# Patient Record
Sex: MALE | Race: WHITE | NOT HISPANIC OR LATINO | Employment: FULL TIME | URBAN - METROPOLITAN AREA
[De-identification: names, ages, dates, MRNs, and addresses within clinical notes are randomized per-mention and may not be internally consistent; named-entity substitution may affect disease eponyms.]

---

## 2017-01-12 ENCOUNTER — OFFICE VISIT (OUTPATIENT)
Dept: FAMILY MEDICINE | Facility: CLINIC | Age: 37
End: 2017-01-12
Payer: COMMERCIAL

## 2017-01-12 VITALS
SYSTOLIC BLOOD PRESSURE: 134 MMHG | HEART RATE: 79 BPM | TEMPERATURE: 97.8 F | BODY MASS INDEX: 42.66 KG/M2 | HEIGHT: 72 IN | WEIGHT: 315 LBS | DIASTOLIC BLOOD PRESSURE: 80 MMHG

## 2017-01-12 DIAGNOSIS — J02.9 SORE THROAT: Primary | ICD-10-CM

## 2017-01-12 DIAGNOSIS — I10 BENIGN ESSENTIAL HYPERTENSION: ICD-10-CM

## 2017-01-12 LAB
DEPRECATED S PYO AG THROAT QL EIA: NORMAL
MICRO REPORT STATUS: NORMAL
SPECIMEN SOURCE: NORMAL

## 2017-01-12 PROCEDURE — 87081 CULTURE SCREEN ONLY: CPT | Performed by: FAMILY MEDICINE

## 2017-01-12 PROCEDURE — 87880 STREP A ASSAY W/OPTIC: CPT | Performed by: FAMILY MEDICINE

## 2017-01-12 PROCEDURE — 99214 OFFICE O/P EST MOD 30 MIN: CPT | Performed by: FAMILY MEDICINE

## 2017-01-12 RX ORDER — LISINOPRIL 10 MG/1
10 TABLET ORAL DAILY
Qty: 30 TABLET | Refills: 1 | Status: SHIPPED | OUTPATIENT
Start: 2017-01-12 | End: 2017-02-01

## 2017-01-12 NOTE — NURSING NOTE
Chief Complaint   Patient presents with     Hypertension     Here to discuss about elevated blood pressure readings.     Pharyngitis     Sore throat symptoms.       Initial /72 mmHg  Pulse 79  Temp(Src) 97.8  F (36.6  C) (Tympanic)  Ht 6' (1.829 m)  Wt 315 lb (142.883 kg)  BMI 42.71 kg/m2 Estimated body mass index is 42.71 kg/(m^2) as calculated from the following:    Height as of this encounter: 6' (1.829 m).    Weight as of this encounter: 315 lb (142.883 kg).  BP completed using cuff size: X-large

## 2017-01-12 NOTE — PROGRESS NOTES
SUBJECTIVE:                                                    Edwin Nuno is a 36 year old male who presents to clinic today for the following health issues:      Hypertension Follow-up    He was seen for a dental visit and the blood pressure was 184/60.  Was seen in clinic and was told to monitor his readings.  His BP machine at home has the average BP at rest in the 160 systolic range.   Was seen in the ER for conjunctivitis on 12-30-16 and blood pressure was still elevated.   He was on Lisinopril in the past.     Family Hx: none for hypertension.     Clinic BP readings are copied below:  Vital Signs 11/10/2016 12/19/2016 12/19/2016 12/30/2016 12/30/2016   Systolic 135 161 159 193 169   Diastolic 81 95 92 121 103   Pulse 79 80 77 85      Vital Signs 1/12/2017   Systolic 141   Diastolic 72   Pulse 79         Outpatient blood pressures are being checked at home.  Results are 160/116.    Low Salt Diet: no added salt     ENT Symptoms             Symptoms: cc Present Absent Comment   Fever/Chills   x    Fatigue   x    Muscle Aches   x    Eye Irritation   x    Sneezing   x    Nasal Titus/Drg  x  Minor.   Sinus Pressure/Pain   x    Loss of smell   x    Dental pain   x    Sore Throat  x     Swollen Glands   x    Ear Pain/Fullness   x    Cough   x    Wheeze   x    Chest Pain   x    Shortness of breath   x    Rash   x    Other         Symptom duration:  One week.   Symptom severity:  Symptoms are the same.   Treatments tried:  Ibuprofen as needed. Cool liquids   Contacts:  none known for strep     He has had strep throat in the past.       Amount of exercise or physical activity: 3 days per week.    Problems taking medications regularly: No    Medication side effects: none  Diet: No added salt.        Current outpatient prescriptions:      lisinopril (PRINIVIL/ZESTRIL) 10 MG tablet, Take 1 tablet (10 mg) by mouth daily, Disp: 30 tablet, Rfl: 1     testosterone cypionate (DEPO-TESTOSTERONE) 200 MG/ML injection, 400 mg  every 25 days., Disp: 2 mL, Rfl: 5     order for DME, Equipment being ordered: gel cups, large, Disp: 1 Device, Rfl: 0     order for DME, Equipment being ordered: knee sleeve, XL, Disp: 1 Device, Rfl: 0    Patient Active Problem List   Diagnosis     Family history of colon cancer     Hyperlipidemia with target LDL less than 130     Hypotestosteronism     Esophageal reflux     Benign essential hypertension       Blood pressure 134/80, pulse 79, temperature 97.8  F (36.6  C), temperature source Tympanic, height 6' (1.829 m), weight 315 lb (142.883 kg).  Exam:  GENERAL APPEARANCE: healthy, alert and no distress  EYES: EOMI,  PERRL  HENT: ear canals and TM's normal and tonsillar erythema  NECK: no adenopathy, no asymmetry, masses, or scars and thyroid normal to palpation  RESP: lungs clear to auscultation - no rales, rhonchi or wheezes  CV: regular rates and rhythm, normal S1 S2, no S3 or S4 and no murmur, click or rub -  SKIN: no suspicious lesions or rashes  PSYCH: mentation appears normal and affect normal/bright    RST: negative    (J02.9) Sore throat  (primary encounter diagnosis)  Comment:   Plan: Strep, Rapid Screen, Beta strep group A culture        For the throat we did the rapid strep test, and this is negative. If the 24 hour test is positive then Pen V at 500 mg twice daily would be ordered. Use the symptomatic therapies as discussed. Avoid contagious exposures.     (I10) Benign essential hypertension  Comment:   Plan: lisinopril (PRINIVIL/ZESTRIL) 10 MG tablet        For the BP, since the average is high then we will start Lisinopril 10 mg daily. Monitor and record the readings at rest with a large cuff, with a calibrated machine. The goal for the average is under 130/80.   Use the non drug therapies as well and if the average is still high in 3-4 weeks then increase the dose to 20 mg daily. Call if any side effects. If doing well then recheck in the clinic in 6 weeks. Bring in the readings.       Cecilio  Davis Hughes

## 2017-01-12 NOTE — PATIENT INSTRUCTIONS
Thank you for choosing [facility].  You may be receiving a survey in the mail from Lukas [employer] regarding your visit today.  Please take a few minutes to complete and return the survey to let us know how we are doing.      If you have questions or concerns, please contact us via RiverWired or you can contact your care team at [phone].    Our Clinic hours are:  Monday 6:40 am  to 7:00 pm  Tuesday -Friday 6:40 am to 5:00 pm    The [place] outpatient lab hours are:  Monday - Friday 6:10 am to 4:45 pm  Saturdays 7:00 am to 11:00 am  Appointments are required, call [phone]    If you have clinical questions after hours or would like to schedule an appointment,  call the clinic at [phone].      (J02.9) Sore throat  (primary encounter diagnosis)  Comment:   Plan: Strep, Rapid Screen, Beta strep group A culture        For the throat we did the rapid strep test, and this is negative. If the 24 hour test is positive then Pen V at 500 mg twice daily would be ordered. Use the symptomatic therapies as discussed. Avoid contagious exposures.     (I10) Benign essential hypertension  Comment:   Plan: lisinopril (PRINIVIL/ZESTRIL) 10 MG tablet        For the BP, since the average is high then we will start Lisinopril 10 mg daily. Monitor and record the readings at rest with a large cuff, with a calibrated machine. The goal for the average is under 130/80.   Use the non drug therapies as well and if the average is still high in 3-4 weeks then increase the dose to 20 mg daily. Call if any side effects. If doing well then recheck in the clinic in 6 weeks. Bring in the readings.

## 2017-01-12 NOTE — MR AVS SNAPSHOT
After Visit Summary   1/12/2017    Edwin Nuno    MRN: [id number]           Patient Information     Date Of Birth          1980        Visit Information        Provider Department      1/12/2017 7:20 AM Cecilio Hughes MD [facility]        Today's Diagnoses     Sore throat    -  1     Benign essential hypertension           Care Instructions          Thank you for choosing [facility].  You may be receiving a survey in the mail from [facility] regarding your visit today.  Please take a few minutes to complete and return the survey to let us know how we are doing.      If you have questions or concerns, please contact us via [employer] or you can contact your care team at [phone].    Our Clinic hours are:  Monday 6:40 am  to 7:00 pm  Tuesday -Friday 6:40 am to 5:00 pm    The [place] outpatient lab hours are:  Monday - Friday 6:10 am to 4:45 pm  Saturdays 7:00 am to 11:00 am  Appointments are required, call [phone]    If you have clinical questions after hours or would like to schedule an appointment,  call the clinic at [phone].      (J02.9) Sore throat  (primary encounter diagnosis)  Comment:   Plan: Strep, Rapid Screen, Beta strep group A culture        For the throat we did the rapid strep test, and this is negative. If the 24 hour test is positive then Pen V at 500 mg twice daily would be ordered. Use the symptomatic therapies as discussed. Avoid contagious exposures.     (I10) Benign essential hypertension  Comment:   Plan: lisinopril (PRINIVIL/ZESTRIL) 10 MG tablet        For the BP, since the average is high then we will start Lisinopril 10 mg daily. Monitor and record the readings at rest with a large cuff, with a calibrated machine. The goal for the average is under 130/80.   Use the non drug therapies as well and if the average is still high in 3-4 weeks then increase the dose to 20 mg daily. Call if any side effects. If doing well then recheck in  the clinic in 6 weeks. Bring in the readings.         Follow-ups after your visit        Who to contact     If you have questions or need follow up information about today's clinic visit or your schedule please contact [facility] directly at [phone].  Normal or non-critical lab and imaging results will be communicated to you by MyChart, letter or phone within 4 business days after the clinic has received the results. If you do not hear from us within 7 days, please contact the clinic through Tradesparqhart or phone. If you have a critical or abnormal lab result, we will notify you by phone as soon as possible.  Submit refill requests through SCREEMO or call your pharmacy and they will forward the refill request to us. Please allow 3 business days for your refill to be completed.          Additional Information About Your Visit        TradesparqharSkytap Information     SCREEMO gives you secure access to your electronic health record. If you see a primary care provider, you can also send messages to your care team and make appointments. If you have questions, please call your primary care clinic.  If you do not have a primary care provider, please call [phone] and they will assist you.        Care EveryWhere ID     This is your Care EveryWhere ID. This could be used by other organizations to access your [place] medical records  [id number]        Your Vitals Were     Pulse Temperature Height BMI (Body Mass Index)          79 97.8  F (36.6  C) (Tympanic) 6' (1.829 m) 42.71 kg/m2         Blood Pressure from Last 3 Encounters:   01/12/17 141/72   12/30/16 169/103   12/19/16 159/92    Weight from Last 3 Encounters:   01/12/17 315 lb (142.883 kg)   12/30/16 313 lb (141.976 kg)   12/19/16 321 lb 6.4 oz (145.786 kg)              We Performed the Following     Beta strep group A culture     Strep, Rapid Screen          Today's Medication Changes          These changes are accurate as of: 1/12/17  8:02 AM.  If you have  any questions, ask your nurse or doctor.               Start taking these medicines.        Dose/Directions    lisinopril 10 MG tablet   Commonly known as:  PRINIVIL/ZESTRIL   Used for:  Benign essential hypertension   Started by:  Cecilio Hughes MD        Dose:  10 mg   Take 1 tablet (10 mg) by mouth daily   Quantity:  30 tablet   Refills:  1            Where to get your medicines      These medications were sent to [id number] [address] AT [facility] OF [address] [place] [address]  [address] [facility] [address]     Phone:  [phone]    - lisinopril 10 MG tablet             Primary Care Provider Office Phone # Fax #    Cecilio Hughes [phone]       [facility] [address] [facility] [address]        Thank you!     Thank you for choosing [facility]  for your care. Our goal is always to provide you with excellent care. Hearing back from our patients is one way we can continue to improve our services. Please take a few minutes to complete the written survey that you may receive in the mail after your visit with us. Thank you!             Your Updated Medication List - Protect others around you: Learn how to safely use, store and throw away your medicines at www.disposemymeds.org.          This list is accurate as of: 1/12/17  8:02 AM.  Always use your most recent med list.                   Brand Name Dispense Instructions for use    DEPO-TESTOSTERONE 200 MG/ML injection   Generic drug:  testosterone cypionate     2 mL    400 mg every 25 days.       lisinopril 10 MG tablet    PRINIVIL/ZESTRIL    30 tablet    Take 1 tablet (10 mg) by mouth daily       * order for DME     1 Device    Equipment being ordered: knee sleeve, XL       * order for DME     1 Device    Equipment being ordered: gel cups, large       * Notice:  This list has 2 medication(s) that are the same as other medications prescribed for you. Read  the directions carefully, and ask your doctor or other care provider to review them with you.

## 2017-01-12 NOTE — Clinical Note
[facility]  [address] [facility] [address]  Phone: [phone]    January 18, 2017    Edwin Nuno  [address]  [facility] [address]              Dear Mr. Nuno,     The results of your recent throat culture were negative.  If you have any further questions or concerns please contact the clinic.                Sincerely,      Cecilio Hughes MD / CATHERINE

## 2017-01-14 LAB
BACTERIA SPEC CULT: NORMAL
MICRO REPORT STATUS: NORMAL
SPECIMEN SOURCE: NORMAL

## 2017-02-01 ENCOUNTER — OFFICE VISIT (OUTPATIENT)
Dept: FAMILY MEDICINE | Facility: CLINIC | Age: 37
End: 2017-02-01
Payer: COMMERCIAL

## 2017-02-01 VITALS
TEMPERATURE: 98.3 F | BODY MASS INDEX: 41.99 KG/M2 | WEIGHT: 310 LBS | HEIGHT: 72 IN | HEART RATE: 70 BPM | SYSTOLIC BLOOD PRESSURE: 135 MMHG | DIASTOLIC BLOOD PRESSURE: 74 MMHG

## 2017-02-01 DIAGNOSIS — I10 BENIGN ESSENTIAL HYPERTENSION: Primary | ICD-10-CM

## 2017-02-01 DIAGNOSIS — E34.9 HYPOTESTOSTERONISM: ICD-10-CM

## 2017-02-01 LAB
CREAT SERPL-MCNC: 1.27 MG/DL (ref 0.66–1.25)
GFR SERPL CREATININE-BSD FRML MDRD: 64 ML/MIN/1.7M2
POTASSIUM SERPL-SCNC: 3.9 MMOL/L (ref 3.4–5.3)

## 2017-02-01 PROCEDURE — 36415 COLL VENOUS BLD VENIPUNCTURE: CPT | Performed by: FAMILY MEDICINE

## 2017-02-01 PROCEDURE — 82565 ASSAY OF CREATININE: CPT | Performed by: FAMILY MEDICINE

## 2017-02-01 PROCEDURE — 99213 OFFICE O/P EST LOW 20 MIN: CPT | Mod: 25 | Performed by: FAMILY MEDICINE

## 2017-02-01 PROCEDURE — 96372 THER/PROPH/DIAG INJ SC/IM: CPT | Performed by: FAMILY MEDICINE

## 2017-02-01 PROCEDURE — 84132 ASSAY OF SERUM POTASSIUM: CPT | Performed by: FAMILY MEDICINE

## 2017-02-01 RX ORDER — LISINOPRIL 10 MG/1
10 TABLET ORAL DAILY
Qty: 90 TABLET | Refills: 3 | Status: SHIPPED | OUTPATIENT
Start: 2017-02-01 | End: 2017-08-03

## 2017-02-01 RX ORDER — TESTOSTERONE CYPIONATE 200 MG/ML
INJECTION, SOLUTION INTRAMUSCULAR
Qty: 2 ML | Refills: 5 | COMMUNITY
Start: 2017-02-01 | End: 2017-04-10

## 2017-02-01 NOTE — PROGRESS NOTES
SUBJECTIVE:                                                    Edwin Nuno is a 36 year old male who presents to clinic today for the following health issues:      Hypertension Follow-up      Outpatient blood pressures are being checked at home.  Results are 126/98 for an average.    He was at the Dentist on 1-24-17 and the blood pressure was 158/88.  They sent a form with him to have filled out.  He forgot this form at home today.    Low Salt Diet: no added salt       Amount of exercise or physical activity: 3 days per week.    Problems taking medications regularly: No    Medication side effects: none    Diet: No added salt.    TESTOSTERONE INJECTION:  Due for his Testosterone injection today.    Testosterone Cypionate, 400 mg every 25 days.      Current outpatient prescriptions:      lisinopril (PRINIVIL/ZESTRIL) 10 MG tablet, Take 1 tablet (10 mg) by mouth daily, Disp: 90 tablet, Rfl: 3     testosterone cypionate (DEPO-TESTOSTERONE) 200 MG/ML injection, 400 mg every 25 days., Disp: 2 mL, Rfl: 5     [DISCONTINUED] lisinopril (PRINIVIL/ZESTRIL) 10 MG tablet, Take 1 tablet (10 mg) by mouth daily, Disp: 30 tablet, Rfl: 1     [DISCONTINUED] testosterone cypionate (DEPO-TESTOSTERONE) 200 MG/ML injection, 400 mg every 25 days., Disp: 2 mL, Rfl: 5     order for DME, Equipment being ordered: gel cups, large, Disp: 1 Device, Rfl: 0     order for DME, Equipment being ordered: knee sleeve, XL, Disp: 1 Device, Rfl: 0    Patient Active Problem List   Diagnosis     Family history of colon cancer     Hyperlipidemia with target LDL less than 130     Hypotestosteronism     Esophageal reflux     Benign essential hypertension       Blood pressure 135/74, pulse 70, temperature 98.3  F (36.8  C), temperature source Tympanic, height 6' (1.829 m), weight 310 lb (140.615 kg).  Exam:  GENERAL APPEARANCE: healthy, alert and no distress  CV: regular rates and rhythm  SKIN: no suspicious lesions or rashes  PSYCH: mentation appears  normal and affect normal/bright      (I10) Benign essential hypertension  (primary encounter diagnosis)  Comment:   Plan: lisinopril (PRINIVIL/ZESTRIL) 10 MG tablet,         Creatinine, Potassium        Monitor and record the BP readings at rest with a calibrated machine with a large cuff. The goal for the average is under 130/80. Use the med and the non drug therapies.  If doing well then refill and recheck annually. If the BP is higher, then call the clinic RN at [phone] and the dose of the Lisinopril will be increased to 20 mg daily.     (E29.1) Hypotestosteronism  Comment:   Plan: testosterone cypionate (DEPO-TESTOSTERONE) 200         MG/ML injection        The shot is done today. The interval is 25 days. The interval may be adjusted pending the effects. The lab is done annually.     Cecilio Hughes

## 2017-02-01 NOTE — MR AVS SNAPSHOT
After Visit Summary   2/1/2017    Edwin Nuno    MRN: [id number]           Patient Information     Date Of Birth          1980        Visit Information        Provider Department      2/1/2017 3:00 PM Cecilio Hughes MD [facility]        Today's Diagnoses     Benign essential hypertension    -  1     Hypotestosteronism           Care Instructions          Thank you for choosing [facility].  You may be receiving a survey in the mail from [facility] regarding your visit today.  Please take a few minutes to complete and return the survey to let us know how we are doing.      If you have questions or concerns, please contact us via [email or web address] or you can contact your care team at [phone].    Our Clinic hours are:  Monday 6:40 am  to 7:00 pm  Tuesday -Friday 6:40 am to 5:00 pm    The [place] outpatient lab hours are:  Monday - Friday 6:10 am to 4:45 pm  Saturdays 7:00 am to 11:00 am  Appointments are required, call [phone]    If you have clinical questions after hours or would like to schedule an appointment,  call the clinic at [phone].    (I10) Benign essential hypertension  (primary encounter diagnosis)  Comment:   Plan: lisinopril (PRINIVIL/ZESTRIL) 10 MG tablet,         Creatinine, Potassium        Monitor and record the BP readings at rest with a calibrated machine with a large cuff. The goal for the average is under 130/80. Use the med and the non drug therapies.  If doing well then refill and recheck annually. If the BP is higher, then call the clinic RN at [phone] and the dose of the Lisinopril will be increased to 20 mg daily.     (E29.1) Hypotestosteronism  Comment:   Plan: testosterone cypionate (DEPO-TESTOSTERONE) 200         MG/ML injection        The shot is done today. The interval is 25 days. The interval may be adjusted pending the effects. The lab is done annually.           Follow-ups after your visit        Who to contact     If you have  questions or need follow up information about today's clinic visit or your schedule please contact [facility] directly at [phone].  Normal or non-critical lab and imaging results will be communicated to you by MyChart, letter or phone within 4 business days after the clinic has received the results. If you do not hear from us within 7 days, please contact the clinic through [email or web address] or phone. If you have a critical or abnormal lab result, we will notify you by phone as soon as possible.  Submit refill requests through Eyepic or call your pharmacy and they will forward the refill request to us. Please allow 3 business days for your refill to be completed.          Additional Information About Your Visit        Ask.comharSanook Information     Eyepic gives you secure access to your electronic health record. If you see a primary care provider, you can also send messages to your care team and make appointments. If you have questions, please call your primary care clinic.  If you do not have a primary care provider, please call [phone] and they will assist you.        Care EveryWhere ID     This is your Care EveryWhere ID. This could be used by other organizations to access your [place] medical records  [id number]        Your Vitals Were     Pulse Temperature Height BMI (Body Mass Index)          70 98.3  F (36.8  C) (Tympanic) 6' (1.829 m) 42.03 kg/m2         Blood Pressure from Last 3 Encounters:   02/01/17 135/74   01/12/17 134/80   12/30/16 169/103    Weight from Last 3 Encounters:   02/01/17 310 lb (140.615 kg)   01/12/17 315 lb (142.883 kg)   12/30/16 313 lb (141.976 kg)              We Performed the Following     Creatinine     Potassium          Where to get your medicines      These medications were sent to [id number] - [place] [address] AT [address]  [address] [facility] [address]     Phone:  [phone]    - lisinopril 10 MG  tablet       Primary Care Provider Office Phone # Fax #    Cecilio Hughes [phone]       [facility] [address] [facility] [address]        Thank you!     Thank you for choosing [facility]  for your care. Our goal is always to provide you with excellent care. Hearing back from our patients is one way we can continue to improve our services. Please take a few minutes to complete the written survey that you may receive in the mail after your visit with us. Thank you!             Your Updated Medication List - Protect others around you: Learn how to safely use, store and throw away your medicines at www.disposemymeds.org.          This list is accurate as of: 2/1/17  3:42 PM.  Always use your most recent med list.                   Brand Name Dispense Instructions for use    DEPO-TESTOSTERONE 200 MG/ML injection   Generic drug:  testosterone cypionate     2 mL    400 mg every 25 days.       lisinopril 10 MG tablet    PRINIVIL/ZESTRIL    90 tablet    Take 1 tablet (10 mg) by mouth daily       * order for DME     1 Device    Equipment being ordered: knee sleeve, XL       * order for DME     1 Device    Equipment being ordered: gel cups, large       * Notice:  This list has 2 medication(s) that are the same as other medications prescribed for you. Read the directions carefully, and ask your doctor or other care provider to review them with you.

## 2017-02-01 NOTE — PATIENT INSTRUCTIONS
Thank you for choosing [facility].  You may be receiving a survey in the mail from Lukas Kent regarding your visit today.  Please take a few minutes to complete and return the survey to let us know how we are doing.      If you have questions or concerns, please contact us via [employer] or you can contact your care team at [phone].    Our Clinic hours are:  Monday 6:40 am  to 7:00 pm  Tuesday -Friday 6:40 am to 5:00 pm    The [place] outpatient lab hours are:  Monday - Friday 6:10 am to 4:45 pm  Saturdays 7:00 am to 11:00 am  Appointments are required, call [phone]    If you have clinical questions after hours or would like to schedule an appointment,  call the clinic at [phone].    (I10) Benign essential hypertension  (primary encounter diagnosis)  Comment:   Plan: lisinopril (PRINIVIL/ZESTRIL) 10 MG tablet,         Creatinine, Potassium        Monitor and record the BP readings at rest with a calibrated machine with a large cuff. The goal for the average is under 130/80. Use the med and the non drug therapies.  If doing well then refill and recheck annually. If the BP is higher, then call the clinic RN at [phone] and the dose of the Lisinopril will be increased to 20 mg daily.     (E29.1) Hypotestosteronism  Comment:   Plan: testosterone cypionate (DEPO-TESTOSTERONE) 200         MG/ML injection        The shot is done today. The interval is 25 days. The interval may be adjusted pending the effects. The lab is done annually.

## 2017-02-01 NOTE — Clinical Note
[facility]  [address] [facility] [place] [address]  Phone: [phone]    February 1, 2017      Edwin Nuno  [address]  [place] [address]    Dear Petar Nuno    Your blood pressure today in clinic was 135/74.   You should be authorized for dental work.       Sincerely,    / Cecilio Hughes MD

## 2017-02-01 NOTE — NURSING NOTE
Chief Complaint   Patient presents with     Hypertension     Recheck on blood pressure.     Imm/Inj     Due for a Testosterone injection.       Initial /74 mmHg  Pulse 70  Temp(Src) 98.3  F (36.8  C) (Tympanic)  Ht 6' (1.829 m)  Wt 310 lb (140.615 kg)  BMI 42.03 kg/m2 Estimated body mass index is 42.03 kg/(m^2) as calculated from the following:    Height as of this encounter: 6' (1.829 m).    Weight as of this encounter: 310 lb (140.615 kg).  BP completed using cuff size: X-large

## 2017-02-01 NOTE — Clinical Note
[facility]  [address] [place] [address]  Phone: [phone]    February 2, 2017    Edwin Nuno  [address]  [place] [address]          Dear Mr. Nuno,    The results of your recent lab tests were:  The creatinine is higher, and this is expected on Lisinopril.   He should have one more creatinine in about one month to ensure it is stable.   If there is no change, then the lab is done annually.  Component      Latest Ref Rng 2/1/2017   Creatinine      0.66 - 1.25 mg/dL 1.27 (H)   GFR Estimate      >60 mL/min/1.7m2 64   GFR Estimate If Black      >60 mL/min/1.7m2 78   Potassium      3.4 - 5.3 mmol/L 3.9     If you have any further questions or problems, please contact our office.    Sincerely,      Cecilio Hughes MD / dennise

## 2017-03-02 ENCOUNTER — ALLIED HEALTH/NURSE VISIT (OUTPATIENT)
Dept: FAMILY MEDICINE | Facility: CLINIC | Age: 37
End: 2017-03-02
Payer: COMMERCIAL

## 2017-03-02 DIAGNOSIS — E34.9 HYPOTESTOSTERONISM: Primary | ICD-10-CM

## 2017-03-02 PROCEDURE — 99207 ZZC NO CHARGE NURSE ONLY: CPT

## 2017-04-10 ENCOUNTER — TELEPHONE (OUTPATIENT)
Dept: FAMILY MEDICINE | Facility: CLINIC | Age: 37
End: 2017-04-10

## 2017-04-10 DIAGNOSIS — E34.9 HYPOTESTOSTERONISM: ICD-10-CM

## 2017-04-10 RX ORDER — TESTOSTERONE CYPIONATE 200 MG/ML
INJECTION, SOLUTION INTRAMUSCULAR
Qty: 2 ML | Refills: 5 | Status: SHIPPED | OUTPATIENT
Start: 2017-04-10 | End: 2017-08-03

## 2017-04-10 NOTE — TELEPHONE ENCOUNTER
Please notify prescription ready to  at pharmacy.   Please explain the protocol to him. Thanks.   Cecilio Hughes

## 2017-04-10 NOTE — TELEPHONE ENCOUNTER
Called and spoke with patient . He did receive his letter and would like to continue getting testosterone injections here. He would like rx sent to [facility]. He will call pharmacy one day prior to coming for injection. Clement RANDOLPH CMA

## 2017-04-10 NOTE — TELEPHONE ENCOUNTER
Paper rx walked by hand to [facility] . Clement RANDOLPH CMA  Patient previously explained protocol.

## 2017-06-01 ENCOUNTER — ALLIED HEALTH/NURSE VISIT (OUTPATIENT)
Dept: FAMILY MEDICINE | Facility: CLINIC | Age: 37
End: 2017-06-01
Payer: COMMERCIAL

## 2017-06-01 DIAGNOSIS — E34.9 HYPOTESTOSTERONISM: Primary | ICD-10-CM

## 2017-06-01 PROCEDURE — 96372 THER/PROPH/DIAG INJ SC/IM: CPT

## 2017-06-01 PROCEDURE — 99207 ZZC NO CHARGE NURSE ONLY: CPT

## 2017-06-21 ENCOUNTER — HOSPITAL ENCOUNTER (EMERGENCY)
Facility: CLINIC | Age: 37
Discharge: HOME | End: 2017-06-21
Attending: NURSE PRACTITIONER | Admitting: NURSE PRACTITIONER
Payer: COMMERCIAL

## 2017-06-21 VITALS
WEIGHT: 290 LBS | TEMPERATURE: 100.3 F | BODY MASS INDEX: 39.28 KG/M2 | DIASTOLIC BLOOD PRESSURE: 75 MMHG | OXYGEN SATURATION: 95 % | HEIGHT: 72 IN | SYSTOLIC BLOOD PRESSURE: 182 MMHG | RESPIRATION RATE: 16 BRPM

## 2017-06-21 DIAGNOSIS — J02.0 ACUTE STREPTOCOCCAL PHARYNGITIS: ICD-10-CM

## 2017-06-21 LAB
INTERNAL QC OK POCT: YES
S PYO AG THROAT QL IA.RAPID: POSITIVE

## 2017-06-21 PROCEDURE — 99213 OFFICE O/P EST LOW 20 MIN: CPT

## 2017-06-21 PROCEDURE — 99213 OFFICE O/P EST LOW 20 MIN: CPT | Performed by: NURSE PRACTITIONER

## 2017-06-21 PROCEDURE — 87880 STREP A ASSAY W/OPTIC: CPT | Performed by: NURSE PRACTITIONER

## 2017-06-21 RX ORDER — PENICILLIN V POTASSIUM 500 MG/1
500 TABLET, FILM COATED ORAL 2 TIMES DAILY
Qty: 20 TABLET | Refills: 0 | Status: SHIPPED | OUTPATIENT
Start: 2017-06-21 | End: 2017-07-01

## 2017-06-21 NOTE — LETTER
[facility] EMERGENCY DEPARTMENT  [address] [place] [address]  [phone]          June 21, 2017    RE:  Edwin Nuno                                                                                                                                                       [address]  [place] [address]            To whom it may concern:    Edwin KENNEDY Nuno was under my professional care today in Urgent Care. He is unable to work tomorrow.     Sincerely,         CHAIM Johnson CNP

## 2017-06-21 NOTE — ED AVS SNAPSHOT
[facility] Emergency Department    [address] [address]    Phone:  [phone]    Fax:  [phone]                                       Edwin Nuno   MRN: [id number]    Department:  [facility] Emergency Department   Date of Visit:  6/21/2017           Patient Information     Date Of Birth          1980        Your diagnoses for this visit were:     Acute streptococcal pharyngitis        You were seen by Irma Cabrera APRN CNP.      Follow-up Information     Follow up with Cecilio Hughes MD.    Specialty:  Family Practice    Why:  As needed    Contact information:    [facility]  [address] [address]  [phone]          Discharge Instructions         Pharyngitis: Strep (Confirmed)    You have had a positive test for strep throat. Strep throat is a contagious illness. It is spread by coughing, kissing or by touching others after touching your mouth or nose. Symptoms include throat pain that is worse with swallowing, aching all over, headache, and fever. It is treated with antibiotic medicine. This should help you start to feel better in 1 to 2 days.  Home care    Rest at home. Drink plenty of fluids to you won't get dehydrated.    No work or school for the first 2 days of taking the antibiotics. After this time, you will not be contagious. You can then return to school or work if you are feeling better.     Take antibiotic medicine for the full 10 days, even if you feel better. This is very important to ensure the infection is treated. It is also important to prevent medicine-resistant germs from developing. If you were given an antibiotic shot, you don't need any more antibiotics.    You may use acetaminophen or ibuprofen to control pain or fever, unless another medicine was prescribed for this. Talk with your doctor before taking these medicines if you have chronic liver or kidney disease. Also talk with your doctor if you  have had a stomach ulcer or GI bleeding.    Throat lozenges or sprays help reduce pain. Gargling with warm saltwater will also reduce throat pain. Dissolve 1/2 teaspoon of salt in 1 glass of warm water. This may be useful just before meals.     Soft foods are OK. Avoid salty or spicy foods.  Follow-up care  Follow up with your healthcare provider or our staff if you don't get better over the next week.  When to seek medical advice  Call your healthcare provider right away if any of these occur:    Fever of 100.4 F (38 C) or higher, or as directed by your healthcare provider    New or worsening ear pain, sinus pain, or headache    Painful lumps in the back of neck    Stiff neck    Lymph nodes getting larger or becoming soft in the middle    You can't swallow liquids or you can't open your mouth wide because of throat pain    Signs of dehydration. These include very dark urine or no urine, sunken eyes, and dizziness.    Trouble breathing or noisy breathing    Muffled voice    Rash  Date Last Reviewed: 4/13/2015 2000-2017 [employer]. [address] [place] [address]. All rights reserved. This information is not intended as a substitute for professional medical care. Always follow your healthcare professional's instructions.          24 Hour Appointment Hotline       To make an appointment at any [facility] clinic, call [phone] ([phone]). If you don't have a family doctor or clinic, we will help you find one. [facility] clinics are conveniently located to serve the needs of you and your family.             Review of your medicines      START taking        Dose / Directions Last dose taken    penicillin V potassium 500 MG tablet   Commonly known as:  VEETID   Dose:  500 mg   Quantity:  20 tablet        Take 1 tablet (500 mg) by mouth 2 times daily for 10 days   Refills:  0          Our records show that you are taking the medicines listed below. If these are incorrect, please call  your family doctor or clinic.        Dose / Directions Last dose taken    lisinopril 10 MG tablet   Commonly known as:  PRINIVIL/ZESTRIL   Dose:  10 mg   Quantity:  90 tablet        Take 1 tablet (10 mg) by mouth daily   Refills:  3        * order for DME   Quantity:  1 Device        Equipment being ordered: knee sleeve, XL   Refills:  0        * order for DME   Quantity:  1 Device        Equipment being ordered: gel cups, large   Refills:  0        testosterone cypionate 200 MG/ML injection   Commonly known as:  DEPO-TESTOSTERONE   Quantity:  2 mL        400 mg every 25 days.   Refills:  5        * Notice:  This list has 2 medication(s) that are the same as other medications prescribed for you. Read the directions carefully, and ask your doctor or other care provider to review them with you.            Prescriptions were sent or printed at these locations (1 Prescription)                   [facility] [place], [place] - [address]   [address] [address]    Telephone:  [phone]   Fax:  [phone]   Hours:                  E-Prescribed (1 of 1)         penicillin V potassium (VEETID) 500 MG tablet                Procedures and tests performed during your visit     Rapid strep group A screen POCT      Orders Needing Specimen Collection     None      Pending Results     No orders found from 6/19/2017 to 6/22/2017.            Pending Culture Results     No orders found from 6/19/2017 to 6/22/2017.            Pending Results Instructions     If you had any lab results that were not finalized at the time of your Discharge, you can call the ED Lab Result RN at [phone]. You will be contacted by this team for any positive Lab results or changes in treatment. The nurses are available 7 days a week from 10A to 6:30P.  You can leave a message 24 hours per day and they will return your call.        Test Results From Your Hospital Stay        6/21/2017  8:05 PM      Component Results      Component Value Ref Range & Units Status    Rapid Strep A Screen POSITIVE neg Final    Internal QC OK Yes  Final                Thank you for choosing [facility]       Thank you for choosing [facility] for your care. Our goal is always to provide you with excellent care. Hearing back from our patients is one way we can continue to improve our services. Please take a few minutes to complete the written survey that you may receive in the mail after you visit with us. Thank you!        [facility] Information     [facility] gives you secure access to your electronic health record. If you see a primary care provider, you can also send messages to your care team and make appointments. If you have questions, please call your primary care clinic.  If you do not have a primary care provider, please call [phone] and they will assist you.        Care EveryWhere ID     This is your Care EveryWhere ID. This could be used by other organizations to access your [facility] medical records  [id number]        Equal Access to Services     BERENICE BENJAMIN : Anusha Cornejo, john worley, austen stanley . So [facility] [phone].    ATENCIÓN: Si habla español, tiene a bhandari disposición servicios gratuitos de asistencia lingüística. Llame al [phone].    We comply with applicable federal civil rights laws and [place] laws. We do not discriminate on the basis of race, color, national origin, age, disability sex, sexual orientation or gender identity.            After Visit Summary       This is your record. Keep this with you and show to your community pharmacist(s) and doctor(s) at your next visit.

## 2017-06-21 NOTE — ED AVS SNAPSHOT
[facility] Emergency Department    [address] [address]    Phone:  [phone]    Fax:  [phone]                                       Edwin Nuno   MRN: [id number]    Department:  [facility] Emergency Department   Date of Visit:  6/21/2017           After Visit Summary Signature Page     I have received my discharge instructions, and my questions have been answered. I have discussed any challenges I see with this plan with the nurse or doctor.    ..........................................................................................................................................  Patient/Patient Representative Signature      ..........................................................................................................................................  Patient Representative Print Name and Relationship to Patient    ..................................................               ................................................  Date                                            Time    ..........................................................................................................................................  Reviewed by Signature/Title    ...................................................              ..............................................  Date                                                            Time

## 2017-06-22 ENCOUNTER — HOSPITAL ENCOUNTER (EMERGENCY)
Facility: CLINIC | Age: 37
Discharge: HOME | End: 2017-06-22
Attending: EMERGENCY MEDICINE | Admitting: EMERGENCY MEDICINE
Payer: COMMERCIAL

## 2017-06-22 ENCOUNTER — NURSE TRIAGE (OUTPATIENT)
Dept: NURSING | Facility: CLINIC | Age: 37
End: 2017-06-22

## 2017-06-22 VITALS
BODY MASS INDEX: 39.28 KG/M2 | TEMPERATURE: 99.2 F | DIASTOLIC BLOOD PRESSURE: 93 MMHG | WEIGHT: 290 LBS | HEART RATE: 85 BPM | OXYGEN SATURATION: 96 % | RESPIRATION RATE: 16 BRPM | HEIGHT: 72 IN | SYSTOLIC BLOOD PRESSURE: 154 MMHG

## 2017-06-22 DIAGNOSIS — R51.9 HEADACHE, UNSPECIFIED HEADACHE TYPE: ICD-10-CM

## 2017-06-22 DIAGNOSIS — J02.0 STREPTOCOCCAL SORE THROAT: ICD-10-CM

## 2017-06-22 PROCEDURE — 99284 EMERGENCY DEPT VISIT MOD MDM: CPT | Performed by: EMERGENCY MEDICINE

## 2017-06-22 PROCEDURE — 99285 EMERGENCY DEPT VISIT HI MDM: CPT

## 2017-06-22 PROCEDURE — 96372 THER/PROPH/DIAG INJ SC/IM: CPT

## 2017-06-22 PROCEDURE — 25000128 H RX IP 250 OP 636: Performed by: EMERGENCY MEDICINE

## 2017-06-22 PROCEDURE — 25000125 ZZHC RX 250: Performed by: EMERGENCY MEDICINE

## 2017-06-22 RX ORDER — DEXAMETHASONE SODIUM PHOSPHATE 4 MG/ML
10 VIAL (ML) INJECTION ONCE
Status: COMPLETED | OUTPATIENT
Start: 2017-06-22 | End: 2017-06-22

## 2017-06-22 RX ORDER — ONDANSETRON 4 MG/1
4 TABLET, ORALLY DISINTEGRATING ORAL ONCE
Status: COMPLETED | OUTPATIENT
Start: 2017-06-22 | End: 2017-06-22

## 2017-06-22 RX ORDER — HYDROCODONE BITARTRATE AND ACETAMINOPHEN 5; 325 MG/1; MG/1
TABLET ORAL
Qty: 15 TABLET | Refills: 0 | Status: SHIPPED | OUTPATIENT
Start: 2017-06-22 | End: 2017-08-03

## 2017-06-22 RX ADMIN — DEXAMETHASONE SODIUM PHOSPHATE 10 MG: 4 INJECTION, SOLUTION INTRAMUSCULAR; INTRAVENOUS at 22:00

## 2017-06-22 RX ADMIN — HYDROMORPHONE HYDROCHLORIDE 1 MG: 1 INJECTION, SOLUTION INTRAMUSCULAR; INTRAVENOUS; SUBCUTANEOUS at 22:48

## 2017-06-22 RX ADMIN — ONDANSETRON 4 MG: 4 TABLET, ORALLY DISINTEGRATING ORAL at 22:47

## 2017-06-22 ASSESSMENT — ENCOUNTER SYMPTOMS
NECK STIFFNESS: 0
MYALGIAS: 1
GASTROINTESTINAL NEGATIVE: 1
SORE THROAT: 1
FEVER: 1
VOICE CHANGE: 0
RHINORRHEA: 1
RESPIRATORY NEGATIVE: 1
CHILLS: 1
NEUROLOGICAL NEGATIVE: 1
FATIGUE: 1
APPETITE CHANGE: 1

## 2017-06-22 NOTE — LETTER
[facility] EMERGENCY DEPARTMENT  [address] [address]  [phone]  Dept: [phone]      6/22/2017    Re: Edwin KENNEDY Nurys      TO WHOM IT MAY CONCERN:    Edwin Nuno  was seen on Thursday 6/22/17.  Please excuse him from work today and tomorrow due to illness.    Cordially,            [facility] EMERGENCY DEPARTMENT

## 2017-06-22 NOTE — ED PROVIDER NOTES
History     Chief Complaint   Patient presents with     Fatigue     body aches, L neck tenderness last 2 days, sore throat     HPI  Edwin Nuno is a 36 year old male who presents to urgent care for evaluation of sore throat, body aches, fever, pain with swallowing, and left neck tenderness.  Symptoms started 2 days ago.  Today he felt feverish, chills and weak.  No nausea or vomiting.  Tolerating fluids.    I have reviewed the Medications, Allergies, Past Medical and Surgical History, and Social History in the Epic system.    Allergies: No Known Allergies    No current facility-administered medications on file prior to encounter.   Current Outpatient Prescriptions on File Prior to Encounter:  testosterone cypionate (DEPO-TESTOSTERONE) 200 MG/ML injection 400 mg every 25 days.   lisinopril (PRINIVIL/ZESTRIL) 10 MG tablet Take 1 tablet (10 mg) by mouth daily   order for DME Equipment being ordered: gel cups, large   order for DME Equipment being ordered: knee sleeve, XL     Patient Active Problem List   Diagnosis     Family history of colon cancer     Hyperlipidemia with target LDL less than 130     Hypotestosteronism     Esophageal reflux     Benign essential hypertension       Review of Systems  As mentioned above in the history present illness. All other systems were reviewed and are negative.    Physical Exam   BP: 182/75  Heart Rate: 99  Temp: 100.3  F (37.9  C)  Resp: 16  Height: 182.9 cm (6')  Weight: 131.5 kg (290 lb)  SpO2: 95 %  Physical Exam  GENERAL APPEARANCE: healthy, alert and no distress  EYES: EOMI,  PERRL, conjunctiva clear  HENT: ear canals and TM's normal.  Nose normal.  Posterior oropharynx erythema without exudate.  Uvula is midline.  No unilateral peritonsillar swelling.  NECK: supple, nontender, no lymphadenopathy  RESP: lungs clear to auscultation - no rales, rhonchi or wheezes  CV: regular rates and rhythm, normal S1 S2, no murmur noted    ED Course     ED Course     Procedures          Results for orders placed or performed during the hospital encounter of 06/21/17 (from the past 24 hour(s))   Rapid strep group A screen POCT   Result Value Ref Range    Rapid Strep A Screen POSITIVE neg    Internal QC OK Yes          Assessments & Plan (with Medical Decision Making)     RST positive.  Patient will be initiated on Penicillin.  Patient and family notified contagious for 24 hours after initiating antibiotics. Recommend replacement of toothbrush at that time.  Follow up with PCP if no improvement in three days.  Worrisome reasons to seek care sooner discussed.      I have reviewed the nursing notes.    I have reviewed the findings, diagnosis, plan and need for follow up with the patient.      New Prescriptions    PENICILLIN V POTASSIUM (VEETID) 500 MG TABLET    Take 1 tablet (500 mg) by mouth 2 times daily for 10 days       Final diagnoses:   Acute streptococcal pharyngitis       6/21/2017   [facility] EMERGENCY DEPARTMENT     Irma Cabrera APRN CNP  06/21/17 2007

## 2017-06-22 NOTE — ED AVS SNAPSHOT
[facility] Emergency Department    [address] [address]    Phone:  [phone]    Fax:  [phone]                                       Edwin Nuno   MRN: [id number]    Department:  [facility] Emergency Department   Date of Visit:  6/22/2017           After Visit Summary Signature Page     I have received my discharge instructions, and my questions have been answered. I have discussed any challenges I see with this plan with the nurse or doctor.    ..........................................................................................................................................  Patient/Patient Representative Signature      ..........................................................................................................................................  Patient Representative Print Name and Relationship to Patient    ..................................................               ................................................  Date                                            Time    ..........................................................................................................................................  Reviewed by Signature/Title    ...................................................              ..............................................  Date                                                            Time

## 2017-06-22 NOTE — DISCHARGE INSTRUCTIONS
Pharyngitis: Strep (Confirmed)    You have had a positive test for strep throat. Strep throat is a contagious illness. It is spread by coughing, kissing or by touching others after touching your mouth or nose. Symptoms include throat pain that is worse with swallowing, aching all over, headache, and fever. It is treated with antibiotic medicine. This should help you start to feel better in 1 to 2 days.  Home care    Rest at home. Drink plenty of fluids to you won't get dehydrated.    No work or school for the first 2 days of taking the antibiotics. After this time, you will not be contagious. You can then return to school or work if you are feeling better.     Take antibiotic medicine for the full 10 days, even if you feel better. This is very important to ensure the infection is treated. It is also important to prevent medicine-resistant germs from developing. If you were given an antibiotic shot, you don't need any more antibiotics.    You may use acetaminophen or ibuprofen to control pain or fever, unless another medicine was prescribed for this. Talk with your doctor before taking these medicines if you have chronic liver or kidney disease. Also talk with your doctor if you have had a stomach ulcer or GI bleeding.    Throat lozenges or sprays help reduce pain. Gargling with warm saltwater will also reduce throat pain. Dissolve 1/2 teaspoon of salt in 1 glass of warm water. This may be useful just before meals.     Soft foods are OK. Avoid salty or spicy foods.  Follow-up care  Follow up with your healthcare provider or our staff if you don't get better over the next week.  When to seek medical advice  Call your healthcare provider right away if any of these occur:    Fever of 100.4 F (38 C) or higher, or as directed by your healthcare provider    New or worsening ear pain, sinus pain, or headache    Painful lumps in the back of neck    Stiff neck    Lymph nodes getting larger or becoming soft in the  middle    You can't swallow liquids or you can't open your mouth wide because of throat pain    Signs of dehydration. These include very dark urine or no urine, sunken eyes, and dizziness.    Trouble breathing or noisy breathing    Muffled voice    Rash  Date Last Reviewed: 4/13/2015 2000-2017 [employer]. [address] [place] [address], [place] [address]. All rights reserved. This information is not intended as a substitute for professional medical care. Always follow your healthcare professional's instructions.

## 2017-06-22 NOTE — ED AVS SNAPSHOT
[facility]    [address] [facility] [address]    Phone:  [phone]    Fax:  [phone]                                       Edwin Nuno   MRN: [id number]    Department:  [facility]   Date of Visit:  6/22/2017           Patient Information     Date Of Birth          1980        Your diagnoses for this visit were:     Streptococcal sore throat     Headache, unspecified headache type        You were seen by Joe Walden MD.      Follow-up Information     Follow up with Cecilio Hughes MD In 4 days.    Specialty:  Family Practice    Why:  For re-evaluation if symptoms not resolving    Contact information:    [address] [id number] [phone]          Follow up with [facility].    Specialty:  EMERGENCY MEDICINE    Why:  If symptoms worsen, or new problems or concerns.    Contact information:    [address] [place] [id number] [phone]    Additional information:    The medical center is located at   [address] [place] [address] (between [address] and   [facility] in [place], four miles north   of [place]).        Discharge Instructions         Pharyngitis: Strep (Confirmed)    You have had a positive test for strep throat. Strep throat is a contagious illness. It is spread by coughing, kissing or by touching others after touching your mouth or nose. Symptoms include throat pain that is worse with swallowing, aching all over, headache, and fever. It is treated with antibiotic medicine. This should help you start to feel better in 1 to 2 days.  Home care    Rest at home. Drink plenty of fluids to you won't get dehydrated.    No work or school for the first 2 days of taking the antibiotics. After this time, you will not be contagious. You can then return to school or work if you are feeling better.     Take antibiotic medicine for the full 10 days, even if  you feel better. This is very important to ensure the infection is treated. It is also important to prevent medicine-resistant germs from developing. If you were given an antibiotic shot, you don't need any more antibiotics.    You may use acetaminophen or ibuprofen to control pain or fever, unless another medicine was prescribed for this. Talk with your doctor before taking these medicines if you have chronic liver or kidney disease. Also talk with your doctor if you have had a stomach ulcer or GI bleeding.    Throat lozenges or sprays help reduce pain. Gargling with warm saltwater will also reduce throat pain. Dissolve 1/2 teaspoon of salt in 1 glass of warm water. This may be useful just before meals.     Soft foods are OK. Avoid salty or spicy foods.  Follow-up care  Follow up with your healthcare provider or our staff if you don't get better over the next week.  When to seek medical advice  Call your healthcare provider right away if any of these occur:    Fever of 100.4 F (38 C) or higher, or as directed by your healthcare provider    New or worsening ear pain, sinus pain, or headache    Painful lumps in the back of neck    Stiff neck    Lymph nodes getting larger or becoming soft in the middle    You can't swallow liquids or you can't open your mouth wide because of throat pain    Signs of dehydration. These include very dark urine or no urine, sunken eyes, and dizziness.    Trouble breathing or noisy breathing    Muffled voice    Rash  Date Last Reviewed: 4/13/2015 2000-2017 [employer]. [address] [place] [address], [place] [address]. All rights reserved. This information is not intended as a substitute for professional medical care. Always follow your healthcare professional's instructions.          When You Have a Sore Throat    A sore throat can be painful. There are many reasons why you may have a sore throat. Your healthcare provider will work with you to find the cause of your sore throat.  He or she will also find the best treatment for you.  What causes a sore throat?  Sore throats can be caused or worsened by:    Cold or flu viruses    Bacteria    Irritants such as tobacco smoke or air pollution    Acid reflux  A healthy throat  The tonsils are on the sides of the throat near the base of the tongue. They collect viruses and bacteria and help fight infection. The throat (pharynx) is the passage for air. Mucus from the nasal cavity also moves down the passage.  An inflamed throat  The tonsils and pharynx can become inflamed due to a cold or flu virus. Postnasal drip (excess mucus draining from the nasal cavity) can irritate the throat. It can also make the throat or tonsils more likely to be infected by bacteria. Severe, untreated tonsillitis in children or adults can cause a pocket of pus (abscess) to form near the tonsil.  Your evaluation  A medical evaluation can help find the cause of your sore throat. It can also help your healthcare provider choose the best treatment for you. The evaluation may include a health history, physical exam, and diagnostic tests.  Health history  Your healthcare provider may ask you the following:    How long has the sore throat lasted and how have you been treating it?    Do you have any other symptoms, such as body aches, fever, or cough?    Does your sore throat recur? If so, how often? How many days of school or work have you missed because of a sore throat?    Do you have trouble eating or swallowing?    Have you been told that you snore or have other sleep problems?    Do you have bad breath?    Do you cough up bad-tasting mucus?  Physical exam  During the exam, your healthcare provider checks your ears, nose, and throat for problems. He or she also checks for swelling in the neck, and may listen to your chest.  Possible tests  Other tests your healthcare provider may perform include:    A throat swab to check for bacteria such as streptococcus (the bacteria that  "causes strep throat)    A blood test to check for mononucleosis (a viral infection)    A chest X-ray to rule out pneumonia, especially if you have a cough  Treating a sore throat  Treatment depends on many factors. What is the likely cause? Is the problem recent? Does it keep coming back? In many cases, the best thing to do is to treat the symptoms, rest, and let the problem heal itself. Antibiotics may help clear up some bacterial infections. For cases of severe or recurring tonsillitis, the tonsils may need to be removed.  Relieving your symptoms    Don t smoke, and avoid secondhand smoke.    For children, try throat sprays or Popsicles. Adults and older children may try lozenges.    Drink warm liquids to soothe the throat and help thin mucus. Avoid alcohol, spicy foods, and acidic drinks such as orange juice. These can irritate the throat.    Gargle with warm saltwater (1 teaspoon of salt to 8 ounces of warm water).    Use a humidifier to keep air moist and relieve throat dryness.    Try over-the-counter pain relievers such as acetaminophen or ibuprofen. Use as directed, and don t exceed the recommended dose. Don t give aspirin to children.   Are antibiotics needed?  If your sore throat is due to a bacterial infection, antibiotics may speed healing and prevent complications. Although group A streptococcus (\"strep throat\" or GAS) is the major treatable infection for a sore throat, GAS causes only 5% to 15% of sore throats in adults who seek medical care. Most sore throats are caused by cold or flu viruses. And antibiotics don t treat viral illness. In fact, using antibiotics when they re not needed may produce bacteria that are harder to kill. Your healthcare provider will prescribe antibiotics only if he or she thinks they are likely to help.  If antibiotics are prescribed  Take the medicine exactly as directed. Be sure to finish your prescription even if you re feeling better. And be sure to ask your healthcare " provider or pharmacist what side effects are common and what to do about them.  Is surgery needed?  In some cases, tonsils need to be removed. This is often done as outpatient (same-day) surgery. Your healthcare provider may advise removing the tonsils in cases of:    Several severe bouts of tonsillitis in a year.  Severe  episodes include those that lead to missed days of school or work, or that need to be treated with antibiotics.    Tonsillitis that causes breathing problems during sleep    Tonsillitis caused by food particles collecting in pouches in the tonsils (cryptic tonsillitis)  Call your healthcare provider if any of the following occur:    Symptoms worsen, or new symptoms develop.    Swollen tonsils make breathing difficult.    The pain is severe enough to keep you from drinking liquids.    A skin rash, hives, or wheezing develops. Any of these could signal an allergic reaction to antibiotics.    Symptoms don t improve within a week.    Symptoms don t improve within 2 to 3 days of starting antibiotics.   Date Last Reviewed: 10/1/2016    6328-2952 [employer]. [address] [place] [address]. All rights reserved. This information is not intended as a substitute for professional medical care. Always follow your healthcare professional's instructions.          Self-Care for Sore Throats    Sore throats happen for many reasons, such as colds, allergies, and infections caused by viruses or bacteria. In any case, your throat becomes red and sore. Your goal for self-care is to reduce your discomfort while giving your throat a chance to heal.  Moisten and soothe your throat  Tips include the following:    Try a sip of water first thing after waking up.    Keep your throat moist by drinking 6 or more glasses of clear liquids every day.    Run a cool-air humidifier in your room overnight.    Avoid cigarette smoke.     Suck on throat lozenges, cough drops, hard candy, ice chips, or frozen  fruit-juice bars. Use the sugar-free versions if your diet or medical condition requires them.  Gargle to ease irritation  Gargling every hour or 2 can ease irritation. Try gargling with 1 of these solutions:    1/4 teaspoon of salt in 1/2 cup of warm water    An over-the-counter anesthetic gargle  Use medicine for more relief  Over-the-counter medicine can reduce sore throat symptoms. Ask your pharmacist if you have questions about which medicine to use:    Ease pain with anesthetic sprays. Aspirin or an aspirin substitute also helps. Remember, never give aspirin to anyone 18 or younger, or if you are already taking blood thinners.     For sore throats caused by allergies, try antihistamines to block the allergic reaction.    Remember: unless a sore throat is caused by a bacterial infection, antibiotics won t help you.  Prevent future sore throats  Prevention tips include the following:    Stop smoking or reduce contact with secondhand smoke. Smoke irritates the tender throat lining.    Limit contact with pets and with allergy-causing substances, such as pollen and mold.    When you re around someone with a sore throat or cold, wash your hands often to keep viruses or bacteria from spreading.    Don t strain your vocal cords.  Call your healthcare provider  Contact your healthcare provider if you have:    A temperature over 101 F (38.3 C)    White spots on the throat    Great difficulty swallowing    Trouble breathing    A skin rash    Recent exposure to someone else with strep bacteria    Severe hoarseness and swollen glands in the neck or jaw   Date Last Reviewed: 8/1/2016 2000-2017 [employer]. [address] [place] [address], [place] [address]. All rights reserved. This information is not intended as a substitute for professional medical care. Always follow your healthcare professional's instructions.          Discharge References/Attachments     HEADACHES, SELF-CARE FOR (ENGLISH)      24 Hour  Appointment Hotline       To make an appointment at any [facility], call [phone] ([phone]). If you don't have a family doctor or clinic, we will help you find one. [facility] clinics are conveniently located to serve the needs of you and your family.             Review of your medicines      START taking        Dose / Directions Last dose taken    amoxicillin-clavulanate 875-125 MG per tablet   Commonly known as:  AUGMENTIN   Dose:  1 tablet   Quantity:  20 tablet        Take 1 tablet by mouth 2 times daily for 10 days   Refills:  0        HYDROcodone-acetaminophen 5-325 MG per tablet   Commonly known as:  NORCO   Quantity:  15 tablet        1-2 tabs po q 4-6 hrs. prn pain   Refills:  0          Our records show that you are taking the medicines listed below. If these are incorrect, please call your family doctor or clinic.        Dose / Directions Last dose taken    lisinopril 10 MG tablet   Commonly known as:  PRINIVIL/ZESTRIL   Dose:  10 mg   Quantity:  90 tablet        Take 1 tablet (10 mg) by mouth daily   Refills:  3        * order for DME   Quantity:  1 Device        Equipment being ordered: knee sleeve, XL   Refills:  0        * order for DME   Quantity:  1 Device        Equipment being ordered: gel cups, large   Refills:  0        penicillin V potassium 500 MG tablet   Commonly known as:  VEETID   Dose:  500 mg   Quantity:  20 tablet        Take 1 tablet (500 mg) by mouth 2 times daily for 10 days   Refills:  0        testosterone cypionate 200 MG/ML injection   Commonly known as:  DEPO-TESTOSTERONE   Quantity:  2 mL        400 mg every 25 days.   Refills:  5        * Notice:  This list has 2 medication(s) that are the same as other medications prescribed for you. Read the directions carefully, and ask your doctor or other care provider to review them with you.            Prescriptions were sent or printed at these locations (2 Prescriptions)                   Other Prescriptions                 Printed at Department/Unit printer (2 of 2)         amoxicillin-clavulanate (AUGMENTIN) 875-125 MG per tablet               HYDROcodone-acetaminophen (NORCO) 5-325 MG per tablet                Orders Needing Specimen Collection     None      Pending Results     No orders found from 6/20/2017 to 6/23/2017.            Pending Culture Results     No orders found from 6/20/2017 to 6/23/2017.            Pending Results Instructions     If you had any lab results that were not finalized at the time of your Discharge, you can call the ED Lab Result RN at [phone]. You will be contacted by this team for any positive Lab results or changes in treatment. The nurses are available 7 days a week from 10A to 6:30P.  You can leave a message 24 hours per day and they will return your call.        Test Results From Your Hospital Stay               Thank you for choosing [facility]       Thank you for choosing [facility] for your care. Our goal is always to provide you with excellent care. Hearing back from our patients is one way we can continue to improve our services. Please take a few minutes to complete the written survey that you may receive in the mail after you visit with us. Thank you!        [employer] Information     [employer] gives you secure access to your electronic health record. If you see a primary care provider, you can also send messages to your care team and make appointments. If you have questions, please call your primary care clinic.  If you do not have a primary care provider, please call [phone] and they will assist you.        Care EveryWhere ID     This is your Care EveryWhere ID. This could be used by other organizations to access your [facility] medical records  [id number]        Equal Access to Services     [facility] SOURAV : Anusha Cornejo, waaxda luqadaha, qaybta kaalmada kayla, austen hui. So [facility] [phone].    ATENCIÓN: Si kenny enrique  disposición servicios gratuitos de asistencia lingüística. Llfatemeh al [phone].    We comply with applicable federal civil rights laws and [place] laws. We do not discriminate on the basis of race, color, national origin, age, disability sex, sexual orientation or gender identity.            After Visit Summary       This is your record. Keep this with you and show to your community pharmacist(s) and doctor(s) at your next visit.

## 2017-06-23 NOTE — DISCHARGE INSTRUCTIONS
Pharyngitis: Strep (Confirmed)    You have had a positive test for strep throat. Strep throat is a contagious illness. It is spread by coughing, kissing or by touching others after touching your mouth or nose. Symptoms include throat pain that is worse with swallowing, aching all over, headache, and fever. It is treated with antibiotic medicine. This should help you start to feel better in 1 to 2 days.  Home care    Rest at home. Drink plenty of fluids to you won't get dehydrated.    No work or school for the first 2 days of taking the antibiotics. After this time, you will not be contagious. You can then return to school or work if you are feeling better.     Take antibiotic medicine for the full 10 days, even if you feel better. This is very important to ensure the infection is treated. It is also important to prevent medicine-resistant germs from developing. If you were given an antibiotic shot, you don't need any more antibiotics.    You may use acetaminophen or ibuprofen to control pain or fever, unless another medicine was prescribed for this. Talk with your doctor before taking these medicines if you have chronic liver or kidney disease. Also talk with your doctor if you have had a stomach ulcer or GI bleeding.    Throat lozenges or sprays help reduce pain. Gargling with warm saltwater will also reduce throat pain. Dissolve 1/2 teaspoon of salt in 1 glass of warm water. This may be useful just before meals.     Soft foods are OK. Avoid salty or spicy foods.  Follow-up care  Follow up with your healthcare provider or our staff if you don't get better over the next week.  When to seek medical advice  Call your healthcare provider right away if any of these occur:    Fever of 100.4 F (38 C) or higher, or as directed by your healthcare provider    New or worsening ear pain, sinus pain, or headache    Painful lumps in the back of neck    Stiff neck    Lymph nodes getting larger or becoming soft in the  middle    You can't swallow liquids or you can't open your mouth wide because of throat pain    Signs of dehydration. These include very dark urine or no urine, sunken eyes, and dizziness.    Trouble breathing or noisy breathing    Muffled voice    Rash  Date Last Reviewed: 4/13/2015 2000-2017 [employer]. [address] [place] [address], [place] [address]. All rights reserved. This information is not intended as a substitute for professional medical care. Always follow your healthcare professional's instructions.          When You Have a Sore Throat    A sore throat can be painful. There are many reasons why you may have a sore throat. Your healthcare provider will work with you to find the cause of your sore throat. He or she will also find the best treatment for you.  What causes a sore throat?  Sore throats can be caused or worsened by:    Cold or flu viruses    Bacteria    Irritants such as tobacco smoke or air pollution    Acid reflux  A healthy throat  The tonsils are on the sides of the throat near the base of the tongue. They collect viruses and bacteria and help fight infection. The throat (pharynx) is the passage for air. Mucus from the nasal cavity also moves down the passage.  An inflamed throat  The tonsils and pharynx can become inflamed due to a cold or flu virus. Postnasal drip (excess mucus draining from the nasal cavity) can irritate the throat. It can also make the throat or tonsils more likely to be infected by bacteria. Severe, untreated tonsillitis in children or adults can cause a pocket of pus (abscess) to form near the tonsil.  Your evaluation  A medical evaluation can help find the cause of your sore throat. It can also help your healthcare provider choose the best treatment for you. The evaluation may include a health history, physical exam, and diagnostic tests.  Health history  Your healthcare provider may ask you the following:    How long has the sore throat lasted and how  have you been treating it?    Do you have any other symptoms, such as body aches, fever, or cough?    Does your sore throat recur? If so, how often? How many days of school or work have you missed because of a sore throat?    Do you have trouble eating or swallowing?    Have you been told that you snore or have other sleep problems?    Do you have bad breath?    Do you cough up bad-tasting mucus?  Physical exam  During the exam, your healthcare provider checks your ears, nose, and throat for problems. He or she also checks for swelling in the neck, and may listen to your chest.  Possible tests  Other tests your healthcare provider may perform include:    A throat swab to check for bacteria such as streptococcus (the bacteria that causes strep throat)    A blood test to check for mononucleosis (a viral infection)    A chest X-ray to rule out pneumonia, especially if you have a cough  Treating a sore throat  Treatment depends on many factors. What is the likely cause? Is the problem recent? Does it keep coming back? In many cases, the best thing to do is to treat the symptoms, rest, and let the problem heal itself. Antibiotics may help clear up some bacterial infections. For cases of severe or recurring tonsillitis, the tonsils may need to be removed.  Relieving your symptoms    Don t smoke, and avoid secondhand smoke.    For children, try throat sprays or Popsicles. Adults and older children may try lozenges.    Drink warm liquids to soothe the throat and help thin mucus. Avoid alcohol, spicy foods, and acidic drinks such as orange juice. These can irritate the throat.    Gargle with warm saltwater (1 teaspoon of salt to 8 ounces of warm water).    Use a humidifier to keep air moist and relieve throat dryness.    Try over-the-counter pain relievers such as acetaminophen or ibuprofen. Use as directed, and don t exceed the recommended dose. Don t give aspirin to children.   Are antibiotics needed?  If your sore throat  "is due to a bacterial infection, antibiotics may speed healing and prevent complications. Although group A streptococcus (\"strep throat\" or GAS) is the major treatable infection for a sore throat, GAS causes only 5% to 15% of sore throats in adults who seek medical care. Most sore throats are caused by cold or flu viruses. And antibiotics don t treat viral illness. In fact, using antibiotics when they re not needed may produce bacteria that are harder to kill. Your healthcare provider will prescribe antibiotics only if he or she thinks they are likely to help.  If antibiotics are prescribed  Take the medicine exactly as directed. Be sure to finish your prescription even if you re feeling better. And be sure to ask your healthcare provider or pharmacist what side effects are common and what to do about them.  Is surgery needed?  In some cases, tonsils need to be removed. This is often done as outpatient (same-day) surgery. Your healthcare provider may advise removing the tonsils in cases of:    Several severe bouts of tonsillitis in a year.  Severe  episodes include those that lead to missed days of school or work, or that need to be treated with antibiotics.    Tonsillitis that causes breathing problems during sleep    Tonsillitis caused by food particles collecting in pouches in the tonsils (cryptic tonsillitis)  Call your healthcare provider if any of the following occur:    Symptoms worsen, or new symptoms develop.    Swollen tonsils make breathing difficult.    The pain is severe enough to keep you from drinking liquids.    A skin rash, hives, or wheezing develops. Any of these could signal an allergic reaction to antibiotics.    Symptoms don t improve within a week.    Symptoms don t improve within 2 to 3 days of starting antibiotics.   Date Last Reviewed: 10/1/2016    6383-5609 [employer]. [address] [place] [address], [place] [address]. All rights reserved. This information is not intended as a " substitute for professional medical care. Always follow your healthcare professional's instructions.          Self-Care for Sore Throats    Sore throats happen for many reasons, such as colds, allergies, and infections caused by viruses or bacteria. In any case, your throat becomes red and sore. Your goal for self-care is to reduce your discomfort while giving your throat a chance to heal.  Moisten and soothe your throat  Tips include the following:    Try a sip of water first thing after waking up.    Keep your throat moist by drinking 6 or more glasses of clear liquids every day.    Run a cool-air humidifier in your room overnight.    Avoid cigarette smoke.     Suck on throat lozenges, cough drops, hard candy, ice chips, or frozen fruit-juice bars. Use the sugar-free versions if your diet or medical condition requires them.  Gargle to ease irritation  Gargling every hour or 2 can ease irritation. Try gargling with 1 of these solutions:    1/4 teaspoon of salt in 1/2 cup of warm water    An over-the-counter anesthetic gargle  Use medicine for more relief  Over-the-counter medicine can reduce sore throat symptoms. Ask your pharmacist if you have questions about which medicine to use:    Ease pain with anesthetic sprays. Aspirin or an aspirin substitute also helps. Remember, never give aspirin to anyone 18 or younger, or if you are already taking blood thinners.     For sore throats caused by allergies, try antihistamines to block the allergic reaction.    Remember: unless a sore throat is caused by a bacterial infection, antibiotics won t help you.  Prevent future sore throats  Prevention tips include the following:    Stop smoking or reduce contact with secondhand smoke. Smoke irritates the tender throat lining.    Limit contact with pets and with allergy-causing substances, such as pollen and mold.    When you re around someone with a sore throat or cold, wash your hands often to keep viruses or bacteria from  spreading.    Don t strain your vocal cords.  Call your healthcare provider  Contact your healthcare provider if you have:    A temperature over 101 F (38.3 C)    White spots on the throat    Great difficulty swallowing    Trouble breathing    A skin rash    Recent exposure to someone else with strep bacteria    Severe hoarseness and swollen glands in the neck or jaw   Date Last Reviewed: 8/1/2016 2000-2017 [employer]. [address] [place] [address]. All rights reserved. This information is not intended as a substitute for professional medical care. Always follow your healthcare professional's instructions.

## 2017-06-23 NOTE — ED PROVIDER NOTES
History     Chief Complaint   Patient presents with     Pharyngitis     Pt states dx with strep yesterday and today with continued symptoms - tired, sorethroat, etc.     HPI  Edwin Nuno is a 36 year old male who was seen in Urgent Care yesterday with symptoms of strep throat and had a positive rapid strep test.  He was prescribed penicillin vk 500 mg twice a day and is had 3 doses, but returns today because he is not feeling any better.  He has persistent sore throat and states he can't eat or drink anything. He has no voice change, drooling or respiratory distress.  He has a diffuse poorly described headache, myalgias, malaise and generalized weakness.  He feels feverish and chilled.  No neck stiffness.  No vomiting or diarrhea.  Sore throat began 4 days ago, was insidious in onset and described as severe, exacerbated swallowing, nonradiating and associated with tender swollen anterior lymph nodes.        I have reviewed the Medications, Allergies, Past Medical and Surgical History, and Social History in the Epic system.  Patient Active Problem List   Diagnosis     Family history of colon cancer     Hyperlipidemia with target LDL less than 130     Hypotestosteronism     Esophageal reflux     Benign essential hypertension     Past Medical History:   Diagnosis Date     Hyperlipidemia LDL goal <160 6/7/2013     Hypertension, goal below 140/90 6/7/2013     History reviewed. No pertinent surgical history.  No current facility-administered medications for this encounter.      Current Outpatient Prescriptions   Medication     amoxicillin-clavulanate (AUGMENTIN) 875-125 MG per tablet     HYDROcodone-acetaminophen (NORCO) 5-325 MG per tablet     penicillin V potassium (VEETID) 500 MG tablet     testosterone cypionate (DEPO-TESTOSTERONE) 200 MG/ML injection     lisinopril (PRINIVIL/ZESTRIL) 10 MG tablet     order for DME     order for DME     No Known Allergies  Social History   Substance Use Topics     Smoking status:  Never Smoker     Smokeless tobacco: Former User     Alcohol use Yes     Family History   Problem Relation Age of Onset     Cancer - colorectal Mother      Breast Cancer Mother      Cancer - colorectal Maternal Grandmother      C.A.D. No family hx of      DIABETES No family hx of      Hypertension No family hx of      CEREBROVASCULAR DISEASE No family hx of      Prostate Cancer No family hx of      Melanoma No family hx of        Review of Systems   Constitutional: Positive for appetite change, chills, fatigue and fever.   HENT: Positive for rhinorrhea and sore throat. Negative for drooling and voice change.    Respiratory: Negative.    Gastrointestinal: Negative.    Musculoskeletal: Positive for myalgias. Negative for neck stiffness.   Skin: Negative.    Neurological: Negative.        Physical Exam   BP: 157/90  Pulse: 85  Temp: 99.2  F (37.3  C)  Resp: 16  Height: 182.9 cm (6')  Weight: 131.5 kg (290 lb)  SpO2: 99 %    Physical Exam   Constitutional: He is oriented to person, place, and time. He appears well-developed and well-nourished. No distress.   HENT:   Head: Normocephalic and atraumatic.   Mouth/Throat: Oropharynx is clear and moist. No oropharyngeal exudate.   Mild posterior pharyngeal erythema without soft tissue swelling, PTA or trismus.  Tongue and sublingual space is normal.  Voice is normal.   Eyes: Conjunctivae and EOM are normal. No scleral icterus.   Neck: Normal range of motion. Neck supple. No tracheal deviation present. No thyromegaly present.   Cardiovascular: Normal rate, regular rhythm and normal heart sounds.  Exam reveals no gallop and no friction rub.    No murmur heard.  Pulmonary/Chest: Effort normal and breath sounds normal. No stridor. No respiratory distress. He has no wheezes. He has no rales.   Musculoskeletal: Normal range of motion. He exhibits no edema.   Lymphadenopathy:     He has cervical adenopathy (anterior, benign).   Neurological: He is alert and oriented to person, place,  and time.   Skin: Skin is warm and dry. No rash noted. He is not diaphoretic. No erythema. No pallor.   Psychiatric: His behavior is normal.   Flat affect.   Nursing note and vitals reviewed.      ED Course     ED Course     Procedures             Labs Ordered and Resulted from Time of ED Arrival Up to the Time of Departure from the ED - No data to display    Medications   dexamethasone (DECADRON) oral solution (inj used orally) 10 mg (10 mg Oral Given 6/22/17 2200)   HYDROmorphone (DILAUDID) injection 1 mg (1 mg Intramuscular Given 6/22/17 2248)   ondansetron (ZOFRAN-ODT) ODT tab 4 mg (4 mg Oral Given 6/22/17 2247)     Patient declined IV fluids.    Assessments & Plan (with Medical Decision Making)   36-year-old male with confirmed strep throat with no improvement after 3 doses of penicillin prescribed yesterday in urgent care.  Returns today because he is not feeling any better and because of concern about headache, generalized malaise, decreased ability to take oral intake and fluids.  He is nontoxic appearing and had a benign oropharyngeal examination.  No evidence of peritonsillar abscess or retropharyngeal abscess and airway appears patent.  We discussed treatment plan and he declined IV fluids.  He was given Dilaudid 1 mg IM and Zofran ODT and felt much improved.  He will continue ibuprofen.  He was given Decadron 10 mg po.  Will switch him to Augmentin in case his penicillin resistant, although is probably more likely that he has not had a long enough course of therapy to develop significant improvement yet.  He and his wife were provided instructions for supportive care and will return as needed for worsened condition or worsening symptoms, or new problems or concerns.    I have reviewed the nursing notes.    I have reviewed the findings, diagnosis, plan and need for follow up with the patient.    New Prescriptions    AMOXICILLIN-CLAVULANATE (AUGMENTIN) 875-125 MG PER TABLET    Take 1 tablet by mouth 2  times daily for 10 days    HYDROCODONE-ACETAMINOPHEN (NORCO) 5-325 MG PER TABLET    1-2 tabs po q 4-6 hrs. prn pain       Final diagnoses:   Streptococcal sore throat   Headache, unspecified headache type       6/22/2017   [facility] EMERGENCY DEPARTMENT     Joe Walden MD  06/22/17 [id number]

## 2017-06-23 NOTE — TELEPHONE ENCOUNTER
"  Reason for Disposition    [1] Taking antibiotics > 24 hours AND [2] symptoms WORSE    Additional Information    Negative: Severe difficulty breathing (e.g., struggling for each breath, speaks in single words)    Negative: Sounds like a life-threatening emergency to the triager    Negative: [1] Recent hospitalization for pneumonia AND [2] taking an antibiotic    Negative: [1] Animal bite infection AND [2] taking an antibiotic    Negative: [1] Ear  infection (Otitis Media) AND [2] taking an antibiotic    Negative: [1] Ear  infection (Swimmer's Ear)) AND [2] taking an antibiotic    Negative: [1] Sinus infection AND [2] taking an antibiotic    Negative: [1] Strep throat AND [2] taking an antibiotic    Negative: [1] Urinary tract  infection (e.g., cystitis, pyelonephritis, urethritis, epididymitis) AND [2] male AND [3] taking an antibiotic    Negative: [1] Urinary tract  infection (e.g., cystitis, pyelonephritis, urethritis) AND [2] female AND [3] taking an antibiotic    Negative: [1] Wound infection AND [2] taking an antibiotic    Negative: MODERATE difficulty breathing (e.g., speaks in phrases, SOB even at rest, pulse 100-120)    Negative: Fever > 103 F (39.4 C)    Negative: Patient sounds very sick or weak to the triager    Answer Assessment - Initial Assessment Questions  1. INFECTION: \"What infection is the antibiotic being given for?\"      Strep-rapid was positive  2. ANTIBIOTIC: \"What antibiotic are you taking\" \"How many times per day?\"      Penecillian  3. DURATION: \"When was the antibiotic started?\"      06/21/17  4. MAIN CONCERN OR SYMPTOM:  \"What is your main concern right now?\"      Worsening fatigue, worsening sore throat with difficulty swallowing, mores severe body aches,  5. BETTER-SAME-WORSE: \"Are you getting better, staying the same, or getting worse compared to when you first started the antibiotics?\" If getting worse, ask: \"In what way?\"       Worse in all symptoms  6. FEVER: \"Do you have a fever?\" " "If so, ask: \"What is your temperature, how was it measured, and when did it start?\"      99.8 oral on abx and tylenol  7. SYMPTOMS: \"Are there any other symptoms you're concerned about?\" If so, ask: \"When did it start?\"      Seen in urgent care last night and RST positive started antibiotics, and no results back on the flu swab, nothing in epic.  8. FOLLOW-UP APPOINTMENT: \"Do you have a follow-up appointment with your doctor?\"      None scheduled.    Protocols used: INFECTION ON ANTIBIOTIC FOLLOW-UP CALL-ADULT-    Kristine Dacosta, RN, BSN  [facility]    "

## 2017-06-28 ENCOUNTER — ALLIED HEALTH/NURSE VISIT (OUTPATIENT)
Dept: FAMILY MEDICINE | Facility: CLINIC | Age: 37
End: 2017-06-28
Payer: COMMERCIAL

## 2017-06-28 DIAGNOSIS — E34.9 HYPOTESTOSTERONISM: Primary | ICD-10-CM

## 2017-06-28 PROCEDURE — 99207 ZZC NO CHARGE NURSE ONLY: CPT

## 2017-06-28 NOTE — MR AVS SNAPSHOT
After Visit Summary   6/28/2017    Edwin Nuno    MRN: [id number]           Patient Information     Date Of Birth          1980        Visit Information        Provider Department      6/28/2017 3:00 PM FABIANO BAZAN/IM RN [facility]        Today's Diagnoses     Hypotestosteronism    -  1       Follow-ups after your visit        Who to contact     If you have questions or need follow up information about today's clinic visit or your schedule please contact [facility] directly at [phone].  Normal or non-critical lab and imaging results will be communicated to you by [employer], letter or phone within 4 business days after the clinic has received the results. If you do not hear from us within 7 days, please contact the clinic through [employer] or phone. If you have a critical or abnormal lab result, we will notify you by phone as soon as possible.  Submit refill requests through [facility] or call your pharmacy and they will forward the refill request to us. Please allow 3 business days for your refill to be completed.          Additional Information About Your Visit        MyChart Information     [facility] gives you secure access to your electronic health record. If you see a primary care provider, you can also send messages to your care team and make appointments. If you have questions, please call your primary care clinic.  If you do not have a primary care provider, please call [phone] and they will assist you.        Care EveryWhere ID     This is your Care EveryWhere ID. This could be used by other organizations to access your [facility] medical records  [id number]         Blood Pressure from Last 3 Encounters:   06/22/17 (!) 154/93   06/21/17 182/75   02/01/17 135/74    Weight from Last 3 Encounters:   06/22/17 290 lb (131.5 kg)   06/21/17 290 lb (131.5 kg)   02/01/17 (!) 310 lb (140.6 kg)              Today, you had the following     No orders found for display     "     Today's Medication Changes          These changes are accurate as of: 6/28/17  5:32 PM.  If you have any questions, ask your nurse or doctor.               Start taking these medicines.        Dose/Directions    Needle (Disp) 18G X 1-1/2\" Misc   Commonly known as:  B-D BLUNT FILL NEEDLE   Used for:  Hypotestosteronism        For drawing up medication.   Quantity:  10 each   Refills:  0       syringe/needle (disp) 22G X 1-1/2\" 3 ML Misc   Commonly known as:  B-D SYRINGE/NEEDLE   Used for:  Hypotestosteronism        For Testosterone injections.   Quantity:  10 each   Refills:  0            Where to get your medicines      These medications were sent to [facility] [address]  [address] [facility] [address]     Phone:  [phone]     Needle (Disp) 18G X 1-1/2\" Misc    syringe/needle (disp) 22G X 1-1/2\" 3 ML Misc                Primary Care Provider Office Phone # Fax #    Cecilio Hughes [phone]       [facility] [address] [facility] [address]        Equal Access to Services     BERENICE BENJAMIN AH: Hadii melinda ku hadasho Soparadiseali, waaxda luqadaha, qaybta kaalmada adeegyada, austen millern ildefonso hui. So [facility] [phone].    ATENCIÓN: Si habla español, tiene a bhandari disposición servicios gratuitos de asistencia lingüística. Rochelle al [phone].    We comply with applicable federal civil rights laws and [place] laws. We do not discriminate on the basis of race, color, national origin, age, disability sex, sexual orientation or gender identity.            Thank you!     Thank you for choosing [facility]  for your care. Our goal is always to provide you with excellent care. Hearing back from our patients is one way we can continue to improve our services. Please take a few minutes to complete the written survey that you may receive in the mail after your visit with us. Thank you!             Your " "Updated Medication List - Protect others around you: Learn how to safely use, store and throw away your medicines at www.disposemymeds.org.          This list is accurate as of: 6/28/17  5:32 PM.  Always use your most recent med list.                   Brand Name Dispense Instructions for use Diagnosis    amoxicillin-clavulanate 875-125 MG per tablet    AUGMENTIN    20 tablet    Take 1 tablet by mouth 2 times daily for 10 days        HYDROcodone-acetaminophen 5-325 MG per tablet    NORCO    15 tablet    1-2 tabs po q 4-6 hrs. prn pain        lisinopril 10 MG tablet    PRINIVIL/ZESTRIL    90 tablet    Take 1 tablet (10 mg) by mouth daily    Benign essential hypertension       Needle (Disp) 18G X 1-1/2\" Misc    B-D BLUNT FILL NEEDLE    10 each    For drawing up medication.    Hypotestosteronism       * order for DME     1 Device    Equipment being ordered: knee sleeve, XL    Right knee injury, initial encounter       * order for DME     1 Device    Equipment being ordered: gel cups, large    Pain of right heel, Right Achilles tendinitis       penicillin V potassium 500 MG tablet    VEETID    20 tablet    Take 1 tablet (500 mg) by mouth 2 times daily for 10 days        syringe/needle (disp) 22G X 1-1/2\" 3 ML Misc    B-D SYRINGE/NEEDLE    10 each    For Testosterone injections.    Hypotestosteronism       testosterone cypionate 200 MG/ML injection    DEPO-TESTOSTERONE    2 mL    400 mg every 25 days.    Hypotestosteronism       * Notice:  This list has 2 medication(s) that are the same as other medications prescribed for you. Read the directions carefully, and ask your doctor or other care provider to review them with you.      "

## 2017-06-28 NOTE — NURSING NOTE
Teaching for home injections done. Pt was able to give teach back and administer his own injection into the left thigh muscle w/o difficulty. Óscar teaching documents provided for IM injections in the thigh muscle and IM injection instructions. Pt was able to review the information and verbalize understanding.     Lily Bean RN

## 2017-08-03 ENCOUNTER — OFFICE VISIT (OUTPATIENT)
Dept: FAMILY MEDICINE | Facility: CLINIC | Age: 37
End: 2017-08-03
Payer: COMMERCIAL

## 2017-08-03 ENCOUNTER — MEDICAL CORRESPONDENCE (OUTPATIENT)
Dept: HEALTH INFORMATION MANAGEMENT | Facility: CLINIC | Age: 37
End: 2017-08-03

## 2017-08-03 VITALS
BODY MASS INDEX: 41.17 KG/M2 | WEIGHT: 304 LBS | HEIGHT: 72 IN | SYSTOLIC BLOOD PRESSURE: 122 MMHG | TEMPERATURE: 96 F | HEART RATE: 68 BPM | DIASTOLIC BLOOD PRESSURE: 70 MMHG

## 2017-08-03 DIAGNOSIS — E34.9 HYPOTESTOSTERONISM: ICD-10-CM

## 2017-08-03 DIAGNOSIS — I10 BENIGN ESSENTIAL HYPERTENSION: ICD-10-CM

## 2017-08-03 LAB
CREAT SERPL-MCNC: 1.01 MG/DL (ref 0.66–1.25)
GFR SERPL CREATININE-BSD FRML MDRD: 83 ML/MIN/1.7M2

## 2017-08-03 PROCEDURE — 99214 OFFICE O/P EST MOD 30 MIN: CPT | Performed by: FAMILY MEDICINE

## 2017-08-03 PROCEDURE — 82565 ASSAY OF CREATININE: CPT | Performed by: FAMILY MEDICINE

## 2017-08-03 PROCEDURE — 36415 COLL VENOUS BLD VENIPUNCTURE: CPT | Performed by: FAMILY MEDICINE

## 2017-08-03 RX ORDER — TESTOSTERONE CYPIONATE 200 MG/ML
INJECTION, SOLUTION INTRAMUSCULAR
Qty: 2 ML | Refills: 5 | Status: SHIPPED | OUTPATIENT
Start: 2017-08-03 | End: 2018-03-30

## 2017-08-03 RX ORDER — LISINOPRIL 20 MG/1
20 TABLET ORAL DAILY
Qty: 90 TABLET | Refills: 3 | Status: SHIPPED | OUTPATIENT
Start: 2017-08-03 | End: 2018-07-17

## 2017-08-03 NOTE — MR AVS SNAPSHOT
After Visit Summary   8/3/2017    Edwin Nuno    MRN: [id number]           Patient Information     Date Of Birth          1980        Visit Information        Provider Department      8/3/2017 7:20 AM Cecilio Hughes MD [facility]        Today's Diagnoses     Benign essential hypertension        Hypotestosteronism          Care Instructions          Thank you for choosing [facility]3].  You may be receiving a survey in the mail from [facility] regarding your visit today.  Please take a few minutes to complete and return the survey to let us know how we are doing.      If you have questions or concerns, please contact us via [employer] or you can contact your care team at [phone].    Our Clinic hours are:  Monday 6:40 am  to 7:00 pm  Tuesday -Friday 6:40 am to 5:00 pm    The [place] outpatient lab hours are:  Monday - Friday 6:10 am to 4:45 pm  Saturdays 7:00 am to 11:00 am  Appointments are required, call [phone]    If you have clinical questions after hours or would like to schedule an appointment,  call the clinic at [phone].    (I10) Benign essential hypertension  Comment:   Plan: lisinopril (PRINIVIL/ZESTRIL) 20 MG tablet,         Creatinine        The goal for the average is under 130/80. Use the med at 20 mg daily of the Lisinopril and the non drug therapies. Calibrated the home BP machine annually and it should be within 10 points.   On the arm, use the large BP cuff. The form is made out for the dentist and faxed to [phone]. Do the creatinine today.     (E29.1) Hypotestosteronism  Comment:   Plan: testosterone cypionate (DEPO-TESTOSTERONE) 200         MG/ML injection        The Rx is done for six months. He does home injections. Call for the lab appt in November for the Testosterone.           Follow-ups after your visit        Future tests that were ordered for you today     Open Future Orders        Priority Expected Expires Ordered     Testosterone, total Routine 11/3/2017 1/3/2018 8/3/2017            Who to contact     If you have questions or need follow up information about today's clinic visit or your schedule please contact [facility] directly at [phone].  Normal or non-critical lab and imaging results will be communicated to you by MyChart, letter or phone within 4 business days after the clinic has received the results. If you do not hear from us within 7 days, please contact the clinic through [employer] or phone. If you have a critical or abnormal lab result, we will notify you by phone as soon as possible.  Submit refill requests through [employer] or call your pharmacy and they will forward the refill request to us. Please allow 3 business days for your refill to be completed.          Additional Information About Your Visit        [employer] Information     [employer] gives you secure access to your electronic health record. If you see a primary care provider, you can also send messages to your care team and make appointments. If you have questions, please call your primary care clinic.  If you do not have a primary care provider, please call [phone] and they will assist you.        Care EveryWhere ID     This is your Care EveryWhere ID. This could be used by other organizations to access your [place] medical records  [id number]        Your Vitals Were     Pulse Temperature Height BMI (Body Mass Index)          68 96  F (35.6  C) (Tympanic) 6' (1.829 m) 41.23 kg/m2         Blood Pressure from Last 3 Encounters:   08/03/17 122/70   06/22/17 (!) 154/93   06/21/17 182/75    Weight from Last 3 Encounters:   08/03/17 (!) 304 lb (137.9 kg)   06/22/17 290 lb (131.5 kg)   06/21/17 290 lb (131.5 kg)              We Performed the Following     Creatinine          Today's Medication Changes          These changes are accurate as of: 8/3/17  7:35 AM.  If you have any questions, ask your nurse or doctor.               These medicines  have changed or have updated prescriptions.        Dose/Directions    lisinopril 20 MG tablet   Commonly known as:  PRINIVIL/ZESTRIL   This may have changed:    - medication strength  - how much to take   Used for:  Benign essential hypertension   Changed by:  Cecilio Hughes MD        Dose:  20 mg   Take 1 tablet (20 mg) by mouth daily   Quantity:  90 tablet   Refills:  3            Where to get your medicines      These medications were sent to [facility] [address] [address] AT [address]  [address] [facility] [address]     Phone:  [phone]     lisinopril 20 MG tablet         Some of these will need a paper prescription and others can be bought over the counter.  Ask your nurse if you have questions.     Bring a paper prescription for each of these medications     testosterone cypionate 200 MG/ML injection                Primary Care Provider Office Phone # Fax #    Cecilio Hughes [phone]       [facility] [address] [facility] [address]        Equal Access to Services     RHONDA [facility] Center[facility]ARCIA : Hadii melinda leyva hadasho Soomaali, waaxda luqadaha, qaybta kaalmada adeegyadestiny, waxay mira thomson . So [facility] [phone].    ATENCIÓN: Si habla español, tiene a bhandari disposición servicios gratuitos de asistencia lingüística. Jayceeame al [phone].    We comply with applicable federal civil rights laws and [place] laws. We do not discriminate on the basis of race, color, national origin, age, disability sex, sexual orientation or gender identity.            Thank you!     Thank you for choosing [facility]  for your care. Our goal is always to provide you with excellent care. Hearing back from our patients is one way we can continue to improve our services. Please take a few minutes to complete the written survey that you may receive in the mail after your visit with us. Thank you!    "          Your Updated Medication List - Protect others around you: Learn how to safely use, store and throw away your medicines at www.disposemymeds.org.          This list is accurate as of: 8/3/17  7:35 AM.  Always use your most recent med list.                   Brand Name Dispense Instructions for use Diagnosis    lisinopril 20 MG tablet    PRINIVIL/ZESTRIL    90 tablet    Take 1 tablet (20 mg) by mouth daily    Benign essential hypertension       Needle (Disp) 18G X 1-1/2\" Misc    B-D BLUNT FILL NEEDLE    10 each    For drawing up medication.    Hypotestosteronism       * order for DME     1 Device    Equipment being ordered: knee sleeve, XL    Right knee injury, initial encounter       * order for DME     1 Device    Equipment being ordered: gel cups, large    Pain of right heel, Right Achilles tendinitis       syringe/needle (disp) 22G X 1-1/2\" 3 ML Misc    B-D SYRINGE/NEEDLE    10 each    For Testosterone injections.    Hypotestosteronism       testosterone cypionate 200 MG/ML injection    DEPO-TESTOSTERONE    2 mL    400 mg every 25 days.    Hypotestosteronism       * Notice:  This list has 2 medication(s) that are the same as other medications prescribed for you. Read the directions carefully, and ask your doctor or other care provider to review them with you.      "

## 2017-08-03 NOTE — PATIENT INSTRUCTIONS
Thank you for choosing [facility].  You may be receiving a survey in the mail from [employer] regarding your visit today.  Please take a few minutes to complete and return the survey to let us know how we are doing.      If you have questions or concerns, please contact us via [phone] or you can contact your care team at [phone].    Our Clinic hours are:  Monday 6:40 am  to 7:00 pm  Tuesday -Friday 6:40 am to 5:00 pm    The [place] outpatient lab hours are:  Monday - Friday 6:10 am to 4:45 pm  Saturdays 7:00 am to 11:00 am  Appointments are required, call [phone]    If you have clinical questions after hours or would like to schedule an appointment,  call the clinic at [phone].    (I10) Benign essential hypertension  Comment:   Plan: lisinopril (PRINIVIL/ZESTRIL) 20 MG tablet,         Creatinine        The goal for the average is under 130/80. Use the med at 20 mg daily of the Lisinopril and the non drug therapies. Calibrated the home BP machine annually and it should be within 10 points.   On the arm, use the large BP cuff. The form is made out for the dentist and faxed to [phone]. Do the creatinine today.     (E29.1) Hypotestosteronism  Comment:   Plan: testosterone cypionate (DEPO-TESTOSTERONE) 200         MG/ML injection        The Rx is done for six months. He does home injections. Call for the lab appt in November for the Testosterone.

## 2017-08-03 NOTE — NURSING NOTE
Chief Complaint   Patient presents with     Hypertension     Recheck on blood pressure.  Has been elevated.  Needs clearance for dentist.     Testosterone     Recheck on testosterone injections and medication.       Initial /70  Pulse 68  Temp 96  F (35.6  C) (Tympanic)  Ht 6' (1.829 m)  Wt (!) 304 lb (137.9 kg)  BMI 41.23 kg/m2 Estimated body mass index is 41.23 kg/(m^2) as calculated from the following:    Height as of this encounter: 6' (1.829 m).    Weight as of this encounter: 304 lb (137.9 kg).  Medication Reconciliation: complete

## 2017-08-03 NOTE — LETTER
dEwin Nuno  [address]   [facility] [address]        August 3, 2017          Dear ,    We are writing to inform you of your test results.    Your test results fall within the expected range(s) or remain unchanged from previous results.  Please continue with current treatment plan.    Resulted Orders   Creatinine   Result Value Ref Range    Creatinine 1.01 0.66 - 1.25 mg/dL    GFR Estimate 83 >60 mL/min/1.7m2      Comment:      Non  GFR Calc    GFR Estimate If Black >90   GFR Calc   >60 mL/min/1.7m2       If you have any questions or concerns, please call the clinic at the number listed above.       Sincerely,        Cecilio Hughes MD

## 2017-08-03 NOTE — PROGRESS NOTES
"  SUBJECTIVE:                                                    Edwin Nuno is a 36 year old male who presents to clinic today for the following health issues:      Hypertension Follow-up      Outpatient blood pressures are being checked at home.  Results are Upper 140's/lower 90's.    He increased the dose of the Lisinopril to 20 mg daily.     At his Dental Visit the readings were 172/90 and 164/90.  This was on 8-1-2017.    Low Salt Diet: no added salt        Amount of exercise or physical activity: None    Problems taking medications regularly: A little he states, on average he is missing 3 days per week.    Medication side effects: none  Diet: No added salt.      TESTOSTERONE:  Recheck on testosterone injections and medication.      Current Outpatient Prescriptions:      syringe/needle, disp, (B-D SYRINGE/NEEDLE) 22G X 1-1/2\" 3 ML MISC, For Testosterone injections., Disp: 10 each, Rfl: 0     Needle, Disp, (B-D BLUNT FILL NEEDLE) 18G X 1-1/2\" MISC, For drawing up medication., Disp: 10 each, Rfl: 0     testosterone cypionate (DEPO-TESTOSTERONE) 200 MG/ML injection, 400 mg every 25 days., Disp: 2 mL, Rfl: 5     lisinopril (PRINIVIL/ZESTRIL) 10 MG tablet, Take 1 tablet (10 mg) by mouth daily, Disp: 90 tablet, Rfl: 3     order for DME, Equipment being ordered: gel cups, large, Disp: 1 Device, Rfl: 0     order for DME, Equipment being ordered: knee sleeve, XL, Disp: 1 Device, Rfl: 0    Patient Active Problem List   Diagnosis     Family history of colon cancer     Hyperlipidemia with target LDL less than 130     Hypotestosteronism     Esophageal reflux     Benign essential hypertension       Blood pressure 122/70, pulse 68, temperature 96  F (35.6  C), temperature source Tympanic, height 6' (1.829 m), weight (!) 304 lb (137.9 kg).    Exam:  GENERAL APPEARANCE: healthy, alert and no distress  EYES: EOMI,  PERRL  CV: regular rates and rhythm  PSYCH: mentation appears normal and affect normal/bright    (I10) Benign " essential hypertension  Comment:   Plan: lisinopril (PRINIVIL/ZESTRIL) 20 MG tablet,         Creatinine        The goal for the average is under 130/80. Use the med at 20 mg daily of the Lisinopril and the non drug therapies. Calibrated the home BP machine annually and it should be within 10 points.   On the arm, use the large BP cuff. The form is made out for the dentist and faxed to [phone]. Do the creatinine today.     (E29.1) Hypotestosteronism  Comment:   Plan: testosterone cypionate (DEPO-TESTOSTERONE) 200         MG/ML injection        The Rx is done for six months. He does home injections. Call for the lab appt in November for the Testosterone.       Cecilio Hughes

## 2017-10-16 ENCOUNTER — OFFICE VISIT (OUTPATIENT)
Dept: FAMILY MEDICINE | Facility: CLINIC | Age: 37
End: 2017-10-16
Payer: COMMERCIAL

## 2017-10-16 VITALS
TEMPERATURE: 97.6 F | BODY MASS INDEX: 42.19 KG/M2 | DIASTOLIC BLOOD PRESSURE: 89 MMHG | SYSTOLIC BLOOD PRESSURE: 146 MMHG | HEART RATE: 83 BPM | HEIGHT: 72 IN | WEIGHT: 311.5 LBS | OXYGEN SATURATION: 94 %

## 2017-10-16 DIAGNOSIS — R09.81 CONGESTION OF PARANASAL SINUS: Primary | ICD-10-CM

## 2017-10-16 PROCEDURE — 99213 OFFICE O/P EST LOW 20 MIN: CPT | Performed by: FAMILY MEDICINE

## 2017-10-16 NOTE — PROGRESS NOTES
SUBJECTIVE:   Edwin Nuno is a 36 year old male who presents to clinic today for the following health issues:      ENT Symptoms             Symptoms: cc Present Absent Comment   Fever/Chills   x    Fatigue  x  Little bit   Muscle Aches   x    Eye Irritation   x    Sneezing   x    Nasal Titus/Drg  x     Sinus Pressure/Pain  x     Loss of smell  x     Dental pain   x    Sore Throat   x    Swollen Glands   x    Ear Pain/Fullness   x    Cough   x    Wheeze   x    Chest Pain   x    Shortness of breath   x    Rash   x    Other  x  headache     Symptom duration:  2 days   Symptom severity:  mild   Treatments tried:  decongestants, sinus medication   Contacts:  family; child dx with strep 2 weeks ago               Problem list and histories reviewed & adjusted, as indicated.  Additional history:         Reviewed and updated as needed this visit by clinical staffTobacco  Allergies  Med Hx  Surg Hx  Fam Hx  Soc Hx      Reviewed and updated as needed this visit by Provider      OBJECTIVE:  /89  Pulse 83  Temp 97.6  F (36.4  C) (Tympanic)  Ht 6' (1.829 m)  Wt (!) 311 lb 8 oz (141.3 kg)  SpO2 94%  BMI 42.25 kg/m2  HEAD: AT/NC  EYES: PERRLA, EOMI, Sclerae clear, Fundi normal with sharp discs  EARS: TMs clear, canals normal  NOSE & THROAT: clear  LUNGS: clear to auscultation, normal breath sounds  CV: RRR without murmur  ABD: BS+, soft, nontender, no masses, no hepatosplenomegaly     ASSESSMENT:  Congestion of paranasal sinus    PLAN:  Symptomatic management  Call if symptoms persist into next week which would be beyond 10 days and then would consider antibiotic treatment.

## 2017-10-16 NOTE — NURSING NOTE
Chief Complaint   Patient presents with     Sinus Problem     x 2 days       Initial /89  Pulse 83  Temp 97.6  F (36.4  C) (Tympanic)  Ht 6' (1.829 m)  Wt (!) 311 lb 8 oz (141.3 kg)  SpO2 94%  BMI 42.25 kg/m2 Estimated body mass index is 42.25 kg/(m^2) as calculated from the following:    Height as of this encounter: 6' (1.829 m).    Weight as of this encounter: 311 lb 8 oz (141.3 kg).  Medication Reconciliation: complete

## 2017-10-16 NOTE — MR AVS SNAPSHOT
After Visit Summary   10/16/2017    Edwin Nuno    MRN: [id number]           Patient Information     Date Of Birth          1980        Visit Information        Provider Department      10/16/2017 4:00 PM Ricardo Stoddard MD [facility]        Today's Diagnoses     Congestion of paranasal sinus    -  1       Follow-ups after your visit        Who to contact     If you have questions or need follow up information about today's clinic visit or your schedule please contact [facility] directly at [phone].  Normal or non-critical lab and imaging results will be communicated to you by Adknowledgehart, letter or phone within 4 business days after the clinic has received the results. If you do not hear from us within 7 days, please contact the clinic through Gigalot or phone. If you have a critical or abnormal lab result, we will notify you by phone as soon as possible.  Submit refill requests through FunCaptcha or call your pharmacy and they will forward the refill request to us. Please allow 3 business days for your refill to be completed.          Additional Information About Your Visit        MyChart Information     FunCaptcha gives you secure access to your electronic health record. If you see a primary care provider, you can also send messages to your care team and make appointments. If you have questions, please call your primary care clinic.  If you do not have a primary care provider, please call [phone] and they will assist you.        Care EveryWhere ID     This is your Care EveryWhere ID. This could be used by other organizations to access your [facility] medical records  [id number]        Your Vitals Were     Pulse Temperature Height Pulse Oximetry BMI (Body Mass Index)       83 97.6  F (36.4  C) (Tympanic) 6' (1.829 m) 94% 42.25 kg/m2        Blood Pressure from Last 3 Encounters:   10/16/17 146/89   08/03/17 122/70   06/22/17 (!) 154/93    Weight from Last  "3 Encounters:   10/16/17 (!) 311 lb 8 oz (141.3 kg)   08/03/17 (!) 304 lb (137.9 kg)   06/22/17 290 lb (131.5 kg)              Today, you had the following     No orders found for display       Primary Care Provider Office Phone # Fax #    Cecilio Hughes [phone] [address] [address]        Equal Access to Services     BERENICE BENJAMIN AH: Hadii aad ku hadasho Soomaali, waaxda luqadaha, qaybta kaalmada adeegyada, waxay idiin hayaan adeeg kharash la'aan ah. So [facility] [place] [phone].    ATENCIÓN: Si habla español, tiene a bhandari disposición servicios gratuitos de asistencia lingüística. [facility] [phone].    We comply with applicable federal civil rights laws and [place] laws. We do not discriminate on the basis of race, color, national origin, age, disability, sex, sexual orientation, or gender identity.            Thank you!     Thank you for choosing [facility][note 3]  for your care. Our goal is always to provide you with excellent care. Hearing back from our patients is one way we can continue to improve our services. Please take a few minutes to complete the written survey that you may receive in the mail after your visit with us. Thank you!             Your Updated Medication List - Protect others around you: Learn how to safely use, store and throw away your medicines at www.disposemymeds.org.          This list is accurate as of: 10/16/17  4:26 PM.  Always use your most recent med list.                   Brand Name Dispense Instructions for use Diagnosis    lisinopril 20 MG tablet    PRINIVIL/ZESTRIL    90 tablet    Take 1 tablet (20 mg) by mouth daily    Benign essential hypertension       Needle (Disp) 18G X 1-1/2\" Misc    B-D BLUNT FILL NEEDLE    10 each    For drawing up medication.    Hypotestosteronism       * order for DME     1 Device    Equipment being ordered: knee sleeve, XL    Right knee injury, initial encounter       * order for DME     1 Device " "   Equipment being ordered: gel cups, large    Pain of right heel, Right Achilles tendinitis       syringe/needle (disp) 22G X 1-1/2\" 3 ML Misc    B-D SYRINGE/NEEDLE    10 each    For Testosterone injections.    Hypotestosteronism       testosterone cypionate 200 MG/ML injection    DEPO-TESTOSTERONE    2 mL    400 mg every 25 days.    Hypotestosteronism       * Notice:  This list has 2 medication(s) that are the same as other medications prescribed for you. Read the directions carefully, and ask your doctor or other care provider to review them with you.      "

## 2017-11-20 DIAGNOSIS — E34.9 HYPOTESTOSTERONISM: ICD-10-CM

## 2017-11-20 PROCEDURE — 36415 COLL VENOUS BLD VENIPUNCTURE: CPT | Performed by: FAMILY MEDICINE

## 2017-11-20 PROCEDURE — 84403 ASSAY OF TOTAL TESTOSTERONE: CPT | Performed by: FAMILY MEDICINE

## 2017-11-23 LAB — TESTOST SERPL-MCNC: 256 NG/DL (ref 240–950)

## 2017-11-25 ENCOUNTER — TRANSFERRED RECORDS (OUTPATIENT)
Dept: HEALTH INFORMATION MANAGEMENT | Facility: CLINIC | Age: 37
End: 2017-11-25

## 2017-12-05 ENCOUNTER — TRANSFERRED RECORDS (OUTPATIENT)
Dept: HEALTH INFORMATION MANAGEMENT | Facility: CLINIC | Age: 37
End: 2017-12-05

## 2017-12-13 ENCOUNTER — OFFICE VISIT (OUTPATIENT)
Dept: FAMILY MEDICINE | Facility: CLINIC | Age: 37
End: 2017-12-13
Payer: COMMERCIAL

## 2017-12-13 VITALS
BODY MASS INDEX: 42.26 KG/M2 | TEMPERATURE: 97.7 F | SYSTOLIC BLOOD PRESSURE: 139 MMHG | WEIGHT: 312 LBS | HEART RATE: 76 BPM | DIASTOLIC BLOOD PRESSURE: 78 MMHG | HEIGHT: 72 IN

## 2017-12-13 DIAGNOSIS — Z01.818 PREOP GENERAL PHYSICAL EXAM: Primary | ICD-10-CM

## 2017-12-13 LAB
BASOPHILS # BLD AUTO: 0 10E9/L (ref 0–0.2)
BASOPHILS NFR BLD AUTO: 0.1 %
CREAT SERPL-MCNC: 1.1 MG/DL (ref 0.66–1.25)
DIFFERENTIAL METHOD BLD: NORMAL
EOSINOPHIL # BLD AUTO: 0.2 10E9/L (ref 0–0.7)
EOSINOPHIL NFR BLD AUTO: 2.7 %
ERYTHROCYTE [DISTWIDTH] IN BLOOD BY AUTOMATED COUNT: 13.2 % (ref 10–15)
GFR SERPL CREATININE-BSD FRML MDRD: 76 ML/MIN/1.7M2
HCT VFR BLD AUTO: 41 % (ref 40–53)
HGB BLD-MCNC: 14.5 G/DL (ref 13.3–17.7)
LYMPHOCYTES # BLD AUTO: 2.8 10E9/L (ref 0.8–5.3)
LYMPHOCYTES NFR BLD AUTO: 39.3 %
MCH RBC QN AUTO: 31.6 PG (ref 26.5–33)
MCHC RBC AUTO-ENTMCNC: 35.4 G/DL (ref 31.5–36.5)
MCV RBC AUTO: 89 FL (ref 78–100)
MONOCYTES # BLD AUTO: 0.6 10E9/L (ref 0–1.3)
MONOCYTES NFR BLD AUTO: 8.5 %
NEUTROPHILS # BLD AUTO: 3.5 10E9/L (ref 1.6–8.3)
NEUTROPHILS NFR BLD AUTO: 49.4 %
PLATELET # BLD AUTO: 249 10E9/L (ref 150–450)
RBC # BLD AUTO: 4.59 10E12/L (ref 4.4–5.9)
WBC # BLD AUTO: 7 10E9/L (ref 4–11)

## 2017-12-13 PROCEDURE — 82565 ASSAY OF CREATININE: CPT | Performed by: FAMILY MEDICINE

## 2017-12-13 PROCEDURE — 36415 COLL VENOUS BLD VENIPUNCTURE: CPT | Performed by: FAMILY MEDICINE

## 2017-12-13 PROCEDURE — 85025 COMPLETE CBC W/AUTO DIFF WBC: CPT | Performed by: FAMILY MEDICINE

## 2017-12-13 PROCEDURE — 93000 ELECTROCARDIOGRAM COMPLETE: CPT | Performed by: FAMILY MEDICINE

## 2017-12-13 PROCEDURE — 99214 OFFICE O/P EST MOD 30 MIN: CPT | Performed by: FAMILY MEDICINE

## 2017-12-13 RX ORDER — CETIRIZINE HYDROCHLORIDE 10 MG/1
10 TABLET ORAL EVERY EVENING
Qty: 30 TABLET | Refills: 1 | COMMUNITY
Start: 2017-12-13 | End: 2021-04-15

## 2017-12-13 RX ORDER — MULTIPLE VITAMINS W/ MINERALS TAB 9MG-400MCG
1 TAB ORAL DAILY
Qty: 100 TABLET | Refills: 3 | COMMUNITY
Start: 2017-12-13 | End: 2018-01-17

## 2017-12-13 NOTE — PROGRESS NOTES
[facility]  [address] [address]  [phone]  Dept: [phone]    PRE-OP EVALUATION:  Today's date: 2017    Edwin Nuno (: 1980) presents for pre-operative evaluation assessment as requested by Dr. José Preston.  He requires evaluation and anesthesia risk assessment prior to undergoing surgery/procedure for treatment of right knee meniscus tears .  Proposed procedure: Right knee arthroscopy and meniscus repair.     Date of Surgery/ Procedure: 18  Time of Surgery/ Procedure: [facility]  Hospital/Surgical Facility: [facility]  Fax number for surgical facility: [phone].  Primary Physician: Cecilio Hughes  Type of Anesthesia Anticipated: General    Patient has a Health Care Directive or Living Will:  NO    1. NO - Do you have a history of heart attack, stroke, stent, bypass or surgery on an artery in the head, neck, heart or legs?  2. NO - Do you ever have any pain or discomfort in your chest?  3. NO - Do you have a history of  Heart Failure?  4. NO - Are you troubled by shortness of breath when: walking on the level, up a slight hill or at night?  5. NO - Do you currently have a cold, bronchitis or other respiratory infection?  6. NO - Do you have a cough, shortness of breath or wheezing?  7. NO - Do you sometimes get pains in the calves of your legs when you walk?  8. YES - Do you or anyone in your family have previous history of blood clots?  Patient-following a surgery on the left knee.  9. NO - Do you or does anyone in your family have a serious bleeding problem such as prolonged bleeding following surgeries or cuts?  10. NO - Have you ever had problems with anemia or been told to take iron pills?  11. NO - Have you had any abnormal blood loss such as black, tarry or bloody stools, or abnormal vaginal bleeding?  12. NO - Have you ever had a blood transfusion?  13. NO - Have you or any of your relatives ever had problems  with anesthesia?  14. NO - Do you have sleep apnea, excessive snoring or daytime drowsiness?  15. NO - Do you have any prosthetic heart valves?  16. NO - Do you have prosthetic joints?      HPI:                                                      Brief HPI related to upcoming procedure: see above.       See problem list for active medical problems.  Problems all longstanding and stable, except as noted/documented.  See ROS for pertinent symptoms related to these conditions.                                                                                                  .    MEDICAL HISTORY:                                                    Patient Active Problem List    Diagnosis Date Noted     Benign essential hypertension 01/12/2017     Priority: Medium     Hypotestosteronism 10/29/2015     Priority: Medium     He has been on replacement therapies in the past.        Esophageal reflux 10/29/2015     Priority: Medium     Family history of colon cancer 11/07/2014     Priority: Medium     His mother and MGM had colon cancer. Recommend starting colonoscopies at age 40.        Hyperlipidemia with target LDL less than 130 11/07/2014     Priority: Medium     Diagnosis updated by automated process. Provider to review and confirm.        Past Medical History:   Diagnosis Date     Hyperlipidemia LDL goal <160 6/7/2013     Hypertension, goal below 140/90 6/7/2013     History reviewed. No pertinent surgical history.  Current Outpatient Prescriptions   Medication Sig Dispense Refill     magnesium oxide (MAG-OX) 400 (241.3 MG) MG tablet Take 1 tablet (400 mg) by mouth daily 60 tablet 3     cetirizine (ZYRTEC) 10 MG tablet Take 1 tablet (10 mg) by mouth every evening 30 tablet 1     multivitamin, therapeutic with minerals (MULTI-VITAMIN) TABS tablet Take 1 tablet by mouth daily 100 tablet 3     lisinopril (PRINIVIL/ZESTRIL) 20 MG tablet Take 1 tablet (20 mg) by mouth daily 90 tablet 3     testosterone cypionate  "(DEPO-TESTOSTERONE) 200 MG/ML injection 400 mg every 25 days. 2 mL 5     syringe/needle, disp, (B-D SYRINGE/NEEDLE) 22G X 1-1/2\" 3 ML MISC For Testosterone injections. 10 each 0     Needle, Disp, (B-D BLUNT FILL NEEDLE) 18G X 1-1/2\" MISC For drawing up medication. 10 each 0     order for DME Equipment being ordered: gel cups, large 1 Device 0     order for DME Equipment being ordered: knee sleeve, XL 1 Device 0     OTC products: None, except as noted above    No Known Allergies   Latex Allergy: NO    Social History   Substance Use Topics     Smoking status: Never Smoker     Smokeless tobacco: Former User     Alcohol use Yes     History   Drug Use No       REVIEW OF SYSTEMS:                                                    C: NEGATIVE for fever, chills, change in weight  E/M: NEGATIVE for ear, mouth and throat problems  R: NEGATIVE for significant cough or SOB  CV: NEGATIVE for chest pain, palpitations or peripheral edema    EXAM:                                                    /78  Pulse 76  Temp 97.7  F (36.5  C) (Tympanic)  Ht 6' (1.829 m)  Wt (!) 312 lb (141.5 kg)  BMI 42.31 kg/m2  Exam:  GENERAL APPEARANCE: healthy, alert and no distress; over weight  EYES: EOMI,  PERRL  HENT: ear canals and TM's normal and nose and mouth without ulcers or lesions  NECK: no adenopathy, no asymmetry, masses, or scars and thyroid normal to palpation  RESP: lungs clear to auscultation - no rales, rhonchi or wheezes  CV: regular rates and rhythm, normal S1 S2, no S3 or S4 and no murmur, click or rub -  ABDOMEN:  soft, nontender, no HSM or masses and bowel sounds normal  MS: extremities normal- no gross deformities noted, no evidence of inflammation in joints, FROM in all extremities.  MS: decreased range of motion of the knee  SKIN: no suspicious lesions or rashes  NEURO: Normal strength and tone, sensory exam grossly normal, mentation intact and speech normal  PSYCH: mentation appears normal and affect " normal/bright  LYMPHATICS: No axillary, cervical, inguinal, or supraclavicular nodes      DIAGNOSTICS:                                                      EKG: appears normal, NSR, normal axis, normal intervals, no acute ST/T changes c/w ischemia, no LVH by voltage criteria, unchanged from previous tracings  Labs Drawn and in Process:   Unresulted Labs Ordered in the Past 30 Days of this Admission     No orders found from 10/14/2017 to 2017.          Recent Labs   Lab Test  17   0742  17   1552  13   1616 13   HGB   --    --   13.4   --    PLT   --    --   251   --    NA   --    --   143  140   POTASSIUM   --   3.9  4.1  4.6   CR  1.01  1.27*  1.07  1.07   A1C   --    --    --   5.4        IMPRESSION:                                                    Reason for surgery/procedure: Edwin Nuno (: 1980) presents for pre-operative evaluation assessment as requested by Dr. José Preston.  He requires evaluation and anesthesia risk assessment prior to undergoing surgery/procedure for treatment of right knee meniscus tears .  Proposed procedure: Right knee arthroscopy and meniscus repair.     The proposed surgical procedure is considered LOW risk.    REVISED CARDIAC RISK INDEX  The patient has the following serious cardiovascular risks for perioperative complications such as (MI, PE, VFib and 3  AV Block):  No serious cardiac risks  INTERPRETATION: 0 risks: Class I (very low risk - 0.4% complication rate)    The patient has the following additional risks for perioperative complications:  No identified additional risks      ICD-10-CM    1. Preop general physical exam Z01.818        RECOMMENDATIONS:                                                        --Patient is to take all scheduled medications on the day before surgery EXCEPT for modifications listed below.  Take no aspirin or advil or aleve for one week before surgery. Tylenol is OK.   Your stomach should be empty for 8  hours before surgery.     APPROVAL GIVEN to proceed with proposed procedure, without further diagnostic evaluation       Signed Electronically by: Cecilio Hughes MD    Copy of this evaluation report is provided to requesting physician.    [facility] Preop Guidelines

## 2017-12-13 NOTE — PATIENT INSTRUCTIONS
Before Your Surgery      Call your surgeon if there is any change in your health. This includes signs of a cold or flu (such as a sore throat, runny nose, cough, rash or fever).    Do not smoke, drink alcohol or take over the counter medicine (unless your surgeon or primary care doctor tells you to) for the 24 hours before and after surgery.    If you take prescribed drugs: Follow your doctor s orders about which medicines to take and which to stop until after surgery.    Eating and drinking prior to surgery: follow the instructions from your surgeon    Take a shower or bath the night before surgery. Use the soap your surgeon gave you to gently clean your skin. If you do not have soap from your surgeon, use your regular soap. Do not shave or scrub the surgery site.  Wear clean pajamas and have clean sheets on your bed.             Thank you for choosing [facility].  You may be receiving a survey in the mail from [facility] regarding your visit today.  Please take a few minutes to complete and return the survey to let us know how we are doing.      If you have questions or concerns, please contact us via Slicethepie or you can contact your care team at [phone].    Our Clinic hours are:  Monday 6:40 am  to 7:00 pm  Tuesday -Friday 6:40 am to 5:00 pm    The [place] outpatient lab hours are:  Monday - Friday 6:10 am to 4:45 pm  Saturdays 7:00 am to 11:00 am  Appointments are required, call [phone]    If you have clinical questions after hours or would like to schedule an appointment,  call the clinic at [phone].    DIAGNOSTICS:                                                      EKG: appears normal, NSR, normal axis, normal intervals, no acute ST/T changes c/w ischemia, no LVH by voltage criteria, unchanged from previous tracings  Labs Drawn and in Process:   Unresulted Labs Ordered in the Past 30 Days of this Admission     No orders found from 10/14/2017 to 12/14/2017.          Recent Labs   Lab  Test  17   0742  17   1552  13   1616 13   HGB   --    --   13.4   --    PLT   --    --   251   --    NA   --    --   143  140   POTASSIUM   --   3.9  4.1  4.6   CR  1.01  1.27*  1.07  1.07   A1C   --    --    --   5.4        IMPRESSION:                                                    Reason for surgery/procedure: Edwin Nuno (: 1980) presents for pre-operative evaluation assessment as requested by Dr. José Preston.  He requires evaluation and anesthesia risk assessment prior to undergoing surgery/procedure for treatment of right knee meniscus tears .  Proposed procedure: Right knee arthroscopy and meniscus repair.     The proposed surgical procedure is considered LOW risk.    REVISED CARDIAC RISK INDEX  The patient has the following serious cardiovascular risks for perioperative complications such as (MI, PE, VFib and 3  AV Block):  No serious cardiac risks  INTERPRETATION: 0 risks: Class I (very low risk - 0.4% complication rate)    The patient has the following additional risks for perioperative complications:  No identified additional risks      ICD-10-CM    1. Preop general physical exam Z01.818        RECOMMENDATIONS:                                                        --Patient is to take all scheduled medications on the day before surgery EXCEPT for modifications listed below.  Take no aspirin or advil or aleve for one week before surgery. Tylenol is OK.   Your stomach should be empty for 8 hours before surgery.     APPROVAL GIVEN to proceed with proposed procedure, without further diagnostic evaluation

## 2017-12-13 NOTE — MR AVS SNAPSHOT
After Visit Summary   12/13/2017    Edwin Nuno    MRN: [id number]           Patient Information     Date Of Birth          1980        Visit Information        Provider Department      12/13/2017 7:00 AM Cecilio Hughes MD [facility]        Today's Diagnoses     Preop general physical exam    -  1      Care Instructions      Before Your Surgery      Call your surgeon if there is any change in your health. This includes signs of a cold or flu (such as a sore throat, runny nose, cough, rash or fever).    Do not smoke, drink alcohol or take over the counter medicine (unless your surgeon or primary care doctor tells you to) for the 24 hours before and after surgery.    If you take prescribed drugs: Follow your doctor s orders about which medicines to take and which to stop until after surgery.    Eating and drinking prior to surgery: follow the instructions from your surgeon    Take a shower or bath the night before surgery. Use the soap your surgeon gave you to gently clean your skin. If you do not have soap from your surgeon, use your regular soap. Do not shave or scrub the surgery site.  Wear clean pajamas and have clean sheets on your bed.             Thank you for choosing [facility].  You may be receiving a survey in the mail from [employer] regarding your visit today.  Please take a few minutes to complete and return the survey to let us know how we are doing.      If you have questions or concerns, please contact us via [employer] or you can contact your care team at [phone].    Our Clinic hours are:  Monday 6:40 am  to 7:00 pm  Tuesday -Friday 6:40 am to 5:00 pm    The [place] outpatient lab hours are:  Monday - Friday 6:10 am to 4:45 pm  Saturdays 7:00 am to 11:00 am  Appointments are required, call [phone]    If you have clinical questions after hours or would like to schedule an appointment,  call the clinic at [phone].    DIAGNOSTICS:                                                       EKG: appears normal, NSR, normal axis, normal intervals, no acute ST/T changes c/w ischemia, no LVH by voltage criteria, unchanged from previous tracings  Labs Drawn and in Process:   Unresulted Labs Ordered in the Past 30 Days of this Admission     No orders found from 10/14/2017 to 2017.          Recent Labs   Lab Test  17   0742  17   1552  13   1616 13   HGB   --    --   13.4   --    PLT   --    --   251   --    NA   --    --   143  140   POTASSIUM   --   3.9  4.1  4.6   CR  1.01  1.27*  1.07  1.07   A1C   --    --    --   5.4        IMPRESSION:                                                    Reason for surgery/procedure: Edwin Nuno (: 1980) presents for pre-operative evaluation assessment as requested by Dr. José Preston.  He requires evaluation and anesthesia risk assessment prior to undergoing surgery/procedure for treatment of right knee meniscus tears .  Proposed procedure: Right knee arthroscopy and meniscus repair.     The proposed surgical procedure is considered LOW risk.    REVISED CARDIAC RISK INDEX  The patient has the following serious cardiovascular risks for perioperative complications such as (MI, PE, VFib and 3  AV Block):  No serious cardiac risks  INTERPRETATION: 0 risks: Class I (very low risk - 0.4% complication rate)    The patient has the following additional risks for perioperative complications:  No identified additional risks      ICD-10-CM    1. Preop general physical exam Z01.818        RECOMMENDATIONS:                                                        --Patient is to take all scheduled medications on the day before surgery EXCEPT for modifications listed below.  Take no aspirin or advil or aleve for one week before surgery. Tylenol is OK.   Your stomach should be empty for 8 hours before surgery.     APPROVAL GIVEN to proceed with proposed procedure, without further diagnostic evaluation                Follow-ups after your visit        Who to contact     If you have questions or need follow up information about today's clinic visit or your schedule please contact [facility] directly at [phone].  Normal or non-critical lab and imaging results will be communicated to you by MyChart, letter or phone within 4 business days after the clinic has received the results. If you do not hear from us within 7 days, please contact the clinic through MyChart or phone. If you have a critical or abnormal lab result, we will notify you by phone as soon as possible.  Submit refill requests through [employer] or call your pharmacy and they will forward the refill request to us. Please allow 3 business days for your refill to be completed.          Additional Information About Your Visit        [employer] Information     [employer] gives you secure access to your electronic health record. If you see a primary care provider, you can also send messages to your care team and make appointments. If you have questions, please call your primary care clinic.  If you do not have a primary care provider, please call [phone] and they will assist you.        Care EveryWhere ID     This is your Care EveryWhere ID. This could be used by other organizations to access your [facility] medical records  [id number]        Your Vitals Were     Pulse Temperature Height BMI (Body Mass Index)          76 97.7  F (36.5  C) (Tympanic) 6' (1.829 m) 42.31 kg/m2         Blood Pressure from Last 3 Encounters:   12/13/17 139/78   10/16/17 146/89   08/03/17 122/70    Weight from Last 3 Encounters:   12/13/17 (!) 312 lb (141.5 kg)   10/16/17 (!) 311 lb 8 oz (141.3 kg)   08/03/17 (!) 304 lb (137.9 kg)              We Performed the Following     CBC with platelets differential     Creatinine     EKG 12-lead complete w/read - Clinics        Primary Care Provider Office Phone # Fax #    Cecilio Hughes [phone]        "[address] [facility] [address]        Equal Access to Services     BERENICE BENJAMIN : Hadii melinda ku hadmargaritao Soparadiseali, waaxda luqadaha, qaybta kaalmada nickienaomydestiny, waxay idiin hayshlomomady matthewsamandaleeann hui. So [facility] [phone].    ATENCIÓN: Si habla español, tiene a bhandari disposición servicios gratuitos de asistencia lingüística. Llame al [phone].    We comply with applicable federal civil rights laws and [place] laws. We do not discriminate on the basis of race, color, national origin, age, disability, sex, sexual orientation, or gender identity.            Thank you!     Thank you for choosing [facility]  for your care. Our goal is always to provide you with excellent care. Hearing back from our patients is one way we can continue to improve our services. Please take a few minutes to complete the written survey that you may receive in the mail after your visit with us. Thank you!             Your Updated Medication List - Protect others around you: Learn how to safely use, store and throw away your medicines at www.disposemymeds.org.          This list is accurate as of: 12/13/17  8:04 AM.  Always use your most recent med list.                   Brand Name Dispense Instructions for use Diagnosis    cetirizine 10 MG tablet    zyrTEC    30 tablet    Take 1 tablet (10 mg) by mouth every evening    Preop general physical exam       lisinopril 20 MG tablet    PRINIVIL/ZESTRIL    90 tablet    Take 1 tablet (20 mg) by mouth daily    Benign essential hypertension       magnesium oxide 400 (241.3 MG) MG tablet    MAG-OX    60 tablet    Take 1 tablet (400 mg) by mouth daily    Preop general physical exam       Multi-vitamin Tabs tablet     100 tablet    Take 1 tablet by mouth daily    Preop general physical exam       Needle (Disp) 18G X 1-1/2\" Misc    B-D BLUNT FILL NEEDLE    10 each    For drawing up medication.    Hypotestosteronism       * order for DME     1 Device    Equipment being ordered: knee " "sleeve, XL    Right knee injury, initial encounter       * order for DME     1 Device    Equipment being ordered: gel cups, large    Pain of right heel, Right Achilles tendinitis       syringe/needle (disp) 22G X 1-1/2\" 3 ML Misc    B-D SYRINGE/NEEDLE    10 each    For Testosterone injections.    Hypotestosteronism       testosterone cypionate 200 MG/ML injection    DEPO-TESTOSTERONE    2 mL    400 mg every 25 days.    Hypotestosteronism       * Notice:  This list has 2 medication(s) that are the same as other medications prescribed for you. Read the directions carefully, and ask your doctor or other care provider to review them with you.      "

## 2017-12-20 ENCOUNTER — OFFICE VISIT (OUTPATIENT)
Dept: URGENT CARE | Facility: URGENT CARE | Age: 37
End: 2017-12-20
Payer: COMMERCIAL

## 2017-12-20 VITALS
HEART RATE: 76 BPM | DIASTOLIC BLOOD PRESSURE: 92 MMHG | TEMPERATURE: 97.9 F | OXYGEN SATURATION: 97 % | SYSTOLIC BLOOD PRESSURE: 152 MMHG | BODY MASS INDEX: 42.45 KG/M2 | WEIGHT: 313 LBS

## 2017-12-20 DIAGNOSIS — J01.90 ACUTE SINUSITIS WITH SYMPTOMS > 10 DAYS: Primary | ICD-10-CM

## 2017-12-20 PROCEDURE — 99213 OFFICE O/P EST LOW 20 MIN: CPT | Performed by: NURSE PRACTITIONER

## 2017-12-20 NOTE — PROGRESS NOTES
"  SUBJECTIVE:   Edwin Nuno is a 37 year old male who presents to clinic today for the following health issues:  Chief Complaint   Patient presents with     Sinus Problem     since september, post nasal drip, congestion, sinus pressure, clogged ears                 Problem list and histories reviewed & adjusted, as indicated.  Additional history: as documented    Patient Active Problem List   Diagnosis     Family history of colon cancer     Hyperlipidemia with target LDL less than 130     Hypotestosteronism     Esophageal reflux     Benign essential hypertension     No past surgical history on file.    Social History   Substance Use Topics     Smoking status: Never Smoker     Smokeless tobacco: Former User     Alcohol use Yes     Family History   Problem Relation Age of Onset     Cancer - colorectal Mother      Breast Cancer Mother      Cancer - colorectal Maternal Grandmother      Prostate Cancer Father      C.A.D. No family hx of      DIABETES No family hx of      Hypertension No family hx of      CEREBROVASCULAR DISEASE No family hx of      Melanoma No family hx of          Current Outpatient Prescriptions   Medication Sig Dispense Refill     amoxicillin-clavulanate (AUGMENTIN) 875-125 MG per tablet Take 1 tablet by mouth 2 times daily for 10 days 20 tablet 0     magnesium oxide (MAG-OX) 400 (241.3 MG) MG tablet Take 1 tablet (400 mg) by mouth daily 60 tablet 3     cetirizine (ZYRTEC) 10 MG tablet Take 1 tablet (10 mg) by mouth every evening 30 tablet 1     multivitamin, therapeutic with minerals (MULTI-VITAMIN) TABS tablet Take 1 tablet by mouth daily 100 tablet 3     lisinopril (PRINIVIL/ZESTRIL) 20 MG tablet Take 1 tablet (20 mg) by mouth daily 90 tablet 3     testosterone cypionate (DEPO-TESTOSTERONE) 200 MG/ML injection 400 mg every 25 days. 2 mL 5     syringe/needle, disp, (B-D SYRINGE/NEEDLE) 22G X 1-1/2\" 3 ML MISC For Testosterone injections. 10 each 0     Needle, Disp, (B-D BLUNT FILL NEEDLE) 18G X " "1-1/2\" MISC For drawing up medication. 10 each 0     order for DME Equipment being ordered: gel cups, large 1 Device 0     order for DME Equipment being ordered: knee sleeve, XL 1 Device 0     No Known Allergies  Labs reviewed in EPIC      Reviewed and updated as needed this visit by clinical staffAllergies  Meds  Problems       Reviewed and updated as needed this visit by Provider  Allergies  Meds  Problems         ROS:  Constitutional, HEENT, cardiovascular, pulmonary, GI, , musculoskeletal, neuro, skin, endocrine and psych systems are negative, except as otherwise noted.      OBJECTIVE:   BP (!) 152/92  Pulse 76  Temp 97.9  F (36.6  C) (Tympanic)  Wt (!) 313 lb (142 kg)  SpO2 97%  BMI 42.45 kg/m2  Body mass index is 42.45 kg/(m^2).   GENERAL: healthy, alert and no distress, nontoxic in appearance  EYES: Eyes grossly normal to inspection, PERRL and conjunctivae and sclerae normal  HENT: ear canals and TM's normal, nose and mouth without ulcers or lesions  NECK: no adenopathy, supple with full ROM  RESP: lungs clear to auscultation - no rales, rhonchi or wheezes  CV: regular rate and rhythm, normal S1 S2, no S3 or S4, no murmur, click or rub, no peripheral edema  ABDOMEN: soft, nontender, no hepatosplenomegaly, no masses   MS: no gross musculoskeletal defects noted, no edema  No rash    Diagnostic Test Results:  No results found for this or any previous visit (from the past 24 hour(s)).    ASSESSMENT/PLAN:     Problem List Items Addressed This Visit     None      Visit Diagnoses     Acute sinusitis with symptoms > 10 days    -  Primary    Relevant Medications    amoxicillin-clavulanate (AUGMENTIN) 875-125 MG per tablet               Patient Instructions   Increase rest and fluids. Tylenol and/or Ibuprofen for comfort. Cool mist vaporizer. If your symptoms worsen or do not resolve follow up with your primary care provider in 2 weeks and sooner if needed.        Indications for emergent return to " emergency department discussed with patient, who verbalized good understanding and agreement.  Patient understands the limitations of today's evaluation.           Sinusitis (Antibiotic Treatment)    The sinuses are air-filled spaces within the bones of the face. They connect to the inside of the nose. Sinusitis is an inflammation of the tissue lining the sinus cavity. Sinus inflammation can occur during a cold. It can also be due to allergies to pollens and other particles in the air. Sinusitis can cause symptoms of sinus congestion and fullness. A sinus infection causes fever, headache and facial pain. There is often green or yellow drainage from the nose or into the back of the throat (post-nasal drip). You have been given antibiotics to treat this condition.  Home care:    Take the full course of antibiotics as instructed. Do not stop taking them, even if you feel better.    Drink plenty of water, hot tea, and other liquids. This may help thin mucus. It also may promote sinus drainage.    Heat may help soothe painful areas of the face. Use a towel soaked in hot water. Or,  the shower and direct the hot spray onto your face. Using a vaporizer along with a menthol rub at night may also help.     An expectorant containing guaifenesin may help thin the mucus and promote drainage from the sinuses.    Over-the-counter decongestants may be used unless a similar medicine was prescribed. Nasal sprays work the fastest. Use one that contains phenylephrine or oxymetazoline. First blow the nose gently. Then use the spray. Do not use these medicines more often than directed on the label or symptoms may get worse. You may also use tablets containing pseudoephedrine. Avoid products that combine ingredients, because side effects may be increased. Read labels. You can also ask the pharmacist for help. (NOTE: Persons with high blood pressure should not use decongestants. They can raise blood  pressure.)    Over-the-counter antihistamines may help if allergies contributed to your sinusitis.      Do not use nasal rinses or irrigation during an acute sinus infection, unless told to by your health care provider. Rinsing may spread the infection to other sinuses.    Use acetaminophen or ibuprofen to control pain, unless another pain medicine was prescribed. (If you have chronic liver or kidney disease or ever had a stomach ulcer, talk with your doctor before using these medicines. Aspirin should never be used in anyone under 18 years of age who is ill with a fever. It may cause severe liver damage.)    Don't smoke. This can worsen symptoms.  Follow-up care  Follow up with your healthcare provider or our staff if you are not improving within the next week.  When to seek medical advice  Call your healthcare provider if any of these occur:    Facial pain or headache becoming more severe    Stiff neck    Unusual drowsiness or confusion    Swelling of the forehead or eyelids    Vision problems, including blurred or double vision    Fever of 100.4 F (38 C) or higher, or as directed by your healthcare provider    Seizure    Breathing problems    Symptoms not resolving within 10 days  Date Last Reviewed: 4/13/2015 2000-2017 [employer]. [address] [place] [address], [place] [address]. All rights reserved. This information is not intended as a substitute for professional medical care. Always follow your healthcare professional's instructions.            CHAIM Yuen [facility] URGENT [facility]

## 2017-12-20 NOTE — MR AVS SNAPSHOT
After Visit Summary   12/20/2017    Edwin Nuno    MRN: [id number]           Patient Information     Date Of Birth          1980        Visit Information        Provider Department      12/20/2017 5:15 PM Yvonne Camp APRN [facility]        Today's Diagnoses     Acute sinusitis with symptoms > 10 days    -  1      Care Instructions    Increase rest and fluids. Tylenol and/or Ibuprofen for comfort. Cool mist vaporizer. If your symptoms worsen or do not resolve follow up with your primary care provider in 2 weeks and sooner if needed.        Indications for emergent return to emergency department discussed with patient, who verbalized good understanding and agreement.  Patient understands the limitations of today's evaluation.           Sinusitis (Antibiotic Treatment)    The sinuses are air-filled spaces within the bones of the face. They connect to the inside of the nose. Sinusitis is an inflammation of the tissue lining the sinus cavity. Sinus inflammation can occur during a cold. It can also be due to allergies to pollens and other particles in the air. Sinusitis can cause symptoms of sinus congestion and fullness. A sinus infection causes fever, headache and facial pain. There is often green or yellow drainage from the nose or into the back of the throat (post-nasal drip). You have been given antibiotics to treat this condition.  Home care:    Take the full course of antibiotics as instructed. Do not stop taking them, even if you feel better.    Drink plenty of water, hot tea, and other liquids. This may help thin mucus. It also may promote sinus drainage.    Heat may help soothe painful areas of the face. Use a towel soaked in hot water. Or,  the shower and direct the hot spray onto your face. Using a vaporizer along with a menthol rub at night may also help.     An expectorant containing guaifenesin may help thin the mucus and promote  drainage from the sinuses.    Over-the-counter decongestants may be used unless a similar medicine was prescribed. Nasal sprays work the fastest. Use one that contains phenylephrine or oxymetazoline. First blow the nose gently. Then use the spray. Do not use these medicines more often than directed on the label or symptoms may get worse. You may also use tablets containing pseudoephedrine. Avoid products that combine ingredients, because side effects may be increased. Read labels. You can also ask the pharmacist for help. (NOTE: Persons with high blood pressure should not use decongestants. They can raise blood pressure.)    Over-the-counter antihistamines may help if allergies contributed to your sinusitis.      Do not use nasal rinses or irrigation during an acute sinus infection, unless told to by your health care provider. Rinsing may spread the infection to other sinuses.    Use acetaminophen or ibuprofen to control pain, unless another pain medicine was prescribed. (If you have chronic liver or kidney disease or ever had a stomach ulcer, talk with your doctor before using these medicines. Aspirin should never be used in anyone under 18 years of age who is ill with a fever. It may cause severe liver damage.)    Don't smoke. This can worsen symptoms.  Follow-up care  Follow up with your healthcare provider or our staff if you are not improving within the next week.  When to seek medical advice  Call your healthcare provider if any of these occur:    Facial pain or headache becoming more severe    Stiff neck    Unusual drowsiness or confusion    Swelling of the forehead or eyelids    Vision problems, including blurred or double vision    Fever of 100.4 F (38 C) or higher, or as directed by your healthcare provider    Seizure    Breathing problems    Symptoms not resolving within 10 days  Date Last Reviewed: 4/13/2015 2000-2017 [employer]. [address] [place] [address], [place] [address]. All rights  reserved. This information is not intended as a substitute for professional medical care. Always follow your healthcare professional's instructions.                Follow-ups after your visit        Follow-up notes from your care team     See patient instructions section of the AVS Return if symptoms worsen or fail to improve, for Follow up with your primary care provider.      Who to contact     If you have questions or need follow up information about today's clinic visit or your schedule please contact [facility] directly at [phone].  Normal or non-critical lab and imaging results will be communicated to you by Omnistreamhart, letter or phone within 4 business days after the clinic has received the results. If you do not hear from us within 7 days, please contact the clinic through [employer] or phone. If you have a critical or abnormal lab result, we will notify you by phone as soon as possible.  Submit refill requests through MD Insider or call your pharmacy and they will forward the refill request to us. Please allow 3 business days for your refill to be completed.          Additional Information About Your Visit        Omnistreamhart Information     [employer]ider gives you secure access to your electronic health record. If you see a primary care provider, you can also send messages to your care team and make appointments. If you have questions, please call your primary care clinic.  If you do not have a primary care provider, please call [phone] and they will assist you.        Care EveryWhere ID     This is your Care EveryWhere ID. This could be used by other organizations to access your [facility] medical records  [id number]        Your Vitals Were     Pulse Temperature Pulse Oximetry BMI (Body Mass Index)          76 97.9  F (36.6  C) (Tympanic) 97% 42.45 kg/m2         Blood Pressure from Last 3 Encounters:   12/20/17 (!) 152/92   12/13/17 139/78   10/16/17 146/89    Weight from Last 3  Encounters:   12/20/17 (!) 313 lb (142 kg)   12/13/17 (!) 312 lb (141.5 kg)   10/16/17 (!) 311 lb 8 oz (141.3 kg)              Today, you had the following     No orders found for display         Today's Medication Changes          These changes are accurate as of: 12/20/17  6:06 PM.  If you have any questions, ask your nurse or doctor.               Start taking these medicines.        Dose/Directions    amoxicillin-clavulanate 875-125 MG per tablet   Commonly known as:  AUGMENTIN   Used for:  Acute sinusitis with symptoms > 10 days        Dose:  1 tablet   Take 1 tablet by mouth 2 times daily for 10 days   Quantity:  20 tablet   Refills:  0            Where to get your medicines      These medications were sent to [facility] [address] [place] [address] AT [address] [address]     Phone:  [phone]     amoxicillin-clavulanate 875-125 MG per tablet                Primary Care Provider Office Phone # Fax #    Cecilio Hughes [phone] [address] [facility] [address]        Equal Access to Services     BERENICE BENJAMIN AH: Hadii melinda wilsono Soomaali, waaxda luqadaha, qaybta kaalmada adeegyada, austen hui. So [facility] [phone].    ATENCIÓN: Si habla español, tiene a bhandari disposición servicios gratuitos de asistencia lingüística. Rochelle al [phone].    We comply with applicable federal civil rights laws and [place] laws. We do not discriminate on the basis of race, color, national origin, age, disability, sex, sexual orientation, or gender identity.            Thank you!     Thank you for choosing [facility]  for your care. Our goal is always to provide you with excellent care. Hearing back from our patients is one way we can continue to improve our services. Please take a few minutes to complete the written survey that you may receive in the  "mail after your visit with us. Thank you!             Your Updated Medication List - Protect others around you: Learn how to safely use, store and throw away your medicines at www.disposemymeds.org.          This list is accurate as of: 12/20/17  6:06 PM.  Always use your most recent med list.                   Brand Name Dispense Instructions for use Diagnosis    amoxicillin-clavulanate 875-125 MG per tablet    AUGMENTIN    20 tablet    Take 1 tablet by mouth 2 times daily for 10 days    Acute sinusitis with symptoms > 10 days       cetirizine 10 MG tablet    zyrTEC    30 tablet    Take 1 tablet (10 mg) by mouth every evening    Preop general physical exam       lisinopril 20 MG tablet    PRINIVIL/ZESTRIL    90 tablet    Take 1 tablet (20 mg) by mouth daily    Benign essential hypertension       magnesium oxide 400 (241.3 MG) MG tablet    MAG-OX    60 tablet    Take 1 tablet (400 mg) by mouth daily    Preop general physical exam       Multi-vitamin Tabs tablet     100 tablet    Take 1 tablet by mouth daily    Preop general physical exam       Needle (Disp) 18G X 1-1/2\" Misc    B-D BLUNT FILL NEEDLE    10 each    For drawing up medication.    Hypotestosteronism       * order for DME     1 Device    Equipment being ordered: knee sleeve, XL    Right knee injury, initial encounter       * order for DME     1 Device    Equipment being ordered: gel cups, large    Pain of right heel, Right Achilles tendinitis       syringe/needle (disp) 22G X 1-1/2\" 3 ML Misc    B-D SYRINGE/NEEDLE    10 each    For Testosterone injections.    Hypotestosteronism       testosterone cypionate 200 MG/ML injection    DEPO-TESTOSTERONE    2 mL    400 mg every 25 days.    Hypotestosteronism       * Notice:  This list has 2 medication(s) that are the same as other medications prescribed for you. Read the directions carefully, and ask your doctor or other care provider to review them with you.      "

## 2017-12-21 NOTE — PATIENT INSTRUCTIONS
Increase rest and fluids. Tylenol and/or Ibuprofen for comfort. Cool mist vaporizer. If your symptoms worsen or do not resolve follow up with your primary care provider in 2 weeks and sooner if needed.        Indications for emergent return to emergency department discussed with patient, who verbalized good understanding and agreement.  Patient understands the limitations of today's evaluation.           Sinusitis (Antibiotic Treatment)    The sinuses are air-filled spaces within the bones of the face. They connect to the inside of the nose. Sinusitis is an inflammation of the tissue lining the sinus cavity. Sinus inflammation can occur during a cold. It can also be due to allergies to pollens and other particles in the air. Sinusitis can cause symptoms of sinus congestion and fullness. A sinus infection causes fever, headache and facial pain. There is often green or yellow drainage from the nose or into the back of the throat (post-nasal drip). You have been given antibiotics to treat this condition.  Home care:    Take the full course of antibiotics as instructed. Do not stop taking them, even if you feel better.    Drink plenty of water, hot tea, and other liquids. This may help thin mucus. It also may promote sinus drainage.    Heat may help soothe painful areas of the face. Use a towel soaked in hot water. Or,  the shower and direct the hot spray onto your face. Using a vaporizer along with a menthol rub at night may also help.     An expectorant containing guaifenesin may help thin the mucus and promote drainage from the sinuses.    Over-the-counter decongestants may be used unless a similar medicine was prescribed. Nasal sprays work the fastest. Use one that contains phenylephrine or oxymetazoline. First blow the nose gently. Then use the spray. Do not use these medicines more often than directed on the label or symptoms may get worse. You may also use tablets containing pseudoephedrine. Avoid  products that combine ingredients, because side effects may be increased. Read labels. You can also ask the pharmacist for help. (NOTE: Persons with high blood pressure should not use decongestants. They can raise blood pressure.)    Over-the-counter antihistamines may help if allergies contributed to your sinusitis.      Do not use nasal rinses or irrigation during an acute sinus infection, unless told to by your health care provider. Rinsing may spread the infection to other sinuses.    Use acetaminophen or ibuprofen to control pain, unless another pain medicine was prescribed. (If you have chronic liver or kidney disease or ever had a stomach ulcer, talk with your doctor before using these medicines. Aspirin should never be used in anyone under 18 years of age who is ill with a fever. It may cause severe liver damage.)    Don't smoke. This can worsen symptoms.  Follow-up care  Follow up with your healthcare provider or our staff if you are not improving within the next week.  When to seek medical advice  Call your healthcare provider if any of these occur:    Facial pain or headache becoming more severe    Stiff neck    Unusual drowsiness or confusion    Swelling of the forehead or eyelids    Vision problems, including blurred or double vision    Fever of 100.4 F (38 C) or higher, or as directed by your healthcare provider    Seizure    Breathing problems    Symptoms not resolving within 10 days  Date Last Reviewed: 4/13/2015 2000-2017 [employer]. [address] [place] [address], [place] [address]. All rights reserved. This information is not intended as a substitute for professional medical care. Always follow your healthcare professional's instructions.

## 2018-01-08 ENCOUNTER — TRANSFERRED RECORDS (OUTPATIENT)
Dept: HEALTH INFORMATION MANAGEMENT | Facility: CLINIC | Age: 38
End: 2018-01-08

## 2018-01-09 ENCOUNTER — TELEPHONE (OUTPATIENT)
Dept: FAMILY MEDICINE | Facility: CLINIC | Age: 38
End: 2018-01-09

## 2018-01-09 DIAGNOSIS — Z20.828 EXPOSURE TO THE FLU: Primary | ICD-10-CM

## 2018-01-10 RX ORDER — OSELTAMIVIR PHOSPHATE 75 MG/1
75 CAPSULE ORAL DAILY
Qty: 10 CAPSULE | Refills: 0 | Status: SHIPPED | OUTPATIENT
Start: 2018-01-10 | End: 2018-02-02

## 2018-01-10 NOTE — TELEPHONE ENCOUNTER
Order for Tamiflu faxed to pharmacy  left message for  Ashley Nuno to return call.  Arlene Carranza RN

## 2018-01-10 NOTE — TELEPHONE ENCOUNTER
Ashley is calling again this AM hoping this order could be sent in this am for medication to prevent the Flu Generic Tamaflu.  Daughter was in the UC with Twyla Galvan Positive Influenza.  Shantel Orn Station Sec

## 2018-01-10 NOTE — TELEPHONE ENCOUNTER
Patient's wife Ashley called stating that they were just seen at urgent care and was sent to pharmacy to  medication. Patient Edwin GRAMAJO was suppose to get medication for preventive measures, medication was sent under daughter's name in stead, unable to  medication. Please call back ASAP, contact Ashley Nuno (patients spouse) @ [phone]

## 2018-01-13 ENCOUNTER — HOSPITAL ENCOUNTER (EMERGENCY)
Facility: CLINIC | Age: 38
Discharge: HOME | End: 2018-01-13
Attending: EMERGENCY MEDICINE | Admitting: EMERGENCY MEDICINE
Payer: COMMERCIAL

## 2018-01-13 ENCOUNTER — APPOINTMENT (OUTPATIENT)
Dept: ULTRASOUND IMAGING | Facility: CLINIC | Age: 38
End: 2018-01-13
Attending: EMERGENCY MEDICINE
Payer: COMMERCIAL

## 2018-01-13 VITALS
DIASTOLIC BLOOD PRESSURE: 74 MMHG | SYSTOLIC BLOOD PRESSURE: 153 MMHG | HEART RATE: 112 BPM | HEIGHT: 72 IN | OXYGEN SATURATION: 96 % | BODY MASS INDEX: 40.63 KG/M2 | WEIGHT: 300 LBS | TEMPERATURE: 98.2 F | RESPIRATION RATE: 18 BRPM

## 2018-01-13 DIAGNOSIS — I82.401 ACUTE DEEP VEIN THROMBOSIS (DVT) OF RIGHT LOWER EXTREMITY, UNSPECIFIED VEIN (H): ICD-10-CM

## 2018-01-13 LAB
APTT PPP: 26 SEC (ref 22–37)
BASOPHILS # BLD AUTO: 0 10E9/L (ref 0–0.2)
BASOPHILS NFR BLD AUTO: 0.1 %
DIFFERENTIAL METHOD BLD: NORMAL
EOSINOPHIL # BLD AUTO: 0.2 10E9/L (ref 0–0.7)
EOSINOPHIL NFR BLD AUTO: 1.8 %
ERYTHROCYTE [DISTWIDTH] IN BLOOD BY AUTOMATED COUNT: 12.3 % (ref 10–15)
HCT VFR BLD AUTO: 41.9 % (ref 40–53)
HGB BLD-MCNC: 14.6 G/DL (ref 13.3–17.7)
IMM GRANULOCYTES # BLD: 0 10E9/L (ref 0–0.4)
IMM GRANULOCYTES NFR BLD: 0.2 %
INR PPP: 1.02 (ref 0.86–1.14)
LYMPHOCYTES # BLD AUTO: 2.5 10E9/L (ref 0.8–5.3)
LYMPHOCYTES NFR BLD AUTO: 28.1 %
MCH RBC QN AUTO: 30.2 PG (ref 26.5–33)
MCHC RBC AUTO-ENTMCNC: 34.8 G/DL (ref 31.5–36.5)
MCV RBC AUTO: 87 FL (ref 78–100)
MONOCYTES # BLD AUTO: 0.8 10E9/L (ref 0–1.3)
MONOCYTES NFR BLD AUTO: 8.9 %
NEUTROPHILS # BLD AUTO: 5.4 10E9/L (ref 1.6–8.3)
NEUTROPHILS NFR BLD AUTO: 60.9 %
PLATELET # BLD AUTO: 208 10E9/L (ref 150–450)
RBC # BLD AUTO: 4.84 10E12/L (ref 4.4–5.9)
WBC # BLD AUTO: 8.9 10E9/L (ref 4–11)

## 2018-01-13 PROCEDURE — 93971 EXTREMITY STUDY: CPT | Mod: RT

## 2018-01-13 PROCEDURE — 99284 EMERGENCY DEPT VISIT MOD MDM: CPT | Mod: Z6 | Performed by: EMERGENCY MEDICINE

## 2018-01-13 PROCEDURE — 96372 THER/PROPH/DIAG INJ SC/IM: CPT | Performed by: EMERGENCY MEDICINE

## 2018-01-13 PROCEDURE — 25000132 ZZH RX MED GY IP 250 OP 250 PS 637: Performed by: EMERGENCY MEDICINE

## 2018-01-13 PROCEDURE — 85025 COMPLETE CBC W/AUTO DIFF WBC: CPT | Performed by: EMERGENCY MEDICINE

## 2018-01-13 PROCEDURE — 25000128 H RX IP 250 OP 636: Performed by: EMERGENCY MEDICINE

## 2018-01-13 PROCEDURE — 99284 EMERGENCY DEPT VISIT MOD MDM: CPT | Mod: 25 | Performed by: EMERGENCY MEDICINE

## 2018-01-13 PROCEDURE — 85730 THROMBOPLASTIN TIME PARTIAL: CPT | Performed by: EMERGENCY MEDICINE

## 2018-01-13 PROCEDURE — 85610 PROTHROMBIN TIME: CPT | Performed by: EMERGENCY MEDICINE

## 2018-01-13 RX ORDER — WARFARIN SODIUM 5 MG/1
7.5 TABLET ORAL DAILY
Qty: 45 TABLET | Refills: 0 | Status: SHIPPED | OUTPATIENT
Start: 2018-01-13 | End: 2018-01-19

## 2018-01-13 RX ORDER — ENOXAPARIN SODIUM 100 MG/ML
140 INJECTION SUBCUTANEOUS ONCE
Status: DISCONTINUED | OUTPATIENT
Start: 2018-01-13 | End: 2018-01-13 | Stop reason: CLARIF

## 2018-01-13 RX ORDER — WARFARIN SODIUM 7.5 MG/1
7.5 TABLET ORAL ONCE
Status: COMPLETED | OUTPATIENT
Start: 2018-01-13 | End: 2018-01-13

## 2018-01-13 RX ADMIN — WARFARIN SODIUM 7.5 MG: 7.5 TABLET ORAL at 15:48

## 2018-01-13 RX ADMIN — ENOXAPARIN SODIUM 140 MG: 150 INJECTION SUBCUTANEOUS at 15:49

## 2018-01-13 NOTE — ED PROVIDER NOTES
History     Chief Complaint   Patient presents with     Leg Pain     HPI  Edwin Nuno is a 37 year old male who had right knee arthroscopic surgery 5 days ago who developed right calf pain today.  He is on full strength aspirin therapy for DVT prophylaxis.  This morning after awakening he noted posterior right calf pain of dull aching, cramping nature which is constant, nonradiating and of moderate severity.  He has tried nothing for pain relief.  He has no shortness of breath, chest pain, cough, hemoptysis or fever or chills.  No history of DVT/PE.  Denies other risk factors for DVT/PE.  No other acute complaints or concerns per    Problem List:    Patient Active Problem List    Diagnosis Date Noted     Benign essential hypertension 01/12/2017     Priority: Medium     Hypotestosteronism 10/29/2015     Priority: Medium     He has been on replacement therapies in the past.        Esophageal reflux 10/29/2015     Priority: Medium     Family history of colon cancer 11/07/2014     Priority: Medium     His mother and MGM had colon cancer. Recommend starting colonoscopies at age 40.        Hyperlipidemia with target LDL less than 130 11/07/2014     Priority: Medium     Diagnosis updated by automated process. Provider to review and confirm.          Past Medical History:    Past Medical History:   Diagnosis Date     Hyperlipidemia LDL goal <160 6/7/2013     Hypertension, goal below 140/90 6/7/2013       Past Surgical History:    History reviewed. No pertinent surgical history.    Family History:    Family History   Problem Relation Age of Onset     Cancer - colorectal Mother      Breast Cancer Mother      Cancer - colorectal Maternal Grandmother      Prostate Cancer Father      C.A.D. No family hx of      DIABETES No family hx of      Hypertension No family hx of      CEREBROVASCULAR DISEASE No family hx of      Melanoma No family hx of        Social History:  Marital Status:   [2]  Social History   Substance  "Use Topics     Smoking status: Never Smoker     Smokeless tobacco: Former User     Alcohol use Yes        Medications:      Coenzyme Q10 (COQ10 PO)   enoxaparin (LOVENOX) 150 MG/ML injection   warfarin (COUMADIN) 5 MG tablet   oseltamivir (TAMIFLU) 75 MG capsule   magnesium oxide (MAG-OX) 400 (241.3 MG) MG tablet   multivitamin, therapeutic with minerals (MULTI-VITAMIN) TABS tablet   lisinopril (PRINIVIL/ZESTRIL) 20 MG tablet   testosterone cypionate (DEPO-TESTOSTERONE) 200 MG/ML injection   order for DME   cetirizine (ZYRTEC) 10 MG tablet   syringe/needle, disp, (B-D SYRINGE/NEEDLE) 22G X 1-1/2\" 3 ML MISC   Needle, Disp, (B-D BLUNT FILL NEEDLE) 18G X 1-1/2\" MISC   order for DME         Review of Systems  As mentioned above in the history present illness.  All other systems were reviewed and are negative.    Physical Exam   BP: 125/77  Pulse: 112  Temp: 98.2  F (36.8  C)  Resp: 18  Height: 182.9 cm (6')  Weight: 136.1 kg (300 lb)  SpO2: 97 %      Physical Exam   Constitutional: He is oriented to person, place, and time. He appears well-developed and well-nourished. No distress.   HENT:   Head: Normocephalic and atraumatic.   Mouth/Throat: Oropharynx is clear and moist.   Eyes: Conjunctivae and EOM are normal.   Neck: Normal range of motion. Neck supple. No JVD present. No tracheal deviation present.   Cardiovascular: Normal rate, regular rhythm, normal heart sounds and intact distal pulses.  Exam reveals no gallop and no friction rub.    No murmur heard.  Pulmonary/Chest: Effort normal and breath sounds normal. No respiratory distress. He has no wheezes. He has no rales.   Abdominal: Soft.   Musculoskeletal: Normal range of motion. He exhibits tenderness. He exhibits no edema.        Right lower leg: He exhibits tenderness. He exhibits no bony tenderness, no edema and no deformity.        Legs:  Neurological: He is alert and oriented to person, place, and time. He has normal strength. No sensory deficit.   Skin: " Skin is warm and dry. No rash noted. He is not diaphoretic. No erythema. No pallor.   Left knee surgical incisions appear clean, dry and without evidence of cellulitis or infection.   Psychiatric: He has a normal mood and affect. His behavior is normal.   Nursing note and vitals reviewed.      ED Course     ED Course     Procedures          Results for orders placed or performed during the hospital encounter of 01/13/18   US Lower Extremity Venous Duplex Right    Narrative    US LOWER EXTREMITY VENOUS DUPLEX RIGHT   1/13/2018 3:03 PM     HISTORY: Postoperative pain, evaluate for DVT.     COMPARISON: None.    FINDINGS: Gray-scale, color and Doppler spectral analysis ultrasound  was performed of the right leg. Compression and augmentation imaging  was performed.    Positive occlusive deep venous thrombosis identified involving the  right popliteal vein extending throughout the posterior tibialis  veins.      Impression    IMPRESSION: Positive occlusive deep venous thrombosis involving the  right popliteal vein through the posterior tibialis veins.    OLGA CARRASCO MD          Labs Ordered and Resulted from Time of ED Arrival Up to the Time of Departure from the ED - No data to display      Medications   warfarin (COUMADIN) tablet 7.5 mg (7.5 mg Oral Given 1/13/18 6628)   enoxaparin (LOVENOX) injection 140 mg (140 mg Subcutaneous Given 1/13/18 3548)       Assessments & Plan (with Medical Decision Making)   5 days status post right knee arthroscopic surgery with development of right lower extremity DVT involving the right popliteal vein through the posterior tibial veins.  No signs or symptoms of PE.  No other known risk factors for DVT/PE.  He has failed DVT prophylaxis with full-strength aspirin since his surgery. We discussed risks and benefits of the different forms of anticoagulation therapy and he denied contraindication to anticoagulation therapy and chose Lovenox and Coumadin anticoagulation therapy.  He was given  Coumadin 7.5 mg po and Lovenox 140 mg SQ. He was discharged with prescriptions for Coumadin and Lovenox, and I made a referral for anticoagulation clinic.  He will follow-up with his Orthopedist next week as scheduled, and follow-up with his primary care provider next week.  He was counseled on signs and symptoms of PE and signs and symptoms indicate need for emergent re-evaluation.  Patient was provided instructions for supportive care and will return as needed for worsened condition or worsening symptoms, or new problems or concerns.    I have reviewed the nursing notes.    I have reviewed the findings, diagnosis, plan and need for follow up with the patient.    New Prescriptions    ENOXAPARIN (LOVENOX) 150 MG/ML INJECTION    Inject 0.9 mLs (135 mg) Subcutaneous every 12 hours for 6 days    WARFARIN (COUMADIN) 5 MG TABLET    Take 1.5 tablets (7.5 mg) by mouth daily       Final diagnoses:   Acute deep vein thrombosis (DVT) of right lower extremity, unspecified vein (H)       1/13/2018   [facility] EMERGENCY DEPARTMENT     Joe Walden MD  01/13/18 3532

## 2018-01-13 NOTE — DISCHARGE INSTRUCTIONS
Stop aspirin therapy.    Begin Coumadin.  Have INR checked in anticoagulation clinic in 3 days (referral was made for you)    Follow-up in primary care clinic next week.    Follow-up in orthopedics clinic next week as scheduled.    Return to the emergency department for new or worsening symptoms.

## 2018-01-13 NOTE — ED AVS SNAPSHOT
[facility] Emergency Department    [address] [facility] [address]    Phone:  [phone]    Fax:  [phone]                                       Edwin Nuno   MRN: [id number]    Department:  [facility] Emergency Department   Date of Visit:  1/13/2018           After Visit Summary Signature Page     I have received my discharge instructions, and my questions have been answered. I have discussed any challenges I see with this plan with the nurse or doctor.    ..........................................................................................................................................  Patient/Patient Representative Signature      ..........................................................................................................................................  Patient Representative Print Name and Relationship to Patient    ..................................................               ................................................  Date                                            Time    ..........................................................................................................................................  Reviewed by Signature/Title    ...................................................              ..............................................  Date                                                            Time

## 2018-01-13 NOTE — ED AVS SNAPSHOT
[facility] Emergency Department    [address] [place] [address]    Phone:  [phone]    Fax:  [phone]                                       Edwin Nuno   MRN: [id number]    Department:  [facility] Emergency Department   Date of Visit:  1/13/2018           Patient Information     Date Of Birth          1980        Your diagnoses for this visit were:     Acute deep vein thrombosis (DVT) of right lower extremity, unspecified vein (H)        You were seen by Joe Walden MD.      Follow-up Information     Follow up with Your Orthopedist.    Why:  Next week as scheduled.        Schedule an appointment as soon as possible for a visit with Cecilio Hughes MD.    Specialty:  Family Practice    Why:  Recheck in primary care clinic next week    Contact information:    [address] [place] [id number] [phone]          Follow up with Anticoagulation clinic In 3 days.        Follow up with [facility] Emergency Department.    Specialty:  EMERGENCY MEDICINE    Why:  If symptoms worsen, signs or symptoms of blood clot in the lungs, new problems or concerns.    Contact information:    [address] [place] [address] [phone]    Additional information:    The medical center is located at   [address] [place] [address] (between [address]   [address] in [place], [place] miles north   of [place]).        Discharge Instructions           Stop aspirin therapy.    Begin Coumadin.  Have INR checked in anticoagulation clinic in 3 days (referral was made for you)    Follow-up in primary care clinic next week.    Follow-up in orthopedics clinic next week as scheduled.    Return to the emergency department for new or worsening symptoms.    Discharge References/Attachments     DEEP VEIN THROMBOSIS, UNDERSTANDING  (ENGLISH)    DVT (ENGLISH)    DISCHARGE INSTRUCTIONS FOR DEEP VEIN THROMBOSIS (ENGLISH)    DEEP VEIN THROMBOSIS, COMPLICATIONS OF (ENGLISH)    BLOOD  THINNERS (ANTICOAGULANTS), USING (ENGLISH)    (S) DIET GUIDELINES FOR PATIENTS TAKING WARFARIN (COUMADIN) (ENGLISH)      24 Hour Appointment Hotline       To make an appointment at any [facility] clinic, call [phone] ([phone]). If you don't have a family doctor or clinic, we will help you find one. [facility] clinics are conveniently located to serve the needs of you and your family.          ED Discharge Orders     INR CLINIC REFERRAL       Your provider has referred you to INR Services.    Please be aware that coverage of these services is subject to the terms and limitations of your health insurance plan.  Call member services at your health plan with any benefit or coverage questions.    Indication for Anticoagulation: DVT (first time)  If nonstandard INR is desired, indicate goal range and explanation: N/A  Expected Duration of Therapy: 3 Months                     Review of your medicines      START taking        Dose / Directions Last dose taken    enoxaparin 150 MG/ML injection   Commonly known as:  LOVENOX   Dose:  140 mg   Quantity:  12 Syringe        Inject 0.9 mLs (135 mg) Subcutaneous every 12 hours for 6 days   Refills:  0        warfarin 5 MG tablet   Commonly known as:  COUMADIN   Dose:  7.5 mg   Quantity:  45 tablet        Take 1.5 tablets (7.5 mg) by mouth daily   Refills:  0          Our records show that you are taking the medicines listed below. If these are incorrect, please call your family doctor or clinic.        Dose / Directions Last dose taken    cetirizine 10 MG tablet   Commonly known as:  zyrTEC   Dose:  10 mg   Quantity:  30 tablet        Take 1 tablet (10 mg) by mouth every evening   Refills:  1        COQ10 PO   Dose:  400 mg        Take 400 mg by mouth daily   Refills:  0        lisinopril 20 MG tablet   Commonly known as:  PRINIVIL/ZESTRIL   Dose:  20 mg   Quantity:  90 tablet        Take 1 tablet (20 mg) by mouth daily   Refills:  3        magnesium oxide 400 (241.3 MG)  "MG tablet   Commonly known as:  MAG-OX   Dose:  400 mg   Quantity:  60 tablet        Take 1 tablet (400 mg) by mouth daily   Refills:  3        Multi-vitamin Tabs tablet   Dose:  1 tablet   Quantity:  100 tablet        Take 1 tablet by mouth daily   Refills:  3        Needle (Disp) 18G X 1-1/2\" Misc   Commonly known as:  B-D BLUNT FILL NEEDLE   Quantity:  10 each        For drawing up medication.   Refills:  0        * order for DME   Quantity:  1 Device        Equipment being ordered: knee sleeve, XL   Refills:  0        * order for DME   Quantity:  1 Device        Equipment being ordered: gel cups, large   Refills:  0        oseltamivir 75 MG capsule   Commonly known as:  TAMIFLU   Dose:  75 mg   Quantity:  10 capsule        Take 1 capsule (75 mg) by mouth daily   Refills:  0        syringe/needle (disp) 22G X 1-1/2\" 3 ML Misc   Commonly known as:  B-D SYRINGE/NEEDLE   Quantity:  10 each        For Testosterone injections.   Refills:  0        testosterone cypionate 200 MG/ML injection   Commonly known as:  DEPO-TESTOSTERONE   Quantity:  2 mL        400 mg every 25 days.   Refills:  5        * Notice:  This list has 2 medication(s) that are the same as other medications prescribed for you. Read the directions carefully, and ask your doctor or other care provider to review them with you.            Prescriptions were sent or printed at these locations (2 Prescriptions)                   [facility] [address] [address]    Telephone:  [phone]   Fax:  [phone]   Hours:                  E-Prescribed (2 of 2)         enoxaparin (LOVENOX) 150 MG/ML injection               warfarin (COUMADIN) 5 MG tablet                Procedures and tests performed during your visit     US Lower Extremity Venous Duplex Right      Orders Needing Specimen Collection     Ordered          01/13/18 1557  CBC with platelets, differential - STAT, Prio: STAT, Needs to " be Collected     Scheduled Task Status   01/13/18 1558 Collect CBC with platelets, differential Open   Order Class:  PCU Collect                01/13/18 1557  INR - STAT, Prio: STAT, Needs to be Collected     Scheduled Task Status   01/13/18 1558 Collect INR Open   Order Class:  PCU Collect                01/13/18 1557  PTT - STAT, Prio: STAT, Needs to be Collected     Scheduled Task Status   01/13/18 1558 Collect PTT Open   Order Class:  PCU Collect                  Pending Results     No orders found from 1/11/2018 to 1/14/2018.            Pending Culture Results     No orders found from 1/11/2018 to 1/14/2018.            Pending Results Instructions     If you had any lab results that were not finalized at the time of your Discharge, you can call the ED Lab Result RN at [phone]. You will be contacted by this team for any positive Lab results or changes in treatment. The nurses are available 7 days a week from 10A to 6:30P.  You can leave a message 24 hours per day and they will return your call.        Test Results From Your Hospital Stay        1/13/2018  3:54 PM      Narrative     US LOWER EXTREMITY VENOUS DUPLEX RIGHT   1/13/2018 3:03 PM     HISTORY: Postoperative pain, evaluate for DVT.     COMPARISON: None.    FINDINGS: Gray-scale, color and Doppler spectral analysis ultrasound  was performed of the right leg. Compression and augmentation imaging  was performed.    Positive occlusive deep venous thrombosis identified involving the  right popliteal vein extending throughout the posterior tibialis  veins.        Impression     IMPRESSION: Positive occlusive deep venous thrombosis involving the  right popliteal vein through the posterior tibialis veins.    OLGA CARRASCO MD                Thank you for choosing [facility]       Thank you for choosing [facility] for your care. Our goal is always to provide you with excellent care. Hearing back from our patients is one way we can continue to improve our services.  Please take a few minutes to complete the written survey that you may receive in the mail after you visit with us. Thank you!        [facility] Information     [facility] gives you secure access to your electronic health record. If you see a primary care provider, you can also send messages to your care team and make appointments. If you have questions, please call your primary care clinic.  If you do not have a primary care provider, please call [phone] and they will assist you.        Care EveryWhere ID     This is your Care EveryWhere ID. This could be used by other organizations to access your [facility] medical records  [id number]        Equal Access to Services     BERENICE [facility] SystemMARCIA : Anusha Cornejo, john worley, lea garza, austen thomson . So [facility] [place] [facility] [phone].    ATENCIÓN: Si habla español, tiene a bhandari disposición servicios gratuitos de asistencia lingüística. Llame al [phone].    We comply with applicable federal civil rights laws and [place] laws. We do not discriminate on the basis of race, color, national origin, age, disability, sex, sexual orientation, or gender identity.            After Visit Summary       This is your record. Keep this with you and show to your community pharmacist(s) and doctor(s) at your next visit.

## 2018-01-14 ENCOUNTER — APPOINTMENT (OUTPATIENT)
Dept: CT IMAGING | Facility: CLINIC | Age: 38
End: 2018-01-14
Attending: EMERGENCY MEDICINE
Payer: COMMERCIAL

## 2018-01-14 ENCOUNTER — HOSPITAL ENCOUNTER (OUTPATIENT)
Facility: CLINIC | Age: 38
Setting detail: OBSERVATION
Discharge: HOME | End: 2018-01-15
Attending: EMERGENCY MEDICINE | Admitting: INTERNAL MEDICINE
Payer: COMMERCIAL

## 2018-01-14 DIAGNOSIS — I26.99 PULMONARY EMBOLISM ON RIGHT (H): ICD-10-CM

## 2018-01-14 LAB
ANION GAP SERPL CALCULATED.3IONS-SCNC: 7 MMOL/L (ref 3–14)
BASOPHILS # BLD AUTO: 0 10E9/L (ref 0–0.2)
BASOPHILS NFR BLD AUTO: 0.2 %
BUN SERPL-MCNC: 21 MG/DL (ref 7–30)
CALCIUM SERPL-MCNC: 9.1 MG/DL (ref 8.5–10.1)
CHLORIDE SERPL-SCNC: 102 MMOL/L (ref 94–109)
CO2 SERPL-SCNC: 27 MMOL/L (ref 20–32)
CREAT SERPL-MCNC: 1.41 MG/DL (ref 0.66–1.25)
DIFFERENTIAL METHOD BLD: NORMAL
EOSINOPHIL # BLD AUTO: 0.2 10E9/L (ref 0–0.7)
EOSINOPHIL NFR BLD AUTO: 1.5 %
ERYTHROCYTE [DISTWIDTH] IN BLOOD BY AUTOMATED COUNT: 12.4 % (ref 10–15)
GFR SERPL CREATININE-BSD FRML MDRD: 57 ML/MIN/1.7M2
GLUCOSE SERPL-MCNC: 107 MG/DL (ref 70–99)
HCT VFR BLD AUTO: 41.2 % (ref 40–53)
HGB BLD-MCNC: 14.5 G/DL (ref 13.3–17.7)
IMM GRANULOCYTES # BLD: 0 10E9/L (ref 0–0.4)
IMM GRANULOCYTES NFR BLD: 0.3 %
INR PPP: 1.04 (ref 0.86–1.14)
LYMPHOCYTES # BLD AUTO: 2.7 10E9/L (ref 0.8–5.3)
LYMPHOCYTES NFR BLD AUTO: 25.2 %
MCH RBC QN AUTO: 30.3 PG (ref 26.5–33)
MCHC RBC AUTO-ENTMCNC: 35.2 G/DL (ref 31.5–36.5)
MCV RBC AUTO: 86 FL (ref 78–100)
MONOCYTES # BLD AUTO: 0.8 10E9/L (ref 0–1.3)
MONOCYTES NFR BLD AUTO: 7.7 %
NEUTROPHILS # BLD AUTO: 6.9 10E9/L (ref 1.6–8.3)
NEUTROPHILS NFR BLD AUTO: 65.1 %
NT-PROBNP SERPL-MCNC: 10 PG/ML (ref 0–450)
PLATELET # BLD AUTO: 203 10E9/L (ref 150–450)
POTASSIUM SERPL-SCNC: 4.2 MMOL/L (ref 3.4–5.3)
RBC # BLD AUTO: 4.78 10E12/L (ref 4.4–5.9)
SODIUM SERPL-SCNC: 136 MMOL/L (ref 133–144)
TROPONIN I SERPL-MCNC: <0.015 UG/L (ref 0–0.04)
WBC # BLD AUTO: 10.6 10E9/L (ref 4–11)

## 2018-01-14 PROCEDURE — 80048 BASIC METABOLIC PNL TOTAL CA: CPT | Performed by: EMERGENCY MEDICINE

## 2018-01-14 PROCEDURE — G0378 HOSPITAL OBSERVATION PER HR: HCPCS

## 2018-01-14 PROCEDURE — 85025 COMPLETE CBC W/AUTO DIFF WBC: CPT | Performed by: EMERGENCY MEDICINE

## 2018-01-14 PROCEDURE — 93010 ELECTROCARDIOGRAM REPORT: CPT | Mod: Z6 | Performed by: EMERGENCY MEDICINE

## 2018-01-14 PROCEDURE — 93005 ELECTROCARDIOGRAM TRACING: CPT

## 2018-01-14 PROCEDURE — 25000128 H RX IP 250 OP 636: Performed by: EMERGENCY MEDICINE

## 2018-01-14 PROCEDURE — 99285 EMERGENCY DEPT VISIT HI MDM: CPT | Mod: 25 | Performed by: EMERGENCY MEDICINE

## 2018-01-14 PROCEDURE — 99285 EMERGENCY DEPT VISIT HI MDM: CPT | Mod: 25

## 2018-01-14 PROCEDURE — 85610 PROTHROMBIN TIME: CPT | Performed by: EMERGENCY MEDICINE

## 2018-01-14 PROCEDURE — 99220 ZZC INITIAL OBSERVATION CARE,LEVL III: CPT | Performed by: INTERNAL MEDICINE

## 2018-01-14 PROCEDURE — 71260 CT THORAX DX C+: CPT

## 2018-01-14 PROCEDURE — 83880 ASSAY OF NATRIURETIC PEPTIDE: CPT | Performed by: INTERNAL MEDICINE

## 2018-01-14 PROCEDURE — 84484 ASSAY OF TROPONIN QUANT: CPT | Performed by: EMERGENCY MEDICINE

## 2018-01-14 PROCEDURE — 25000125 ZZHC RX 250: Performed by: EMERGENCY MEDICINE

## 2018-01-14 RX ORDER — HYDROCODONE BITARTRATE AND ACETAMINOPHEN 5; 325 MG/1; MG/1
1-2 TABLET ORAL EVERY 4 HOURS PRN
Status: DISCONTINUED | OUTPATIENT
Start: 2018-01-14 | End: 2018-01-15 | Stop reason: HOSPADM

## 2018-01-14 RX ORDER — IOPAMIDOL 755 MG/ML
96 INJECTION, SOLUTION INTRAVASCULAR ONCE
Status: COMPLETED | OUTPATIENT
Start: 2018-01-14 | End: 2018-01-14

## 2018-01-14 RX ORDER — ACETAMINOPHEN 325 MG/1
650 TABLET ORAL EVERY 4 HOURS PRN
Status: DISCONTINUED | OUTPATIENT
Start: 2018-01-14 | End: 2018-01-15 | Stop reason: HOSPADM

## 2018-01-14 RX ORDER — NALOXONE HYDROCHLORIDE 0.4 MG/ML
.1-.4 INJECTION, SOLUTION INTRAMUSCULAR; INTRAVENOUS; SUBCUTANEOUS
Status: DISCONTINUED | OUTPATIENT
Start: 2018-01-14 | End: 2018-01-15 | Stop reason: HOSPADM

## 2018-01-14 RX ORDER — ONDANSETRON 2 MG/ML
4 INJECTION INTRAMUSCULAR; INTRAVENOUS EVERY 6 HOURS PRN
Status: DISCONTINUED | OUTPATIENT
Start: 2018-01-14 | End: 2018-01-15 | Stop reason: HOSPADM

## 2018-01-14 RX ORDER — ENOXAPARIN SODIUM 100 MG/ML
140 INJECTION SUBCUTANEOUS EVERY 12 HOURS
Status: DISCONTINUED | OUTPATIENT
Start: 2018-01-15 | End: 2018-01-14

## 2018-01-14 RX ORDER — OSELTAMIVIR PHOSPHATE 75 MG/1
75 CAPSULE ORAL DAILY
Status: DISCONTINUED | OUTPATIENT
Start: 2018-01-15 | End: 2018-01-15 | Stop reason: HOSPADM

## 2018-01-14 RX ORDER — HYDROMORPHONE HYDROCHLORIDE 1 MG/ML
.3-.5 INJECTION, SOLUTION INTRAMUSCULAR; INTRAVENOUS; SUBCUTANEOUS EVERY 4 HOURS PRN
Status: CANCELLED | OUTPATIENT
Start: 2018-01-14

## 2018-01-14 RX ORDER — ONDANSETRON 4 MG/1
4 TABLET, ORALLY DISINTEGRATING ORAL EVERY 6 HOURS PRN
Status: DISCONTINUED | OUTPATIENT
Start: 2018-01-14 | End: 2018-01-15 | Stop reason: HOSPADM

## 2018-01-14 RX ADMIN — SODIUM CHLORIDE 110 ML: 9 INJECTION, SOLUTION INTRAVENOUS at 18:04

## 2018-01-14 RX ADMIN — IOPAMIDOL 96 ML: 755 INJECTION, SOLUTION INTRAVENOUS at 18:04

## 2018-01-14 RX ADMIN — SODIUM CHLORIDE 1000 ML: 9 INJECTION, SOLUTION INTRAVENOUS at 20:09

## 2018-01-14 ASSESSMENT — ENCOUNTER SYMPTOMS
BACK PAIN: 0
WHEEZING: 0
NUMBNESS: 0
DIZZINESS: 0
ACTIVITY CHANGE: 0
APPETITE CHANGE: 0
DYSURIA: 0
NAUSEA: 0
CHEST TIGHTNESS: 0
HEADACHES: 0
ABDOMINAL PAIN: 0
CONFUSION: 0
VOMITING: 0
NECK PAIN: 0
TROUBLE SWALLOWING: 0
LIGHT-HEADEDNESS: 0
STRIDOR: 0
BRUISES/BLEEDS EASILY: 0

## 2018-01-14 NOTE — IP AVS SNAPSHOT
[facility]    [address] [address]    Phone:  [phone]    Fax:  [phone]                                       After Visit Summary   1/14/2018    Edwin Nuno    MRN: [id number]           After Visit Summary Signature Page     I have received my discharge instructions, and my questions have been answered. I have discussed any challenges I see with this plan with the nurse or doctor.    ..........................................................................................................................................  Patient/Patient Representative Signature      ..........................................................................................................................................  Patient Representative Print Name and Relationship to Patient    ..................................................               ................................................  Date                                            Time    ..........................................................................................................................................  Reviewed by Signature/Title    ...................................................              ..............................................  Date                                                            Time

## 2018-01-14 NOTE — ED AVS SNAPSHOT
[facility] Emergency Department    [address] [address]    Phone:  [phone]    Fax:  [phone]                                       Edwin Nuno   MRN: [id number]    Department:  [facility] Emergency Department   Date of Visit:  1/14/2018           After Visit Summary Signature Page     I have received my discharge instructions, and my questions have been answered. I have discussed any challenges I see with this plan with the nurse or doctor.    ..........................................................................................................................................  Patient/Patient Representative Signature      ..........................................................................................................................................  Patient Representative Print Name and Relationship to Patient    ..................................................               ................................................  Date                                            Time    ..........................................................................................................................................  Reviewed by Signature/Title    ...................................................              ..............................................  Date                                                            Time

## 2018-01-14 NOTE — IP AVS SNAPSHOT
MRN:[id number]                      After Visit Summary   1/14/2018    Edwin Nuno    MRN: [id number]           Thank you!     Thank you for choosing [facility] for your care. Our goal is always to provide you with excellent care. Hearing back from our patients is one way we can continue to improve our services. Please take a few minutes to complete the written survey that you may receive in the mail after you visit with us. Thank you!        Patient Information     Date Of Birth          1980        Designated Caregiver       Most Recent Value    Caregiver    Will someone help with your care after discharge? no      About your hospital stay     You were admitted on:  January 14, 2018 You last received care in the:  [facility]    You were discharged on:  January 15, 2018        Reason for your hospital stay       You were seen for a blood clot in your lung, a pulmonary embolism.                  Who to Call     For medical emergencies, please call 911.  For non-urgent questions about your medical care, please call your primary care provider or clinic, [phone]          Attending Provider     Provider Specialty    Cecilio Nash MD Emergency Medicine    Park Chavez MD Internal Medicine    [facility]       Primary Care Provider Office Phone # Fax #    Cecilio Hughes [phone]      After Care Instructions     Activity       Your activity upon discharge: activity as tolerated, no vigorous or strenuous exercise            Diet       Follow this diet upon discharge: Regular Diet                  Follow-up Appointments     Follow-up and recommended labs and tests        Follow up with primary care provider, Cecilio Hughes, as scheduled this Friday to evaluate medication change and for hospital follow- up.  No follow up labs or test are needed.                  Your next 10 appointments already scheduled      Jan 17, 2018  3:00 PM CST   Anticoagulation Visit with [place] [facility]   [facility]    [address] [address] [phone]            Jan 19, 2018  7:20 AM CST   MyChart Injury Follow Up with Cecilio Hughes MD   [facility]    [address] [address]   [phone]              Further instructions from your care team       - Please follow up with your primary and orthopedic surgeon as scheduled on Friday.  - Follow up with the anticoagulation clinic as needed per pharmacy.  - Call your primary or present to the emergency room if you have worsening chest pain, shortness of breath, feel like your heart is racing or beating differently, or any other concerning symptoms.    Warfarin Instructions  You were given 7.5 mg of warfarin today prior to your discharge.  You can take this in the morning daily as this works best with your schedule.  Continue the Lovenox injections twice daily until you are seen by the Coumadin Clinic on Wednesday in [place] at 3 pm.  They will evaluate your INR and further warfarin and Lovenox dosing at that time.    Pending Results     Date and Time Order Name Status Description    1/14/2018 2220 EKG 12-LEAD, TRACING ONLY In process             Statement of Approval     Ordered          01/15/18 1110  I have reviewed and agree with all the recommendations and orders detailed in this document.  EFFECTIVE NOW     Approved and electronically signed by:  Margo Santana PA-C             Admission Information     Date & Time Provider Department Dept. Phone    1/14/2018 Shad Wynne MD [facility] [phone]      Your Vitals Were     Blood Pressure Pulse Temperature Respirations Height Weight    139/70 (BP Location: Right arm) 77 99.2  F (37.3  C) (Oral) 18 1.829 m (6') 135.5 kg (298 lb 11.6 oz)    Pulse Oximetry BMI (Body Mass Index)                 97% 40.51 kg/m2          [facility] Information     [facility] gives you secure access to your electronic health record. If you see a primary care provider, you can also send messages to your care team and make appointments. If you have questions, please call your primary care clinic.  If you do not have a primary care provider, please call [phone] and they will assist you.        Care EveryWhere ID     This is your Care EveryWhere ID. This could be used by other organizations to access your [facility] medical records  [id number]        Equal Access to Services     BERENICE BENJAMIN : Hadraina martinez Sotyrone, waaxda luqadaha, qaybta kaalmadestiny garza, austen thomson . So [facility] [place] [phone].    ATENCIÓN: Si habla español, tiene a bhandari disposición servicios gratuitos de asistencia lingüística. Llame al [phone].    We comply with applicable federal civil rights laws and [place] laws. We do not discriminate on the basis of race, color, national origin, age, disability, sex, sexual orientation, or gender identity.               Review of your medicines      CONTINUE these medicines which have NOT CHANGED        Dose / Directions    cetirizine 10 MG tablet   Commonly known as:  zyrTEC   Used for:  Preop general physical exam        Dose:  10 mg   Take 1 tablet (10 mg) by mouth every evening   Quantity:  30 tablet   Refills:  1       COQ10 PO        Dose:  400 mg   Take 400 mg by mouth daily   Refills:  0       enoxaparin 150 MG/ML injection   Commonly known as:  LOVENOX        Dose:  140 mg   Inject 0.9 mLs (135 mg) Subcutaneous every 12 hours for 6 days   Quantity:  12 Syringe   Refills:  0       lisinopril 20 MG tablet   Commonly known as:  PRINIVIL/ZESTRIL   Used for:  Benign essential hypertension        Dose:  20 mg   Take 1 tablet (20 mg) by mouth daily   Quantity:  90 tablet   Refills:  3       magnesium oxide 400 (241.3 MG) MG tablet   Commonly known as:  MAG-OX   Used  "for:  Preop general physical exam        Dose:  400 mg   Take 1 tablet (400 mg) by mouth daily   Quantity:  60 tablet   Refills:  3       Multi-vitamin Tabs tablet   Used for:  Preop general physical exam        Dose:  1 tablet   Take 1 tablet by mouth daily   Quantity:  100 tablet   Refills:  3       Needle (Disp) 18G X 1-1/2\" Misc   Commonly known as:  B-D BLUNT FILL NEEDLE   Used for:  Hypotestosteronism        For drawing up medication.   Quantity:  10 each   Refills:  0       * order for DME   Used for:  Right knee injury, initial encounter        Equipment being ordered: knee sleeve, XL   Quantity:  1 Device   Refills:  0       * order for DME   Used for:  Pain of right heel, Right Achilles tendinitis        Equipment being ordered: gel cups, large   Quantity:  1 Device   Refills:  0       oseltamivir 75 MG capsule   Commonly known as:  TAMIFLU   Used for:  Exposure to the flu        Dose:  75 mg   Take 1 capsule (75 mg) by mouth daily   Quantity:  10 capsule   Refills:  0       syringe/needle (disp) 22G X 1-1/2\" 3 ML Misc   Commonly known as:  B-D SYRINGE/NEEDLE   Used for:  Hypotestosteronism        For Testosterone injections.   Quantity:  10 each   Refills:  0       testosterone cypionate 200 MG/ML injection   Commonly known as:  DEPO-TESTOSTERONE   Used for:  Hypotestosteronism        400 mg every 25 days.   Quantity:  2 mL   Refills:  5       warfarin 5 MG tablet   Commonly known as:  COUMADIN        Dose:  7.5 mg   Take 1.5 tablets (7.5 mg) by mouth daily   Quantity:  45 tablet   Refills:  0       * Notice:  This list has 2 medication(s) that are the same as other medications prescribed for you. Read the directions carefully, and ask your doctor or other care provider to review them with you.             Protect others around you: Learn how to safely use, store and throw away your medicines at www.disposemymeds.org.             Medication List: This is a list of all your medications and when to take " "them. Check marks below indicate your daily home schedule. Keep this list as a reference.      Medications           Morning Afternoon Evening Bedtime As Needed    cetirizine 10 MG tablet   Commonly known as:  zyrTEC   Take 1 tablet (10 mg) by mouth every evening                                COQ10 PO   Take 400 mg by mouth daily                                enoxaparin 150 MG/ML injection   Commonly known as:  LOVENOX   Inject 0.9 mLs (135 mg) Subcutaneous every 12 hours for 6 days   Last time this was given:  135 mg on 1/15/2018  6:15 AM                                lisinopril 20 MG tablet   Commonly known as:  PRINIVIL/ZESTRIL   Take 1 tablet (20 mg) by mouth daily                                magnesium oxide 400 (241.3 MG) MG tablet   Commonly known as:  MAG-OX   Take 1 tablet (400 mg) by mouth daily                                Multi-vitamin Tabs tablet   Take 1 tablet by mouth daily                                Needle (Disp) 18G X 1-1/2\" Misc   Commonly known as:  B-D BLUNT FILL NEEDLE   For drawing up medication.                                * order for DME   Equipment being ordered: knee sleeve, XL                                * order for DME   Equipment being ordered: gel cups, large                                oseltamivir 75 MG capsule   Commonly known as:  TAMIFLU   Take 1 capsule (75 mg) by mouth daily   Last time this was given:  75 mg on 1/15/2018  9:00 AM                                syringe/needle (disp) 22G X 1-1/2\" 3 ML Misc   Commonly known as:  B-D SYRINGE/NEEDLE   For Testosterone injections.                                testosterone cypionate 200 MG/ML injection   Commonly known as:  DEPO-TESTOSTERONE   400 mg every 25 days.                                warfarin 5 MG tablet   Commonly known as:  COUMADIN   Take 1.5 tablets (7.5 mg) by mouth daily                                * Notice:  This list has 2 medication(s) that are the same as other medications prescribed for " you. Read the directions carefully, and ask your doctor or other care provider to review them with you.

## 2018-01-14 NOTE — ED PROVIDER NOTES
History     Chief Complaint   Patient presents with     Chest Pain     chest pain , dx with dvt yesterday     HPI  Edwin Nuno is a 37 year old male with a history of recent knee arthroscopy on 1/8/2018 and diagnosis of right popliteal DVT yesterday who presents emergency department complaining of shortness of breath.  Patient states this morning he awoke with this pain in his right chest with deep breathing.  Pain is persisted intermittently since that time does not radiate.  He rates it a 3 out of 10.  He does not feel significantly short of breath but does have chest pain.  He had some diaphoresis earlier in the day but does not think he has had a fever.  He denies any headache or visual changes.  He has not had any abdominal pain or back pain.  He is still having some pain in his right calf from the DVT.  He denies any numbness or weakness in any extremity.  She began Lovenox and Coumadin yesterday and has taken several doses of both.      Problem List:    Patient Active Problem List    Diagnosis Date Noted     Benign essential hypertension 01/12/2017     Priority: Medium     Hypotestosteronism 10/29/2015     Priority: Medium     He has been on replacement therapies in the past.        Esophageal reflux 10/29/2015     Priority: Medium     Family history of colon cancer 11/07/2014     Priority: Medium     His mother and MGM had colon cancer. Recommend starting colonoscopies at age 40.        Hyperlipidemia with target LDL less than 130 11/07/2014     Priority: Medium     Diagnosis updated by automated process. Provider to review and confirm.          Past Medical History:    Past Medical History:   Diagnosis Date     Hyperlipidemia LDL goal <160 6/7/2013     Hypertension, goal below 140/90 6/7/2013       Past Surgical History:    No past surgical history on file.    Family History:    Family History   Problem Relation Age of Onset     Cancer - colorectal Mother      Breast Cancer Mother      Cancer -  "colorectal Maternal Grandmother      Prostate Cancer Father      C.A.D. No family hx of      DIABETES No family hx of      Hypertension No family hx of      CEREBROVASCULAR DISEASE No family hx of      Melanoma No family hx of        Social History:  Marital Status:   [2]  Social History   Substance Use Topics     Smoking status: Never Smoker     Smokeless tobacco: Former User     Alcohol use Yes        Medications:      Coenzyme Q10 (COQ10 PO)   enoxaparin (LOVENOX) 150 MG/ML injection   warfarin (COUMADIN) 5 MG tablet   oseltamivir (TAMIFLU) 75 MG capsule   magnesium oxide (MAG-OX) 400 (241.3 MG) MG tablet   cetirizine (ZYRTEC) 10 MG tablet   multivitamin, therapeutic with minerals (MULTI-VITAMIN) TABS tablet   lisinopril (PRINIVIL/ZESTRIL) 20 MG tablet   testosterone cypionate (DEPO-TESTOSTERONE) 200 MG/ML injection   syringe/needle, disp, (B-D SYRINGE/NEEDLE) 22G X 1-1/2\" 3 ML MISC   Needle, Disp, (B-D BLUNT FILL NEEDLE) 18G X 1-1/2\" MISC   order for DME   order for DME         Review of Systems   Constitutional: Negative for activity change and appetite change.   HENT: Negative for congestion and trouble swallowing.    Eyes: Negative for visual disturbance.   Respiratory: Negative for chest tightness, wheezing and stridor.    Cardiovascular: Positive for chest pain.   Gastrointestinal: Negative for abdominal pain, nausea and vomiting.   Genitourinary: Negative for decreased urine volume and dysuria.   Musculoskeletal: Negative for back pain and neck pain.   Skin: Negative for rash.   Neurological: Negative for dizziness, light-headedness, numbness and headaches.   Hematological: Does not bruise/bleed easily.   Psychiatric/Behavioral: Negative for confusion.       Physical Exam          Physical Exam   Constitutional: He is oriented to person, place, and time. He appears well-developed and well-nourished. No distress.   HENT:   Head: Normocephalic.   Eyes: Conjunctivae are normal.   Neck: Normal range of " motion. Neck supple.   Cardiovascular: Normal rate, regular rhythm, normal heart sounds and intact distal pulses.    No murmur heard.  Pulmonary/Chest: Effort normal and breath sounds normal. He has no wheezes. He has no rales.   Abdominal: Soft. Bowel sounds are normal. There is no tenderness.   Musculoskeletal: Normal range of motion.   Tenderness to palpation of the right posterior calf region   Neurological: He is alert and oriented to person, place, and time. He exhibits normal muscle tone.   Skin: Skin is warm and dry. No rash noted.   Psychiatric: He has a normal mood and affect.   Nursing note and vitals reviewed.      ED Course     ED Course     Procedures       EKG Interpretation:      Interpreted by Cecilio Nash  Rhythm: normal sinus   Rate: normal  Axis: normal  Ectopy: none  Conduction: normal  ST Segments/ T Waves: No ST-T wave changes  Q Waves: none  Comparison to prior: Unchanged from 12/13/17    Clinical Impression: normal EKG                    Critical Care time:  none               Labs Ordered and Resulted from Time of ED Arrival Up to the Time of Departure from the ED   BASIC METABOLIC PANEL - Abnormal; Notable for the following:        Result Value    Glucose 107 (*)     Creatinine 1.41 (*)     GFR Estimate 57 (*)     All other components within normal limits   CBC WITH PLATELETS DIFFERENTIAL   TROPONIN I   INR       Assessments & Plan (with Medical Decision Making)records were reviewed . Labs were obtained. Patient had and ekg done.  Basic labs were checked.  White count is not elevated hemoglobin 14.5 platelet count 203.  Basic metabolic panel significant for creatinine elevated 1.41.  I discussed this with CT technician and this is in range for doing a PE protocol study I did give the patient a liter of fluids before completing the study which was ordered.  The PE protocol did reveal a single right lower lobe segmental pulmonary embolism no other abnormalities noted.  Patient has  been on Coumadin and Lovenox for day and a half.  He has had symptoms today and due to this concern I felt patient should be admitted for heparin protocol I did discuss this with Dr. Chavez with hospitalist service.  Discussed with pharmacy and they said hospital policy is not start heparin until after the Lovenox dose was 12 hours old and therefore I discussed case with Dr. Pat with [facility] hematology.  He did not feel patient need to be heparinized.  He thought it was reasonable to admit the patient overnight to observe and make sure the embolism was not evolving but would not switch to heparin unless there is a high risk of bleeding or patient had high risk factors.     I have reviewed the nursing notes.    I have reviewed the findings, diagnosis, plan and need for follow up with the patient.       New Prescriptions    No medications on file       Final diagnoses:   Pulmonary embolism on right (H)       1/14/2018   [facility] EMERGENCY DEPARTMENT     Cecilio Nash MD  01/16/18 [id number]

## 2018-01-14 NOTE — ED AVS SNAPSHOT
[facility] Emergency Department    [address] [address]    Phone:  [phone]    Fax:  [phone]                                       Edwin Nuno   MRN: [id number]    Department:  [facility] Emergency Department   Date of Visit:  1/14/2018           Patient Information     Date Of Birth          1980        Your diagnoses for this visit were:     Pulmonary embolism on right (H)        You were seen by Cecilio Nash MD and Park Chavez MD.      Future Appointments        Provider Department Dept Phone Center    1/15/2018 3:30 PM [place] Anticoagulation provider, [facility] [phone] [facility]    1/19/2018 7:20 AM Cecilio Hughes MD [facility] [phone] [facility]      24 Hour Appointment Hotline       To make an appointment at any [facility], call [phone] ([phone]). If you don't have a family doctor or clinic, we will help you find one. [facility] are conveniently located to serve the needs of you and your family.             Review of your medicines      Our records show that you are taking the medicines listed below. If these are incorrect, please call your family doctor or clinic.        Dose / Directions Last dose taken    cetirizine 10 MG tablet   Commonly known as:  zyrTEC   Dose:  10 mg   Quantity:  30 tablet        Take 1 tablet (10 mg) by mouth every evening   Refills:  1        COQ10 PO   Dose:  400 mg        Take 400 mg by mouth daily   Refills:  0        enoxaparin 150 MG/ML injection   Commonly known as:  LOVENOX   Dose:  140 mg   Quantity:  12 Syringe        Inject 0.9 mLs (135 mg) Subcutaneous every 12 hours for 6 days   Refills:  0        lisinopril 20 MG tablet   Commonly known as:  PRINIVIL/ZESTRIL   Dose:  20 mg   Quantity:  90 tablet        Take 1 tablet (20 mg) by mouth daily   Refills:  3        magnesium oxide 400 (241.3 MG) MG tablet   Commonly known as:  MAG-OX   Dose:  400 mg  "  Quantity:  60 tablet        Take 1 tablet (400 mg) by mouth daily   Refills:  3        Multi-vitamin Tabs tablet   Dose:  1 tablet   Quantity:  100 tablet        Take 1 tablet by mouth daily   Refills:  3        Needle (Disp) 18G X 1-1/2\" Misc   Commonly known as:  B-D BLUNT FILL NEEDLE   Quantity:  10 each        For drawing up medication.   Refills:  0        * order for DME   Quantity:  1 Device        Equipment being ordered: knee sleeve, XL   Refills:  0        * order for DME   Quantity:  1 Device        Equipment being ordered: gel cups, large   Refills:  0        oseltamivir 75 MG capsule   Commonly known as:  TAMIFLU   Dose:  75 mg   Quantity:  10 capsule        Take 1 capsule (75 mg) by mouth daily   Refills:  0        syringe/needle (disp) 22G X 1-1/2\" 3 ML Misc   Commonly known as:  B-D SYRINGE/NEEDLE   Quantity:  10 each        For Testosterone injections.   Refills:  0        testosterone cypionate 200 MG/ML injection   Commonly known as:  DEPO-TESTOSTERONE   Quantity:  2 mL        400 mg every 25 days.   Refills:  5        warfarin 5 MG tablet   Commonly known as:  COUMADIN   Dose:  7.5 mg   Quantity:  45 tablet        Take 1.5 tablets (7.5 mg) by mouth daily   Refills:  0        * Notice:  This list has 2 medication(s) that are the same as other medications prescribed for you. Read the directions carefully, and ask your doctor or other care provider to review them with you.            Procedures and tests performed during your visit     Basic metabolic panel    CBC with platelets, differential    Chest CT - IV contrast only - PE protocol    ED Bed Request    INR    Troponin I      Orders Needing Specimen Collection     None      Pending Results     Date and Time Order Name Status Description    1/14/2018 1642 Chest CT - IV contrast only - PE protocol Preliminary             Pending Culture Results     No orders found from 1/12/2018 to 1/15/2018.            Pending Results Instructions     If you " had any lab results that were not finalized at the time of your Discharge, you can call the ED Lab Result RN at [phone]. You will be contacted by this team for any positive Lab results or changes in treatment. The nurses are available 7 days a week from 10A to 6:30P.  You can leave a message 24 hours per day and they will return your call.        Test Results From Your Hospital Stay        1/14/2018  3:46 PM      Component Results     Component Value Ref Range & Units Status    WBC 10.6 4.0 - 11.0 10e9/L Final    RBC Count 4.78 4.4 - 5.9 10e12/L Final    Hemoglobin 14.5 13.3 - 17.7 g/dL Final    Hematocrit 41.2 40.0 - 53.0 % Final    MCV 86 78 - 100 fl Final    MCH 30.3 26.5 - 33.0 pg Final    MCHC 35.2 31.5 - 36.5 g/dL Final    RDW 12.4 10.0 - 15.0 % Final    Platelet Count 203 150 - 450 10e9/L Final    Diff Method Automated Method  Final    % Neutrophils 65.1 % Final    % Lymphocytes 25.2 % Final    % Monocytes 7.7 % Final    % Eosinophils 1.5 % Final    % Basophils 0.2 % Final    % Immature Granulocytes 0.3 % Final    Absolute Neutrophil 6.9 1.6 - 8.3 10e9/L Final    Absolute Lymphocytes 2.7 0.8 - 5.3 10e9/L Final    Absolute Monocytes 0.8 0.0 - 1.3 10e9/L Final    Absolute Eosinophils 0.2 0.0 - 0.7 10e9/L Final    Absolute Basophils 0.0 0.0 - 0.2 10e9/L Final    Abs Immature Granulocytes 0.0 0 - 0.4 10e9/L Final         1/14/2018  4:05 PM      Component Results     Component Value Ref Range & Units Status    Sodium 136 133 - 144 mmol/L Final    Potassium 4.2 3.4 - 5.3 mmol/L Final    Chloride 102 94 - 109 mmol/L Final    Carbon Dioxide 27 20 - 32 mmol/L Final    Anion Gap 7 3 - 14 mmol/L Final    Glucose 107 (H) 70 - 99 mg/dL Final    Urea Nitrogen 21 7 - 30 mg/dL Final    Creatinine 1.41 (H) 0.66 - 1.25 mg/dL Final    GFR Estimate 57 (L) >60 mL/min/1.7m2 Final    Non  GFR Calc    GFR Estimate If Black 68 >60 mL/min/1.7m2 Final    African American GFR Calc    Calcium 9.1 8.5 - 10.1 mg/dL  Final         1/14/2018  4:05 PM      Component Results     Component Value Ref Range & Units Status    Troponin I ES <0.015 0.000 - 0.045 ug/L Final    The 99th percentile for upper reference range is 0.045 ug/L.  Troponin values   in the range of 0.045 - 0.120 ug/L may be associated with risks of adverse   clinical events.           1/14/2018  3:54 PM      Component Results     Component Value Ref Range & Units Status    INR 1.04 0.86 - 1.14 Final         1/14/2018  6:40 PM      Narrative     CT CHEST PULMONARY EMBOLISM W CONTRAST  1/14/2018 6:15 PM     HISTORY: Deep venous thrombosis diagnosed yesterday by ultrasound. Now  with shortness of breath.    TECHNIQUE: Helical axial scans from lung apices through lung bases  with 96 mL Isovue 370 IV contrast. Radiation dose for this scan was  reduced using automated exposure control, adjustment of the mA and/or  kV according to patient size, or iterative reconstruction technique.    COMPARISON: None.    FINDINGS: There is a single pulmonary embolus in a right lower lobe  segmental artery (images 85 through 100 of series 4). No other  pulmonary emboli are identified. The remainder of the mediastinal and  hilar structures are unremarkable. The lungs are clear bilaterally.  Visualized upper abdomen shows no abnormality.        Impression     IMPRESSION:  1. Single right lower lobe segmental pulmonary embolus.  2. No other abnormalities.                  Thank you for choosing [facility]       Thank you for choosing [facility] for your care. Our goal is always to provide you with excellent care. Hearing back from our patients is one way we can continue to improve our services. Please take a few minutes to complete the written survey that you may receive in the mail after you visit with us. Thank you!        [facility] Information     [facility] gives you secure access to your electronic health record. If you see a primary care provider, you can also send messages to your care team  and make appointments. If you have questions, please call your primary care clinic.  If you do not have a primary care provider, please call [phone] and they will assist you.        Care EveryWhere ID     This is your Care EveryWhere ID. This could be used by other organizations to access your [facility] medical records  [id number]        Equal Access to Services     BERENICE BENJAMIN : Anusha Cornejo, john worley, austen stanley. So [facility] [phone].    ATENCIÓN: Si habla español, tiene a bhandari disposición servicios gratuitos de asistencia lingüística. Llame al [phone].    We comply with applicable federal civil rights laws and [place] laws. We do not discriminate on the basis of race, color, national origin, age, disability, sex, sexual orientation, or gender identity.            After Visit Summary       This is your record. Keep this with you and show to your community pharmacist(s) and doctor(s) at your next visit.

## 2018-01-15 ENCOUNTER — APPOINTMENT (OUTPATIENT)
Dept: ULTRASOUND IMAGING | Facility: CLINIC | Age: 38
End: 2018-01-15
Attending: INTERNAL MEDICINE
Payer: COMMERCIAL

## 2018-01-15 ENCOUNTER — ANTICOAGULATION THERAPY VISIT (OUTPATIENT)
Dept: ANTICOAGULATION | Facility: CLINIC | Age: 38
End: 2018-01-15

## 2018-01-15 VITALS
HEIGHT: 72 IN | RESPIRATION RATE: 18 BRPM | WEIGHT: 298.72 LBS | BODY MASS INDEX: 40.46 KG/M2 | SYSTOLIC BLOOD PRESSURE: 138 MMHG | OXYGEN SATURATION: 97 % | DIASTOLIC BLOOD PRESSURE: 79 MMHG | TEMPERATURE: 98.5 F | HEART RATE: 77 BPM

## 2018-01-15 DIAGNOSIS — Z79.01 LONG-TERM (CURRENT) USE OF ANTICOAGULANTS: ICD-10-CM

## 2018-01-15 DIAGNOSIS — I82.409 DVT (DEEP VENOUS THROMBOSIS) (H): ICD-10-CM

## 2018-01-15 DIAGNOSIS — I26.99 PULMONARY EMBOLISM ON RIGHT (H): Primary | ICD-10-CM

## 2018-01-15 DIAGNOSIS — I26.99 PULMONARY EMBOLISM ON RIGHT (H): ICD-10-CM

## 2018-01-15 LAB
ANION GAP SERPL CALCULATED.3IONS-SCNC: 7 MMOL/L (ref 3–14)
BUN SERPL-MCNC: 20 MG/DL (ref 7–30)
CALCIUM SERPL-MCNC: 8.6 MG/DL (ref 8.5–10.1)
CHLORIDE SERPL-SCNC: 105 MMOL/L (ref 94–109)
CO2 SERPL-SCNC: 26 MMOL/L (ref 20–32)
CREAT SERPL-MCNC: 0.89 MG/DL (ref 0.66–1.25)
GFR SERPL CREATININE-BSD FRML MDRD: >90 ML/MIN/1.7M2
GLUCOSE SERPL-MCNC: 101 MG/DL (ref 70–99)
INR PPP: 1.11 (ref 0.86–1.14)
POTASSIUM SERPL-SCNC: 4.2 MMOL/L (ref 3.4–5.3)
SODIUM SERPL-SCNC: 138 MMOL/L (ref 133–144)

## 2018-01-15 PROCEDURE — 25000132 ZZH RX MED GY IP 250 OP 250 PS 637: Performed by: INTERNAL MEDICINE

## 2018-01-15 PROCEDURE — 99207 ZZC NO CHARGE NURSE ONLY: CPT | Performed by: REGISTERED NURSE

## 2018-01-15 PROCEDURE — 25000128 H RX IP 250 OP 636: Performed by: INTERNAL MEDICINE

## 2018-01-15 PROCEDURE — 96372 THER/PROPH/DIAG INJ SC/IM: CPT

## 2018-01-15 PROCEDURE — 36415 COLL VENOUS BLD VENIPUNCTURE: CPT | Performed by: EMERGENCY MEDICINE

## 2018-01-15 PROCEDURE — 99217 ZZC OBSERVATION CARE DISCHARGE: CPT | Performed by: PHYSICIAN ASSISTANT

## 2018-01-15 PROCEDURE — 80048 BASIC METABOLIC PNL TOTAL CA: CPT | Performed by: EMERGENCY MEDICINE

## 2018-01-15 PROCEDURE — 25000132 ZZH RX MED GY IP 250 OP 250 PS 637: Performed by: FAMILY MEDICINE

## 2018-01-15 PROCEDURE — 93970 EXTREMITY STUDY: CPT

## 2018-01-15 PROCEDURE — 25000132 ZZH RX MED GY IP 250 OP 250 PS 637: Performed by: PHYSICIAN ASSISTANT

## 2018-01-15 PROCEDURE — G0378 HOSPITAL OBSERVATION PER HR: HCPCS

## 2018-01-15 PROCEDURE — 85610 PROTHROMBIN TIME: CPT | Performed by: EMERGENCY MEDICINE

## 2018-01-15 RX ORDER — LISINOPRIL 20 MG/1
20 TABLET ORAL DAILY
Status: DISCONTINUED | OUTPATIENT
Start: 2018-01-15 | End: 2018-01-15 | Stop reason: HOSPADM

## 2018-01-15 RX ORDER — WARFARIN SODIUM 7.5 MG/1
7.5 TABLET ORAL ONCE
Status: COMPLETED | OUTPATIENT
Start: 2018-01-15 | End: 2018-01-15

## 2018-01-15 RX ADMIN — WARFARIN SODIUM 7.5 MG: 7.5 TABLET ORAL at 12:01

## 2018-01-15 RX ADMIN — OSELTAMIVIR PHOSPHATE 75 MG: 75 CAPSULE ORAL at 09:00

## 2018-01-15 RX ADMIN — ENOXAPARIN SODIUM 135 MG: 150 INJECTION SUBCUTANEOUS at 06:15

## 2018-01-15 RX ADMIN — LISINOPRIL 20 MG: 20 TABLET ORAL at 12:01

## 2018-01-15 NOTE — DISCHARGE INSTRUCTIONS
- Please follow up with your primary and orthopedic surgeon as scheduled on Friday.  - Follow up with the anticoagulation clinic as needed per pharmacy.  - Call your primary or present to the emergency room if you have worsening chest pain, shortness of breath, feel like your heart is racing or beating differently, or any other concerning symptoms.    Warfarin Instructions  You were given 7.5 mg of warfarin today prior to your discharge.  You can take this in the morning daily as this works best with your schedule.  Continue the Lovenox injections twice daily until you are seen by the Coumadin Clinic on Wednesday in [place] at 3 pm.  They will evaluate your INR and further warfarin and Lovenox dosing at that time.

## 2018-01-15 NOTE — PROGRESS NOTES
[facility] DISCHARGE NOTE    Patient discharged to home at 1:16 PM via wheel chair. Accompanied by spouse and staff. Discharge instructions reviewed with patient, opportunity offered to ask questions. Prescriptions - None ordered for discharge. All belongings sent with patient.    Jazmine Matamoros

## 2018-01-15 NOTE — PHARMACY-ANTICOAGULATION SERVICE
Clinical Pharmacy- Warfarin Discharge Note  This patient is currently on warfarin for the treatment of PE.  INR Goal= 2-3      Anticoagulation Dose History     Recent Dosing and Labs Latest Ref Rng & Units 1/13/2018 1/14/2018 1/15/2018    Warfarin 7.5 mg - 7.5 mg - -    INR 0.86 - 1.14 1.02 1.04 1.11            Recommend discharging the patient on a warfarin regimen of 7.5mg daily with a prescription for warfarin 5mg tablets.      The patient should have an INR checked in 2 days.

## 2018-01-15 NOTE — MR AVS SNAPSHOT
Edwin MARCIA Nuno   1/15/2018   Anticoagulation Therapy Visit    Description:  37 year old male   Provider:  Rohini Cisneros, RN   Department:  [place] [facility]           INR as of 1/15/2018     Today's INR 1.11!      Anticoagulation Summary as of 1/15/2018     INR goal 2.0-3.0   Today's INR 1.11!   Full instructions 1/15: 7.5 mg; 1/16: 7.5 mg; Otherwise No maintenance plan   Next INR check 1/17/2018    Indications   DVT (deep venous thrombosis) (H) [I82.409]  Pulmonary embolism on right (H) [I26.99]  Long-term (current) use of anticoagulants [Z79.01] [Z79.01]         Your next Anticoagulation Clinic appointment(s)     Jan 17, 2018  3:00 PM CST   Anticoagulation Visit with [place] [facility]TI COAG   [facility]    [address] [address]   [phone]              January 2018 Details    Sun Mon Tue Wed Thu Fri Sat      1               2               3               4               5               6                 7               8               9               10               11               12               13                 14               15      7.5 mg   See details      16      7.5 mg         17            18               19               20                 21               22               23               24               25               26               27                 28               29               30               31                   Date Details   01/15 This INR check       Date of next INR:  1/17/2018         How to take your warfarin dose     To take:  7.5 mg Take 1.5 of the 5 mg tablets.

## 2018-01-15 NOTE — H&P
[facility]    History and Physical  Hospital Medicine       Date of Admission:  1/14/2018  Date of Service: 1/14/2018     Assessment & Plan   Edwin Nuno is a 37 year old male with recent Rknee arthroscopy 1/8, then new dx Right leg dvt 1/13 who presents with cp and is found to have RLL PE    1. Pulmonary embolism in setting of recent knee syrgery and dx of dvt less than 24 hours before pe sx started. I feel that pt is not a lovenox failure, just that he was on the tx <24 hrs and was actuvely using the right leg during that time. I will continue the lovenox and coumadin. Coumadin is preferable to doac bc of recent surgery and the ability to reverse if he should bleed into knee. Lovenox has been shown to be superior to UFH in tx of pe.Trops neg. I will check ekg, BNP.If BNP up would consider echo. Bc leg is more swollen, I reed recheck u/s lower extremities. Other than the surgery, the only other possible risk factor is testosterone but pt says he hasn't had a shot since dec. Pt needs coumadin teaching    2.KIMMY- creat 1.4 and baseline creat 1.1- I will hold lisinopril for now, hydrate and recheck in am    3.GERD- only sx with spicy food and takes omeprazole at home prn    4. Hypotestosterone- shots on hold    5. Daughter with influenza- he is on day 5 of prophylactic tx        DVT Prophylaxis: on lovenox for dvt  Code Status: full  Disposition: admit observation    Park Chavez            Past Medical History      Past Medical History:   Diagnosis Date     DVT (deep vein thrombosis) in pregnancy (H)     right leg dx 1/13/2018     GERD (gastroesophageal reflux disease)      HTN (hypertension)      Hypotestosteronemia      Patient Active Problem List    Diagnosis Date Noted     Benign essential hypertension 01/12/2017     Priority: Medium     Hypotestosteronism 10/29/2015     Priority: Medium     He has been on replacement therapies in the past.        Esophageal reflux 10/29/2015      "Priority: Medium     Family history of colon cancer 11/07/2014     Priority: Medium     His mother and MGM had colon cancer. Recommend starting colonoscopies at age 40.        Hyperlipidemia with target LDL less than 130 11/07/2014     Priority: Medium     Diagnosis updated by automated process. Provider to review and confirm.          Past Surgical History     Past Surgical History:   Procedure Laterality Date     ARTHROSCOPY KNEE      right, 1/8/2018        History of Present Illness   Edwin Nuno is a 37 year old male with hx R bknee arthroscopy 1/8 and newly dx R le dvt 1/13 comes to ed with c/o R lower cp when he breaths. He was seen in ed 1/13 and started on lovenox and coumadin about 2:30 pm yesterday. He went home. He continued doing the exercises that were presrcibed for his leg . He took his second lovenox shot about 4:30 am. About 10 am he was in the shower when he noticed right lower cp with deep breaths. This did not resolve so he came to ed. He had a ct pulm angio which showed a single RLL pe.  Pt feels well except for the cp and Rle pain. He has no sob. He does have sl dry cough. He thinks maybe his right leg is a little more swollen today then yesterday. He is on tamiflu bc his daughter was diaGNOSED WITH INFLUENZA AND THE FAMILY IS ON IT PROPHYLACTICALLY.    Prior to Admission Medications   Prior to Admission Medications   Prescriptions Last Dose Informant Patient Reported? Taking?   Coenzyme Q10 (COQ10 PO) 1/13/2018 at am Self Yes Yes   Sig: Take 400 mg by mouth daily   Needle, Disp, (B-D BLUNT FILL NEEDLE) 18G X 1-1/2\" MISC  Self No No   Sig: For drawing up medication.   cetirizine (ZYRTEC) 10 MG tablet Past Month at Unknown time Self Yes Yes   Sig: Take 1 tablet (10 mg) by mouth every evening   enoxaparin (LOVENOX) 150 MG/ML injection 1/14/2018 at 1630 Self No Yes   Sig: Inject 0.9 mLs (135 mg) Subcutaneous every 12 hours for 6 days   lisinopril (PRINIVIL/ZESTRIL) 20 MG tablet 1/14/2018 at " "0630 Self No Yes   Sig: Take 1 tablet (20 mg) by mouth daily   magnesium oxide (MAG-OX) 400 (241.3 MG) MG tablet 2018 at am Self Yes Yes   Sig: Take 1 tablet (400 mg) by mouth daily   multivitamin, therapeutic with minerals (MULTI-VITAMIN) TABS tablet Past Month at Unknown time Self Yes Yes   Sig: Take 1 tablet by mouth daily   order for DME  Self No No   Sig: Equipment being ordered: knee sleeve, XL   order for DME  Self No No   Sig: Equipment being ordered: gel cups, large   oseltamivir (TAMIFLU) 75 MG capsule 2018 at 0630 Self No Yes   Sig: Take 1 capsule (75 mg) by mouth daily   syringe/needle, disp, (B-D SYRINGE/NEEDLE) 22G X 1-1/2\" 3 ML MISC  Self No No   Sig: For Testosterone injections.   testosterone cypionate (DEPO-TESTOSTERONE) 200 MG/ML injection More than a month at Unknown time Self No No   Si mg every 25 days.   warfarin (COUMADIN) 5 MG tablet 2018 at 1200 Self No Yes   Sig: Take 1.5 tablets (7.5 mg) by mouth daily      Facility-Administered Medications: None     Allergies   No Known Allergies    Family History    Family History   Problem Relation Age of Onset     Cancer - colorectal Mother      Breast Cancer Mother      Cancer - colorectal Maternal Grandmother      Prostate Cancer Father      C.A.D. No family hx of      DIABETES No family hx of      Hypertension No family hx of      CEREBROVASCULAR DISEASE No family hx of      Melanoma No family hx of      No fh of clots    Social History   Social History     Social History     Marital status:      Spouse name: N/A     Number of children: N/A     Years of education: N/A     Occupational History     Not on file.     Social History Main Topics     Smoking status: Never Smoker     Smokeless tobacco: Former User     Alcohol use Yes     Drug use: No     Sexual activity: Yes     Birth control/ protection: IUD     Other Topics Concern     Not on file     Social History Narrative    .       Review of Systems   10 point ros " pos for hpi and otherwise neg    Physical Exam   /84  Temp 99.4  F (37.4  C) (Oral)  Resp 10  SpO2 96%         Constitutional: Alert, oriented, cooperative, no apparent distress, appears nontoxic  Eyes: Eyes are clear, pupils are reactive.  HEENT:  No evidence of cranial trauma.  Lymph/Hematologic: No cervical lymphadenopathy is appreciated.  Cardiovascular: Regular rate and rhythm, normal S1 and S2, and no murmur noted. JVP is normal. Good peripheral pulses dp bilaterally.   Respiratory: Clear to auscultation bilaterally.   GI: Soft, non-tender, normal bowel sounds, no hepatosplenomegaly.  Genitourinary: Deferred  Musculoskeletal: right leg larger than left, bandaid over surgical area, left leg with old scar knee  Skin: Warm and dry, no rashes.   Neurologic: Neck supple. Cranial nerves are grossly intact.  is symmetric.     Data   Data reviewed today:     Recent Labs  Lab 01/14/18  1540 01/13/18  1613   WBC 10.6 8.9   HGB 14.5 14.6   MCV 86 87    208   INR 1.04 1.02     --    POTASSIUM 4.2  --    CHLORIDE 102  --    CO2 27  --    BUN 21  --    CR 1.41*  --    ANIONGAP 7  --    DARRIAN 9.1  --    *  --    TROPI <0.015  --        Recent Results (from the past 24 hour(s))   Chest CT - IV contrast only - PE protocol    Narrative    CT CHEST PULMONARY EMBOLISM W CONTRAST  1/14/2018 6:15 PM     HISTORY: Deep venous thrombosis diagnosed yesterday by ultrasound. Now  with shortness of breath.    TECHNIQUE: Helical axial scans from lung apices through lung bases  with 96 mL Isovue 370 IV contrast. Radiation dose for this scan was  reduced using automated exposure control, adjustment of the mA and/or  kV according to patient size, or iterative reconstruction technique.    COMPARISON: None.    FINDINGS: There is a single pulmonary embolus in a right lower lobe  segmental artery (images 85 through 100 of series 4). No other  pulmonary emboli are identified. The remainder of the mediastinal  and  hilar structures are unremarkable. The lungs are clear bilaterally.  Visualized upper abdomen shows no abnormality.      Impression    IMPRESSION:  1. Single right lower lobe segmental pulmonary embolus.  2. No other abnormalities.             Park Chavez

## 2018-01-15 NOTE — PROGRESS NOTES
ANTICOAGULATION FOLLOW-UP CLINIC VISIT    Patient Name:  Edwin Nuno  Date:  1/15/2018  Contact Type:  hospital discharge/new referral    SUBJECTIVE:     Patient Findings     Positives Change in medications (first dose of enoxaparin 140mg on 1/13/18 at 1549), Dental/Other procedures (1/8/18 Right knee arthroscopy and meniscus repair at [facility]), Hospital admission (1/13, then 1/14-1/15), Initiation of therapy (1/13/18)    Comments 1/15/18, Per Felicita SOLIS [facility]:  'Patient is coming in on Wed afternoon.   We canceled his appt today.   He will take 7.5 mg daily and continue Lovenox until he is evaluated by you.'           OBJECTIVE    INR   Date Value Ref Range Status   01/15/2018 1.11 0.86 - 1.14 Final       ASSESSMENT / PLAN  INR assessment SUB    Recheck INR In: 2 DAYS    INR Location Clinic hospital lab draw     Anticoagulation Summary as of 1/15/2018     INR goal 2.0-3.0   Today's INR 1.11!   Maintenance plan No maintenance plan   Full instructions 1/15: 7.5 mg; 1/16: 7.5 mg; Otherwise No maintenance plan   Next INR check 1/17/2018   Target end date     Indications   DVT (deep venous thrombosis) (H) [I82.409]  Pulmonary embolism on right (H) [I26.99]  Long-term (current) use of anticoagulants [Z79.01] [Z79.01]         Anticoagulation Episode Summary     INR check location     Preferred lab     Send INR reminders to [facility]    Comments *      Anticoagulation Care Providers     Provider Role Specialty Phone number    Cecilio Hughes MD [facility] [phone]            See the Encounter Report to view Anticoagulation Flowsheet and Dosing Calendar (Go to Encounters tab in chart review, and find the Anticoagulation Therapy Visit)      Rohini Cisneros RN

## 2018-01-15 NOTE — PROGRESS NOTES
Nutrition Consult  Reason for Assessment:  Nutrition education regarding vitamin K content of foods  Diet History:  The patient reports that he had recently decided to work on losing weight and he is trying to eat mostly protein foods and vegetables. He is agreeable to working with the coumadin clinic to develop a meal plan with consistent vitamin K intake.  Nutrition Diagnosis:  Food- and nutrition-related knowledge deficit r/t no previous knowledge of need to have a consistent intake of Vitamin K  Interventions:  Provided instruction on content of Vitamin K in foods; reviewed low, moderate and high Vitamin K containing foods  Provided handouts on the content of Vitamin K in foods  Goals:   Patient will verbalize understanding of the importance of a consistent intake of Vitamin K each day  Follow-up:    Patient to ask any further nutrition-related questions before discharge.  In addition, pt may request outpatient RD appointment.    Richa Dillard RD,LD  Clinical Dietitian

## 2018-01-15 NOTE — PROGRESS NOTES
[facility] [place]MISSION NOTE    Patient admitted to room 2309 at approximately 2205 via cart from emergency room. Patient was accompanied by spouse and transport tech.     Verbal SBAR report received from Jessica prior to patient arrival.     Patient ambulated to bed with one assist. Patient alert and oriented X 3. Pain is controlled without any medications.  . Admission vital signs: Blood pressure 135/63, pulse 74, temperature 98.8  F (37.1  C), temperature source Oral, resp. rate 16, height 1.829 m (6'), weight 135.5 kg (298 lb 11.6 oz), SpO2 100 %. Patient and spouse were oriented to plan of care, call light, bed controls, tv, telephone, bathroom and visiting hours.     The following safety risks were identified during admission: none. Yellow risk band applied: MINI Soria

## 2018-01-15 NOTE — PLAN OF CARE
Problem: Patient Care Overview  Goal: Plan of Care/Patient Progress Review  Outcome: No Change  Declined pain med during night, has exceptional bladder capacity, voided 1200 cc at HS and declined need to void until this AM again voiding 1000cc. Gave own Lovenox this AM and has been doing so at home since 1/13. Good appetite, mild calf pain. Is on BR w/ bedside urinal using walker and SBA.

## 2018-01-15 NOTE — DISCHARGE SUMMARY
[facility]    Discharge Summary  Hospital Medicine    Date of Admission:  1/14/2018  Date of Discharge:  1/15/2018   Discharging Provider: Margo Santana  Date of Service: 1/15/2018     Primary Care     Cecilio Hughes  [address] [id number]      Identification and Chief Compaint: Edwin Nuno is a 37 year old male who presented on 1/14/2018 with complaint of chest pain.    Discharge Diagnoses     Hypotestosteronism  Benign essential hypertension  Pulmonary embolism on right  Deep Vein Thrombosis of right leg  Acute Kidney Injury    Discharge Disposition   Discharged to home    Discharge Orders     Reason for your hospital stay   You were seen for a blood clot in your lung, a pulmonary embolism.     Follow-up and recommended labs and tests    Follow up with primary care provider, Cecilio Hughes, as scheduled this Friday to evaluate medication change and for hospital follow- up.  No follow up labs or test are needed.     Activity   Your activity upon discharge: activity as tolerated, no vigorous or strenuous exercise     Diet   Follow this diet upon discharge: Regular Diet       Discharge Medications   Discharge Medication List as of 1/15/2018 11:55 AM      CONTINUE these medications which have NOT CHANGED    Details   Coenzyme Q10 (COQ10 PO) Take 400 mg by mouth daily, Historical      enoxaparin (LOVENOX) 150 MG/ML injection Inject 0.9 mLs (135 mg) Subcutaneous every 12 hours for 6 days, Disp-12 Syringe, R-0, E-Prescribe      warfarin (COUMADIN) 5 MG tablet Take 1.5 tablets (7.5 mg) by mouth daily, Disp-45 tablet, R-0, E-Prescribe      oseltamivir (TAMIFLU) 75 MG capsule Take 1 capsule (75 mg) by mouth daily, Disp-10 capsule, R-0, E-Prescribe      magnesium oxide (MAG-OX) 400 (241.3 MG) MG tablet Take 1 tablet (400 mg) by mouth daily, Disp-60 tablet, R-3, Historical      cetirizine (ZYRTEC) 10 MG tablet Take 1 tablet (10 mg) by mouth every evening, Disp-30  "tablet, R-1, Historical      multivitamin, therapeutic with minerals (MULTI-VITAMIN) TABS tablet Take 1 tablet by mouth daily, Disp-100 tablet, R-3, Historical      lisinopril (PRINIVIL/ZESTRIL) 20 MG tablet Take 1 tablet (20 mg) by mouth daily, Disp-90 tablet, R-3, E-Prescribe      testosterone cypionate (DEPO-TESTOSTERONE) 200 MG/ML injection 400 mg every 25 days., Disp-2 mL, R-5, Local PrintPt will call to order. He is giving himself the injections.      syringe/needle, disp, (B-D SYRINGE/NEEDLE) 22G X 1-1/2\" 3 ML MISC For Testosterone injections., Disp-10 each, R-0, E-Prescribe      Needle, Disp, (B-D BLUNT FILL NEEDLE) 18G X 1-1/2\" MISC For drawing up medication., Disp-10 each, R-0, E-Prescribe      !! order for DME Equipment being ordered: gel cups, largeDisp-1 Device, R-0, Local Print      !! order for DME Equipment being ordered: knee sleeve, XLDisp-1 Device, R-0, Local Print       !! - Potential duplicate medications found. Please discuss with provider.        Allergies   No Known Allergies    Consultations This Hospital Stay   No consultations were requested during this admission    PHARMACY TO DOSE WARFARIN  NUTRITION SERVICES ADULT IP CONSULT  SPIRITUAL HEALTH SERVICES IP CONSULT    Significant Results and Procedures   Procedures    None    Data   Results for orders placed or performed during the hospital encounter of 01/14/18   Chest CT - IV contrast only - PE protocol    Narrative    CT CHEST PULMONARY EMBOLISM W CONTRAST  1/14/2018 6:15 PM     HISTORY: Deep venous thrombosis diagnosed yesterday by ultrasound. Now  with shortness of breath.    TECHNIQUE: Helical axial scans from lung apices through lung bases  with 96 mL Isovue 370 IV contrast. Radiation dose for this scan was  reduced using automated exposure control, adjustment of the mA and/or  kV according to patient size, or iterative reconstruction technique.    COMPARISON: None.    FINDINGS: There is a single pulmonary embolus in a right lower " "lobe  segmental artery (images 85 through 100 of series 4). No other  pulmonary emboli are identified. The remainder of the mediastinal and  hilar structures are unremarkable. The lungs are clear bilaterally.  Visualized upper abdomen shows no abnormality.      Impression    IMPRESSION:  1. Single right lower lobe segmental pulmonary embolus.  2. No other abnormalities.    ANNY MYERS MD   US Lower Extremity Venous Duplex Bilateral    Narrative    ULTRASOUND VENOUS BILATERAL LOWER EXTREMITY  1/15/2018 8:53 AM     HISTORY: PE.    COMPARISON: 1/13/2018    TECHNIQUE: Ultrasound gray scale, Color Doppler flow, and spectral  Doppler waveform analysis performed.    FINDINGS:  The left common femoral, superficial femoral, popliteal and  posterior tibial veins are patent and fully compressible and  demonstrate normal venous Doppler flow. The visualized greater  saphenous veins are negative for thrombus.     On the right, the common femoral vein and femoral vein demonstrate  normal compressibility. Color flow is demonstrated. Augmentation is  present but blunted. There is partial compressibility of the right  popliteal vein. There is no color-flow nor compressibility in the  right posterior tibial veins. There is partial compressibility of the  right peroneal veins.      Impression    IMPRESSION: DVT involving the right popliteal, posterior tibial, and  peroneal veins. This is similar to the prior study.    ANNA TEJADA MD     History of Present Illness   (From H&P) \"Edwin Nuno is a 37 year old male with hx R bknee arthroscopy 1/8 and newly dx R le dvt 1/13 comes to ed with c/o R lower cp when he breaths. He was seen in ed 1/13 and started on lovenox and coumadin about 2:30 pm yesterday. He went home. He continued doing the exercises that were presrcibed for his leg . He took his second lovenox shot about 4:30 am. About 10 am he was in the shower when he noticed right lower cp with deep breaths. This did not " "resolve so he came to ed. He had a ct pulm angio which showed a single RLL pe.  Pt feels well except for the cp and Rle pain. He has no sob. He does have sl dry cough. He thinks maybe his right leg is a little more swollen today then yesterday. He is on tamiflu bc his daughter was diaGNOSED WITH INFLUENZA AND THE FAMILY IS ON IT PROPHYLACTICALLY.\"    Hospital Course   Edwin Nuno was admitted on 1/14/2018.  The following problems were addressed during his hospitalization:    Pulmonary embolism   DVT right leg  Patient found to have pulmonary embolism in the setting of a recent knee surgery and diagnosis of DVT less than 24 hours before the symptoms of his PE started. First came to the ED on 1/13/2018 and found to have DVT. He was started on Lovenox and warfarin on 1/13/2018 and his symptoms of PE started about 20 hours later - the case was discussed with Dr. Pat with [facility] hematology who felt the patient did not need to be heparinized and recommended observation to make sure the the embolism was not evolving. Patient was continued on Lovenox and warfarin. His symptoms remained stable. Pharmacy was consulted to dose the warfarin, provide education and arrange follow up with anticoagulation clinic. Patient will be followed by anticoagulation clinic. He has follow up with primary on Friday and orthopedic on Friday. Instructed to weight-bear as tolerated; however should not do any strenuous activity or exercise. Return to ED if worsening chest pain, shortness of breath or other concerning symptoms.    Acute Kidney Injury  Patient presented with creatine of 1.4. Baseline appears to be ~1. Suspect prerenal due to due to dehydration/poor PO intake. He was given 1 L of IV and creatinine improved to 0.89.  Lisinopril was initally held, then restarted on discharge. Instructed to drink plenty of fluids.    Hypotestosteronism  Patient uses testosterone injections at home. Has stopped since December due to " surgery and plans to follow up with PCP to reassess starting.    Benign essential hypertension  Lisinopril initially held in the setting of an KIMMY. Continued on day of discharge.      Pending Results   Unresulted Labs Ordered in the Past 30 Days of this Admission     No orders found for last 61 day(s).        NYDIA  Reports some cramping pain in his lower back and right lower abdomen when he stood up to change clothes. Resolved quickly; he has been non-weightbearing since admission and wonders if that is contributing.    He reports lower, right-sided dull chest pain with deep inspiration. He rates it a 2/10.     Reports cough x 3 days. States he has had some mild elevations in temperature, however denies feeling feverish, chills, sore throat, congestion, rhinorrhea or myalgias.     Denies loss of appetite, arthralgias, shortness of breath, palpitations, abdominal pain, nausea, vomiting, diarrhea, constipation, numbness or tingling.    Physical Exam   Temp:  [98.8  F (37.1  C)-100.3  F (37.9  C)] 99.2  F (37.3  C)  Pulse:  [74-77] 77  Heart Rate:  [78-93] 80  Resp:  [8-19] 18  BP: (113-183)/() 139/70  SpO2:  [96 %-100 %] 97 % on RA  Vitals:    01/14/18 2207   Weight: 135.5 kg (298 lb 11.6 oz)     Constitutional: Alert, oriented, cooperative, no apparent distress, appears nontoxic, Appears stated age.  Eyes: Sclera are anicteric, EOMI  HENT: Normocephalic. Atraumatic.   Cardiovascular: Regular rate and rhythm, normal S1 and S2, and no murmur, rubs or gallops noted. Radial pulses are 2+ bilaterally. Distal pulses are intact. Lower extremity edema in the right leg to the mid-calf.  Respiratory: No accessory muscle usage. Speaking in full sentences. Clear to auscultation bilaterally without wheezes, crackles or rhonchi.   GI:Normal bowel sounds present, soft, non-tender,non-distended. No rebound or guarding.   Genitourinary: Deferred  Musculoskeletal: Normal muscle bulk and tone. Moves all extremities appropriately.  Bandage over surgical area, incision not inspected - no obvious signs of erythema or warmth surrounding bandage.  Skin: Warm and dry, no rashes.   Neurologic: Neck supple. Cranial nerves 3-12 are grossly intact.  is symmetric.     The discharge plan was discussed with the patient.    Total time on this discharge was >35 minutes.    I have discussed patient and formulated plan with Dr. Shad Wynne.    Margo Santana, [facility]

## 2018-01-15 NOTE — PROGRESS NOTES
"SPIRITUAL HEALTH SERVICES  SPIRITUAL ASSESSMENT Progress Note  [facility] [place] - Med/Surg    PRIMARY FOCUS:     Assessment of emotional/spiritual/Sikh distress    Support for coping    ILLNESS CIRCUMSTANCES:   Reviewed documentation. Reflective conversation shared with Edwin Nuno which integrated elements of illness and family narratives.     Context of Serious Illness/Symptom(s) - PE possibly related to recent knee surgery    Resources for Support - Recently moved from [place] (5 years ago) - wife, three children    DISTRESS:     Emotional/Existential/Relational Distress - Milad stated that he isn't \"too worried\" about things but his kids are watching medication ads and worrying about side effects    Spiritual/Christian Distress - None identified    Social/Cultural/Economic Distress - None identified    SPIRITUAL/Christianity COPING:     Restoration/Essence - Sabianist; hasn't connected with a Islam in this area since their move    Emotional/Existential/Relational Connections - Milad talked about the challenges of having three kids, all different.  His oldest, 15, reads constantly, his middle child, 12, is autistic and provides unique challenges, his youngest, 6, is \"all about sports.\"  His wife teaches in the [employer] [facility].  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      GOALS OF CARE:    Goals of Care - Getting back home to continue in his many important roles.    Meaning/Sense-Making - Family is central for Milad right now.  He finds great meaning and purpose here.    PLAN: Depending on LOS, I will provide follow up to see how pt is progressing.      Ananth Carbajal M.A., [facility]  Staff   [facility]  Office: [phone]  Cell: [phone]  Pager [phone]    "

## 2018-01-15 NOTE — PHARMACY-ANTICOAGULATION SERVICE
Clinical Pharmacy - Warfarin Dosing Consult     Pharmacy has been consulted to manage this patient s warfarin therapy.  Indication: DVT/ PE Treatment  Therapy Goal: INR 2-3  Provider/Team: [facility]  Warfarin Prior to Admission: Yes  Warfarin PTA Regimen: 7.5mg daily until initial ACC visit  Recent documented change in oral intake/nutrition: Unknown  Dose Comments: last dose 7.5mg 1/14/18 per prior to admission note    INR   Date Value Ref Range Status   01/14/2018 1.04 0.86 - 1.14 Final   01/13/2018 1.02 0.86 - 1.14 Final       Patient reports taking Warfarin 7.5 mg today 1/14/18.  Pharmacy will monitor Edwin Nuno daily and order warfarin doses to achieve specified goal.    Please contact pharmacy as soon as possible if the warfarin needs to be held for a procedure or if the warfarin goals change.      Yakov Tineo

## 2018-01-16 ENCOUNTER — TELEPHONE (OUTPATIENT)
Dept: FAMILY MEDICINE | Facility: CLINIC | Age: 38
End: 2018-01-16

## 2018-01-17 ENCOUNTER — ANTICOAGULATION THERAPY VISIT (OUTPATIENT)
Dept: ANTICOAGULATION | Facility: CLINIC | Age: 38
End: 2018-01-17
Payer: COMMERCIAL

## 2018-01-17 DIAGNOSIS — Z79.01 LONG-TERM (CURRENT) USE OF ANTICOAGULANTS: ICD-10-CM

## 2018-01-17 DIAGNOSIS — I26.99 PULMONARY EMBOLISM ON RIGHT (H): ICD-10-CM

## 2018-01-17 LAB — INR POINT OF CARE: 1.4 (ref 0.86–1.14)

## 2018-01-17 PROCEDURE — 36416 COLLJ CAPILLARY BLOOD SPEC: CPT

## 2018-01-17 PROCEDURE — 85610 PROTHROMBIN TIME: CPT | Mod: QW

## 2018-01-17 PROCEDURE — 99211 OFF/OP EST MAY X REQ PHY/QHP: CPT

## 2018-01-17 NOTE — PROGRESS NOTES
ANTICOAGULATION INITIAL CLINIC VISIT    Patient Name:  Edwin Nuno  Date:  1/17/2018  Referred by: ED  Contact Type:  Face to Face    SUBJECTIVE:  Coumadin education was completed today.  Topics covered include:  -Introduction to coumadin  -Proper Administration  -INR Testing  -Sign/Symptoms of Bleeding  -Signs/Symptoms of Clot Formation or Stroke  -Dietary Intake of Vitamin K (wants to avoid dark greens while on warfarin)  -Drug Interactions (stopped his co-q10 and MV)  -Anticoagulation Identification (bracelet, necklace or wallet card)  -Future Surgery  -Effects of Alcohol, Tobacco, and Exercise on Coumadin    Coumadin Education Booklet and Coumadin Identification Wallet Card were given to the patient.       Patient Findings     Positives Change in medications (pt is on tamiflu for 1 more day. This is prophylactic due to his daughter had the flu), Bruising (on abdomen from lovenox), Initiation of therapy (1-13-18), Activity level change (less active due to recent knee surgery and multiple DVTs/PE)    Comments Increasing warfarin dosing by 2.5 mg/day. Pt is a larger male who is 37 years old. He will likely require a higher dosing and wishes to finish lovenox by this weekend, if possible. If INR in range on Friday, okay to continue lovenox 24 hours and then stop.          OBJECTIVE    INR Protime   Date Value Ref Range Status   01/17/2018 1.4 (A) 0.86 - 1.14 Final       ASSESSMENT / PLAN  INR assessment SUB initiation of therapy   Recheck INR In: 2 DAYS    INR Location Clinic      Anticoagulation Summary as of 1/17/2018     INR goal 2.0-3.0   Today's INR 1.4!   Maintenance plan No maintenance plan   Full instructions 1/17: 10 mg; 1/18: 10 mg; Otherwise No maintenance plan   Next INR check 1/19/2018   Target end date     Indications   DVT (deep venous thrombosis) (H) [I82.409]  Pulmonary embolism on right (H) [I26.99]  Long-term (current) use of anticoagulants [Z79.01] [Z79.01]         Anticoagulation Episode  Summary     INR check location     Preferred lab     Send INR reminders to [facility]    Comments * 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 Length of therapy is 3-6 months but will discuss with PCP on 1-19-18      Anticoagulation Care Providers     Provider Role Specialty Phone number    Cecilio Hughes MD [facility] [phone]            See the Encounter Report to view Anticoagulation Flowsheet and Dosing Calendar (Go to Encounters tab in chart review, and find the Anticoagulation Therapy Visit)    Dosage adjustment made based on physician directed care plan.    Humera Masterson RN

## 2018-01-19 ENCOUNTER — OFFICE VISIT (OUTPATIENT)
Dept: FAMILY MEDICINE | Facility: CLINIC | Age: 38
End: 2018-01-19
Payer: COMMERCIAL

## 2018-01-19 ENCOUNTER — ANTICOAGULATION THERAPY VISIT (OUTPATIENT)
Dept: ANTICOAGULATION | Facility: CLINIC | Age: 38
End: 2018-01-19
Payer: COMMERCIAL

## 2018-01-19 VITALS
DIASTOLIC BLOOD PRESSURE: 72 MMHG | BODY MASS INDEX: 40.5 KG/M2 | HEART RATE: 71 BPM | TEMPERATURE: 98.4 F | HEIGHT: 72 IN | WEIGHT: 299 LBS | SYSTOLIC BLOOD PRESSURE: 143 MMHG

## 2018-01-19 DIAGNOSIS — I82.401 ACUTE DEEP VEIN THROMBOSIS (DVT) OF RIGHT LOWER EXTREMITY, UNSPECIFIED VEIN (H): ICD-10-CM

## 2018-01-19 DIAGNOSIS — I26.99 PULMONARY EMBOLISM ON RIGHT (H): ICD-10-CM

## 2018-01-19 DIAGNOSIS — Z79.01 LONG-TERM (CURRENT) USE OF ANTICOAGULANTS: ICD-10-CM

## 2018-01-19 DIAGNOSIS — I26.99 OTHER ACUTE PULMONARY EMBOLISM WITHOUT ACUTE COR PULMONALE (H): Primary | ICD-10-CM

## 2018-01-19 LAB — INR POINT OF CARE: 1.6 (ref 0.86–1.14)

## 2018-01-19 PROCEDURE — 36416 COLLJ CAPILLARY BLOOD SPEC: CPT

## 2018-01-19 PROCEDURE — 99207 ZZC NO CHARGE NURSE ONLY: CPT

## 2018-01-19 PROCEDURE — 85610 PROTHROMBIN TIME: CPT | Mod: QW

## 2018-01-19 PROCEDURE — 99214 OFFICE O/P EST MOD 30 MIN: CPT | Performed by: FAMILY MEDICINE

## 2018-01-19 RX ORDER — WARFARIN SODIUM 5 MG/1
7.5 TABLET ORAL DAILY
Qty: 45 TABLET | Refills: 11 | Status: SHIPPED | OUTPATIENT
Start: 2018-01-19 | End: 2018-02-01

## 2018-01-19 NOTE — PATIENT INSTRUCTIONS
(I26.99) Other acute pulmonary embolism without acute cor pulmonale (H)  (primary encounter diagnosis)  Comment:   Plan: enoxaparin (LOVENOX) 150 MG/ML injection,         warfarin (COUMADIN) 5 MG tablet        We discussed the issues and follow with the INR clinic and take the warfarin as directed. When the INR is in the 2.0-3.0 range, for 24 hours, then gradually increase the weight on the right leg over a few days.   Once there is no pain with full weight bearing then stop the crutches. Elevate the leg when possible. Use the lower sodium diet. Follow up the Ortho instructions. The sutures of the incision were removed. The incision is well healed. Monitor for symptoms of chest pain or shortness of breath and return to the ED right away. You will be on the warfarin for 3-6 months and recheck in the clinic in 3-4 months and we will discuss the US of the leg and CT of the chest to document resolution of the clots. If a work letter is needed, then call our RN at [phone]. Stay off the aspirin and advil and aleve until off the warfarin. Use the dietary and precaution instructions. Avoid heights and ladders and impact activities.     (I82.401) Acute deep vein thrombosis (DVT) of right lower extremity, unspecified vein (H)  Comment:   Plan: see above.

## 2018-01-19 NOTE — PROGRESS NOTES
SUBJECTIVE:   Edwin Nuno is a 37 year old male who presents to clinic today for the following health issues:      Hospital Follow-up Visit:    Hospital/Nursing Home/IP Rehab Facility: [facility]  Date of Admission: 1-14-18  Date of Discharge: 1-15-18  Was seen in the ER on 1-13-18 also.  Reason(s) for Admission: Two DVT's for the right leg.  Pulmonary embolism.  He had right knee surgery on 1-8-18, and had a tight Ace wrap for compression.   The right calf had pain. In the ED on 1-13-18 he had an US and found the DVT. On  1-14-18 he had another DVT in the right leg, and a PE. He is on warfarin and going to the INR  Clinic.     Family Hx: no blood clots.             Problems taking medications regularly:  None       Medication changes since discharge: Warfarin and Lovenox.       Problems adhering to non-medication therapy:  Has noticed more of a watery stool.  Since Monday.    Summary of hospitalization:  [facility] discharge summary reviewed  Diagnostic Tests/Treatments reviewed.  Follow up needed: none  Other Healthcare Providers Involved in Patient s Care:         None  Update since discharge: stable.     Post Discharge Medication Reconciliation: discharge medications reconciled, continue medications without change.  Plan of care communicated with patient and family     Coding guidelines for this visit:  Type of Medical   Decision Making Face-to-Face Visit       within 7 Days of discharge Face-to-Face Visit        within 14 days of discharge   Moderate Complexity [id number]   High Complexity [id number]            Current Outpatient Prescriptions:      enoxaparin (LOVENOX) 150 MG/ML injection, Inject 0.9 mLs (135 mg) Subcutaneous every 12 hours for 6 days, Disp: 12 Syringe, Rfl: 0     warfarin (COUMADIN) 5 MG tablet, Take 1.5 tablets (7.5 mg) by mouth daily, Disp: 45 tablet, Rfl: 0     oseltamivir (TAMIFLU) 75 MG capsule, Take 1 capsule (75 mg) by mouth daily, Disp: 10 capsule, Rfl: 0     " magnesium oxide (MAG-OX) 400 (241.3 MG) MG tablet, Take 1 tablet (400 mg) by mouth daily, Disp: 60 tablet, Rfl: 3     cetirizine (ZYRTEC) 10 MG tablet, Take 1 tablet (10 mg) by mouth every evening, Disp: 30 tablet, Rfl: 1     lisinopril (PRINIVIL/ZESTRIL) 20 MG tablet, Take 1 tablet (20 mg) by mouth daily, Disp: 90 tablet, Rfl: 3     Needle, Disp, (B-D BLUNT FILL NEEDLE) 18G X 1-1/2\" MISC, For drawing up medication., Disp: 10 each, Rfl: 0     testosterone cypionate (DEPO-TESTOSTERONE) 200 MG/ML injection, 400 mg every 25 days. (Patient not taking: Reported on 1/19/2018), Disp: 2 mL, Rfl: 5     syringe/needle, disp, (B-D SYRINGE/NEEDLE) 22G X 1-1/2\" 3 ML MISC, For Testosterone injections. (Patient not taking: Reported on 1/19/2018), Disp: 10 each, Rfl: 0     order for DME, Equipment being ordered: gel cups, large, Disp: 1 Device, Rfl: 0     order for DME, Equipment being ordered: knee sleeve, XL, Disp: 1 Device, Rfl: 0    Patient Active Problem List   Diagnosis     Family history of colon cancer     Hyperlipidemia with target LDL less than 130     Hypotestosteronism     Esophageal reflux     Benign essential hypertension     Pulmonary embolism on right (H)     DVT (deep venous thrombosis) (H)     Long-term (current) use of anticoagulants [Z79.01]       Blood pressure 140/73, pulse 71, temperature 98.4  F (36.9  C), temperature source Tympanic, height 6' (1.829 m), weight 299 lb (135.6 kg).    Exam:  GENERAL APPEARANCE: healthy, alert and no distress  EYES: EOMI,  PERRL  RESP: lungs clear to auscultation - no rales, rhonchi or wheezes  CV: regular rates and rhythm, normal S1 S2, no S3 or S4 and no murmur, click or rub -  MS: moderate edema to the right leg.   SKIN: no suspicious lesions or rashes  PSYCH: mentation appears normal and affect normal/bright      (I26.99) Other acute pulmonary embolism without acute cor pulmonale (H)  (primary encounter diagnosis)  Comment:   Plan: enoxaparin (LOVENOX) 150 MG/ML " injection,         warfarin (COUMADIN) 5 MG tablet        We discussed the issues and follow with the INR clinic and take the warfarin as directed. When the INR is in the 2.0-3.0 range, for 24 hours, then gradually increase the weight on the right leg over a few days.   Once there is no pain with full weight bearing then stop the crutches. Elevate the leg when possible. Use the lower sodium diet. Follow up the Ortho instructions. The sutures of the incision were removed. The incision is well healed. Monitor for symptoms of chest pain or shortness of breath and return to the ED right away. You will be on the warfarin for 3-6 months and recheck in the clinic in 3-4 months and we will discuss the US of the leg and CT of the chest to document resolution of the clots. If a work letter is needed, then call our RN at [phone]. Stay off the aspirin and advil and aleve until off the warfarin. Use the dietary and precaution instructions. Avoid heights and ladders and impact activities.     (I09.401) Acute deep vein thrombosis (DVT) of right lower extremity, unspecified vein (H)  Comment:   Plan: see above.       Cecilio Hughes

## 2018-01-19 NOTE — MR AVS SNAPSHOT
After Visit Summary   1/19/2018    Edwin Nuno    MRN: [id number]           Patient Information     Date Of Birth          1980        Visit Information        Provider Department      1/19/2018 7:20 AM Cecilio Hughes MD [facility]        Today's Diagnoses     Other acute pulmonary embolism without acute cor pulmonale (H)    -  1    Acute deep vein thrombosis (DVT) of right lower extremity, unspecified vein (H)          Care Instructions    (I26.99) Other acute pulmonary embolism without acute cor pulmonale (H)  (primary encounter diagnosis)  Comment:   Plan: enoxaparin (LOVENOX) 150 MG/ML injection,         warfarin (COUMADIN) 5 MG tablet        We discussed the issues and follow with the INR clinic and take the warfarin as directed. When the INR is in the 2.0-3.0 range, for 24 hours, then gradually increase the weight on the right leg over a few days.   Once there is no pain with full weight bearing then stop the crutches. Elevate the leg when possible. Use the lower sodium diet. Follow up the Ortho instructions. The sutures of the incision were removed. The incision is well healed. Monitor for symptoms of chest pain or shortness of breath and return to the ED right away. You will be on the warfarin for 3-6 months and recheck in the clinic in 3-4 months and we will discuss the US of the leg and CT of the chest to document resolution of the clots. If a work letter is needed, then call our RN at [phone]. Stay off the aspirin and advil and aleve until off the warfarin. Use the dietary and precaution instructions. Avoid heights and ladders and impact activities.     (I82.401) Acute deep vein thrombosis (DVT) of right lower extremity, unspecified vein (H)  Comment:   Plan: see above.           Follow-ups after your visit        Your next 10 appointments already scheduled     Jan 19, 2018  8:30 AM CST   Anticoagulation Visit with [place] [facility]   [facility]  ([facility])    [address]  [facility] [place] [address] [phone]              Who to contact     If you have questions or need follow up information about today's clinic visit or your schedule please contact [facility] directly at [phone].  Normal or non-critical lab and imaging results will be communicated to you by MyChart, letter or phone within 4 business days after the clinic has received the results. If you do not hear from us within 7 days, please contact the clinic through MyChart or phone. If you have a critical or abnormal lab result, we will notify you by phone as soon as possible.  Submit refill requests through [employer] or call your pharmacy and they will forward the refill request to us. Please allow 3 business days for your refill to be completed.          Additional Information About Your Visit        MyChart Information     [employer] gives you secure access to your electronic health record. If you see a primary care provider, you can also send messages to your care team and make appointments. If you have questions, please call your primary care clinic.  If you do not have a primary care provider, please call [phone] and they will assist you.        Care EveryWhere ID     This is your Care EveryWhere ID. This could be used by other organizations to access your [place] medical records  [id number]        Your Vitals Were     Pulse Temperature Height BMI (Body Mass Index)          71 98.4  F (36.9  C) (Tympanic) 6' (1.829 m) 40.55 kg/m2         Blood Pressure from Last 3 Encounters:   01/19/18 140/73   01/15/18 138/79   01/13/18 153/74    Weight from Last 3 Encounters:   01/19/18 299 lb (135.6 kg)   01/14/18 298 lb 11.6 oz (135.5 kg)   01/13/18 300 lb (136.1 kg)              Today, you had the following     No orders found for display         Where to get your medicines      These medications were sent to [facility] [place], [place] - [address]  [address] [address]     Phone:  [phone]     enoxaparin 150 MG/ML injection    warfarin 5 MG tablet          Primary Care Provider Office Phone # Fax #    Cecilio Hughes [phone] [address] [address]        Equal Access to Services     BERENICE BENJAMIN : Hadii aad ku hadasho Soomaali, waaxda luqadaha, qaybta kaalmada adeegyada, waxay idiin hayaan adeeg kharash lakatya . So [facility] [phone].    ATENCIÓN: Si habla español, tiene a bhandari disposición servicios gratuitos de asistencia lingüística. Rochelle al [phone].    We comply with applicable federal civil rights laws and [place] laws. We do not discriminate on the basis of race, color, national origin, age, disability, sex, sexual orientation, or gender identity.            Thank you!     Thank you for choosing [facility]  for your care. Our goal is always to provide you with excellent care. Hearing back from our patients is one way we can continue to improve our services. Please take a few minutes to complete the written survey that you may receive in the mail after your visit with us. Thank you!             Your Updated Medication List - Protect others around you: Learn how to safely use, store and throw away your medicines at www.disposemymeds.org.          This list is accurate as of: 1/19/18  8:14 AM.  Always use your most recent med list.                   Brand Name Dispense Instructions for use Diagnosis    cetirizine 10 MG tablet    zyrTEC    30 tablet    Take 1 tablet (10 mg) by mouth every evening    Preop general physical exam       enoxaparin 150 MG/ML injection    LOVENOX    4 Syringe    Inject 0.9 mLs (135 mg) Subcutaneous every 12 hours    Other acute pulmonary embolism without acute cor pulmonale (H)       lisinopril 20 MG tablet    PRINIVIL/ZESTRIL    90 tablet    Take 1 tablet (20 mg) by mouth daily    Benign essential hypertension       magnesium oxide  "400 (241.3 MG) MG tablet    MAG-OX    60 tablet    Take 1 tablet (400 mg) by mouth daily    Preop general physical exam       Needle (Disp) 18G X 1-1/2\" Misc    B-D BLUNT FILL NEEDLE    10 each    For drawing up medication.    Hypotestosteronism       * order for DME     1 Device    Equipment being ordered: knee sleeve, XL    Right knee injury, initial encounter       * order for DME     1 Device    Equipment being ordered: gel cups, large    Pain of right heel, Right Achilles tendinitis       oseltamivir 75 MG capsule    TAMIFLU    10 capsule    Take 1 capsule (75 mg) by mouth daily    Exposure to the flu       syringe/needle (disp) 22G X 1-1/2\" 3 ML Misc    B-D SYRINGE/NEEDLE    10 each    For Testosterone injections.    Hypotestosteronism       testosterone cypionate 200 MG/ML injection    DEPO-TESTOSTERONE    2 mL    400 mg every 25 days.    Hypotestosteronism       warfarin 5 MG tablet    COUMADIN    45 tablet    Take 1.5 tablets (7.5 mg) by mouth daily    Other acute pulmonary embolism without acute cor pulmonale (H)       * Notice:  This list has 2 medication(s) that are the same as other medications prescribed for you. Read the directions carefully, and ask your doctor or other care provider to review them with you.      "

## 2018-01-19 NOTE — MR AVS SNAPSHOT
Edwin MARCIA Nuno   1/19/2018 8:30 AM   Anticoagulation Therapy Visit    Description:  37 year old male   Provider:  [place] [facility]   Department:  [facility]           INR as of 1/19/2018     Today's INR 1.6!      Anticoagulation Summary as of 1/19/2018     INR goal 2.0-3.0   Today's INR 1.6!   Full instructions 1/19: 12.5 mg; 1/20: 12.5 mg; 1/21: 10 mg; Otherwise No maintenance plan   Next INR check 1/22/2018    Indications   DVT (deep venous thrombosis) (H) [I82.409]  Pulmonary embolism on right (H) [I26.99]  Long-term (current) use of anticoagulants [Z79.01] [Z79.01]         Description     Continue lovenox every 12 hours. Take 12.5 mg (2.5 tablets) of warfarin today and tomorrow. Then take 10 mg (2 tablets) Sunday.      Your next Anticoagulation Clinic appointment(s)     Jan 22, 2018  3:15 PM CST   Anticoagulation Visit with [place] [facility]   [facility]    [address] [address] [phone]              Contact Numbers     Please call [phone] with any problems or questions regarding your therapy.    If you need to cancel and/or reschedule your appointment please call one of the following numbers:  [facility] [phone]  [place] - [phone]  [place] - [phone]  [facility] [phone]  [place] - [phone]            January 2018 Details    Sun Mon Tue Wed Thu Fri Sat      1               2               3               4               5               6                 7               8               9               10               11               12               13                 14               15               16               17               18               19      12.5 mg   See details      20      12.5 mg           21      10 mg         22            23               24               25               26               27                 28               29               30               31                   Date  Details   01/19 This INR check       Date of next INR:  1/22/2018         How to take your warfarin dose     To take:  10 mg Take 2 of the 5 mg tablets.    To take:  12.5 mg Take 2.5 of the 5 mg tablets.

## 2018-01-19 NOTE — PROGRESS NOTES
ANTICOAGULATION FOLLOW-UP CLINIC VISIT    Patient Name:  Edwin Nuno  Date:  1/19/2018  Contact Type:  Face to Face    SUBJECTIVE:     Patient Findings     Positives Initiation of therapy (1-13-18)    Comments Pt will continue lovenox BID. Writer increased dosing since pt would really like to be finished with Lovenox as soon as possible. We may need to cut back on warfarin once INR is in range due to larger doses the next couple days.           OBJECTIVE    INR Protime   Date Value Ref Range Status   01/19/2018 1.6 (A) 0.86 - 1.14 Final       ASSESSMENT / PLAN  INR assessment SUB initiation of therapy   Recheck INR In: 3 DAYS    INR Location Clinic      Anticoagulation Summary as of 1/19/2018     INR goal 2.0-3.0   Today's INR 1.6!   Maintenance plan No maintenance plan   Full instructions 1/19: 12.5 mg; 1/20: 12.5 mg; 1/21: 10 mg; Otherwise No maintenance plan   Next INR check 1/22/2018   Target end date     Indications   DVT (deep venous thrombosis) (H) [I82.409]  Pulmonary embolism on right (H) [I26.99]  Long-term (current) use of anticoagulants [Z79.01] [Z79.01]         Anticoagulation Episode Summary     INR check location     Preferred lab     Send INR reminders to [facility] POOL    Comments * 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 Length of therapy is 3-6 months but will discuss with PCP on 1-19-18      Anticoagulation Care Providers     Provider Role Specialty Phone number    Cecilio Hughes MD [facility] [phone]            See the Encounter Report to view Anticoagulation Flowsheet and Dosing Calendar (Go to Encounters tab in chart review, and find the Anticoagulation Therapy Visit)        Humera Masterson RN

## 2018-01-19 NOTE — NURSING NOTE
Chief Complaint   Patient presents with     Hospital F/U     Post hospital follow up.       Initial /73  Pulse 71  Temp 98.4  F (36.9  C) (Tympanic)  Ht 6' (1.829 m)  Wt 299 lb (135.6 kg)  BMI 40.55 kg/m2 Estimated body mass index is 40.55 kg/(m^2) as calculated from the following:    Height as of this encounter: 6' (1.829 m).    Weight as of this encounter: 299 lb (135.6 kg).  Medication Reconciliation: complete

## 2018-01-22 ENCOUNTER — ANTICOAGULATION THERAPY VISIT (OUTPATIENT)
Dept: ANTICOAGULATION | Facility: CLINIC | Age: 38
End: 2018-01-22
Payer: COMMERCIAL

## 2018-01-22 DIAGNOSIS — I26.99 PULMONARY EMBOLISM ON RIGHT (H): ICD-10-CM

## 2018-01-22 DIAGNOSIS — Z79.01 LONG-TERM (CURRENT) USE OF ANTICOAGULANTS: ICD-10-CM

## 2018-01-22 LAB — INR POINT OF CARE: 2.6 (ref 0.86–1.14)

## 2018-01-22 PROCEDURE — 99207 ZZC NO CHARGE NURSE ONLY: CPT

## 2018-01-22 PROCEDURE — 85610 PROTHROMBIN TIME: CPT | Mod: QW

## 2018-01-22 PROCEDURE — 36416 COLLJ CAPILLARY BLOOD SPEC: CPT

## 2018-01-22 NOTE — PROGRESS NOTES
ANTICOAGULATION FOLLOW-UP CLINIC VISIT    Patient Name:  Edwin Nuno  Date:  1/22/2018  Contact Type:  Face to Face, accompanied by spouse    SUBJECTIVE:     Patient Findings     Positives Change in medications (took last Lovenox injection this AM), Bruising (from injections), No Problem Findings    Comments Had appt with Dr. Hughes on Friday.           OBJECTIVE    INR Protime   Date Value Ref Range Status   01/22/2018 2.6 (A) 0.86 - 1.14 Final       ASSESSMENT / PLAN  INR assessment THER continue 70mg/7 days   Recheck INR In: 3 DAYS    INR Location Clinic      Anticoagulation Summary as of 1/22/2018     INR goal 2.0-3.0   Today's INR 2.6   Maintenance plan No maintenance plan   Full instructions 1/22: 7.5 mg; 1/23: 7.5 mg; 1/24: 10 mg; Otherwise No maintenance plan   Next INR check 1/25/2018   Target end date     Indications   DVT (deep venous thrombosis) (H) [I82.409]  Pulmonary embolism on right (H) [I26.99]  Long-term (current) use of anticoagulants [Z79.01] [Z79.01]         Anticoagulation Episode Summary     INR check location     Preferred lab     Send INR reminders to [facility] POOL    Comments * 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 Length of therapy is 3-6 months but will discuss with PCP on 1-19-18      Anticoagulation Care Providers     Provider Role Specialty Phone number    Cecilio Hughes MD [facility] [phone]            See the Encounter Report to view Anticoagulation Flowsheet and Dosing Calendar (Go to Encounters tab in chart review, and find the Anticoagulation Therapy Visit)    Rohini Cisneros RN

## 2018-01-22 NOTE — MR AVS SNAPSHOT
Edwin Nuno   1/22/2018 2:30 PM   Anticoagulation Therapy Visit    Description:  37 year old male   Provider:  [facility]   Department:  [facility]           INR as of 1/22/2018     Today's INR 2.6      Anticoagulation Summary as of 1/22/2018     INR goal 2.0-3.0   Today's INR 2.6   Full instructions 1/22: 7.5 mg; 1/23: 7.5 mg; 1/24: 10 mg; Otherwise No maintenance plan   Next INR check 1/25/2018    Indications   DVT (deep venous thrombosis) (H) [I82.409]  Pulmonary embolism on right (H) [I26.99]  Long-term (current) use of anticoagulants [Z79.01] [Z79.01]         Description     7.5mg Mon and Tues.  10mg Wed.  Recheck INR Thursday 1/25/18.      Your next Anticoagulation Clinic appointment(s)     Jan 25, 2018  2:15 PM CST   [facility]ticoagulation Visit with [facility]   [facility]    [address] [address] [phone]              Contact Numbers     Please call [phone] with any problems or questions regarding your therapy.    If you need to cancel and/or reschedule your appointment please call one of the following numbers:  [facility] [phone]  [place] - [phone]  [place] - [phone]  [place] - [phone]  [place] - [phone]            January 2018 Details    Sun Mon Tue Wed Thu Fri Sat      1               2               3               4               5               6                 7               8               9               10               11               12               13                 14               15               16               17               18               19               20                 21               22      7.5 mg   See details      23      7.5 mg         24      10 mg         25            26               27                 28               29               30               31                   Date Details   01/22 This INR check       Date of next INR:  1/25/2018          How to take your warfarin dose     To take:  7.5 mg Take 1.5 of the 5 mg tablets.    To take:  10 mg Take 2 of the 5 mg tablets.

## 2018-01-23 ENCOUNTER — TRANSFERRED RECORDS (OUTPATIENT)
Dept: HEALTH INFORMATION MANAGEMENT | Facility: CLINIC | Age: 38
End: 2018-01-23

## 2018-01-25 ENCOUNTER — ANTICOAGULATION THERAPY VISIT (OUTPATIENT)
Dept: ANTICOAGULATION | Facility: CLINIC | Age: 38
End: 2018-01-25
Payer: COMMERCIAL

## 2018-01-25 DIAGNOSIS — I82.409 DVT (DEEP VENOUS THROMBOSIS) (H): ICD-10-CM

## 2018-01-25 DIAGNOSIS — Z79.01 LONG-TERM (CURRENT) USE OF ANTICOAGULANTS: ICD-10-CM

## 2018-01-25 DIAGNOSIS — I26.99 PULMONARY EMBOLISM ON RIGHT (H): ICD-10-CM

## 2018-01-25 LAB — INR POINT OF CARE: 3.2 (ref 0.86–1.14)

## 2018-01-25 PROCEDURE — 99207 ZZC NO CHARGE NURSE ONLY: CPT

## 2018-01-25 PROCEDURE — 85610 PROTHROMBIN TIME: CPT | Mod: QW

## 2018-01-25 PROCEDURE — 36416 COLLJ CAPILLARY BLOOD SPEC: CPT

## 2018-01-25 NOTE — MR AVS SNAPSHOT
Edwin Nuno   1/25/2018 2:15 PM   Anticoagulation Therapy Visit    Description:  37 year old male   Provider:  [facility]   Department:  [facility]           INR as of 1/25/2018     Today's INR 3.2!      Anticoagulation Summary as of 1/25/2018     INR goal 2.0-3.0   Today's INR 3.2!   Full instructions 1/25: 10 mg; 1/26: 10 mg; Otherwise 10 mg every day   Next INR check 2/1/2018    Indications   DVT (deep venous thrombosis) (H) [I82.409]  Pulmonary embolism on right (H) [I26.99]  Long-term (current) use of anticoagulants [Z79.01] [Z79.01]         Your next Anticoagulation Clinic appointment(s)     Feb 01, 2018 10:30 AM CST   Anticoagulation Visit with [facility]   [facility]    [address] [id number] [phone]              Contact Numbers     Please call [phone] with any problems or questions regarding your therapy.    If you need to cancel and/or reschedule your appointment please call one of the following numbers:  [facility] - [phone]  [facility] - [phone]  [facility] [phone]  [facility] [phone]  [place] - [phone]            January 2018 Details    Sun Mon Tue Wed Thu Fri Sat      1               2               3               4               5               6                 7               8               9               10               11               12               13                 14               15               16               17               18               19               20                 21               22               23               24               25      10 mg   See details      26      10 mg         27      10 mg           28      10 mg         29      10 mg         30      10 mg         31      10 mg             Date Details   01/25 This INR check               How to take your warfarin dose     To take:  10 mg Take 2 of the 5 mg tablets.           February 2018  Details    Sun Mon Tue Wed Thu Fri Sat         1            2               3                 4               5               6               7               8               9               10                 11               12               13               14               15               16               17                 18               19               20               21               22               23               24                 25               26               27               28                   Date Details   No additional details    Date of next INR:  2/1/2018         How to take your warfarin dose     To take:  10 mg Take 2 of the 5 mg tablets.

## 2018-01-25 NOTE — PROGRESS NOTES
ANTICOAGULATION FOLLOW-UP CLINIC VISIT    Patient Name:  Edwin Nuno  Date:  1/25/2018  Contact Type:  Face to Face    SUBJECTIVE:     Patient Findings     Positives Change in diet/appetite (Pt eating no greens, which is a change, would like to start back eating vit K rich foods), Activity level change (pt reports he started back working out yesterday, states he plans to continue 4x's/wk), No Problem Findings    Comments Patient had 70mg in the previous 7 days, will keep 7 day total  dose the same by next INR check in 7 days.             OBJECTIVE    INR Protime   Date Value Ref Range Status   01/25/2018 3.2 (A) 0.86 - 1.14 Final       ASSESSMENT / PLAN  INR assessment SUPRA    Recheck INR In: 1 WEEK    INR Location Clinic      Anticoagulation Summary as of 1/25/2018     INR goal 2.0-3.0   Today's INR 3.2!   Maintenance plan 10 mg (5 mg x 2) every day   Full instructions 1/25: 10 mg; 1/26: 10 mg; Otherwise 10 mg every day   Weekly total 70 mg   Plan last modified Maria Teresa Meredith RN (1/25/2018)   Next INR check 2/1/2018   Target end date     Indications   DVT (deep venous thrombosis) (H) [I82.409]  Pulmonary embolism on right (H) [I26.99]  Long-term (current) use of anticoagulants [Z79.01] [Z79.01]         Anticoagulation Episode Summary     INR check location     Preferred lab     Send INR reminders to [facility] POOL    Comments * 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 Length of therapy is 3-6 months but will discuss with PCP on 1-19-18      Anticoagulation Care Providers     Provider Role Specialty Phone number    Cecilio Hughes MD [facility] [phone]            See the Encounter Report to view Anticoagulation Flowsheet and Dosing Calendar (Go to Encounters tab in chart review, and find the Anticoagulation Therapy Visit)        Maria Teresa Meredith, RN

## 2018-01-26 ENCOUNTER — MYC MEDICAL ADVICE (OUTPATIENT)
Dept: FAMILY MEDICINE | Facility: CLINIC | Age: 38
End: 2018-01-26

## 2018-01-29 ENCOUNTER — APPOINTMENT (OUTPATIENT)
Dept: GENERAL RADIOLOGY | Facility: CLINIC | Age: 38
End: 2018-01-29
Attending: EMERGENCY MEDICINE
Payer: COMMERCIAL

## 2018-01-29 ENCOUNTER — HOSPITAL ENCOUNTER (EMERGENCY)
Facility: CLINIC | Age: 38
Discharge: HOME | End: 2018-01-30
Attending: EMERGENCY MEDICINE | Admitting: EMERGENCY MEDICINE
Payer: COMMERCIAL

## 2018-01-29 DIAGNOSIS — G89.18 POSTOPERATIVE PAIN OF RIGHT KNEE: ICD-10-CM

## 2018-01-29 DIAGNOSIS — Z79.01 LONG-TERM (CURRENT) USE OF ANTICOAGULANTS: ICD-10-CM

## 2018-01-29 DIAGNOSIS — I82.4Z1 ACUTE DEEP VEIN THROMBOSIS (DVT) OF DISTAL VEIN OF RIGHT LOWER EXTREMITY (H): ICD-10-CM

## 2018-01-29 DIAGNOSIS — I26.99 PULMONARY EMBOLISM ON RIGHT (H): ICD-10-CM

## 2018-01-29 DIAGNOSIS — M25.561 POSTOPERATIVE PAIN OF RIGHT KNEE: ICD-10-CM

## 2018-01-29 LAB
BASOPHILS # BLD AUTO: 0 10E9/L (ref 0–0.2)
BASOPHILS NFR BLD AUTO: 0.2 %
DIFFERENTIAL METHOD BLD: ABNORMAL
EOSINOPHIL # BLD AUTO: 0.2 10E9/L (ref 0–0.7)
EOSINOPHIL NFR BLD AUTO: 2.3 %
ERYTHROCYTE [DISTWIDTH] IN BLOOD BY AUTOMATED COUNT: 12.3 % (ref 10–15)
HCT VFR BLD AUTO: 37.3 % (ref 40–53)
HGB BLD-MCNC: 12.8 G/DL (ref 13.3–17.7)
IMM GRANULOCYTES # BLD: 0 10E9/L (ref 0–0.4)
IMM GRANULOCYTES NFR BLD: 0.3 %
LYMPHOCYTES # BLD AUTO: 3.2 10E9/L (ref 0.8–5.3)
LYMPHOCYTES NFR BLD AUTO: 36.1 %
MCH RBC QN AUTO: 29.8 PG (ref 26.5–33)
MCHC RBC AUTO-ENTMCNC: 34.3 G/DL (ref 31.5–36.5)
MCV RBC AUTO: 87 FL (ref 78–100)
MONOCYTES # BLD AUTO: 0.7 10E9/L (ref 0–1.3)
MONOCYTES NFR BLD AUTO: 7.9 %
NEUTROPHILS # BLD AUTO: 4.7 10E9/L (ref 1.6–8.3)
NEUTROPHILS NFR BLD AUTO: 53.2 %
PLATELET # BLD AUTO: 305 10E9/L (ref 150–450)
RBC # BLD AUTO: 4.29 10E12/L (ref 4.4–5.9)
WBC # BLD AUTO: 8.8 10E9/L (ref 4–11)

## 2018-01-29 PROCEDURE — 85610 PROTHROMBIN TIME: CPT | Performed by: EMERGENCY MEDICINE

## 2018-01-29 PROCEDURE — 36415 COLL VENOUS BLD VENIPUNCTURE: CPT | Performed by: EMERGENCY MEDICINE

## 2018-01-29 PROCEDURE — 85025 COMPLETE CBC W/AUTO DIFF WBC: CPT | Performed by: EMERGENCY MEDICINE

## 2018-01-29 PROCEDURE — 99284 EMERGENCY DEPT VISIT MOD MDM: CPT | Mod: Z6 | Performed by: EMERGENCY MEDICINE

## 2018-01-29 PROCEDURE — 99284 EMERGENCY DEPT VISIT MOD MDM: CPT | Performed by: EMERGENCY MEDICINE

## 2018-01-29 PROCEDURE — 73560 X-RAY EXAM OF KNEE 1 OR 2: CPT | Mod: RT

## 2018-01-29 NOTE — ED AVS SNAPSHOT
[facility] Emergency Department    [address] [address]    Phone:  [phone]    Fax:  [phone]                                       Edwin Nuno   MRN: [id number]    Department:  [facility] Emergency Department   Date of Visit:  1/29/2018           After Visit Summary Signature Page     I have received my discharge instructions, and my questions have been answered. I have discussed any challenges I see with this plan with the nurse or doctor.    ..........................................................................................................................................  Patient/Patient Representative Signature      ..........................................................................................................................................  Patient Representative Print Name and Relationship to Patient    ..................................................               ................................................  Date                                            Time    ..........................................................................................................................................  Reviewed by Signature/Title    ...................................................              ..............................................  Date                                                            Time

## 2018-01-29 NOTE — ED AVS SNAPSHOT
[facility] Emergency Department    [address] [address]    Phone:  [phone]    Fax:  [phone]                                       Edwin Nuno   MRN: [id number]    Department:  [facility] Emergency Department   Date of Visit:  1/29/2018           Patient Information     Date Of Birth          1980        Your diagnoses for this visit were:     Postoperative pain of right knee     Acute deep vein thrombosis (DVT) of distal vein of right lower extremity (H)     Long-term (current) use of anticoagulants [Z79.01]     Pulmonary embolism on right (H)        You were seen by Cecilio Nash MD.      Follow-up Information     Follow up with Ceciloi Hughes MD.    Specialty:  Family Practice    Why:  As needed    Contact information:    [address] [place] [address]  [phone]          Follow up with [facility] Emergency Department.    Specialty:  EMERGENCY MEDICINE    Why:  If symptoms worsen    Contact information:    [address] [place] [address] [phone]    Additional information:    The medical center is located at   [address] [place] [address] (between [address]   [facility] [address] in [place], [place] miles north   of [place]).        Follow up with [facility].    Why:  Contact your physician tomorrow    Contact information:    [address]  [facility] [address] [phone]        Discharge Instructions       Return if symptoms worsen or new symptoms develop.  Elevate knee.  Ice knee today weight-bear as tolerated use crutches if pain continues.  Follow-up with your orthopedic surgeon tomorrow.  Take pain medication as directed.  If increased swelling pain shortness of breath or other symptoms present please return for further evaluation and care.  Reducing Knee Pain and Swelling    Many treatments can help reduce pain and swelling in your knee. Your healthcare provider or physical therapist may suggest  one or more of the following treatments:    Icing your knee helps reduce swelling. You may be asked to ice your knee once a day or more. Apply ice for about 15 to 20 minutes at a time, with at least 40 minutes between sessions. Always keep a towel between the ice and your skin.     Keeping your leg raised above your heart helps excess fluid flow out of your knee joint. This reduces swelling.    Compression means wrapping an elastic bandage or neoprene sleeve snugly around your knees. This keeps fluid from collecting in your knee joint.    Electrical stimulation, done by a physical therapist or , can help reduce excess fluid in your knee joint.    Anti-inflammatory medicines may be prescribed by your healthcare provider. You may take pills or receive injections in your knee.    Isometric (sonya) exercises strengthen the muscles that support your knee joint. They also help reduce excess fluid in your knee.    Massage helps fluid drain away from your knee.  Date Last Reviewed: 10/13/2015    7885-3318 [employer]. [address] [place] [address]. All rights reserved. This information is not intended as a substitute for professional medical care. Always follow your healthcare professional's instructions.          Future Appointments        Provider Department Dept Phone Center    2/1/2018 10:30 AM [place] Anticoagulation provider, [facility] [phone] [facility]      24 Hour Appointment Hotline       To make an appointment at any [facility], call [phone] ([phone]). If you don't have a family doctor or clinic, we will help you find one. [facility] are conveniently located to serve the needs of you and your family.             Review of your medicines      START taking        Dose / Directions Last dose taken    oxyCODONE-acetaminophen 5-325 MG per tablet   Commonly known as:  PERCOCET   Dose:  1-2 tablet   Quantity:  8 tablet        Take  "1-2 tablets by mouth every 4 hours as needed for pain   Refills:  0          Our records show that you are taking the medicines listed below. If these are incorrect, please call your family doctor or clinic.        Dose / Directions Last dose taken    cetirizine 10 MG tablet   Commonly known as:  zyrTEC   Dose:  10 mg   Quantity:  30 tablet        Take 1 tablet (10 mg) by mouth every evening   Refills:  1        lisinopril 20 MG tablet   Commonly known as:  PRINIVIL/ZESTRIL   Dose:  20 mg   Quantity:  90 tablet        Take 1 tablet (20 mg) by mouth daily   Refills:  3        magnesium oxide 400 (241.3 MG) MG tablet   Commonly known as:  MAG-OX   Dose:  400 mg   Quantity:  60 tablet        Take 1 tablet (400 mg) by mouth daily   Refills:  3        Needle (Disp) 18G X 1-1/2\" Misc   Commonly known as:  B-D BLUNT FILL NEEDLE   Quantity:  10 each        For drawing up medication.   Refills:  0        * order for DME   Quantity:  1 Device        Equipment being ordered: knee sleeve, XL   Refills:  0        * order for DME   Quantity:  1 Device        Equipment being ordered: gel cups, large   Refills:  0        oseltamivir 75 MG capsule   Commonly known as:  TAMIFLU   Dose:  75 mg   Quantity:  10 capsule        Take 1 capsule (75 mg) by mouth daily   Refills:  0        syringe/needle (disp) 22G X 1-1/2\" 3 ML Misc   Commonly known as:  B-D SYRINGE/NEEDLE   Quantity:  10 each        For Testosterone injections.   Refills:  0        testosterone cypionate 200 MG/ML injection   Commonly known as:  DEPO-TESTOSTERONE   Quantity:  2 mL        400 mg every 25 days.   Refills:  5        warfarin 5 MG tablet   Commonly known as:  COUMADIN   Dose:  7.5 mg   Quantity:  45 tablet        Take 1.5 tablets (7.5 mg) by mouth daily   Refills:  11        * Notice:  This list has 2 medication(s) that are the same as other medications prescribed for you. Read the directions carefully, and ask your doctor or other care provider to review them " with you.            Prescriptions were sent or printed at these locations (1 Prescription)                   Other Prescriptions                Printed at Department/Unit printer (1 of 1)         oxyCODONE-acetaminophen (PERCOCET) 5-325 MG per tablet                Procedures and tests performed during your visit     CBC with platelets, differential    INR    XR Knee Right 1/2 Views      Orders Needing Specimen Collection     None      Pending Results     Date and Time Order Name Status Description    1/29/2018 2305 XR Knee Right 1/2 Views Preliminary             Pending Culture Results     No orders found for last 3 day(s).            Pending Results Instructions     If you had any lab results that were not finalized at the time of your Discharge, you can call the ED Lab Result RN at [phone]. You will be contacted by this team for any positive Lab results or changes in treatment. The nurses are available 7 days a week from 10A to 6:30P.  You can leave a message 24 hours per day and they will return your call.        Test Results From Your Hospital Stay              1/29/2018 11:32 PM      Component Results     Component Value Ref Range & Units Status    WBC 8.8 4.0 - 11.0 10e9/L Final    RBC Count 4.29 (L) 4.4 - 5.9 10e12/L Final    Hemoglobin 12.8 (L) 13.3 - 17.7 g/dL Final    Hematocrit 37.3 (L) 40.0 - 53.0 % Final    MCV 87 78 - 100 fl Final    MCH 29.8 26.5 - 33.0 pg Final    MCHC 34.3 31.5 - 36.5 g/dL Final    RDW 12.3 10.0 - 15.0 % Final    Platelet Count 305 150 - 450 10e9/L Final    Diff Method Automated Method  Final    % Neutrophils 53.2 % Final    % Lymphocytes 36.1 % Final    % Monocytes 7.9 % Final    % Eosinophils 2.3 % Final    % Basophils 0.2 % Final    % Immature Granulocytes 0.3 % Final    Absolute Neutrophil 4.7 1.6 - 8.3 10e9/L Final    Absolute Lymphocytes 3.2 0.8 - 5.3 10e9/L Final    Absolute Monocytes 0.7 0.0 - 1.3 10e9/L Final    Absolute Eosinophils 0.2 0.0 - 0.7 10e9/L Final     Absolute Basophils 0.0 0.0 - 0.2 10e9/L Final    Abs Immature Granulocytes 0.0 0 - 0.4 10e9/L Final         1/30/2018 12:44 AM      Component Results     Component Value Ref Range & Units Status    INR 3.17 (H) 0.86 - 1.14 Final         1/30/2018 12:04 AM      Narrative     XR KNEE RT 1 /2 VW   1/29/2018 11:53 PM     HISTORY: Status post knee surgery 1/8/2018. Increased knee pain.     COMPARISON: None.         Impression     IMPRESSION: No fracture or dislocation. Joint effusion.                Thank you for choosing [facility]       Thank you for choosing [facility] for your care. Our goal is always to provide you with excellent care. Hearing back from our patients is one way we can continue to improve our services. Please take a few minutes to complete the written survey that you may receive in the mail after you visit with us. Thank you!        [facility] Information     [facility] gives you secure access to your electronic health record. If you see a primary care provider, you can also send messages to your care team and make appointments. If you have questions, please call your primary care clinic.  If you do not have a primary care provider, please call [phone] and they will assist you.        Care EveryWhere ID     This is your Care EveryWhere ID. This could be used by other organizations to access your [facility] medical records  [id number]        Equal Access to Services     BERENICE BENJAMIN AH: Hadii melinda Cornejo, waaxda luqadaha, qaybta kaalmada adeegyada, austen hui. So [facility] [phone].    ATENCIÓN: Si habla español, tiene a bhandari disposición servicios gratuitos de asistencia lingüística. Llame al [phone].    We comply with applicable federal civil rights laws and [place] laws. We do not discriminate on the basis of race, color, national origin, age, disability, sex, sexual orientation, or gender identity.            After Visit Summary       This is your record. Keep  this with you and show to your community pharmacist(s) and doctor(s) at your next visit.

## 2018-01-30 ENCOUNTER — TRANSFERRED RECORDS (OUTPATIENT)
Dept: HEALTH INFORMATION MANAGEMENT | Facility: CLINIC | Age: 38
End: 2018-01-30

## 2018-01-30 VITALS
DIASTOLIC BLOOD PRESSURE: 81 MMHG | SYSTOLIC BLOOD PRESSURE: 146 MMHG | OXYGEN SATURATION: 97 % | RESPIRATION RATE: 16 BRPM | TEMPERATURE: 98.1 F

## 2018-01-30 LAB — INR PPP: 3.17 (ref 0.86–1.14)

## 2018-01-30 PROCEDURE — 25000132 ZZH RX MED GY IP 250 OP 250 PS 637: Performed by: EMERGENCY MEDICINE

## 2018-01-30 RX ORDER — OXYCODONE AND ACETAMINOPHEN 5; 325 MG/1; MG/1
1-2 TABLET ORAL EVERY 4 HOURS PRN
Status: DISCONTINUED | OUTPATIENT
Start: 2018-01-30 | End: 2018-01-30 | Stop reason: HOSPADM

## 2018-01-30 RX ORDER — OXYCODONE AND ACETAMINOPHEN 5; 325 MG/1; MG/1
1-2 TABLET ORAL EVERY 4 HOURS PRN
Qty: 8 TABLET | Refills: 0 | Status: SHIPPED | OUTPATIENT
Start: 2018-01-30 | End: 2018-02-02

## 2018-01-30 RX ADMIN — OXYCODONE HYDROCHLORIDE AND ACETAMINOPHEN 1 TABLET: 5; 325 TABLET ORAL at 01:00

## 2018-01-30 ASSESSMENT — ENCOUNTER SYMPTOMS
NECK PAIN: 0
SHORTNESS OF BREATH: 0
VOMITING: 0
FEVER: 0
WEAKNESS: 0
HEADACHES: 0
JOINT SWELLING: 1
CONFUSION: 0
BRUISES/BLEEDS EASILY: 0
CHEST TIGHTNESS: 0
ARTHRALGIAS: 1
NAUSEA: 0
ABDOMINAL PAIN: 0
ACTIVITY CHANGE: 1
COUGH: 0
DIZZINESS: 0
LIGHT-HEADEDNESS: 0
CHILLS: 0
MYALGIAS: 1
PALPITATIONS: 0
BACK PAIN: 0

## 2018-01-30 NOTE — DISCHARGE INSTRUCTIONS
Return if symptoms worsen or new symptoms develop.  Elevate knee.  Ice knee today weight-bear as tolerated use crutches if pain continues.  Follow-up with your orthopedic surgeon tomorrow.  Take pain medication as directed.  If increased swelling pain shortness of breath or other symptoms present please return for further evaluation and care.  Reducing Knee Pain and Swelling    Many treatments can help reduce pain and swelling in your knee. Your healthcare provider or physical therapist may suggest one or more of the following treatments:    Icing your knee helps reduce swelling. You may be asked to ice your knee once a day or more. Apply ice for about 15 to 20 minutes at a time, with at least 40 minutes between sessions. Always keep a towel between the ice and your skin.     Keeping your leg raised above your heart helps excess fluid flow out of your knee joint. This reduces swelling.    Compression means wrapping an elastic bandage or neoprene sleeve snugly around your knees. This keeps fluid from collecting in your knee joint.    Electrical stimulation, done by a physical therapist or , can help reduce excess fluid in your knee joint.    Anti-inflammatory medicines may be prescribed by your healthcare provider. You may take pills or receive injections in your knee.    Isometric (sonya) exercises strengthen the muscles that support your knee joint. They also help reduce excess fluid in your knee.    Massage helps fluid drain away from your knee.  Date Last Reviewed: 10/13/2015    2294-4004 [employer]. [address] [place] [address], [place] [address]. All rights reserved. This information is not intended as a substitute for professional medical care. Always follow your healthcare professional's instructions.

## 2018-01-30 NOTE — ED NOTES
Pt presents to Ed with complaint of Right knee pain. Recent knee surgery, DVT and PE within the past few months. Pain is at the top of the knee and is associated with swelling. Pain is currently at a 5/10. Pt took tylenol at home which helped with pain a little. CMS intact. Denies any chest pain or SOA at this time. Pt provided with Ice pack

## 2018-01-30 NOTE — ED PROVIDER NOTES
History     Chief Complaint   Patient presents with     Knee Pain     right knee surgery 1/8/18     HPI  Edwin Nuno is a 37 year old male who since to the emergency department complaining of right knee pain.  Patient had an arthroscopy of his right knee on 1/8/2018 with meniscal repair.  He was diagnosed with a DVT on the 13th and a PE on the 14th.  He has been on Lovenox and Coumadin since that time.  His INR became therapeutic roughly last week and he was taken off of Lovenox.  Patient states he was seen last week by his orthopedic surgery and they encouraged him using a bike.  He has used this several times but began having pain in his knee yesterday.  Today noticed increased swelling on the anterior portion of the knee and he definitely had pain especially with ambulation.  He denies any falls.  He has not had any trauma.  He denies any swelling of his leg other than over the anterior portion of the knee.  He denies any shortness of breath.He currently rates his pain a 4/10 and it is worsen with activity.     Problem List:    Patient Active Problem List    Diagnosis Date Noted     DVT (deep venous thrombosis) (H) 01/15/2018     Priority: Medium     Long-term (current) use of anticoagulants [Z79.01] 01/15/2018     Priority: Medium     Pulmonary embolism on right (H) 01/14/2018     Priority: Medium     Benign essential hypertension 01/12/2017     Priority: Medium     Hypotestosteronism 10/29/2015     Priority: Medium     He has been on replacement therapies in the past.        Esophageal reflux 10/29/2015     Priority: Medium     Family history of colon cancer 11/07/2014     Priority: Medium     His mother and MGM had colon cancer. Recommend starting colonoscopies at age 40.        Hyperlipidemia with target LDL less than 130 11/07/2014     Priority: Medium     Diagnosis updated by automated process. Provider to review and confirm.          Past Medical History:    Past Medical History:   Diagnosis Date      "DVT (deep vein thrombosis) in pregnancy (H)      GERD (gastroesophageal reflux disease)      HTN (hypertension)      Hypotestosteronemia        Past Surgical History:    Past Surgical History:   Procedure Laterality Date     ARTHROSCOPY KNEE      right, 1/8/2018       Family History:    Family History   Problem Relation Age of Onset     Cancer - colorectal Mother      Breast Cancer Mother      Cancer - colorectal Maternal Grandmother      Prostate Cancer Father      C.A.D. No family hx of      DIABETES No family hx of      Hypertension No family hx of      CEREBROVASCULAR DISEASE No family hx of      Melanoma No family hx of        Social History:  Marital Status:   [2]  Social History   Substance Use Topics     Smoking status: Never Smoker     Smokeless tobacco: Former User     Alcohol use Yes        Medications:      warfarin (COUMADIN) 5 MG tablet   oseltamivir (TAMIFLU) 75 MG capsule   magnesium oxide (MAG-OX) 400 (241.3 MG) MG tablet   cetirizine (ZYRTEC) 10 MG tablet   lisinopril (PRINIVIL/ZESTRIL) 20 MG tablet   testosterone cypionate (DEPO-TESTOSTERONE) 200 MG/ML injection   syringe/needle, disp, (B-D SYRINGE/NEEDLE) 22G X 1-1/2\" 3 ML MISC   Needle, Disp, (B-D BLUNT FILL NEEDLE) 18G X 1-1/2\" MISC   order for DME   order for DME         Review of Systems   Constitutional: Positive for activity change. Negative for chills and fever.   HENT: Negative for congestion.    Respiratory: Negative for cough, chest tightness and shortness of breath.    Cardiovascular: Positive for leg swelling. Negative for chest pain and palpitations.   Gastrointestinal: Negative for abdominal pain, nausea and vomiting.   Musculoskeletal: Positive for arthralgias, gait problem, joint swelling and myalgias. Negative for back pain and neck pain.   Skin: Negative for rash.   Neurological: Negative for dizziness, weakness, light-headedness and headaches.   Hematological: Does not bruise/bleed easily.   Psychiatric/Behavioral: " Negative for confusion.       Physical Exam   BP: (!) 163/91  Heart Rate: 80  Temp: 98.1  F (36.7  C)  Resp: 16  SpO2: 98 %      Physical Exam   Constitutional: He appears well-developed and well-nourished. No distress.   HENT:   Head: Normocephalic.   Mouth/Throat: Oropharynx is clear and moist.   Eyes: Conjunctivae are normal.   Neck: Normal range of motion.   Cardiovascular: Normal rate, regular rhythm and normal heart sounds.    No murmur heard.  Pulmonary/Chest: Effort normal and breath sounds normal. He has no wheezes. He has no rales.   Abdominal: Soft. Bowel sounds are normal.   Musculoskeletal: Normal range of motion.   arthroscopy incision sites without erythema or edema. Mild to moderate anterior swelling of the R knee . No erythema. + tenderness to palpation of the anterior surface of the knee. Patient is able to flex and extend the knee will mild pain. There is mild swelling of the calf . Without erythema and edema. Pulses and sensation are symmetrical.    Neurological: He is alert. He exhibits normal muscle tone.   Skin: Skin is warm and dry. No rash noted.   Psychiatric: He has a normal mood and affect.   Nursing note and vitals reviewed.      ED Course     ED Course     Procedures               Critical Care time:  none               Labs Ordered and Resulted from Time of ED Arrival Up to the Time of Departure from the ED   CBC WITH PLATELETS DIFFERENTIAL - Abnormal; Notable for the following:        Result Value    RBC Count 4.29 (*)     Hemoglobin 12.8 (*)     Hematocrit 37.3 (*)     All other components within normal limits   INR       Assessments & Plan (with Medical Decision Making)reocrds were reviewed. Labs were obtained and an xray of the R knee was obtained. White count was not elevated. hgb was 12.8. INR was above therapeutic at 3.17. Xray of the knee without fracture or dislocation with a joint effusion. Findings discussed with patient. I do not think a repeat US of the leg is warranted as  the the INR has been therapeutic for some time. This may be pain secondary to increased use. Patient will be given a few pain pills and weight bear as tolerated. HE will follow up with his orthopedic surgeon tomorrow .      I have reviewed the nursing notes.    I have reviewed the findings, diagnosis, plan and need for follow up with the patient.       Discharge Medication List as of 1/30/2018 12:49 AM      START taking these medications    Details   oxyCODONE-acetaminophen (PERCOCET) 5-325 MG per tablet Take 1-2 tablets by mouth every 4 hours as needed for pain, Disp-8 tablet, R-0, Local Print             Final diagnoses:   Postoperative pain of right knee   Acute deep vein thrombosis (DVT) of distal vein of right lower extremity (H)   Long-term (current) use of anticoagulants [Z79.01]   Pulmonary embolism on right (H)       1/29/2018   [facility] [place] EMERGENCY DEPARTMENT     Cecilio Nash MD  01/30/18 [id number]

## 2018-01-30 NOTE — ED NOTES
Patient with pain and swelling superior of right knee. Patient with history of knee procedure on 1/8/17, also DVT's since procedure. States in on coumadin and at therapeutic level as of Thursday. Wife in room at bedside. Ice applied to site of pain.

## 2018-02-01 ENCOUNTER — ANTICOAGULATION THERAPY VISIT (OUTPATIENT)
Dept: ANTICOAGULATION | Facility: CLINIC | Age: 38
End: 2018-02-01
Payer: COMMERCIAL

## 2018-02-01 DIAGNOSIS — Z79.01 LONG-TERM (CURRENT) USE OF ANTICOAGULANTS: ICD-10-CM

## 2018-02-01 DIAGNOSIS — I82.4Z1 ACUTE DEEP VEIN THROMBOSIS (DVT) OF DISTAL VEIN OF RIGHT LOWER EXTREMITY (H): ICD-10-CM

## 2018-02-01 DIAGNOSIS — I26.99 OTHER ACUTE PULMONARY EMBOLISM WITHOUT ACUTE COR PULMONALE (H): ICD-10-CM

## 2018-02-01 DIAGNOSIS — I26.99 PULMONARY EMBOLISM ON RIGHT (H): ICD-10-CM

## 2018-02-01 LAB — INR POINT OF CARE: 5.1 (ref 0.86–1.14)

## 2018-02-01 PROCEDURE — 99207 ZZC NO CHARGE NURSE ONLY: CPT

## 2018-02-01 PROCEDURE — 36416 COLLJ CAPILLARY BLOOD SPEC: CPT

## 2018-02-01 PROCEDURE — 85610 PROTHROMBIN TIME: CPT | Mod: QW

## 2018-02-01 RX ORDER — WARFARIN SODIUM 5 MG/1
TABLET ORAL
Qty: 45 TABLET | Refills: 11 | COMMUNITY
Start: 2018-02-01 | End: 2018-02-12

## 2018-02-01 NOTE — MR AVS SNAPSHOT
Edwin MARCIA Nuno   2/1/2018 10:30 AM   Anticoagulation Therapy Visit    Description:  37 year old male   Provider:  [facility]   Department:  [facility]           INR as of 2/1/2018     Today's INR 5.1!      Anticoagulation Summary as of 2/1/2018     INR goal 2.0-3.0   Today's INR 5.1!   Full instructions 2/1: Hold; 2/2: 7.5 mg; Otherwise 10 mg every day   Next INR check 2/5/2018    Indications   DVT (deep venous thrombosis) (H) [I82.409]  Pulmonary embolism on right (H) [I26.99]  Long-term (current) use of anticoagulants [Z79.01] [Z79.01]         Your next Anticoagulation Clinic appointment(s)     Feb 05, 2018  9:30 AM CST   Anticoagulation Visit with [facility]   [facility]    [address] [address] [phone]              Contact Numbers     Please call [phone] with any problems or questions regarding your therapy.    If you need to cancel and/or reschedule your appointment please call one of the following numbers:  [facility] - [phone]  [place] - [phone]  [facility] [phone]  \[facility]\"" [phone]  [place] - [phone]            February 2018 Details    Sun Mon Tue Wed Thu Fri Sat         1      Hold   See details      2      7.5 mg         3      10 mg           4      10 mg         5            6               7               8               9               10                 11               12               13               14               15               16               17                 18               19               20               21               22               23               24                 25               26               27               28                   Date Details   02/01 This INR check       Date of next INR:  2/5/2018         How to take your warfarin dose     To take:  7.5 mg Take 1.5 of the 5 mg tablets.    To take:  10 mg Take 2 of the 5 mg tablets.    Hold Do not take  your warfarin dose. See the Details table to the right for additional instructions.

## 2018-02-01 NOTE — PROGRESS NOTES
tANTICOAGULATION FOLLOW-UP CLINIC VISIT    Patient Name:  Edwin Nuno  Date:  2/1/2018  Contact Type:  Face to Face    SUBJECTIVE:     Patient Findings     Positives Other complaints (Blood collecting by knee per patient's report, cannot drain the area due to recent surgery), Inflammation (Swelling and pain around his knee)    Comments Patient was seen in the ED on Monday. INR was slightly above his goal range (3.17). Patient was instructed to follow up with ortho on Tuesday for his knee pain. He was seen, the ortho MD believes the problem is due to bleeding from the supratherapeutic INR (reported by patient since [facility] cannot see ortho notes). Because the knee surgery was so recent, they are not able to drain the area.     Writer explained the caution of decreasing his warfarin dose too much due to the blood clot but also having the INR too high due to the bleeding around his knee. He asked about exercise. Writer explained to him the inflammation and swelling can cause the INR to go up. If the activity is causing him pain, it will likely keep his INR higher than desired. Writer explained the risk of bleeding, requested he be seen again if the pain in his knee gets worse or if he has any issues with other bleeding.     Patient had 70mg in the previous 7 days, will decrease dose to 57.5mg by the next INR check on Monday (~18% decrease).              OBJECTIVE    INR Protime   Date Value Ref Range Status   02/01/2018 5.1 (A) 0.86 - 1.14 Final       ASSESSMENT / PLAN  INR assessment SUPRA    Recheck INR In: 4 DAYS    INR Location Clinic      Anticoagulation Summary as of 2/1/2018     INR goal 2.0-3.0   Today's INR 5.1!   Maintenance plan 10 mg (5 mg x 2) every day   Full instructions 2/1: Hold; 2/2: 7.5 mg; Otherwise 10 mg every day   Weekly total 70 mg   Plan last modified Maria Teresa Meredith RN (1/25/2018)   Next INR check 2/5/2018   Priority INR   Target end date     Indications   DVT (deep venous thrombosis) (H)  [I82.409]  Pulmonary embolism on right (H) [I26.99]  Long-term (current) use of anticoagulants [Z79.01] [Z79.01]         Anticoagulation Episode Summary     INR check location     Preferred lab     Send INR reminders to [facility]    Comments * 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 Length of therapy is 3-6 months but will discuss with PCP on 1-19-18      Anticoagulation Care Providers     Provider Role Specialty Phone number    Cecilio Hughes MD [facility] [phone]            See the Encounter Report to view Anticoagulation Flowsheet and Dosing Calendar (Go to Encounters tab in chart review, and find the Anticoagulation Therapy Visit)        Nunu Silvestre RN

## 2018-02-02 ENCOUNTER — OFFICE VISIT (OUTPATIENT)
Dept: FAMILY MEDICINE | Facility: CLINIC | Age: 38
End: 2018-02-02
Payer: COMMERCIAL

## 2018-02-02 VITALS
BODY MASS INDEX: 40.19 KG/M2 | WEIGHT: 296.3 LBS | SYSTOLIC BLOOD PRESSURE: 135 MMHG | RESPIRATION RATE: 16 BRPM | TEMPERATURE: 97 F | HEART RATE: 74 BPM | DIASTOLIC BLOOD PRESSURE: 86 MMHG

## 2018-02-02 DIAGNOSIS — I82.4Z1 ACUTE DEEP VEIN THROMBOSIS (DVT) OF DISTAL VEIN OF RIGHT LOWER EXTREMITY (H): Primary | ICD-10-CM

## 2018-02-02 DIAGNOSIS — M25.461 EFFUSION OF RIGHT KNEE: ICD-10-CM

## 2018-02-02 PROCEDURE — 99213 OFFICE O/P EST LOW 20 MIN: CPT | Performed by: FAMILY MEDICINE

## 2018-02-02 NOTE — MR AVS SNAPSHOT
After Visit Summary   2/2/2018    Edwin Nuno    MRN: [id number]           Patient Information     Date Of Birth          1980        Visit Information        Provider Department      2/2/2018 7:00 AM Ricardo Stoddard MD [facility]        Today's Diagnoses     Acute deep vein thrombosis (DVT) of distal vein of right lower extremity (H)    -  1    Effusion of right knee           Follow-ups after your visit        Your next 10 appointments already scheduled     Feb 05, 2018  9:30 AM CST   Anticoagulation Visit with [facility]   [facility]    [address] [address] [phone]              Who to contact     If you have questions or need follow up information about today's clinic visit or your schedule please contact [facility] directly at [phone].  Normal or non-critical lab and imaging results will be communicated to you by MyChart, letter or phone within 4 business days after the clinic has received the results. If you do not hear from us within 7 days, please contact the clinic through [employer] or phone. If you have a critical or abnormal lab result, we will notify you by phone as soon as possible.  Submit refill requests through [employer] or call your pharmacy and they will forward the refill request to us. Please allow 3 business days for your refill to be completed.          Additional Information About Your Visit        MyChart Information     [employer] gives you secure access to your electronic health record. If you see a primary care provider, you can also send messages to your care team and make appointments. If you have questions, please call your primary care clinic.  If you do not have a primary care provider, please call [phone] and they will assist you.        Care EveryWhere ID     This is your Care EveryWhere ID. This could be used by other organizations to access  your [place] medical records  [id number]        Your Vitals Were     Pulse Temperature Respirations BMI (Body Mass Index)          74 97  F (36.1  C) (Oral) 16 40.19 kg/m2         Blood Pressure from Last 3 Encounters:   02/02/18 135/86   01/30/18 146/81   01/19/18 143/72    Weight from Last 3 Encounters:   02/02/18 296 lb 4.8 oz (134.4 kg)   01/19/18 299 lb (135.6 kg)   01/14/18 298 lb 11.6 oz (135.5 kg)              Today, you had the following     No orders found for display       Primary Care Provider Office Phone # Fax #    Cecilio Hughes [phone] [address] [address]        Equal Access to Services     BERENICE BENJAMIN : Anusha martinez Sotyrone, waaxda luqadaha, qaybta kaalmada adeegyadestiny, austen thomson . So [facility] [phone].    ATENCIÓN: Si habla español, tiene a bhandari disposición servicios gratuitos de asistencia lingüística. Rochelle al [phone].    We comply with applicable federal civil rights laws and [place] laws. We do not discriminate on the basis of race, color, national origin, age, disability, sex, sexual orientation, or gender identity.            Thank you!     Thank you for choosing [facility]note 2]  for your care. Our goal is always to provide you with excellent care. Hearing back from our patients is one way we can continue to improve our services. Please take a few minutes to complete the written survey that you may receive in the mail after your visit with us. Thank you!             Your Updated Medication List - Protect others around you: Learn how to safely use, store and throw away your medicines at www.disposemymeds.org.          This list is accurate as of 2/2/18  7:59 AM.  Always use your most recent med list.                   Brand Name Dispense Instructions for use Diagnosis    cetirizine 10 MG tablet    zyrTEC    30 tablet    Take 1 tablet (10 mg) by mouth every evening    Preop general  "physical exam       lisinopril 20 MG tablet    PRINIVIL/ZESTRIL    90 tablet    Take 1 tablet (20 mg) by mouth daily    Benign essential hypertension       magnesium oxide 400 (241.3 MG) MG tablet    MAG-OX    60 tablet    Take 1 tablet (400 mg) by mouth daily    Preop general physical exam       Needle (Disp) 18G X 1-1/2\" Misc    B-D BLUNT FILL NEEDLE    10 each    For drawing up medication.    Hypotestosteronism       * order for DME     1 Device    Equipment being ordered: knee sleeve, XL    Right knee injury, initial encounter       * order for DME     1 Device    Equipment being ordered: gel cups, large    Pain of right heel, Right Achilles tendinitis       syringe/needle (disp) 22G X 1-1/2\" 3 ML Misc    B-D SYRINGE/NEEDLE    10 each    For Testosterone injections.    Hypotestosteronism       testosterone cypionate 200 MG/ML injection    DEPO-TESTOSTERONE    2 mL    400 mg every 25 days.    Hypotestosteronism       warfarin 5 MG tablet    COUMADIN    45 tablet    As directed by Anticoagulation Clinic (maintenance dose not yet established)    Other acute pulmonary embolism without acute cor pulmonale (H)       * Notice:  This list has 2 medication(s) that are the same as other medications prescribed for you. Read the directions carefully, and ask your doctor or other care provider to review them with you.      "

## 2018-02-02 NOTE — NURSING NOTE
Chief Complaint   Patient presents with     INR Followup       Initial /86  Pulse 74  Temp 97  F (36.1  C) (Oral)  Resp 16  Wt 296 lb 4.8 oz (134.4 kg)  BMI 40.19 kg/m2 Estimated body mass index is 40.19 kg/(m^2) as calculated from the following:    Height as of 1/19/18: 6' (1.829 m).    Weight as of this encounter: 296 lb 4.8 oz (134.4 kg).  Medication Reconciliation: complete

## 2018-02-02 NOTE — PROGRESS NOTES
SUBJECTIVE:   Edwin Nuno is a 37 year old male who presents to clinic today for the following health issues:    Patient in clinic today to follow up on his current anticoagulation therapy and a high INR taken Thursday  Morning. Patient would also like to discuss concerns with his infusion diagnosis from Monday 1/29 at the [place] [facility].          Problem list and histories reviewed & adjusted, as indicated.  Additional history:         Reviewed and updated as needed this visit by clinical staff  Allergies  Meds       Reviewed and updated as needed this visit by Provider       Further history obtained, clarified or corrected by physician:  He is very concerned because since his arthroscopic knee surgery on 1/8/18 he has had problems.  He developed DVT in the same leg and now has been on Coumadin and his INR seems to have take any concerning jump to 5.1 on his last check.  This has him concerned along with swelling around the knee and pain just above the knee.  He is concerned that this may be indicating infection in the knee.  He has been doing rehab basically on a stationary bike and as the day goes on the swelling in the knee gets worse.    OBJECTIVE:  /86  Pulse 74  Temp 97  F (36.1  C) (Oral)  Resp 16  Wt 296 lb 4.8 oz (134.4 kg)  BMI 40.19 kg/m2  LUNGS: clear to auscultation, normal breath sounds  CV: RRR without murmur  ABD: BS+, soft, nontender, no masses, no hepatosplenomegaly  EXT: The right knee shows well-healed surgical scars and there is some effusion and there is some puffiness at the distal quadriceps muscle where he complains of pain but there is no tenderness, there is no redness, there is no warmth, the knee is not tense.  Range of motion is limited by discomfort.    ASSESSMENT:     Acute deep vein thrombosis (DVT) of distal vein of right lower extremity (H)  Effusion of right knee    PLAN:  I reassured him that this does not appear to be infection  I also reassured him that the INR  clinic recommended adjustments to his Coumadin dosing are correct and he should follow-up as planned  I gave him instructions on continuing his knee rehab and then elevating his leg as much as possible when able  I went over with him explicitly signs and symptoms to watch for that would indicate infection for which she should be seen again immediately.

## 2018-02-05 ENCOUNTER — ANTICOAGULATION THERAPY VISIT (OUTPATIENT)
Dept: ANTICOAGULATION | Facility: CLINIC | Age: 38
End: 2018-02-05
Payer: COMMERCIAL

## 2018-02-05 DIAGNOSIS — Z79.01 LONG-TERM (CURRENT) USE OF ANTICOAGULANTS: ICD-10-CM

## 2018-02-05 DIAGNOSIS — I26.99 PULMONARY EMBOLISM ON RIGHT (H): ICD-10-CM

## 2018-02-05 LAB — INR POINT OF CARE: 3.8 (ref 0.86–1.14)

## 2018-02-05 PROCEDURE — 85610 PROTHROMBIN TIME: CPT | Mod: QW

## 2018-02-05 PROCEDURE — 36416 COLLJ CAPILLARY BLOOD SPEC: CPT

## 2018-02-05 PROCEDURE — 99207 ZZC NO CHARGE NURSE ONLY: CPT

## 2018-02-08 ENCOUNTER — ANTICOAGULATION THERAPY VISIT (OUTPATIENT)
Dept: ANTICOAGULATION | Facility: CLINIC | Age: 38
End: 2018-02-08
Payer: COMMERCIAL

## 2018-02-08 DIAGNOSIS — I82.409 DVT (DEEP VENOUS THROMBOSIS) (H): ICD-10-CM

## 2018-02-08 DIAGNOSIS — Z79.01 LONG-TERM (CURRENT) USE OF ANTICOAGULANTS: ICD-10-CM

## 2018-02-08 DIAGNOSIS — I26.99 PULMONARY EMBOLISM ON RIGHT (H): ICD-10-CM

## 2018-02-08 LAB — INR POINT OF CARE: 3.4 (ref 0.86–1.14)

## 2018-02-08 PROCEDURE — 85610 PROTHROMBIN TIME: CPT | Mod: QW

## 2018-02-08 PROCEDURE — 99207 ZZC NO CHARGE NURSE ONLY: CPT

## 2018-02-08 PROCEDURE — 36416 COLLJ CAPILLARY BLOOD SPEC: CPT

## 2018-02-08 NOTE — PROGRESS NOTES
ANTICOAGULATION FOLLOW-UP CLINIC VISIT    Patient Name:  Edwin Nuno  Date:  2/8/2018  Contact Type:  Face to Face    SUBJECTIVE:     Patient Findings     Positives Inflammation (swelling behind his knee, starting to get better), Intentional hold of therapy (Held dose as instructed), Unexplained INR or factor level change    Comments Dose was decreased on Monday 2/5 by ~17% this past 3 days with an INR change of 3.8 to 3.4. On Thursday last week patient's dose was decreased by about 18% (a total of ~35% in the last 7 days and INR is still 3.4).     The only change that has occurred is the swelling/hematoma by his knee after surgery. Patient states this has improved. Writer asked about other supplements, changes in medications. He is not taking anything new. The only other doctor he has is for his knee surgery so there is no missing information for another clinic. Patient took warfarin as prescribed. Writer asked patient about his diet, he states he is eating plenty of protein and eats 3 meals per day. It is unlikely patient has a low albumin level. Writer is not sure why INR remains supratherapeutic.     Patient had 47.5mg in the previous 7 days, will decrease dose to 42.5mg by the next INR check on Monday (~11% decrease).             OBJECTIVE    INR Protime   Date Value Ref Range Status   02/08/2018 3.4 (A) 0.86 - 1.14 Final       ASSESSMENT / PLAN  INR assessment SUPRA    Recheck INR In: 4 DAYS    INR Location Clinic      Anticoagulation Summary as of 2/8/2018     INR goal 2.0-3.0   Today's INR 3.4!   Maintenance plan No maintenance plan   Full instructions 2/8: 5 mg; 2/9: 5 mg; 2/10: 7.5 mg; 2/11: 5 mg   Plan last modified Nunu Silvestre RN (2/8/2018)   Next INR check 2/12/2018   Priority INR   Target end date     Indications   DVT (deep venous thrombosis) (H) [I82.409]  Pulmonary embolism on right (H) [I26.99]  Long-term (current) use of anticoagulants [Z79.01] [Z79.01]         Anticoagulation  Episode Summary     INR check location     Preferred lab     Send INR reminders to [facility] PO[facility]    Comments * 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 Length of therapy is 3-6 months but will discuss with PCP on 1-19-18      Anticoagulation Care Providers     Provider Role Specialty Phone number    Cecilio Hughes MD [facility] [phone]            See the Encounter Report to view Anticoagulation Flowsheet and Dosing Calendar (Go to Encounters tab in chart review, and find the Anticoagulation Therapy Visit)        Nunu Silvestre RN

## 2018-02-08 NOTE — MR AVS SNAPSHOT
Edwin MARCIA Nuno   2/8/2018 8:45 AM   Anticoagulation Therapy Visit    Description:  37 year old male   Provider:  [place] [facility]   Department:  [facility]           INR as of 2/8/2018     Today's INR 3.4!      Anticoagulation Summary as of 2/8/2018     INR goal 2.0-3.0   Today's INR 3.4!   Full instructions 2/8: 5 mg; 2/9: 5 mg; 2/10: 7.5 mg; 2/11: 5 mg   Next INR check 2/12/2018    Indications   DVT (deep venous thrombosis) (H) [I82.409]  Pulmonary embolism on right (H) [I26.99]  Long-term (current) use of anticoagulants [Z79.01] [Z79.01]         Your next Anticoagulation Clinic appointment(s)     Feb 12, 2018  8:45 AM CST   Anticoagulation Visit with [place] [facility]   [facility]    [address] [address] [phone]              Contact Numbers     Please call [phone] with any problems or questions regarding your therapy.    If you need to cancel and/or reschedule your appointment please call one of the following numbers:  [facility] - [phone]  [place] - [phone]  [facility] [phone]  [facility] [phone]  [place] - [phone]            February 2018 Details    Sun Mon Tue Wed Thu Fri Sat         1               2               3                 4               5               6               7               8      5 mg   See details      9      5 mg         10      7.5 mg           11      5 mg         12            13               14               15               16               17                 18               19               20               21               22               23               24                 25               26               27               28                   Date Details   02/08 This INR check       Date of next INR:  2/12/2018         How to take your warfarin dose     To take:  5 mg Take 1 of the 5 mg tablets.    To take:  7.5 mg Take 1.5 of the 5 mg tablets.

## 2018-02-12 ENCOUNTER — ANTICOAGULATION THERAPY VISIT (OUTPATIENT)
Dept: ANTICOAGULATION | Facility: CLINIC | Age: 38
End: 2018-02-12
Payer: COMMERCIAL

## 2018-02-12 DIAGNOSIS — I26.99 PULMONARY EMBOLISM ON RIGHT (H): ICD-10-CM

## 2018-02-12 DIAGNOSIS — Z79.01 LONG-TERM (CURRENT) USE OF ANTICOAGULANTS: ICD-10-CM

## 2018-02-12 DIAGNOSIS — I26.99 OTHER ACUTE PULMONARY EMBOLISM WITHOUT ACUTE COR PULMONALE (H): ICD-10-CM

## 2018-02-12 DIAGNOSIS — I82.409 DVT (DEEP VENOUS THROMBOSIS) (H): ICD-10-CM

## 2018-02-12 LAB — INR POINT OF CARE: 2.6 (ref 0.86–1.14)

## 2018-02-12 PROCEDURE — 36416 COLLJ CAPILLARY BLOOD SPEC: CPT

## 2018-02-12 PROCEDURE — 99207 ZZC NO CHARGE NURSE ONLY: CPT

## 2018-02-12 PROCEDURE — 85610 PROTHROMBIN TIME: CPT | Mod: QW

## 2018-02-12 RX ORDER — WARFARIN SODIUM 5 MG/1
TABLET ORAL
Qty: 45 TABLET | Refills: 11 | COMMUNITY
Start: 2018-02-12 | End: 2018-02-19

## 2018-02-12 NOTE — PROGRESS NOTES
ANTICOAGULATION FOLLOW-UP CLINIC VISIT    Patient Name:  Edwin Nuno  Date:  2/12/2018  Contact Type:  Face to Face    SUBJECTIVE:     Patient Findings     Positives Inflammation (continues to have swelling/hematoma behind knee, but states it is improving), No Problem Findings    Comments Pt denies changes in medications, diet, activity or health, reports taking warfarin as directed.  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 Since INR is therapeutic today, writer put pt on maintenance dose at same mg/7 days as he took the past week, will recheck in 1 week and re-evaluate.             OBJECTIVE    INR Protime   Date Value Ref Range Status   02/12/2018 2.6 (A) 0.86 - 1.14 Final       ASSESSMENT / PLAN  INR assessment THER    Recheck INR In: 1 WEEK    INR Location Clinic      Anticoagulation Summary as of 2/12/2018     INR goal 2.0-3.0   Today's INR 2.6   Maintenance plan 7.5 mg (5 mg x 1.5) on Mon, Wed, Fri; 5 mg (5 mg x 1) all other days   Full instructions 7.5 mg on Mon, Wed, Fri; 5 mg all other days   Weekly total 42.5 mg   Plan last modified Maria Teresa Meredith RN (2/12/2018)   Next INR check 2/19/2018   Priority INR   Target end date     Indications   DVT (deep venous thrombosis) (H) [I82.409]  Pulmonary embolism on right (H) [I26.99]  Long-term (current) use of anticoagulants [Z79.01] [Z79.01]         Anticoagulation Episode Summary     INR check location     Preferred lab     Send INR reminders to [facility]    Comments * 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 Length of therapy is 3-6 months but will discuss with PCP on 1-19-18      Anticoagulation Care Providers     Provider Role Specialty Phone number    Cecilio Hughes MD [facility] [phone]            See the Encounter Report to view Anticoagulation Flowsheet and Dosing Calendar  (Go to Encounters tab in chart review, and find the Anticoagulation Therapy Visit)        Maria Teresa Meredith RN

## 2018-02-12 NOTE — MR AVS SNAPSHOT
Edwin KENNEDY Nuno   2/12/2018 8:45 AM   Anticoagulation Therapy Visit    Description:  37 year old male   Provider:  [place] [facility]   Department:  [facility]           INR as of 2/12/2018     Today's INR 2.6      Anticoagulation Summary as of 2/12/2018     INR goal 2.0-3.0   Today's INR 2.6   Full instructions 2/12: 7.5 mg; 2/13: 5 mg; 2/14: 7.5 mg; 2/15: 5 mg; 2/16: 7.5 mg; 2/17: 5 mg; 2/18: 5 mg   Next INR check 2/19/2018    Indications   DVT (deep venous thrombosis) (H) [I82.409]  Pulmonary embolism on right (H) [I26.99]  Long-term (current) use of anticoagulants [Z79.01] [Z79.01]         Your next Anticoagulation Clinic appointment(s)     Feb 19, 2018 10:00 AM CST   An[facility]agulation [facility] with [place] [facility]    [address] [address] [phone]              Contact Numbers     Please call [phone] with any problems or questions regarding your therapy.    If you need to cancel and/or reschedule your appointment please call one of the following numbers:  [facility] [phone]  [place] - [phone]  [place] - [phone]  [facility] [phone]  [place] - [phone]            February 2018 Details    Sun Mon Tue Wed Thu Fri Sat         1               2               3                 4               5               6               7               8               9               10                 11               12      7.5 mg   See details      13      5 mg         14      7.5 mg         15      5 mg         16      7.5 mg         17      5 mg           18      5 mg         19            20               21               22               23               24                 25               26               27               28                   Date Details   02/12 This INR check       Date of next INR:  2/19/2018         How to take your warfarin dose     To take:  5 mg Take 1 of the 5 mg tablets.    To take:   7.5 mg Take 1.5 of the 5 mg tablets.

## 2018-02-13 ENCOUNTER — OFFICE VISIT (OUTPATIENT)
Dept: FAMILY MEDICINE | Facility: CLINIC | Age: 38
End: 2018-02-13
Payer: COMMERCIAL

## 2018-02-13 ENCOUNTER — TRANSFERRED RECORDS (OUTPATIENT)
Dept: HEALTH INFORMATION MANAGEMENT | Facility: CLINIC | Age: 38
End: 2018-02-13

## 2018-02-13 VITALS
BODY MASS INDEX: 41.31 KG/M2 | HEIGHT: 72 IN | WEIGHT: 305 LBS | OXYGEN SATURATION: 97 % | HEART RATE: 78 BPM | RESPIRATION RATE: 18 BRPM | DIASTOLIC BLOOD PRESSURE: 78 MMHG | SYSTOLIC BLOOD PRESSURE: 132 MMHG | TEMPERATURE: 97.7 F

## 2018-02-13 DIAGNOSIS — I82.401 ACUTE DEEP VEIN THROMBOSIS (DVT) OF RIGHT LOWER EXTREMITY, UNSPECIFIED VEIN (H): Primary | ICD-10-CM

## 2018-02-13 DIAGNOSIS — I26.99 PULMONARY EMBOLISM ON RIGHT (H): ICD-10-CM

## 2018-02-13 PROCEDURE — 99214 OFFICE O/P EST MOD 30 MIN: CPT | Performed by: FAMILY MEDICINE

## 2018-02-13 NOTE — NURSING NOTE
Chief Complaint   Patient presents with     RECHECK     follow up DVT  and work restrictions        Initial /80 (BP Location: Right arm, Patient Position: Chair, Cuff Size: Adult Large)  Pulse 78  Temp 97.7  F (36.5  C)  Resp 18  Ht 6' (1.829 m)  Wt (!) 305 lb (138.3 kg)  SpO2 97%  BMI 41.37 kg/m2 Estimated body mass index is 41.37 kg/(m^2) as calculated from the following:    Height as of this encounter: 6' (1.829 m).    Weight as of this encounter: 305 lb (138.3 kg).  Medication Reconciliation: complete   Birdie Llanes, CMA

## 2018-02-13 NOTE — PROGRESS NOTES
SUBJECTIVE:   Edwin Nuno is a 37 year old male who presents to clinic today for the following health issues:      Chief Complaint   Patient presents with     RECHECK     follow up DVT  and work restrictions              Problem list and histories reviewed & adjusted, as indicated.  Additional history: as documented        Reviewed and updated as needed this visit by clinical staff  Tobacco  Allergies  Meds  Med Hx  Surg Hx  Fam Hx  Soc Hx      Reviewed and updated as needed this visit by Provider      Further history obtained, clarified or corrected by physician:  He has not been back to work since he developed DVT and PE following arthroscopic knee surgery on the right.  He is still trying to get his INR stabilized.  The orthopedist apparently cleared him from the joint standpoint for working starting 2/20/18.  He still is having difficulty standing for prolonged periods because of discomfort in the leg.    OBJECTIVE:  /78  Pulse 78  Temp 97.7  F (36.5  C)  Resp 18  Ht 6' (1.829 m)  Wt (!) 305 lb (138.3 kg)  SpO2 97%  BMI 41.37 kg/m2  LUNGS: clear to auscultation, normal breath sounds  CV: RRR without murmur  ABD: BS+, soft, nontender, no masses, no hepatosplenomegaly  EXT: He has some generalized tenderness in the distal thigh on the right and around the knee.  Range of motion is full in the knee.    ASSESSMENT:     Acute deep vein thrombosis (DVT) of right lower extremity, unspecified vein (H)  Pulmonary embolism on right (H)    PLAN:  I recommended not returning to work until 2/26/18 and then restricted for the first 2 weeks through 3/12/18.  Restrictions will include limited lifting and pushing and no squatting or climbing and only 4 hours per day for those 2 weeks  Recheck in clinic prior to 3/12/18.

## 2018-02-13 NOTE — PATIENT INSTRUCTIONS
Thank you for choosing [facility].  You may be receiving a survey in the mail from [facility] regarding your visit today.  Please take a few minutes to complete and return the survey to let us know how we are doing.      Our Clinic hours are:  Mondays    7:20 am - 7 pm  Tues -  Fri  7:20 am - 5 pm    Clinic Phone: [phone]    The clinic lab opens at 7:30 am Mon - Fri and appointments are required.    [facility]. [phone]  Monday-Thursday 8 am - 7pm  Tues/Wed/Fri 8 am - 5:30 pm

## 2018-02-13 NOTE — MR AVS SNAPSHOT
After Visit Summary   2/13/2018    Edwin Nuno    MRN: [id number]           Patient Information     Date Of Birth          1980        Visit Information        Provider Department      2/13/2018 3:40 PM Ricardo Stoddard MD [facility]        Today's Diagnoses     Acute deep vein thrombosis (DVT) of right lower extremity, unspecified vein (H)    -  1    Pulmonary embolism on right (H)          Care Instructions          Thank you for choosing [facility].  You may be receiving a survey in the mail from [facility] regarding your visit today.  Please take a few minutes to complete and return the survey to let us know how we are doing.      Our Clinic hours are:  Mondays    7:20 am - 7 pm  Tues -  Fri  7:20 am - 5 pm    Clinic Phone: [phone]    The clinic lab opens at 7:30 am Mon - Fri and appointments are required.    [facility]  Ph. [phone]  Monday-Thursday 8 am - 7pm  Tues/Wed/Fri 8 am - 5:30 pm                 Follow-ups after your visit        Your next 10 appointments already scheduled     Feb 19, 2018 10:00 AM CST   Anticoagulation Visit with [place] [facility]   [facility]    [address] [address]   [phone]            Mar 09, 2018  3:20 PM CST   MyChart Injury Follow Up with Ricardo Stoddard MD   [facility]    [address] [facility] [address] [phone]              Who to contact     If you have questions or need follow up information about today's clinic visit or your schedule please contact [facility] directly at [phone].  Normal or non-critical lab and imaging results will be communicated to you by MyChart, letter or phone within 4 business days after the clinic has received the results. If you do not hear from us within 7 days, please contact the clinic through MyChart or phone.  If you have a critical or abnormal lab result, we will notify you by phone as soon as possible.  Submit refill requests through the grafter or call your pharmacy and they will forward the refill request to us. Please allow 3 business days for your refill to be completed.          Additional Information About Your Visit        [employer] Information     the grafter gives you secure access to your electronic health record. If you see a primary care provider, you can also send messages to your care team and make appointments. If you have questions, please call your primary care clinic.  If you do not have a primary care provider, please call [phone] and they will assist you.        Care EveryWhere ID     This is your Care EveryWhere ID. This could be used by other organizations to access your [place] medical records  [id number]        Your Vitals Were     Pulse Temperature Respirations Height Pulse Oximetry BMI (Body Mass Index)    78 97.7  F (36.5  C) 18 6' (1.829 m) 97% 41.37 kg/m2       Blood Pressure from Last 3 Encounters:   02/13/18 132/78   02/02/18 135/86   01/30/18 146/81    Weight from Last 3 Encounters:   02/13/18 (!) 305 lb (138.3 kg)   02/02/18 296 lb 4.8 oz (134.4 kg)   01/19/18 299 lb (135.6 kg)              Today, you had the following     No orders found for display       Primary Care Provider Office Phone # Fax #    Cecilio Hughes [phone] [address] [address]        Equal Access to Services     RHONDA BENJAMIN AH: Hadii aad ku hadasho Soomaali, waaxda luqadaha, qaybta kaalmada adeegyada, austen hui. So [facility] [phone].    ATENCIÓN: Si habla español, tiene a bhandari disposición servicios gratuitos de asistencia lingüística. Llame al [phone].    We comply with applicable federal civil rights laws and [place] laws. We do not discriminate on the basis of race, color, national origin, age, disability, sex, sexual orientation, or  "gender identity.            Thank you!     Thank you for choosing [facility]  for your care. Our goal is always to provide you with excellent care. Hearing back from our patients is one way we can continue to improve our services. Please take a few minutes to complete the written survey that you may receive in the mail after your visit with us. Thank you!             Your Updated Medication List - Protect others around you: Learn how to safely use, store and throw away your medicines at www.disposemymeds.org.          This list is accurate as of 2/13/18  4:16 PM.  Always use your most recent med list.                   Brand Name Dispense Instructions for use Diagnosis    cetirizine 10 MG tablet    zyrTEC    30 tablet    Take 1 tablet (10 mg) by mouth every evening    Preop general physical exam       lisinopril 20 MG tablet    PRINIVIL/ZESTRIL    90 tablet    Take 1 tablet (20 mg) by mouth daily    Benign essential hypertension       magnesium oxide 400 (241.3 MG) MG tablet    MAG-OX    60 tablet    Take 1 tablet (400 mg) by mouth daily    Preop general physical exam       Needle (Disp) 18G X 1-1/2\" Misc    B-D BLUNT FILL NEEDLE    10 each    For drawing up medication.    Hypotestosteronism       * order for DME     1 Device    Equipment being ordered: knee sleeve, XL    Right knee injury, initial encounter       * order for DME     1 Device    Equipment being ordered: gel cups, large    Pain of right heel, Right Achilles tendinitis       syringe/needle (disp) 22G X 1-1/2\" 3 ML Misc    B-D SYRINGE/NEEDLE    10 each    For Testosterone injections.    Hypotestosteronism       testosterone cypionate 200 MG/ML injection    DEPO-TESTOSTERONE    2 mL    400 mg every 25 days.    Hypotestosteronism       warfarin 5 MG tablet    COUMADIN    45 tablet    Take 7.5 mg MWF, 5 mg rest of week or as directed by Anticoagulation Clinic.    Other acute pulmonary embolism without acute cor pulmonale (H)       * " Notice:  This list has 2 medication(s) that are the same as other medications prescribed for you. Read the directions carefully, and ask your doctor or other care provider to review them with you.

## 2018-02-19 ENCOUNTER — ANTICOAGULATION THERAPY VISIT (OUTPATIENT)
Dept: ANTICOAGULATION | Facility: CLINIC | Age: 38
End: 2018-02-19
Payer: COMMERCIAL

## 2018-02-19 DIAGNOSIS — Z79.01 LONG-TERM (CURRENT) USE OF ANTICOAGULANTS: ICD-10-CM

## 2018-02-19 DIAGNOSIS — I26.99 OTHER ACUTE PULMONARY EMBOLISM WITHOUT ACUTE COR PULMONALE (H): ICD-10-CM

## 2018-02-19 DIAGNOSIS — I26.99 PULMONARY EMBOLISM ON RIGHT (H): ICD-10-CM

## 2018-02-19 LAB — INR POINT OF CARE: 1.9 (ref 0.86–1.14)

## 2018-02-19 PROCEDURE — 36416 COLLJ CAPILLARY BLOOD SPEC: CPT

## 2018-02-19 PROCEDURE — 85610 PROTHROMBIN TIME: CPT | Mod: QW

## 2018-02-19 PROCEDURE — 99207 ZZC NO CHARGE NURSE ONLY: CPT

## 2018-02-19 RX ORDER — WARFARIN SODIUM 5 MG/1
TABLET ORAL
Qty: 45 TABLET | Refills: 11 | COMMUNITY
Start: 2018-02-19 | End: 2018-03-16

## 2018-02-19 NOTE — PROGRESS NOTES
ANTICOAGULATION FOLLOW-UP CLINIC VISIT    Patient Name:  Edwin Nuno  Date:  2/19/2018  Contact Type:  Face to Face    SUBJECTIVE:     Patient Findings     Positives No Problem Findings    Comments There is a possibility the patient missed a dose on 2-13-18 but he is not certain. Pt is pretty sure he did take his warfarin every day. No other changes or concerns. He returns to work next week with half days, depending on how his leg swelling goes. He anticipates a lot of sitting this first week back to work so writer advised frequent breaks and standing/walking as much as he can. Will increase maintenance dosing by 5.9% today since INR at low end.           OBJECTIVE    INR Protime   Date Value Ref Range Status   02/19/2018 1.9 (A) 0.86 - 1.14 Final       ASSESSMENT / PLAN  INR assessment THER    Recheck INR In: 10 DAYS    INR Location Clinic      Anticoagulation Summary as of 2/19/2018     INR goal 2.0-3.0   Today's INR 1.9!   Maintenance plan 5 mg (5 mg x 1) on Mon, Wed, Fri; 7.5 mg (5 mg x 1.5) all other days   Full instructions 2/19: 7.5 mg; Otherwise 5 mg on Mon, Wed, Fri; 7.5 mg all other days   Weekly total 45 mg   Plan last modified Humera Masterson RN (2/19/2018)   Next INR check 3/1/2018   Priority INR   Target end date     Indications   DVT (deep venous thrombosis) (H) [I82.409]  Pulmonary embolism on right (H) [I26.99]  Long-term (current) use of anticoagulants [Z79.01] [Z79.01]         Anticoagulation Episode Summary     INR check location     Preferred lab     Send INR reminders to [facility]    Comments * 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 Length of therapy is 3-6 months. Pt will have repeat imaging prior to stopping warfarin.      Anticoagulation Care Providers     Provider Role Specialty Phone number    Cecilio Hughes MD [facility] [phone]            See the Encounter Report to view Anticoagulation Flowsheet and Dosing Calendar (Go to  Encounters tab in chart review, and find the Anticoagulation Therapy Visit)        Humera Masterson RN

## 2018-02-19 NOTE — MR AVS SNAPSHOT
Edwin Nuno   2/19/2018 10:00 AM   Anticoagulation Therapy Visit    Description:  37 year old male   Provider:  [facility] [facility]   Department:  [facility]           INR as of 2/19/2018     Today's INR 1.9!      Anticoagulation Summary as of 2/19/2018     INR goal 2.0-3.0   Today's INR 1.9!   Full instructions 2/19: 7.5 mg; Otherwise 5 mg on Mon, Wed, Fri; 7.5 mg all other days   Next INR check 3/1/2018    Indications   DVT (deep venous thrombosis) (H) [I82.409]  Pulmonary embolism on right (H) [I26.99]  Long-term (current) use of anticoagulants [Z79.01] [Z79.01]         Description     Take 7.5 mg today. Then start taking 5 mg Mondays, Wednesdays, and Fridays; 7.5mg all other days of the week.        Your next Anticoagulation Clinic appointment(s)     Mar 01, 2018  2:00 PM CST   Anticoagulation Visit with [facility] [facility]   [facility]    [address] [address] [phone]              Contact Numbers     Please call [phone] with any problems or questions regarding your therapy.    If you need to cancel and/or reschedule your appointment please call one of the following numbers:  [facility] [phone]  [place] - [phone]  [place] - [phone]  [facility] [phone]  [place] - [phone]            February 2018 Details    Sun Mon Tue Wed Thu Fri Sat         1               2               3                 4               5               6               7               8               9               10                 11               12               13               14               15               16               17                 18               19      7.5 mg   See details      20      7.5 mg         21      5 mg         22      7.5 mg         23      5 mg         24      7.5 mg           25      7.5 mg         26      5 mg         27      7.5 mg         28      5 mg             Date Details   02/19 This INR check                How to take your warfarin dose     To take:  5 mg Take 1 of the 5 mg tablets.    To take:  7.5 mg Take 1.5 of the 5 mg tablets.           March 2018 Details    Sun Mon Tue Wed Thu Fri Sat         1            2               3                 4               5               6               7               8               9               10                 11               12               13               14               15               16               17                 18               19               20               21               22               23               24                 25               26               27               28               29               30               31                Date Details   No additional details    Date of next INR:  3/1/2018         How to take your warfarin dose     To take:  7.5 mg Take 1.5 of the 5 mg tablets.

## 2018-02-28 ENCOUNTER — OFFICE VISIT (OUTPATIENT)
Dept: FAMILY MEDICINE | Facility: CLINIC | Age: 38
End: 2018-02-28
Payer: COMMERCIAL

## 2018-02-28 ENCOUNTER — HOSPITAL ENCOUNTER (OUTPATIENT)
Dept: CT IMAGING | Facility: CLINIC | Age: 38
Discharge: HOME | End: 2018-02-28
Attending: FAMILY MEDICINE | Admitting: FAMILY MEDICINE
Payer: COMMERCIAL

## 2018-02-28 VITALS
WEIGHT: 307.5 LBS | HEART RATE: 84 BPM | BODY MASS INDEX: 41.7 KG/M2 | TEMPERATURE: 98.1 F | DIASTOLIC BLOOD PRESSURE: 72 MMHG | OXYGEN SATURATION: 96 % | SYSTOLIC BLOOD PRESSURE: 126 MMHG

## 2018-02-28 DIAGNOSIS — R31.0 GROSS HEMATURIA: ICD-10-CM

## 2018-02-28 DIAGNOSIS — R31.0 GROSS HEMATURIA: Primary | ICD-10-CM

## 2018-02-28 LAB
ALBUMIN UR-MCNC: NEGATIVE MG/DL
APPEARANCE UR: CLEAR
BILIRUB UR QL STRIP: NEGATIVE
COLOR UR AUTO: YELLOW
GLUCOSE UR STRIP-MCNC: NEGATIVE MG/DL
HGB UR QL STRIP: NEGATIVE
KETONES UR STRIP-MCNC: NEGATIVE MG/DL
LEUKOCYTE ESTERASE UR QL STRIP: NEGATIVE
NITRATE UR QL: NEGATIVE
PH UR STRIP: 6 PH (ref 5–7)
SOURCE: NORMAL
SP GR UR STRIP: 1.02 (ref 1–1.03)
UROBILINOGEN UR STRIP-ACNC: 0.2 EU/DL (ref 0.2–1)

## 2018-02-28 PROCEDURE — 25000125 ZZHC RX 250: Performed by: RADIOLOGY

## 2018-02-28 PROCEDURE — 25000128 H RX IP 250 OP 636: Performed by: RADIOLOGY

## 2018-02-28 PROCEDURE — 74178 CT ABD&PLV WO CNTR FLWD CNTR: CPT

## 2018-02-28 PROCEDURE — 81003 URINALYSIS AUTO W/O SCOPE: CPT | Performed by: FAMILY MEDICINE

## 2018-02-28 PROCEDURE — 99213 OFFICE O/P EST LOW 20 MIN: CPT | Performed by: FAMILY MEDICINE

## 2018-02-28 RX ORDER — IOPAMIDOL 755 MG/ML
100 INJECTION, SOLUTION INTRAVASCULAR ONCE
Status: COMPLETED | OUTPATIENT
Start: 2018-02-28 | End: 2018-02-28

## 2018-02-28 RX ADMIN — SODIUM CHLORIDE 80 ML: 9 INJECTION, SOLUTION INTRAVENOUS at 15:29

## 2018-02-28 RX ADMIN — IOPAMIDOL 100 ML: 755 INJECTION, SOLUTION INTRAVENOUS at 15:29

## 2018-02-28 NOTE — NURSING NOTE
Chief Complaint   Patient presents with     Urinary Problem     Small amonths of blood on the urien since last evening.       Initial /72 (BP Location: Left arm, Patient Position: Chair, Cuff Size: Adult Large)  Pulse 84  Temp 98.1  F (36.7  C) (Tympanic)  Wt (!) 307 lb 8 oz (139.5 kg)  SpO2 96%  BMI 41.7 kg/m2 Estimated body mass index is 41.7 kg/(m^2) as calculated from the following:    Height as of 2/13/18: 6' (1.829 m).    Weight as of this encounter: 307 lb 8 oz (139.5 kg).  Medication Reconciliation: complete   Thao Francois CMA (GABRIEL)   (aka: Kassie Francois)

## 2018-02-28 NOTE — PATIENT INSTRUCTIONS
Thank you for choosing [facility].  You may be receiving a survey in the mail from [facility] regarding your visit today.  Please take a few minutes to complete and return the survey to let us know how we are doing.      If you have questions or concerns, please contact us via [email or web address] or you can contact your care team at [phone].    Our Clinic hours are:  Monday 6:40 am  to 7:00 pm  Tuesday -Friday 6:40 am to 5:00 pm    The [place] outpatient lab hours are:  Monday - Friday 6:10 am to 4:45 pm  Saturdays 7:00 am to 11:00 am  Appointments are required, call [phone]    If you have clinical questions after hours or would like to schedule an appointment,  call the clinic at [phone].    (R31.0) Gross hematuria  (primary encounter diagnosis)  Comment:   Plan: *UA reflex to Microscopic and Culture ([place]         and [facility] (except [place] and         [place]), CT Abdomen Pelvis w Contrast, UROLOGY        ADULT REFERRAL        We discussed the plan and call [phone] for Diagnostics or go there now. We will call the results of the CT scan   Of the abdomen. Make the Urology appt for after the CT is done. If the tests are all normal then follow with the INR clinic and make sure the labs are in the desired range.

## 2018-02-28 NOTE — MR AVS SNAPSHOT
After Visit Summary   2/28/2018    Edwin Nuno    MRN: [id number]           Patient Information     Date Of Birth          1980        Visit Information        Provider Department      2/28/2018 2:20 PM Cecilio Hughes MD [facility]        Today's Diagnoses     Gross hematuria    -  1      Care Instructions          Thank you for choosing [facility].  You may be receiving a survey in the mail from [facility] regarding your visit today.  Please take a few minutes to complete and return the survey to let us know how we are doing.      If you have questions or concerns, please contact us via OpenPortal or you can contact your care team at [phone].    Our Clinic hours are:  Monday 6:40 am  to 7:00 pm  Tuesday -Friday 6:40 am to 5:00 pm    The [place] outpatient lab hours are:  Monday - Friday 6:10 am to 4:45 pm  Saturdays 7:00 am to 11:00 am  Appointments are required, call [phone]    If you have clinical questions after hours or would like to schedule an appointment,  call the clinic at [phone].    (R31.0) Gross hematuria  (primary encounter diagnosis)  Comment:   Plan: *UA reflex to Microscopic and Culture ([place]         and [facility] (except [place] and         [place]), CT Abdomen Pelvis w Contrast, UROLOGY        ADULT REFERRAL        We discussed the plan and call [phone] for Diagnostics or go there now. We will call the results of the CT scan   Of the abdomen. Make the Urology appt for after the CT is done. If the tests are all normal then follow with the INR clinic and make sure the labs are in the desired range.           Follow-ups after your visit        Additional Services     UROLOGY ADULT REFERRAL       Your provider has referred you to: [facility]: [facility] [place] [phone]   [email or web address]    Please be aware that coverage of these  services is subject to the terms and limitations of your health insurance plan.  Call member services at your health plan with any benefit or coverage questions.      Please bring the following with you to your appointment:    (1) Any X-Rays, CTs or MRIs which have been performed.  Contact the facility where they were done to arrange for  prior to your scheduled appointment.    (2) List of current medications  (3) This referral request   (4) Any documents/labs given to you for this referral                  Your next 10 appointments already scheduled     Mar 01, 2018  2:00 PM CST   Anticoagulation Visit with [place] [facility]   [facility] ([facility]    [address] [facility] [address]   [phone]            Mar 09, 2018  3:20 PM CST   MyChart Injury Follow Up with Ricardo Stoddard MD   [facility] ([facility])    [address]  [facility] [id number] [phone]              Future tests that were ordered for you today     Open Future Orders        Priority Expected Expires Ordered    CT Abdomen Pelvis w Contrast Routine  2/28/2019 2/28/2018            Who to contact     If you have questions or need follow up information about today's clinic visit or your schedule please contact [facility] directly at [phone].  Normal or non-critical lab and imaging results will be communicated to you by MyChart, letter or phone within 4 business days after the clinic has received the results. If you do not hear from us within 7 days, please contact the clinic through MyChart or phone. If you have a critical or abnormal lab result, we will notify you by phone as soon as possible.  Submit refill requests through [employer] or call your pharmacy and they will forward the refill request to us. Please allow 3 business days for your refill to be completed.          Additional Information About Your Visit        [employer]  Information     [employer] gives you secure access to your electronic health record. If you see a primary care provider, you can also send messages to your care team and make appointments. If you have questions, please call your primary care clinic.  If you do not have a primary care provider, please call [phone] and they will assist you.        Care EveryWhere ID     This is your Care EveryWhere ID. This could be used by other organizations to access your [facility] medical records  [id number]        Your Vitals Were     Pulse Temperature Pulse Oximetry BMI (Body Mass Index)          84 98.1  F (36.7  C) (Tympanic) 96% 41.7 kg/m2         Blood Pressure from Last 3 Encounters:   02/28/18 126/72   02/13/18 132/78   02/02/18 135/86    Weight from Last 3 Encounters:   02/28/18 (!) 307 lb 8 oz (139.5 kg)   02/13/18 (!) 305 lb (138.3 kg)   02/02/18 296 lb 4.8 oz (134.4 kg)              We Performed the Following     *UA reflex to Microscopic and Culture ([place] and [facility] (except [place] and [place])     UROLOGY ADULT REFERRAL        Primary Care Provider Office Phone # Fax #    Cecilio Hughes [phone] [address] [facility] [address]        Equal Access to Services     BERENICE BENJAMIN : Hadii melinda ku hadasho Soparadiseali, waaxda luqadaha, qaybta kaalmada adeegyada, austen thomson . So [facility] [phone].    ATENCIÓN: Si habla español, tiene a bhandari disposición servicios gratuitos de asistencia lingüística. Llame al [phone].    We comply with applicable federal civil rights laws and [place] laws. We do not discriminate on the basis of race, color, national origin, age, disability, sex, sexual orientation, or gender identity.            Thank you!     Thank you for choosing [facility]  for your care. Our goal is always to provide you with excellent care. Hearing back from our patients is one way we can continue to improve our services.  "Please take a few minutes to complete the written survey that you may receive in the mail after your visit with us. Thank you!             Your Updated Medication List - Protect others around you: Learn how to safely use, store and throw away your medicines at www.disposemymeds.org.          This list is accurate as of 2/28/18  2:51 PM.  Always use your most recent med list.                   Brand Name Dispense Instructions for use Diagnosis    cetirizine 10 MG tablet    zyrTEC    30 tablet    Take 1 tablet (10 mg) by mouth every evening    Preop general physical exam       lisinopril 20 MG tablet    PRINIVIL/ZESTRIL    90 tablet    Take 1 tablet (20 mg) by mouth daily    Benign essential hypertension       magnesium oxide 400 (241.3 MG) MG tablet    MAG-OX    60 tablet    Take 1 tablet (400 mg) by mouth daily    Preop general physical exam       Needle (Disp) 18G X 1-1/2\" Misc    B-D BLUNT FILL NEEDLE    10 each    For drawing up medication.    Hypotestosteronism       * order for DME     1 Device    Equipment being ordered: knee sleeve, XL    Right knee injury, initial encounter       * order for DME     1 Device    Equipment being ordered: gel cups, large    Pain of right heel, Right Achilles tendinitis       syringe/needle (disp) 22G X 1-1/2\" 3 ML Misc    B-D SYRINGE/NEEDLE    10 each    For Testosterone injections.    Hypotestosteronism       testosterone cypionate 200 MG/ML injection    DEPO-TESTOSTERONE    2 mL    400 mg every 25 days.    Hypotestosteronism       warfarin 5 MG tablet    COUMADIN    45 tablet    Take 5 mg MWF, 7.5 mg rest of week or as directed by Anticoagulation Clinic.    Other acute pulmonary embolism without acute cor pulmonale (H)       * Notice:  This list has 2 medication(s) that are the same as other medications prescribed for you. Read the directions carefully, and ask your doctor or other care provider to review them with you.      "

## 2018-02-28 NOTE — PROGRESS NOTES
"  SUBJECTIVE:   Edwin Nuno is a 37 year old male who presents to clinic today for the following health issues:    Chief Complaint   Patient presents with     Urinary Problem     Small amonths of blood on the urien since last evening.         Genitourinary - Male  Onset: Last evening - pt unsure if the blood is coming from the urine.     Description:   Dysuria (painful urination): no   Hematuria (blood in urine): YES  Frequency: YES  Are you urinating at night : YES- sometimes  Hesitancy (delay in urine): no   Retention (unable to empty): no   Decrease in urinary flow: no   Incontinence: no     Progression of Symptoms:  improving and constant    Accompanying Signs & Symptoms:  Fever: no   Back/Flank pain: no   Urethral discharge: no   Testicle lumps/masses/pain: no   Nausea and/or vomiting: no   Abdominal pain: no     History:   History of frequent UTI's: no   History of kidney stones: no   History of hernias: no   Personal or Family history of Prostate problems: YES  Sexually active: YES    Precipitating factors:   na    Alleviating factors:  na        Current Outpatient Prescriptions:      warfarin (COUMADIN) 5 MG tablet, Take 5 mg MWF, 7.5 mg rest of week or as directed by Anticoagulation Clinic., Disp: 45 tablet, Rfl: 11     magnesium oxide (MAG-OX) 400 (241.3 MG) MG tablet, Take 1 tablet (400 mg) by mouth daily, Disp: 60 tablet, Rfl: 3     cetirizine (ZYRTEC) 10 MG tablet, Take 1 tablet (10 mg) by mouth every evening, Disp: 30 tablet, Rfl: 1     lisinopril (PRINIVIL/ZESTRIL) 20 MG tablet, Take 1 tablet (20 mg) by mouth daily, Disp: 90 tablet, Rfl: 3     syringe/needle, disp, (B-D SYRINGE/NEEDLE) 22G X 1-1/2\" 3 ML MISC, For Testosterone injections., Disp: 10 each, Rfl: 0     Needle, Disp, (B-D BLUNT FILL NEEDLE) 18G X 1-1/2\" MISC, For drawing up medication., Disp: 10 each, Rfl: 0     order for DME, Equipment being ordered: gel cups, large, Disp: 1 Device, Rfl: 0     order for DME, Equipment being ordered: " knee sleeve, XL, Disp: 1 Device, Rfl: 0     testosterone cypionate (DEPO-TESTOSTERONE) 200 MG/ML injection, 400 mg every 25 days. (Patient not taking: Reported on 2/2/2018), Disp: 2 mL, Rfl: 5    Patient Active Problem List   Diagnosis     Family history of colon cancer     Hyperlipidemia with target LDL less than 130     Hypotestosteronism     Esophageal reflux     Benign essential hypertension     Pulmonary embolism on right (H)     DVT (deep venous thrombosis) (H)     Long-term (current) use of anticoagulants [Z79.01]       Blood pressure 126/72, pulse 84, temperature 98.1  F (36.7  C), temperature source Tympanic, weight (!) 307 lb 8 oz (139.5 kg), SpO2 96 %.    Exam:  GENERAL APPEARANCE: healthy, alert and no distress  ABDOMEN:  soft, nontender, no HSM or masses and bowel sounds normal  Rectal exam: prostate symmetric w/o nodularity, no masses palpated; no fissures or hemorrhoids.   GU_male: testicles normal without atrophy or masses, no hernias and penis normal without urethral discharge  SKIN: no suspicious lesions or rashes      (R31.0) Gross hematuria  (primary encounter diagnosis)  Comment:   Plan: *UA reflex to Microscopic and Culture ([place]         and [facility]         [place]), CT Abdomen Pelvis w Contrast, UROLOGY        ADULT REFERRAL        We discussed the plan and call [phone] for Diagnostics or go there now. We will call the results of the CT scan   Of the abdomen. Make the Urology appt for after the CT is done. If the tests are all normal then follow with the INR clinic and make sure the labs are in the desired range.     Cecilio Hughes

## 2018-03-01 ENCOUNTER — ANTICOAGULATION THERAPY VISIT (OUTPATIENT)
Dept: ANTICOAGULATION | Facility: CLINIC | Age: 38
End: 2018-03-01
Payer: COMMERCIAL

## 2018-03-01 DIAGNOSIS — Z79.01 LONG-TERM (CURRENT) USE OF ANTICOAGULANTS: ICD-10-CM

## 2018-03-01 DIAGNOSIS — I26.99 PULMONARY EMBOLISM ON RIGHT (H): ICD-10-CM

## 2018-03-01 LAB — INR POINT OF CARE: 1.9 (ref 0.86–1.14)

## 2018-03-01 PROCEDURE — 99207 ZZC NO CHARGE NURSE ONLY: CPT

## 2018-03-01 PROCEDURE — 36416 COLLJ CAPILLARY BLOOD SPEC: CPT

## 2018-03-01 PROCEDURE — 85610 PROTHROMBIN TIME: CPT | Mod: QW

## 2018-03-01 NOTE — MR AVS SNAPSHOT
Edwin Nuno   3/1/2018 2:00 PM   Anticoagulation Therapy Visit    Description:  37 year old male   Provider:  [place] [facility]   Department:  [facility]           INR as of 3/1/2018     Today's INR 1.9!      Anticoagulation Summary as of 3/1/2018     INR goal 2.0-3.0   Today's INR 1.9!   Full instructions 3/1: 10 mg; Otherwise 5 mg on Mon, Wed, Fri; 7.5 mg all other days   Next INR check 3/12/2018    Indications   DVT (deep venous thrombosis) (H) [I82.409]  Pulmonary embolism on right (H) [I26.99]  Long-term (current) use of anticoagulants [Z79.01] [Z79.01]         Description     Take 10 mg today (3/1), then resume 5 mg MWF, 7.5 mg rest of week. Recheck INR either 3/12 or 3/13.      Contact Numbers     Please call [phone] with any problems or questions regarding your therapy.    If you need to cancel and/or reschedule your appointment please call one of the following numbers:  [facility] [phone]  [place] - [phone]  [place] - [phone]  [facility] [phone]  [place] - [phone]            March 2018 Details    Sun Mon Tue Wed Thu Fri Sat         1      10 mg   See details      2      5 mg         3      7.5 mg           4      7.5 mg         5      5 mg         6      7.5 mg         7      5 mg         8      7.5 mg         9      5 mg         10      7.5 mg           11      7.5 mg         12            13               14               15               16               17                 18               19               20               21               22               23               24                 25               26               27               28               29               30               31                Date Details   03/01 This INR check       Date of next INR:  3/12/2018         How to take your warfarin dose     To take:  5 mg Take 1 of the 5 mg tablets.    To take:  7.5 mg Take 1.5 of the 5 mg tablets.    To take:  10 mg Take 2 of the 5 mg tablets.

## 2018-03-01 NOTE — PROGRESS NOTES
ANTICOAGULATION FOLLOW-UP CLINIC VISIT    Patient Name:  Edwin Nuno  Date:  3/1/2018  Contact Type:  Face to Face    SUBJECTIVE:     Patient Findings     Positives Blood in urine (small amt, seen by PCP yesterday for this- CT neg)           OBJECTIVE    INR Protime   Date Value Ref Range Status   03/01/2018 1.9 (A) 0.86 - 1.14 Final       ASSESSMENT / PLAN  INR assessment SUB    Recheck INR In: 2 WEEKS    INR Location Clinic      Anticoagulation Summary as of 3/1/2018     INR goal 2.0-3.0   Today's INR 1.9!   Maintenance plan 5 mg (5 mg x 1) on Mon, Wed, Fri; 7.5 mg (5 mg x 1.5) all other days   Full instructions 3/1: 10 mg; Otherwise 5 mg on Mon, Wed, Fri; 7.5 mg all other days   Weekly total 45 mg   Plan last modified Humera Masterson RN (2/19/2018)   Next INR check 3/12/2018   Priority INR   Target end date     Indications   DVT (deep venous thrombosis) (H) [I82.409]  Pulmonary embolism on right (H) [I26.99]  Long-term (current) use of anticoagulants [Z79.01] [Z79.01]         Anticoagulation Episode Summary     INR check location     Preferred lab     Send INR reminders to [facility] [place] [facility] POOL    Comments * 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 Length of therapy is 3-6 months. Pt will have repeat imaging prior to stopping warfarin.      Anticoagulation Care Providers     Provider Role Specialty Phone number    Cecilio Hughes MD [facility] [phone]            See the Encounter Report to view Anticoagulation Flowsheet and Dosing Calendar (Go to Encounters tab in chart review, and find the Anticoagulation Therapy Visit)      Antoinette Lara [facility]

## 2018-03-08 ENCOUNTER — OFFICE VISIT (OUTPATIENT)
Dept: UROLOGY | Facility: OTHER | Age: 38
End: 2018-03-08
Payer: COMMERCIAL

## 2018-03-08 VITALS
RESPIRATION RATE: 16 BRPM | SYSTOLIC BLOOD PRESSURE: 136 MMHG | DIASTOLIC BLOOD PRESSURE: 78 MMHG | BODY MASS INDEX: 41.3 KG/M2 | WEIGHT: 304.5 LBS

## 2018-03-08 DIAGNOSIS — R31.0 GROSS HEMATURIA: Primary | ICD-10-CM

## 2018-03-08 PROCEDURE — 99243 OFF/OP CNSLTJ NEW/EST LOW 30: CPT | Performed by: UROLOGY

## 2018-03-08 NOTE — PROGRESS NOTES
"Urology Note            S:  Edwin Nuno is a 37 year old male who was seen in a consultation at the request of Dr. Hughes for hematuria.   Patient has gross hematuria recently.  He has no other voiding symptoms in addition to hematuria.  He has no flank pain.  He has no history of kidney stone.  He denies any trauma.  Recent UA showed 0 rbc/hpf.  Recent CT urogram was negative.  He is not a smoker but has exposure to it.  He is on coumadin for DVT/PE.  Past Medical History:   Diagnosis Date     DVT (deep vein thrombosis) in pregnancy (H)     right leg dx 1/13/2018     Family history of colon cancer      GERD (gastroesophageal reflux disease)      HTN (hypertension)      Hypotestosteronemia      Current Outpatient Prescriptions   Medication Sig Dispense Refill     warfarin (COUMADIN) 5 MG tablet Take 5 mg MWF, 7.5 mg rest of week or as directed by Anticoagulation Clinic. 45 tablet 11     magnesium oxide (MAG-OX) 400 (241.3 MG) MG tablet Take 1 tablet (400 mg) by mouth daily 60 tablet 3     cetirizine (ZYRTEC) 10 MG tablet Take 1 tablet (10 mg) by mouth every evening 30 tablet 1     lisinopril (PRINIVIL/ZESTRIL) 20 MG tablet Take 1 tablet (20 mg) by mouth daily 90 tablet 3     syringe/needle, disp, (B-D SYRINGE/NEEDLE) 22G X 1-1/2\" 3 ML MISC For Testosterone injections. 10 each 0     order for DME Equipment being ordered: gel cups, large 1 Device 0     order for DME Equipment being ordered: knee sleeve, XL 1 Device 0     testosterone cypionate (DEPO-TESTOSTERONE) 200 MG/ML injection 400 mg every 25 days. (Patient not taking: Reported on 2/2/2018) 2 mL 5     Needle, Disp, (B-D BLUNT FILL NEEDLE) 18G X 1-1/2\" MISC For drawing up medication. (Patient not taking: Reported on 3/8/2018) 10 each 0     Past Surgical History:   Procedure Laterality Date     APPENDECTOMY  Feb 1998     ARTHROSCOPY KNEE      right, 1/8/2018      Social History     Social History     Marital status:      Spouse name: N/A     Number of " children: N/A     Years of education: N/A     Occupational History     Not on file.     Social History Main Topics     Smoking status: Never Smoker     Smokeless tobacco: Former User     Quit date: 6/22/2002     Alcohol use Yes     Drug use: No     Sexual activity: Yes     Birth control/ protection: IUD     Other Topics Concern     Not on file     Social History Narrative    .     Family History   Problem Relation Age of Onset     Cancer - colorectal Mother      Breast Cancer Mother      Colon Cancer Mother      not malignant pollups     Cancer - colorectal Maternal Grandmother      Colon Cancer Maternal Grandmother      Large intestine and colon removal     Prostate Cancer Father      Removed prostate     OSTEOPOROSIS Paternal Grandmother      CEREBROVASCULAR DISEASE Other      Great Grandmother     C.A.D. No family hx of      DIABETES No family hx of      Hypertension No family hx of      CEREBROVASCULAR DISEASE No family hx of      Melanoma No family hx of           REVIEW OF SYSTEMS  =================  C: NEGATIVE for fever, chills, change in weight  I: NEGATIVE for worrisome rashes, moles or lesions  E/M: NEGATIVE for ear, mouth and throat problems  R: NEGATIVE for significant cough or SHORTNESS OF BREATH  CV:  NEGATIVE for chest pain, palpitations or peripheral edema  GI: NEGATIVE for nausea, abdominal pain, heartburn, or change in bowel habits  NEURO: NEGATIVE numbness/weakness  : see HPI  PSYCH: NEGATIVE depression/anxiety  LYmph: no new enlarged lymph nodes  Ortho: no new trauma/movements           O: Exam: obese  Constitutional: healthy, alert and no distress  Head: Normocephalic. No masses, lesions, tenderness or abnormalities  ENT: ENT exam normal, no neck nodes or sinus tenderness  Cardiovascular: negative, PMI normal.   Respiratory: negative, no evidence of respiratory distress  Gastrointestinal: Abdomen soft, non-tender. BS normal. No masses, organomegaly  : normal male  Musculoskeletal:  extremities normal- no gross deformities noted, gait normal and normal muscle tone  Skin: no suspicious lesions or rashes  Neurologic: Alert and oriented  Psychiatric: mentation appears normal. and affect normal/bright  Hematologic/Lymphatic/Immunologic: normal ant/post cervical, axillary, supraclavicular and inguinal nodes    Assessment:  Hematuria, gross                          Second hand exposure to smoking                          On coumadin    Recommendation: Schedule patient for cystoscopy next.

## 2018-03-08 NOTE — MR AVS SNAPSHOT
After Visit Summary   3/8/2018    Edwin Nuno    MRN: [id number]           Patient Information     Date Of Birth          1980        Visit Information        Provider Department      3/8/2018 8:15 AM Jose Raul Garcia MD [facility]        Today's Diagnoses     Gross hematuria    -  1      Care Instructions    Your cystoscopy is scheduled 3/29/18 @ 8:00AM Please call  if you need to reschedule this appointment.    Cystoscopy    Cystoscopy is a procedure that lets your doctor look directly inside your urethra and bladder. It can be used to:    Help diagnose a problem with your urethra, bladder, or kidneys.    Take a sample (biopsy) of bladder or urethral tissue.    Treat certain problems (such as removing kidney stones).    Place a stent to bypass an obstruction.    Take special X-rays of the kidneys.  Based on the findings, your doctor may recommend other tests or treatments.  What is a cystoscope?  A cystoscope is a telescope-like instrument that contains lenses and fiberoptics (small glass wires that make bright light). The cystoscope may be straight and rigid, or flexible to bend around curves in the urethra. The doctor may look directly into the cystoscope, or project the image onto a monitor.  Getting ready    Ask your doctor if you should stop taking any medicines before the procedure.    Ask whether you should avoid eating or drinking anything after midnight before the procedure.    Follow any other instructions your doctor gives you.  Tell your doctor before the exam if you:    Take any medicines, such as aspirin or blood thinners    Have allergies to any medicines    Are pregnant   The procedure  Cystoscopy is done in the doctor s office, surgery center, or hospital. The doctor and a nurse are present during the procedure. It takes only a few minutes, longer if a biopsy, X-ray, or treatment needs to be done.  During the procedure:    You lie on an exam  table on your back, knees bent and legs apart. You are covered with a drape.    Your urethra and the area around it are washed. Anesthetic jelly may be applied to numb the urethra. Other pain medicine is usually not needed. In some cases, you may be offered a mild sedative to help you relax. If a more extensive procedure is to be done, such as a biopsy or kidney stone removal, general anesthesia may be needed.    The cystoscope is inserted. A sterile fluid is put into the bladder to expand it. You may feel pressure from this fluid.    When the procedure is done, the cystoscope is removed.  After the procedure  If you had a sedative, general anesthesia, or spinal anesthesia, you must have someone drive you home. Once you re home:    Drink plenty of fluids.    You may have burning or light bleeding when you urinate--this is normal.    Medicines may be prescribed to ease any discomfort or prevent infection. Take these as directed.    Call your doctor if you have heavy bleeding or blood clots, burning that lasts more than a day, a fever over 100 F  (38  C), or trouble urinating.  Date Last Reviewed: 1/1/2017 2000-2017 [employer]. [address] [place] [address]. All rights reserved. This information is not intended as a substitute for professional medical care. Always follow your healthcare professional's instructions.                Follow-ups after your visit        Your next 10 appointments already scheduled     Mar 09, 2018  3:20 PM CST   MyChart Injury Follow Up with Ricardo Stoddard MD   [facility]    [address]  [facility] [address]   [phone]            Mar 13, 2018  5:00 PM CDT   [facility]agulation [facility] with [place] [facility]    [address] [facility] [address]   [phone]            Mar 29, 2018  8:00 AM CDT   [facility] Raul [facility]    [address] [facility] [address] [phone]              Who to contact     If you have questions or need follow up information about today's clinic visit or your schedule please contact [facility] directly at [phone].  Normal or non-critical lab and imaging results will be communicated to you by MyChart, letter or phone within 4 business days after the clinic has received the results. If you do not hear from us within 7 days, please contact the clinic through PearFundshart or phone. If you have a critical or abnormal lab result, we will notify you by phone as soon as possible.  Submit refill requests through StARTinitiative or call your pharmacy and they will forward the refill request to us. Please allow 3 business days for your refill to be completed.          Additional Information About Your Visit        PearFundshart Information     [employer] gives you secure access to your electronic health record. If you see a primary care provider, you can also send messages to your care team and make appointments. If you have questions, please call your primary care clinic.  If you do not have a primary care provider, please call [phone] and they will assist you.        Care EveryWhere ID     This is your Care EveryWhere ID. This could be used by other organizations to access your [place] medical records  [id number]        Your Vitals Were     Respirations BMI (Body Mass Index)                16 41.3 kg/m2           Blood Pressure from Last 3 Encounters:   03/08/18 136/78   02/28/18 126/72   02/13/18 132/78    Weight from Last 3 Encounters:   03/08/18 (!) 304 lb 8 oz (138.1 kg)   02/28/18 (!) 307 lb 8 oz (139.5 kg)   02/13/18 (!) 305 lb (138.3 kg)              Today, you had the following     No orders found for display       Primary Care Provider Office Phone # Fax #    Cecilio Hughes [phone] [address]  "[facility] [address]        Equal Access to Services     BERENICE BENJAMIN : Hadii melinda ku hadmargaritao Soparadiseali, waaxda luqadaha, qaybta kaalmada nickienaomydestiny, uasten matthewsamandaleeann hui. So [facility] [phone].    ATENCIÓN: Si habla español, tiene a bhandari disposición servicios gratuitos de asistencia lingüística. [facility] [phone].    We comply with applicable federal civil rights laws and [place] laws. We do not discriminate on the basis of race, color, national origin, age, disability, sex, sexual orientation, or gender identity.            Thank you!     Thank you for choosing [facility]  for your care. Our goal is always to provide you with excellent care. Hearing back from our patients is one way we can continue to improve our services. Please take a few minutes to complete the written survey that you may receive in the mail after your visit with us. Thank you!             Your Updated Medication List - Protect others around you: Learn how to safely use, store and throw away your medicines at www.disposemymeds.org.          This list is accurate as of 3/8/18  8:33 AM.  Always use your most recent med list.                   Brand Name Dispense Instructions for use Diagnosis    cetirizine 10 MG tablet    zyrTEC    30 tablet    Take 1 tablet (10 mg) by mouth every evening    Preop general physical exam       lisinopril 20 MG tablet    PRINIVIL/ZESTRIL    90 tablet    Take 1 tablet (20 mg) by mouth daily    Benign essential hypertension       magnesium oxide 400 (241.3 MG) MG tablet    MAG-OX    60 tablet    Take 1 tablet (400 mg) by mouth daily    Preop general physical exam       Needle (Disp) 18G X 1-1/2\" Misc    B-D BLUNT FILL NEEDLE    10 each    For drawing up medication.    Hypotestosteronism       * order for DME     1 Device    Equipment being ordered: knee sleeve, XL    Right knee injury, initial encounter       * order for DME     1 Device    Equipment being ordered: gel cups, " "large    Pain of right heel, Right Achilles tendinitis       syringe/needle (disp) 22G X 1-1/2\" 3 ML Misc    B-D SYRINGE/NEEDLE    10 each    For Testosterone injections.    Hypotestosteronism       testosterone cypionate 200 MG/ML injection    DEPO-TESTOSTERONE    2 mL    400 mg every 25 days.    Hypotestosteronism       warfarin 5 MG tablet    COUMADIN    45 tablet    Take 5 mg MWF, 7.5 mg rest of week or as directed by Anticoagulation Clinic.    Other acute pulmonary embolism without acute cor pulmonale (H)       * Notice:  This list has 2 medication(s) that are the same as other medications prescribed for you. Read the directions carefully, and ask your doctor or other care provider to review them with you.      "

## 2018-03-08 NOTE — PATIENT INSTRUCTIONS
Your cystoscopy is scheduled 3/29/18 @ 8:00AM Please call  if you need to reschedule this appointment.    Cystoscopy    Cystoscopy is a procedure that lets your doctor look directly inside your urethra and bladder. It can be used to:    Help diagnose a problem with your urethra, bladder, or kidneys.    Take a sample (biopsy) of bladder or urethral tissue.    Treat certain problems (such as removing kidney stones).    Place a stent to bypass an obstruction.    Take special X-rays of the kidneys.  Based on the findings, your doctor may recommend other tests or treatments.  What is a cystoscope?  A cystoscope is a telescope-like instrument that contains lenses and fiberoptics (small glass wires that make bright light). The cystoscope may be straight and rigid, or flexible to bend around curves in the urethra. The doctor may look directly into the cystoscope, or project the image onto a monitor.  Getting ready    Ask your doctor if you should stop taking any medicines before the procedure.    Ask whether you should avoid eating or drinking anything after midnight before the procedure.    Follow any other instructions your doctor gives you.  Tell your doctor before the exam if you:    Take any medicines, such as aspirin or blood thinners    Have allergies to any medicines    Are pregnant   The procedure  Cystoscopy is done in the doctor s office, surgery center, or hospital. The doctor and a nurse are present during the procedure. It takes only a few minutes, longer if a biopsy, X-ray, or treatment needs to be done.  During the procedure:    You lie on an exam table on your back, knees bent and legs apart. You are covered with a drape.    Your urethra and the area around it are washed. Anesthetic jelly may be applied to numb the urethra. Other pain medicine is usually not needed. In some cases, you may be offered a mild sedative to help you relax. If a more extensive procedure is to be done, such as a biopsy  or kidney stone removal, general anesthesia may be needed.    The cystoscope is inserted. A sterile fluid is put into the bladder to expand it. You may feel pressure from this fluid.    When the procedure is done, the cystoscope is removed.  After the procedure  If you had a sedative, general anesthesia, or spinal anesthesia, you must have someone drive you home. Once you re home:    Drink plenty of fluids.    You may have burning or light bleeding when you urinate this is normal.    Medicines may be prescribed to ease any discomfort or prevent infection. Take these as directed.    Call your doctor if you have heavy bleeding or blood clots, burning that lasts more than a day, a fever over 100 F  (38  C), or trouble urinating.  Date Last Reviewed: 1/1/2017 2000-2017 [employer]. [address] [place] [address], [place] [address]. All rights reserved. This information is not intended as a substitute for professional medical care. Always follow your healthcare professional's instructions.

## 2018-03-09 ENCOUNTER — ANTICOAGULATION THERAPY VISIT (OUTPATIENT)
Dept: ANTICOAGULATION | Facility: CLINIC | Age: 38
End: 2018-03-09
Payer: COMMERCIAL

## 2018-03-09 ENCOUNTER — OFFICE VISIT (OUTPATIENT)
Dept: FAMILY MEDICINE | Facility: CLINIC | Age: 38
End: 2018-03-09
Payer: COMMERCIAL

## 2018-03-09 ENCOUNTER — TELEPHONE (OUTPATIENT)
Dept: ANTICOAGULATION | Facility: CLINIC | Age: 38
End: 2018-03-09

## 2018-03-09 VITALS
HEIGHT: 72 IN | DIASTOLIC BLOOD PRESSURE: 81 MMHG | RESPIRATION RATE: 18 BRPM | WEIGHT: 299 LBS | HEART RATE: 68 BPM | TEMPERATURE: 98.6 F | BODY MASS INDEX: 40.5 KG/M2 | SYSTOLIC BLOOD PRESSURE: 138 MMHG

## 2018-03-09 DIAGNOSIS — Z79.01 LONG-TERM (CURRENT) USE OF ANTICOAGULANTS: ICD-10-CM

## 2018-03-09 DIAGNOSIS — I82.401 ACUTE DEEP VEIN THROMBOSIS (DVT) OF RIGHT LOWER EXTREMITY, UNSPECIFIED VEIN (H): ICD-10-CM

## 2018-03-09 DIAGNOSIS — I82.409 DVT (DEEP VENOUS THROMBOSIS) (H): ICD-10-CM

## 2018-03-09 DIAGNOSIS — I26.99 PULMONARY EMBOLISM ON RIGHT (H): ICD-10-CM

## 2018-03-09 DIAGNOSIS — I26.99 PULMONARY EMBOLISM ON RIGHT (H): Primary | ICD-10-CM

## 2018-03-09 LAB — INR POINT OF CARE: 1.5 (ref 0.86–1.14)

## 2018-03-09 PROCEDURE — 99213 OFFICE O/P EST LOW 20 MIN: CPT | Performed by: FAMILY MEDICINE

## 2018-03-09 PROCEDURE — 36416 COLLJ CAPILLARY BLOOD SPEC: CPT

## 2018-03-09 PROCEDURE — 85610 PROTHROMBIN TIME: CPT | Mod: QW

## 2018-03-09 PROCEDURE — 99207 ZZC NO CHARGE NURSE ONLY: CPT

## 2018-03-09 NOTE — TELEPHONE ENCOUNTER
Dr. Garcia-    We wanted to check with you first to see if holding is even necessary for this procedure?    Please let us know and route to [place] [id number].    Thank you,  Humera Masterson, BSN, RN  [facility]

## 2018-03-09 NOTE — MR AVS SNAPSHOT
Edwin Nuno   3/9/2018 2:45 PM   Anticoagulation Therapy Visit    Description:  37 year old male   Provider:  RACHELLE ANTI COAG   Department:  Cl Anticoag           INR as of 3/9/2018     Today's INR 1.5!      Anticoagulation Summary as of 3/9/2018     INR goal 2.0-3.0   Today's INR 1.5!   Full instructions 3/9: 10 mg; Otherwise 5 mg on Mon, Wed, Fri; 7.5 mg all other days   Next INR check 3/16/2018    Indications   DVT (deep venous thrombosis) (H) [I82.409]  Pulmonary embolism on right (H) [I26.99]  Long-term (current) use of anticoagulants [Z79.01] [Z79.01]         Your next Anticoagulation Clinic appointment(s)     Mar 16, 2018  2:15 PM CDT   Anticoagulation Visit with CL ANTI COAG   [facility] (Former[facility])    [address] [facility] [address] [phone]              Contact Numbers     Please call [phone] with any problems or questions regarding your therapy.    If you need to cancel and/or reschedule your appointment please call one of the following numbers:  [facility] - [phone]  [facility] - [phone]  [facility] [phone]  [facility] [phone]  [place] - [phone]            March 2018 Details    Sun Mon Tue Wed Thu Fri Sat         1               2               3                 4               5               6               7               8               9      10 mg   See details      10      7.5 mg           11      7.5 mg         12      5 mg         13      7.5 mg         14      5 mg         15      7.5 mg         16            17                 18               19               20               21               22               23               24                 25               26               27               28               29               30               31                Date Details   03/09 This INR check       Date of next INR:  3/16/2018         How to take your warfarin dose     To take:  5 mg Take 1 of  the 5 mg tablets.    To take:  7.5 mg Take 1.5 of the 5 mg tablets.    To take:  10 mg Take 2 of the 5 mg tablets.

## 2018-03-09 NOTE — MR AVS SNAPSHOT
After Visit Summary   3/9/2018    Edwin Nuno    MRN: [id number]           Patient Information     Date Of Birth          1980        Visit Information        Provider Department      3/9/2018 3:20 PM Ricardo Stoddard MD [facility]        Today's Diagnoses     Pulmonary embolism on right (H)    -  1    Acute deep vein thrombosis (DVT) of right lower extremity, unspecified vein (H)          Care Instructions          Thank you for choosing [facility].  You may be receiving a survey in the mail from [facility] regarding your visit today.  Please take a few minutes to complete and return the survey to let us know how we are doing.      Our Clinic hours are:  Mondays    7:20 am - 7 pm  Tues -  Fri  7:20 am - 5 pm    Clinic Phone: [phone]    The clinic lab opens at 7:30 am Mon - Fri and appointments are required.    [facility]  Ph. [phone]  Monday-Thursday 8 am - 7pm  Tues/Wed/Fri 8 am - 5:30 pm                 Follow-ups after your visit        Your next 10 appointments already scheduled     Mar 16, 2018  2:15 PM CDT   Anticoagulation Visit with CL ANTI COAG   [facility] ([facility])    [address]  [facility] [address] [phone]            Mar 29, 2018  8:00 AM CDT   Return Visit with Jose Raul Garcia MD   [facility]    [address] [facility] [address] [phone]              Who to contact     If you have questions or need follow up information about today's clinic visit or your schedule please contact [facility] directly at [phone].  Normal or non-critical lab and imaging results will be communicated to you by MyChart, letter or phone within 4 business days after the clinic has received the results. If you do not hear from us within 7 days, please contact the clinic through MyChart or phone. If you have a  critical or abnormal lab result, we will notify you by phone as soon as possible.  Submit refill requests through Nidmi or call your pharmacy and they will forward the refill request to us. Please allow 3 business days for your refill to be completed.          Additional Information About Your Visit        Breakout Commerce[employer] Information     [employer]mi gives you secure access to your electronic health record. If you see a primary care provider, you can also send messages to your care team and make appointments. If you have questions, please call your primary care clinic.  If you do not have a primary care provider, please call [phone] and they will assist you.        Care EveryWhere ID     This is your Care EveryWhere ID. This could be used by other organizations to access your [place] medical records  [id number]        Your Vitals Were     Pulse Temperature Respirations Height BMI (Body Mass Index)       68 98.6  F (37  C) 18 6' (1.829 m) 40.55 kg/m2        Blood Pressure from Last 3 Encounters:   03/09/18 138/81   03/08/18 136/78   02/28/18 126/72    Weight from Last 3 Encounters:   03/09/18 299 lb (135.6 kg)   03/08/18 (!) 304 lb 8 oz (138.1 kg)   02/28/18 (!) 307 lb 8 oz (139.5 kg)              Today, you had the following     No orders found for display         Today's Medication Changes          These changes are accurate as of 3/9/18  3:41 PM.  If you have any questions, ask your nurse or doctor.               Stop taking these medicines if you haven't already. Please contact your care team if you have questions.     order for DME   Stopped by:  Ricardo Stoddard MD                    Primary Care Provider Office Phone # Fax #    Cecilio Hughes [phone] [address] [facility] [address]        Equal Access to Services     BERENICE BENJAMIN : Anusha Cornejo, wagrant worley, qaybta kaalmaausten mohan . So [facility]  "[phone].    ATENCIÓN: Si radhika blanc, tiene a bhandari disposición servicios gratuitos de asistencia lingüística. Rochelle arreola [phone].    We comply with applicable federal civil rights laws and [place] laws. We do not discriminate on the basis of race, color, national origin, age, disability, sex, sexual orientation, or gender identity.            Thank you!     Thank you for choosing [facility]  for your care. Our goal is always to provide you with excellent care. Hearing back from our patients is one way we can continue to improve our services. Please take a few minutes to complete the written survey that you may receive in the mail after your visit with us. Thank you!             Your Updated Medication List - Protect others around you: Learn how to safely use, store and throw away your medicines at www.disposemymeds.org.          This list is accurate as of 3/9/18  3:41 PM.  Always use your most recent med list.                   Brand Name Dispense Instructions for use Diagnosis    cetirizine 10 MG tablet    zyrTEC    30 tablet    Take 1 tablet (10 mg) by mouth every evening    Preop general physical exam       lisinopril 20 MG tablet    PRINIVIL/ZESTRIL    90 tablet    Take 1 tablet (20 mg) by mouth daily    Benign essential hypertension       magnesium oxide 400 (241.3 MG) MG tablet    MAG-OX    60 tablet    Take 1 tablet (400 mg) by mouth daily    Preop general physical exam       Needle (Disp) 18G X 1-1/2\" Misc    B-D BLUNT FILL NEEDLE    10 each    For drawing up medication.    Hypotestosteronism       syringe/needle (disp) 22G X 1-1/2\" 3 ML Misc    B-D SYRINGE/NEEDLE    10 each    For Testosterone injections.    Hypotestosteronism       testosterone cypionate 200 MG/ML injection    DEPO-TESTOSTERONE    2 mL    400 mg every 25 days.    Hypotestosteronism       warfarin 5 MG tablet    COUMADIN    45 tablet    Take 5 mg MWF, 7.5 mg rest of week or as directed by Anticoagulation " Clinic.    Other acute pulmonary embolism without acute cor pulmonale (H)

## 2018-03-09 NOTE — PATIENT INSTRUCTIONS
Thank you for choosing [facility].  You may be receiving a survey in the mail from [facility] regarding your visit today.  Please take a few minutes to complete and return the survey to let us know how we are doing.      Our Clinic hours are:  Mondays    7:20 am - 7 pm  Tues -  Fri  7:20 am - 5 pm    Clinic Phone: [phone]    The clinic lab opens at 7:30 am Mon - Fri and appointments are required.    [facility]. [phone]  Monday-Thursday 8 am - 7pm  Tues/Wed/Fri 8 am - 5:30 pm

## 2018-03-09 NOTE — PROGRESS NOTES
ANTICOAGULATION FOLLOW-UP CLINIC VISIT    Patient Name:  Edwin Nuno  Date:  3/9/2018  Contact Type:  Face to Face    SUBJECTIVE:     Patient Findings     Positives Change in diet/appetite (not eating many vit K rich greens), Activity level change (Back at work, on his feet 8 hours/day, walking around, much more active)    Comments Pt denies any missed doses, changes in meds.  Pt has been have hematuria, has seen urologist and has cystoscopy scheduled for 3/29.    INR subtherapeutic today, Patient had 45 mg in the previous 7 days, will increase  dose to 50 mg by next INR check in 7 days (~10% increase). Pt denies s/s of PE or DVT, declines to come in sooner for INR check as he will be out of town until next Thursday afternoon.    Writer will route telephone encounter to  (PCP) to determine if pt needs to be on lovenox for cystoscopy.             OBJECTIVE    INR Protime   Date Value Ref Range Status   03/09/2018 1.5 (A) 0.86 - 1.14 Final       ASSESSMENT / PLAN  INR assessment SUB    Recheck INR In: 1 WEEK    INR Location Clinic      Anticoagulation Summary as of 3/9/2018     INR goal 2.0-3.0   Today's INR 1.5!   Maintenance plan 5 mg (5 mg x 1) on Mon, Wed, Fri; 7.5 mg (5 mg x 1.5) all other days   Full instructions 3/9: 10 mg; Otherwise 5 mg on Mon, Wed, Fri; 7.5 mg all other days   Weekly total 45 mg   Plan last modified Humera Masterson RN (2/19/2018)   Next INR check 3/16/2018   Priority INR   Target end date     Indications   DVT (deep venous thrombosis) (H) [I82.409]  Pulmonary embolism on right (H) [I26.99]  Long-term (current) use of anticoagulants [Z79.01] [Z79.01]         Anticoagulation Episode Summary     INR check location     Preferred lab     Send INR reminders to [facility] CLINIC POOL    Comments * 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 Length of therapy is 3-6 months. Pt will have repeat imaging prior to stopping warfarin.      Anticoagulation Care Providers      Provider Role Specialty Phone number    RocioCecilio alcantara MD [facility] [phone]            See the Encounter Report to view Anticoagulation Flowsheet and Dosing Calendar (Go to Encounters tab in chart review, and find the Anticoagulation Therapy Visit)        Maria Teresa Meredith RN

## 2018-03-09 NOTE — PROGRESS NOTES
SUBJECTIVE:   Edwin Nuno is a 37 year old male who presents to clinic today for the following health issues:      Chief Complaint   Patient presents with     Forms     follow up after DVT and PE fill out Return to work slip              Problem list and histories reviewed & adjusted, as indicated.  Additional history:         Reviewed and updated as needed this visit by clinical staff  Tobacco  Allergies  Meds       Reviewed and updated as needed this visit by Provider      Further history obtained, clarified or corrected by physician:  He is recovering from knee surgery and he has been back at work at 4 hour restrictions.  He is doing well with that.  He had a DVT and PE so he is on anticoagulation.  He then was found to have hematuria that he witnessed though now it looking back he is wondering if it came from his colon rather than bladder.  He is set up to have a cystoscopy.  He has not had any further bleeding.    OBJECTIVE:  /81  Pulse 68  Temp 98.6  F (37  C)  Resp 18  Ht 6' (1.829 m)  Wt 299 lb (135.6 kg)  BMI 40.55 kg/m2  LUNGS: clear to auscultation, normal breath sounds  CV: RRR without murmur  ABD: BS+, soft, nontender, no masses, no hepatosplenomegaly  EXTREMITIES: without joint tenderness, swelling or erythema.  No muscle tenderness or abnormality.  The right knee shows just a little puffiness around the incision sites but no erythema and no general swelling.  SKIN: No rashes or abnormalities    ASSESSMENT:     Pulmonary embolism on right (H)  Acute deep vein thrombosis (DVT) of right lower extremity, unspecified vein (H)    PLAN:  I signed off for him to go back to full duty though he does need to change positions at least every 2 hours so that if he has a long drive he will have to stop  He is going to go ahead with the cystoscopy  I recommended he have a colonoscopy following the cystoscopy.      NEURO:non focal exam

## 2018-03-09 NOTE — TELEPHONE ENCOUNTER
Edwin is scheduled to have a cystoscopy completed on March 29, 2018 and will need to hold warfarin for 5 days.   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   Current CHEST guidelines suggest considering bridging for those with high thrombotic risk.   While the patient is off warfarin, would you recommend the patient use enoxaparin injections as a bridge? If yes, would you like the prophylactic dose (40mg daily) or the therapeutic dose (1mg/kg twice daily)? Should the patient use enoxaparin both before and after the procedure or only afterwards?  CURRENT BRIDGING GUIDELINES  *NOTE: This does not take into consideration the bleeding risk of the procedure.   To calculate HASBLED score click HERE  Pre-Procedural bridging is not needed for most patient's except for the following:    VTE within the previous month    Prior history of recurrent VTE during anticoagulation therapy interruption    Underingoing a procedure with high inherent risk for VTE (ie. Joint replacement, major abdominal cancer resection)     Perioperative Thrombotic Risk Stratification    High Thrombotic Risk Moderate Thrombotic Risk Low Thrombotic Risk     Mechanical Heart Valve   Any mitral valve prosthesis    Any caged-ball or tilting disk aortic valve prothesis    Stroke or TIA within 6 months   Bileaflet aortic valve prothesis and one or more of the following risk factors: Afib, prior stroke or TIA, hypertension, diabetes, CHF, age >75 years   Bileaflet aortic valve prothesis without Afib and no other risk factors for stroke     Atrial Fibrillation   CHADS2-VASc score 7 to 9    Stroke or TIA within 3 months    Rheumatic vavlular heart disease     CHADS2-VASc score of 5 to 6   CHADS2-VASc score of 4 or less (assuming no prior stroke or TIA)       VTE   VTE within 3 months    Severe thrombophilia (eg. Deficiency of protein C, protein S, or antithrombin; antiphospholipid antibodies; Homozygous Factor V Leiden or  Prothrombin Gene Mutation; muliple abormalities)   VTE within the past 3-12 months    Non-severe thrombophilia (eg. Heterozygous Factor V Leiden or Prothrombin Gene Mutation)    Recurrent VTE    Active cancer (treated within 6 months or palliative)   VTE >12 months and no other risk factors    For additional Anticoagulation Bridging Guidelines -- Click HERE    Please respond to the [facility] pool ([id number]) so all staff are aware of the plan. The Anticoagulation Clinic will order any necessary medications and contact the patient with a written plan regarding the upcoming procedure. Thank you!  Anticoagulation Clinic Staff  Phone: [phone]  Fax: [phone]  Pool # [id number]

## 2018-03-09 NOTE — NURSING NOTE
Chief Complaint   Patient presents with     Forms     follow up after DVT and PE fill out Return to work slip        Initial /81  Pulse 68  Temp 98.6  F (37  C)  Resp 18  Ht 6' (1.829 m)  Wt 299 lb (135.6 kg)  BMI 40.55 kg/m2 Estimated body mass index is 40.55 kg/(m^2) as calculated from the following:    Height as of this encounter: 6' (1.829 m).    Weight as of this encounter: 299 lb (135.6 kg).  Medication Reconciliation: complete   Birdie Llanes, CMA

## 2018-03-12 NOTE — TELEPHONE ENCOUNTER
Two things: please ask Urology if they need the warfarin stopped before the cystoscopy.   If not then we do not have to use bridging. I  If they want it stopped, then we should have him stop this 5 days before the cystoscopy and   Bridge with Lovenox at 40 mg daily, sub Q til 5 days after the procedure. And restart the warfarin on the day of the procedure,   As that will take 4-5 days to become therapeutic. .Cecilio Hughes

## 2018-03-13 NOTE — TELEPHONE ENCOUNTER
Dr. Garcia-    Waiting on a response from you please. Thank you.    Humera Masterson, BSN, RN  [facility]  Pool [id number]    Per Celine Newman RN from Dr. Garcia's office on 3/16/18:      Good morning,   No need to hold blood thinner for the cystoscopy.   Sorry for the delay Dr. Garcia is not in this week.     Celine Newman RN   (Routing comment)

## 2018-03-14 DIAGNOSIS — I82.409 DVT (DEEP VENOUS THROMBOSIS) (H): ICD-10-CM

## 2018-03-14 DIAGNOSIS — I26.99 PULMONARY EMBOLISM ON RIGHT (H): Primary | ICD-10-CM

## 2018-03-14 DIAGNOSIS — Z79.01 LONG TERM CURRENT USE OF ANTICOAGULANT THERAPY: ICD-10-CM

## 2018-03-15 DIAGNOSIS — Z79.01 LONG TERM CURRENT USE OF ANTICOAGULANT THERAPY: ICD-10-CM

## 2018-03-15 DIAGNOSIS — I82.409 DVT (DEEP VENOUS THROMBOSIS) (H): ICD-10-CM

## 2018-03-15 DIAGNOSIS — I26.99 PULMONARY EMBOLISM ON RIGHT (H): ICD-10-CM

## 2018-03-15 LAB — INR PPP: 1.39 (ref 0.86–1.14)

## 2018-03-15 PROCEDURE — 85610 PROTHROMBIN TIME: CPT | Performed by: FAMILY MEDICINE

## 2018-03-15 PROCEDURE — 36415 COLL VENOUS BLD VENIPUNCTURE: CPT | Performed by: FAMILY MEDICINE

## 2018-03-16 ENCOUNTER — ANTICOAGULATION THERAPY VISIT (OUTPATIENT)
Dept: ANTICOAGULATION | Facility: CLINIC | Age: 38
End: 2018-03-16
Payer: COMMERCIAL

## 2018-03-16 ENCOUNTER — TELEPHONE (OUTPATIENT)
Dept: ANTICOAGULATION | Facility: CLINIC | Age: 38
End: 2018-03-16

## 2018-03-16 DIAGNOSIS — I26.99 OTHER ACUTE PULMONARY EMBOLISM WITHOUT ACUTE COR PULMONALE (H): ICD-10-CM

## 2018-03-16 DIAGNOSIS — Z79.01 LONG-TERM (CURRENT) USE OF ANTICOAGULANTS: ICD-10-CM

## 2018-03-16 DIAGNOSIS — I26.99 PULMONARY EMBOLISM ON RIGHT (H): ICD-10-CM

## 2018-03-16 PROCEDURE — 99207 ZZC NO CHARGE NURSE ONLY: CPT

## 2018-03-16 RX ORDER — WARFARIN SODIUM 5 MG/1
TABLET ORAL
Qty: 45 TABLET | Refills: 11 | COMMUNITY
Start: 2018-03-16 | End: 2018-03-30

## 2018-03-16 NOTE — MR AVS SNAPSHOT
Edwin Nuno   3/16/2018   Anticoagulation Therapy Visit    Description:  37 year old male   Provider:  Humera Masterson, RN   Department:  [place] [facility]           INR as of 3/16/2018     Today's INR 1.39! (3/15/2018)      Anticoagulation Summary as of 3/16/2018     INR goal 2.0-3.0   Today's INR 1.39! (3/15/2018)   Full instructions 3/16: 12.5 mg; Otherwise 7.5 mg every day   Next INR check 3/26/2018    Indications   DVT (deep venous thrombosis) (H) [I82.409]  Pulmonary embolism on right (H) [I26.99]  Long-term (current) use of anticoagulants [Z79.01] [Z79.01]         March 2018 Details    Sun Mon Tue Wed Thu Fri Sat         1               2               3                 4               5               6               7               8               9               10                 11               12               13               14               15               16      12.5 mg   See details      17      7.5 mg           18      7.5 mg         19      7.5 mg         20      7.5 mg         21      7.5 mg         22      7.5 mg         23      7.5 mg         24      7.5 mg           25      7.5 mg         26            27               28               29               30               31                Date Details   03/16 This INR check       Date of next INR:  3/26/2018         How to take your warfarin dose     To take:  7.5 mg Take 1.5 of the 5 mg tablets.    To take:  12.5 mg Take 2.5 of the 5 mg tablets.

## 2018-03-16 NOTE — TELEPHONE ENCOUNTER
Dr. Stoddard-    This patient has had a sub therapeutic INR since 3-9-18. He had DVT/PE about 2 months ago. Would you like the patient to be bridged with lovenox until in range again? The other factor to consider is that he will be out of state (not sure if driving or flying) Monday through Friday next week so limited access to having INR checked.    Please route to pool [phone]    Humera Masterson, DENNISN, RN

## 2018-03-16 NOTE — PROGRESS NOTES
Writer called patient as his INR result was never faxed to [facility]. He stated he just walked out of the lab appointment now. [facility] will not receive the results today. He will continue taking Lovenox tonight and tomorrow AM and will take 7.5mg tonight.   Nunu Silvestre, RN on 3/19/2018 at 4:58 PM  -----------------------------------------------------  Addendum: Per Dr. Stoddard, the patient needs to bridge again until INR back in range (probably twice in a row to be safe). Writer spoke with patient and he will have INR checked on 3-19-18 while out of town. He will  snowbird letter and lovenox at [facility] today and start taking BID. Inpatient was notified that they do not have to watch for result.    JAHAIRA Coelho, RN    ANTICOAGULATION FOLLOW-UP CLINIC VISIT    Patient Name:  Edwin Nuno  Date:  3/16/2018  Contact Type:  Telephone/ writer spoke with Edwin on phone    SUBJECTIVE:     Patient Findings     Positives Activity level change (permanent increase in activity)    Comments Patient does not need to hold warfarin for upcoming cystoscopy per Dr. Garcia's nursing team. No missed doses, increase in greens or other changes since last INR. He remains more active now that he is back at work. He will be out of town in another state M- next week and the week after (home on the weekends). He will check INR at lab on Saturday, March 24. Writer will send staff message to inpatient pharm to watch for this result and dose only if outside of 1.9-3.1 range. Otherwise, [facility] can follow up on 3-26-18. Patient will avoid greens until next INR. Writer is increasing maintenance dose by 16.7% with a larger one time dose today.    Also, will send phone encounter to Dr. Stoddard to see if resumed bridging is warranted. It has been two months since DVT/PE.           OBJECTIVE    INR   Date Value Ref Range Status   03/15/2018 1.39 (H) 0.86 - 1.14 Final       ASSESSMENT / PLAN  INR assessment SUB    Recheck INR In: 9 DAYS     INR Location Clinic lab     Anticoagulation Summary as of 3/16/2018     INR goal 2.0-3.0   Today's INR 1.39! (3/15/2018)   Maintenance plan 7.5 mg (5 mg x 1.5) every day   Full instructions 3/16: 12.5 mg; Otherwise 7.5 mg every day   Weekly total 52.5 mg   Plan last modified Humera Masterson RN (3/16/2018)   Next INR check 3/26/2018   Priority INR   Target end date     Indications   DVT (deep venous thrombosis) (H) [I82.409]  Pulmonary embolism on right (H) [I26.99]  Long-term (current) use of anticoagulants [Z79.01] [Z79.01]         Anticoagulation Episode Summary     INR check location     Preferred lab     Send INR reminders to [facility]    Comments * 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 Length of therapy is 3-6 months. Pt will have repeat imaging prior to stopping warfarin.      Anticoagulation Care Providers     Provider Role Specialty Phone number    Ricardo Stoddard MD [facility] [phone]            See the Encounter Report to view Anticoagulation Flowsheet and Dosing Calendar (Go to Encounters tab in chart review, and find the Anticoagulation Therapy Visit)        Humera Masterson RN

## 2018-03-19 ENCOUNTER — TRANSFERRED RECORDS (OUTPATIENT)
Dept: HEALTH INFORMATION MANAGEMENT | Facility: CLINIC | Age: 38
End: 2018-03-19

## 2018-03-20 ENCOUNTER — ANTICOAGULATION THERAPY VISIT (OUTPATIENT)
Dept: ANTICOAGULATION | Facility: CLINIC | Age: 38
End: 2018-03-20
Payer: COMMERCIAL

## 2018-03-20 DIAGNOSIS — Z79.01 LONG-TERM (CURRENT) USE OF ANTICOAGULANTS: ICD-10-CM

## 2018-03-20 DIAGNOSIS — I82.409 DVT (DEEP VENOUS THROMBOSIS) (H): ICD-10-CM

## 2018-03-20 DIAGNOSIS — I26.99 PULMONARY EMBOLISM ON RIGHT (H): ICD-10-CM

## 2018-03-20 LAB — INR PPP: 1.6

## 2018-03-20 PROCEDURE — 99207 ZZC NO CHARGE NURSE ONLY: CPT

## 2018-03-20 NOTE — MR AVS SNAPSHOT
Edwin MARCIA Nuno   3/20/2018   Anticoagulation Therapy Visit    Description:  37 year old male   Provider:  Maria Teresa Meredith RN   Department:  [place] [facility]           INR as of 3/20/2018     Today's INR 1.6! (3/19/2018)      Anticoagulation Summary as of 3/20/2018     INR goal 2.0-3.0   Today's INR 1.6! (3/19/2018)   Full instructions 3/20: 15 mg; 3/21: 12.5 mg; Otherwise 7.5 mg every day   Next INR check 3/22/2018    Indications   DVT (deep venous thrombosis) (H) [I82.409]  Pulmonary embolism on right (H) [I26.99]  Long-term (current) use of anticoagulants [Z79.01] [Z79.01]         March 2018 Details    Sun Mon Tue Wed Thu Fri Sat         1               2               3                 4               5               6               7               8               9               10                 11               12               13               14               15               16               17                 18               19               20      15 mg   See details      21      12.5 mg         22            23               24                 25               26               27               28               29               30               31                Date Details   03/20 This INR check       Date of next INR:  3/22/2018         How to take your warfarin dose     To take:  7.5 mg Take 1.5 of the 5 mg tablets.    To take:  12.5 mg Take 2.5 of the 5 mg tablets.    To take:  15 mg Take 3 of the 5 mg tablets.

## 2018-03-20 NOTE — PROGRESS NOTES
ANTICOAGULATION FOLLOW-UP CLINIC VISIT    Patient Name:  Edwin Nuno  Date:  3/20/2018  Contact Type:  Telephone/ Pt reported results/FAX from [facility] [phone]    SUBJECTIVE:     Patient Findings     Comments Pt reports taking warfarin as instructed, denies missed doses, denies changes in meds, diet or health, but reports he is much more active due to being back at work.  Writer instructed pt he will need to continue lovenox injections BID, recheck INR Thurs 3/22, if therapeutic, recheck Fri 3/23 and then can stop lovenox if he continues in range.    Writer ordered 5 more lovenox syringes, sent to [email or web address] on [address] in [place], [place].           OBJECTIVE    INR   Date Value Ref Range Status   03/19/2018 1.6  Final       ASSESSMENT / PLAN  No question data found.  Anticoagulation Summary as of 3/20/2018     INR goal 2.0-3.0   Today's INR 1.6! (3/19/2018)   Maintenance plan 7.5 mg (5 mg x 1.5) every day   Full instructions 3/20: 15 mg; 3/21: 12.5 mg; Otherwise 7.5 mg every day   Weekly total 52.5 mg   Plan last modified Humera Masterson RN (3/16/2018)   Next INR check 3/22/2018   Priority INR   Target end date     Indications   DVT (deep venous thrombosis) (H) [I82.409]  Pulmonary embolism on right (H) [I26.99]  Long-term (current) use of anticoagulants [Z79.01] [Z79.01]         Anticoagulation Episode Summary     INR check location     Preferred lab     Send INR reminders to [place] [facility] PO[facility]    Comments * 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 Length of therapy is 3-6 months. Pt will have repeat imaging prior to stopping warfarin.      Anticoagulation Care Providers     Provider Role Specialty Phone number    Ricardo Stoddard MD [facility] [phone]            See the Encounter Report to view Anticoagulation Flowsheet and Dosing Calendar (Go to Encounters tab in chart review, and find the Anticoagulation Therapy Visit)        Maria Teresa Meredith RN

## 2018-03-21 ENCOUNTER — TELEPHONE (OUTPATIENT)
Dept: FAMILY MEDICINE | Facility: CLINIC | Age: 38
End: 2018-03-21

## 2018-03-21 NOTE — TELEPHONE ENCOUNTER
Discussed with covering provider and would like [facility] to help with this.  [facility] did order this for the patient.  The clinic PA person does not come into clinic until 3 pm and patient is out of shots today. This needs to be handled before than.  The number to call [phone] to initiate PA.  ID # is [id number].  All of this information is on the fax.     Thank you for your help.    Luli CAMACHO RN

## 2018-03-21 NOTE — TELEPHONE ENCOUNTER
Attempted to reach patient rang multiple times and then disconnected.     Writer spoke to staff to start the PA process.  The staff stated the max 30 quantity in 68 days.  Patient has script for qty . 5.  She stated the patient can have qty. 4 filled by insurance.  That will get patient through Friday am.  Patient is due to have labs repeated on Friday.  Patient has appt scheduled for Saturday for lab.  Will discuss with patient.  Patient 68 days are up on 3/22/18.  Patient should be able to get another 12 if needed after 3/22/18.      Luli CAMACHO RN

## 2018-03-21 NOTE — TELEPHONE ENCOUNTER
Patient was notified.  Patient understands and will call back if the prescription of 4 is not available.  Patient will have his INR drawn Thursday and Friday.  Will send to [facility] for FYI.    Luli CAMACHO RN

## 2018-03-21 NOTE — TELEPHONE ENCOUNTER
Reason for call:  Patient reporting a symptom    Symptom or request: Pt is on coumadin for DVTs and PEs - His INR was 1.6 on 3/19/18 and the Coumadin RN prescribed additional Lovenox injections and pt's insurance will not cover them because he hit his max amount of shots in 90 days?  He wants Dr. Stoddard to do a P.A. so that the shots will be covered and he is asking that this be done ASAP - Pt is out of shots today.  Please call patient and advise.        Duration (how long have symptoms been present): ongoing    Have you been treated for this before? Yes    Additional comments:     Phone Number patient can be reached at:  Cell number on file:    Telephone Information:   Mobile [phone]       Best Time:  any    Can we leave a detailed message on this number:  YES    Call taken on 3/21/2018 at 7:55 AM by Rita Hoang

## 2018-03-21 NOTE — TELEPHONE ENCOUNTER
[facility] does not manage/initiate PAs for patients. This will either need to be completed by the PCP care team or through a care coordinator. Even if the PA was initiated today, it will likely not be approved for up to a week. He will need someone to request a short term emergency override.

## 2018-03-21 NOTE — TELEPHONE ENCOUNTER
I'm pretty sure they are going to want a provider's signature. Please give to covering provider.    Humera Masterson, DENNISN, RN

## 2018-03-21 NOTE — TELEPHONE ENCOUNTER
Will route to [facility] for FYI. Patient may need a new prescription.      Luli CAMACHO RN

## 2018-03-21 NOTE — TELEPHONE ENCOUNTER
PCP is out of the office today.  The writer can send this to the covering provider to review.  Writer will send to [facility] for any recommendations also.    Thank you  Luli CAMACHO RN

## 2018-03-21 NOTE — TELEPHONE ENCOUNTER
Received fax from pharmacy for PA.  Faxed PA to the [facility] clinic.    Luli CAMACHO RN

## 2018-03-22 ENCOUNTER — ANTICOAGULATION THERAPY VISIT (OUTPATIENT)
Dept: ANTICOAGULATION | Facility: CLINIC | Age: 38
End: 2018-03-22
Payer: COMMERCIAL

## 2018-03-22 DIAGNOSIS — I26.99 PULMONARY EMBOLISM ON RIGHT (H): ICD-10-CM

## 2018-03-22 DIAGNOSIS — I82.409 DVT (DEEP VENOUS THROMBOSIS) (H): ICD-10-CM

## 2018-03-22 DIAGNOSIS — Z79.01 LONG-TERM (CURRENT) USE OF ANTICOAGULANTS: ICD-10-CM

## 2018-03-22 LAB — INR PPP: 1.6

## 2018-03-22 PROCEDURE — 99207 ZZC NO CHARGE NURSE ONLY: CPT

## 2018-03-22 NOTE — MR AVS SNAPSHOT
Edwin Nuno   3/22/2018   Anticoagulation Therapy Visit    Description:  37 year old male   Provider:  Maria Teresa Meredith RN   [facility]           INR as of 3/22/2018     Today's INR 1.6!      Anticoagulation Summary as of 3/22/2018     INR goal 2.0-3.0   Today's INR 1.6!   Full instructions 3/22: 12.5 mg; Otherwise 7.5 mg every day   Next INR check 3/23/2018    Indications   DVT (deep venous thrombosis) (H) [I82.409]  Pulmonary embolism on right (H) [I26.99]  Long-term (current) use of anticoagulants [Z79.01] [Z79.01]         March 2018 Details    Sun Mon Tue Wed Thu Fri Sat         1               2               3                 4               5               6               7               8               9               10                 11               12               13               14               15               16               17                 18               19               20               21               22      12.5 mg   See details      23            24                 25               26               27               28               29               30               31                Date Details   03/22 This INR check       Date of next INR:  3/23/2018         How to take your warfarin dose     To take:  7.5 mg Take 1.5 of the 5 mg tablets.    To take:  12.5 mg Take 2.5 of the 5 mg tablets.

## 2018-03-23 ENCOUNTER — ANTICOAGULATION THERAPY VISIT (OUTPATIENT)
Dept: ANTICOAGULATION | Facility: CLINIC | Age: 38
End: 2018-03-23
Payer: COMMERCIAL

## 2018-03-23 DIAGNOSIS — Z79.01 LONG-TERM (CURRENT) USE OF ANTICOAGULANTS: ICD-10-CM

## 2018-03-23 DIAGNOSIS — I26.99 PULMONARY EMBOLISM ON RIGHT (H): ICD-10-CM

## 2018-03-23 DIAGNOSIS — I82.409 DVT (DEEP VENOUS THROMBOSIS) (H): ICD-10-CM

## 2018-03-23 LAB — INR PPP: 1.8

## 2018-03-23 PROCEDURE — 99207 ZZC NO CHARGE NURSE ONLY: CPT

## 2018-03-23 NOTE — PROGRESS NOTES
ADDENDUM:  Spoke with Milad, Verified dosing instructions for the weekend. Reordered Lovenox syringes to [facility]. He will go into lab on Monday for a recheck. Patient is concerned because he can only get 12 more syringes of Lovenox covered by his insurance.     Aldo Vincent   3/23/18 3:32    ANTICOAGULATION FOLLOW-UP CLINIC VISIT    Patient Name:  Edwin Nuno  Date:  3/23/2018  Contact Type:  Telephone/ Left Detailed VM    SUBJECTIVE:     Patient Findings     Positives Unexplained INR or factor level change    Comments Left detailed VM instructing patient to take 12.5 mg today, tomorrow, and 10 mg on Sunday. I requested he call to make an appointment for a recheck on Monday at [facility] or the lab. I also advised him that he will need to continue the Lovenox injections. I am unable to reorder more at this time since I am unsure which pharmacy he would prefer. I asked the patient to contact [facility].           OBJECTIVE    INR   Date Value Ref Range Status   03/23/2018 1.8  Final       ASSESSMENT / PLAN  INR assessment SUB    Recheck INR In: 3 DAYS    INR Location Outside lab      Anticoagulation Summary as of 3/23/2018     INR goal 2.0-3.0   Today's INR 1.8!   Maintenance plan 7.5 mg (5 mg x 1.5) every day   Full instructions 3/23: 12.5 mg; 3/24: 12.5 mg; 3/25: 10 mg; Otherwise 7.5 mg every day   Weekly total 52.5 mg   Plan last modified Humera Masterson RN (3/16/2018)   Next INR check 3/26/2018   Priority INR   Target end date     Indications   DVT (deep venous thrombosis) (H) [I82.409]  Pulmonary embolism on right (H) [I26.99]  Long-term (current) use of anticoagulants [Z79.01] [Z79.01]         Anticoagulation Episode Summary     INR check location     Preferred lab     Send INR reminders to [facility] [facility]    Comments * 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 Length of therapy is 3-6 months. Pt will have repeat imaging prior to stopping warfarin.      Anticoagulation Care Providers     Provider  Role Specialty Phone number    Ricardo Stoddard MD [facility] [phone]            See the Encounter Report to view Anticoagulation Flowsheet and Dosing Calendar (Go to Encounters tab in chart review, and find the Anticoagulation Therapy Visit)        Aldo Vincent RN

## 2018-03-23 NOTE — MR AVS SNAPSHOT
Edwin Nuno   3/23/2018   Anticoagulation Therapy Visit    Description:  37 year old male   Provider:  Aldo Vincent, RN   Department:  [place] [facility]           INR as of 3/23/2018     Today's INR 1.8!      Anticoagulation Summary as of 3/23/2018     INR goal 2.0-3.0   Today's INR 1.8!   Full instructions 3/23: 12.5 mg; 3/24: 12.5 mg; 3/25: 10 mg; Otherwise 7.5 mg every day   Next INR check 3/26/2018    Indications   DVT (deep venous thrombosis) (H) [I82.409]  Pulmonary embolism on right (H) [I26.99]  Long-term (current) use of anticoagulants [Z79.01] [Z79.01]         March 2018 Details    Sun Mon Tue Wed Thu Fri Sat         1               2               3                 4               5               6               7               8               9               10                 11               12               13               14               15               16               17                 18               19               20               21               22               23      12.5 mg   See details      24      12.5 mg           25      10 mg         26            27               28               29               30               31                Date Details   03/23 This INR check       Date of next INR:  3/26/2018         How to take your warfarin dose     To take:  7.5 mg Take 1.5 of the 5 mg tablets.    To take:  10 mg Take 2 of the 5 mg tablets.    To take:  12.5 mg Take 2.5 of the 5 mg tablets.

## 2018-03-24 ENCOUNTER — APPOINTMENT (OUTPATIENT)
Dept: LAB | Facility: CLINIC | Age: 38
End: 2018-03-24
Payer: COMMERCIAL

## 2018-03-26 ENCOUNTER — TELEPHONE (OUTPATIENT)
Dept: ULTRASOUND IMAGING | Facility: OTHER | Age: 38
End: 2018-03-26

## 2018-03-26 ENCOUNTER — ANTICOAGULATION THERAPY VISIT (OUTPATIENT)
Dept: ANTICOAGULATION | Facility: CLINIC | Age: 38
End: 2018-03-26
Payer: COMMERCIAL

## 2018-03-26 ENCOUNTER — TELEPHONE (OUTPATIENT)
Dept: FAMILY MEDICINE | Facility: CLINIC | Age: 38
End: 2018-03-26

## 2018-03-26 DIAGNOSIS — I82.409 DVT (DEEP VENOUS THROMBOSIS) (H): ICD-10-CM

## 2018-03-26 DIAGNOSIS — I82.409 DVT (DEEP VENOUS THROMBOSIS) (H): Primary | ICD-10-CM

## 2018-03-26 DIAGNOSIS — Z79.01 LONG-TERM (CURRENT) USE OF ANTICOAGULANTS: ICD-10-CM

## 2018-03-26 DIAGNOSIS — I26.99 PULMONARY EMBOLISM ON RIGHT (H): ICD-10-CM

## 2018-03-26 LAB — INR PPP: 1.8

## 2018-03-26 PROCEDURE — 99207 ZZC NO CHARGE NURSE ONLY: CPT

## 2018-03-26 NOTE — TELEPHONE ENCOUNTER
Patient calling to reschedule his cysto he has scheduled for Thursday 03-28-18.  I was informed to send a message and someone will contact patient to reschedule him.  Thank you

## 2018-03-26 NOTE — TELEPHONE ENCOUNTER
Perhaps he needs a referral to hematology since there has been such difficulty managing the anticoagulation.    Arlyn

## 2018-03-26 NOTE — MR AVS SNAPSHOT
Edwin Nuno   3/26/2018   Anticoagulation Therapy Visit    Description:  37 year old male   Provider:  Nunu Silvestre RN   Department:  [facility]           INR as of 3/26/2018     Today's INR 1.8!      Anticoagulation Summary as of 3/26/2018     INR goal 2.0-3.0   Today's INR 1.8!   Full instructions 3/26: 15 mg; Otherwise 7.5 mg every day   Next INR check 3/27/2018    Indications   DVT (deep venous thrombosis) (H) [I82.409]  Pulmonary embolism on right (H) [I26.99]  Long-term (current) use of anticoagulants [Z79.01] [Z79.01]         Description     Continue Lovenox injections until your next appointment. You must be on Lovenox for a minimum of 5 days and until your INR is therapeutic (between 2 and 3).         March 2018 Details    Sun Mon Tue Wed Thu Fri Sat         1               2               3                 4               5               6               7               8               9               10                 11               12               13               14               15               16               17                 18               19               20               21               22               23               24                 25               26      15 mg   See details      27 28 29 30 31                Date Details   03/26 This INR check       Date of next INR:  3/27/2018         How to take your warfarin dose     To take:  7.5 mg Take 1.5 of the 5 mg tablets.    To take:  15 mg Take 3 of the 5 mg tablets.

## 2018-03-26 NOTE — TELEPHONE ENCOUNTER
[facility] RN called and is concerned about patient's INR levels.  She has been adjusting the medication and patient is on the Lovenox injections.  Patient has 6 doses left before another PA will be needed through insurance. Patient is very frustrated. Patient would like to know if there is any other medication he can try. [facility] RN will CC her notes from visit today.  Please review and advise.    Thank you  Luli CAMACHO RN

## 2018-03-26 NOTE — PROGRESS NOTES
ADDENDUM: Writer spoke with Luli CAMACHO RN and also the patient on phone. The patient's INR is increasing and responding, just at a slower rate than expected. He has been much more active since returning to work. He used to lay on the couch all day when he was first diagnosed with DVTs and PE. He reports a 200% increase in activity since then.     He has enough Lovenox to last until Thurs morning dose. He cannot get more unless an emergency order is put through with a PA, which will take 72 hours. At this point, he is less than 72 hours from running out of lovenox anyway. Dr. Stoddard has advised pt see hematology but earliest appointment is on 3-30-18 (more than 24 hours after last lovenox injection).     To complicate matters further, the patient took less warfarin yesterday than he was advised. He states he was confused and forgot what he was supposed to take. Writer advised he take 17.5 mg as soon as he gets to his hotel today and avoid all greens. He will recheck INR tomorrow.     If INR in range tomorrow, will have patient take last doses of lovenox. If INR is sub therapeutic tomorrow, patient will be out of lovenox for 1.5 days before he can be seen by hematology. Patient may cancel hematology appointment if INR is in range. He does not have concerns with [facility] or taking warfarin, he just does not want to bridge with lovenox any longer due to bruises and pain from injections.     JAHAIRA Coelho, RN  3-27-18    ANTICOAGULATION FOLLOW-UP CLINIC VISIT    Patient Name:  Edwin Nuno  Date:  3/26/2018  Contact Type:  Telephone    SUBJECTIVE:     Patient Findings     Positives Extra doses (Patient ended up taking 15mg Fri/Sat instead of 12.5mg), Unexplained INR or factor level change    Comments Patient had 82.5mg in the previous 7 days, will increase dose to 97.5mg by the next INR check on Wednesday (~18% increase).    Despite large warfarin dose increases, patient's INR has be consistently staying  "subtherpeutic. It is unclear why his INR is so low - medications on his current med list would not decrease the INR like this, he has filled his scripts at [facility] so this is unlikely to be related to the TARO brand of warfarin with a \"bad batch of warfarin\". Patient's diet is unchanged. He is getting very frustrated and states his abdomen is very bruised and it is getting painful. He stated \"either the needles are too blunt or my skin is getting thicker\". Patient expressed interest in starting a different anticoagulant. Writer called over and spoke with DEVIN Rockwell with Dr. Stoddard's care team and she will relay the message to him. Writer will route note to Dr. Stoddard as well.     6 more syringes of Lovenox sent to the pharmacy. This will be the last amount his insurance will cover.            OBJECTIVE    INR   Date Value Ref Range Status   03/26/2018 1.8  Final       ASSESSMENT / PLAN  No question data found.  Anticoagulation Summary as of 3/26/2018     INR goal 2.0-3.0   Today's INR 1.8!   Maintenance plan 7.5 mg (5 mg x 1.5) every day   Full instructions 3/26: 17.5 mg; 3/27: 15 mg; Otherwise 7.5 mg every day   Weekly total 52.5 mg   Plan last modified Humera Masterson RN (3/16/2018)   Next INR check 3/28/2018   Priority INR   Target end date     Indications   DVT (deep venous thrombosis) (H) [I82.409]  Pulmonary embolism on right (H) [I26.99]  Long-term (current) use of anticoagulants [Z79.01] [Z79.01]         Anticoagulation Episode Summary     INR check location     Preferred lab     Send INR reminders to [place] PHONE [facility] POOL    Comments * 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 Length of therapy is 3-6 months. Pt will have repeat imaging prior to stopping warfarin.      Anticoagulation Care Providers     Provider Role Specialty Phone number    Ricardo Stoddard MD [facility] [phone]            See the Encounter Report to view Anticoagulation Flowsheet and Dosing Calendar (Go to " Encounters tab in chart review, and find the Anticoagulation Therapy Visit)        Nunu Silvestre RN

## 2018-03-27 NOTE — TELEPHONE ENCOUNTER
Spoke with [facility] RN and she will contact the patient to discuss options.  Patient also scheduled with Hematology on 3/30/18.      Luli CAMACHO RN

## 2018-03-28 ENCOUNTER — ANTICOAGULATION THERAPY VISIT (OUTPATIENT)
Dept: ANTICOAGULATION | Facility: CLINIC | Age: 38
End: 2018-03-28
Payer: COMMERCIAL

## 2018-03-28 DIAGNOSIS — I82.409 DVT (DEEP VENOUS THROMBOSIS) (H): ICD-10-CM

## 2018-03-28 DIAGNOSIS — I26.99 PULMONARY EMBOLISM ON RIGHT (H): ICD-10-CM

## 2018-03-28 DIAGNOSIS — Z79.01 LONG-TERM (CURRENT) USE OF ANTICOAGULANTS: ICD-10-CM

## 2018-03-28 LAB — INR POINT OF CARE: 2.4 (ref 0.86–1.14)

## 2018-03-28 PROCEDURE — 99207 ZZC NO CHARGE NURSE ONLY: CPT | Performed by: REGISTERED NURSE

## 2018-03-28 PROCEDURE — 36416 COLLJ CAPILLARY BLOOD SPEC: CPT | Performed by: REGISTERED NURSE

## 2018-03-28 PROCEDURE — 85610 PROTHROMBIN TIME: CPT | Mod: QW | Performed by: REGISTERED NURSE

## 2018-03-28 NOTE — MR AVS SNAPSHOT
After Visit Summary   3/28/2018    Edwin Nuno    MRN: [id number]           Patient Information     Date Of Birth          1980        Visit Information        Provider Department      3/28/2018 5:16 PM Eri Connell RN [facility]        Today's Diagnoses     DVT (deep venous thrombosis) (H)        Long-term (current) use of anticoagulants        Pulmonary embolism on right (H)           Follow-ups after your visit        Your next 10 appointments already scheduled     Mar 30, 2018  2:30 PM CDT   New Visit with Sivakumar Hendricks MD   [facility] ([facility])    [facility]  [address]  [facility] [address]   [phone]            Mar 30, 2018  3:30 PM CDT   Anticoagulation Visit with [place] ANTI [facility]AG   [facility]    [address]  [facility] [address]   [phone]            Apr 12, 2018  8:30 AM CDT   Return Visit with Jose Raul Garcia MD   [facility]    [address]  [facility] [address] [phone]              Who to contact     If you have questions or need follow up information about today's clinic visit or your schedule please contact [facility] directly at [phone].  Normal or non-critical lab and imaging results will be communicated to you by MyChart, letter or phone within 4 business days after the clinic has received the results. If you do not hear from us within 7 days, please contact the clinic through [employer] or phone. If you have a critical or abnormal lab result, we will notify you by phone as soon as possible.  Submit refill requests through [employer] or call your pharmacy and they will forward the refill request to us. Please allow 3 business days for your refill to be completed.          Additional Information About Your Visit        MyChart Information     [employer] gives  you secure access to your electronic health record. If you see a primary care provider, you can also send messages to your care team and make appointments. If you have questions, please call your primary care clinic.  If you do not have a primary care provider, please call [phone] and they will assist you.        Care EveryWhere ID     This is your Care EveryWhere ID. This could be used by other organizations to access your [facility] medical records  [id number]         Blood Pressure from Last 3 Encounters:   03/09/18 138/81   03/08/18 136/78   02/28/18 126/72    Weight from Last 3 Encounters:   03/09/18 299 lb (135.6 kg)   03/08/18 (!) 304 lb 8 oz (138.1 kg)   02/28/18 (!) 307 lb 8 oz (139.5 kg)              We Performed the Following     INR point of care        Primary Care Provider Office Phone # Fax #    Ricardo Stoddard [phone] [address] [facility] [address]        Equal Access to Services     [facility]: Hadii aad ku hadasho Soomaali, waaxda luqadaha, qaybta kaalmada adeegyada, waxay guerdain hayradha thomson . So [facility] [phone].    ATENCIÓN: Si habla español, tiene a bhandari disposición servicios gratuitos de asistencia lingüística. Llame al [phone].    We comply with applicable federal civil rights laws and [place] laws. We do not discriminate on the basis of race, color, national origin, age, disability, sex, sexual orientation, or gender identity.            Thank you!     Thank you for choosing [facility]  for your care. Our goal is always to provide you with excellent care. Hearing back from our patients is one way we can continue to improve our services. Please take a few minutes to complete the written survey that you may receive in the mail after your visit with us. Thank you!             Your Updated Medication List - Protect others around you: Learn how to safely use, store and throw away your medicines at  "www.disposemymeds.org.          This list is accurate as of 3/28/18  6:11 PM.  Always use your most recent med list.                   Brand Name Dispense Instructions for use Diagnosis    cetirizine 10 MG tablet    zyrTEC    30 tablet    Take 1 tablet (10 mg) by mouth every evening    Preop general physical exam       enoxaparin 150 MG/ML injection    LOVENOX    6 Syringe    Inject 0.9 mLs (135 mg) Subcutaneous every 12 hours See Pharmacy note.    Pulmonary embolism on right (H), Long-term (current) use of anticoagulants       lisinopril 20 MG tablet    PRINIVIL/ZESTRIL    90 tablet    Take 1 tablet (20 mg) by mouth daily    Benign essential hypertension       magnesium oxide 400 (241.3 MG) MG tablet    MAG-OX    60 tablet    Take 1 tablet (400 mg) by mouth daily    Preop general physical exam       Needle (Disp) 18G X 1-1/2\" Misc    B-D BLUNT FILL NEEDLE    10 each    For drawing up medication.    Hypotestosteronism       syringe/needle (disp) 22G X 1-1/2\" 3 ML Misc    B-D SYRINGE/NEEDLE    10 each    For Testosterone injections.    Hypotestosteronism       testosterone cypionate 200 MG/ML injection    DEPO-TESTOSTERONE    2 mL    400 mg every 25 days.    Hypotestosteronism       warfarin 5 MG tablet    COUMADIN    45 tablet    As directed by Anticoagulation Clinic (maintenance dose being re-established)    Other acute pulmonary embolism without acute cor pulmonale (H)         "

## 2018-03-28 NOTE — PROGRESS NOTES
ANTICOAGULATION FOLLOW-UP CLINIC VISIT    Patient Name:  Edwin Nuno  Date:  3/28/2018  Contact Type:  Telephone/ Rich    SUBJECTIVE:     Patient Findings     Positives No Problem Findings    Comments Writer called [facility] to inquired about patient's INR that was drawn at 11:00 am per patient. Writer spoke to Zandra who states she faxed the result to the [facility] around 12:00. Humera RN at the [facility] states no result was received. Writer asked for the result but Zandra would not verbally share. Writer gave Zandra the fax number for the [facility] as that was writer's physical location. Writer waited for the fax until almost 5pm, it did not come. Writer called the [facility] in [place] back and it went after hours vm.Writer then investigated online and called the nearest Hospital within the [facility] in [place]. Writer spoke to Jeremy in the lab and kindly requested her to ask her house supervisor if she could verbally give the lab result. She called the supervisor at home who put her in touch with the regional lab in [place] which operates after hours and is attached to the [facility], she then transferred writer to that location after a 35 minute hold. Antoinette from the the lab had Zandra, the orginally MT, writer spoke to call their supervisor to get permission to verbally give Writer the INR result.   Writer called Milad and instructed him to take one last Lovenox injection tonight then stop. He will take 15mg of warfarin today and 7.5 tomorrow. He will return to [facility] Friday and for his appt with Dr. Hendricks. Writer requested he have an INR recheck after that appt. Patient verbalizes understanding and agrees to plan. No further questions or concerns at this time.             OBJECTIVE    INR Protime   Date Value Ref Range Status   03/28/2018 2.4 (A) 0.86 - 1.14 Final       ASSESSMENT / PLAN  No question data found.  Anticoagulation Summary as of 3/28/2018      INR goal 2.0-3.0   Today's INR 2.4   Maintenance plan 7.5 mg (5 mg x 1.5) every day   Full instructions 3/28: 15 mg; Otherwise 7.5 mg every day   Weekly total 52.5 mg   Plan last modified Humera Masterson RN (3/16/2018)   Next INR check 3/30/2018   Priority INR   Target end date     Indications   DVT (deep venous thrombosis) (H) [I82.409]  Pulmonary embolism on right (H) [I26.99]  Long-term (current) use of anticoagulants [Z79.01] [Z79.01]         Anticoagulation Episode Summary     INR check location     Preferred lab     Send INR reminders to [place] PHONE ANTICOAG POOL    Comments * 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 Length of therapy is 3-6 months. Pt will have repeat imaging prior to stopping warfarin.      Anticoagulation Care Providers     Provider Role Specialty Phone number    Ricardo Stoddard MD [facility] [phone]            See the Encounter Report to view Anticoagulation Flowsheet and Dosing Calendar (Go to Encounters tab in chart review, and find the Anticoagulation Therapy Visit)        Eri Connell RN

## 2018-03-29 ENCOUNTER — TELEPHONE (OUTPATIENT)
Dept: FAMILY MEDICINE | Facility: CLINIC | Age: 38
End: 2018-03-29

## 2018-03-29 NOTE — TELEPHONE ENCOUNTER
Received fax yesterday evening from [place] [employer] in [place] with patient's INR result:    2.4    Lab was taken yesterday at 11:32am.     Per [facility] note yesterday, they were attempting to get result all day yesterday.    Results were faxed to  location and not [place] location.  Per note, patient was instructions for lovenox as well as coumadin.    Routing to [facility] pool.    Gisella RANDOLPH RN

## 2018-03-30 ENCOUNTER — HOSPITAL ENCOUNTER (OUTPATIENT)
Facility: CLINIC | Age: 38
Discharge: HOME | End: 2018-03-30
Attending: INTERNAL MEDICINE | Admitting: INTERNAL MEDICINE
Payer: COMMERCIAL

## 2018-03-30 ENCOUNTER — ANTICOAGULATION THERAPY VISIT (OUTPATIENT)
Dept: ANTICOAGULATION | Facility: CLINIC | Age: 38
End: 2018-03-30
Payer: COMMERCIAL

## 2018-03-30 ENCOUNTER — ONCOLOGY VISIT (OUTPATIENT)
Dept: ONCOLOGY | Facility: CLINIC | Age: 38
End: 2018-03-30
Attending: INTERNAL MEDICINE
Payer: COMMERCIAL

## 2018-03-30 VITALS
DIASTOLIC BLOOD PRESSURE: 74 MMHG | BODY MASS INDEX: 40.05 KG/M2 | HEART RATE: 74 BPM | SYSTOLIC BLOOD PRESSURE: 135 MMHG | TEMPERATURE: 97.1 F | WEIGHT: 295.7 LBS | RESPIRATION RATE: 16 BRPM | HEIGHT: 72 IN | OXYGEN SATURATION: 97 %

## 2018-03-30 DIAGNOSIS — I26.99 PULMONARY EMBOLISM ON RIGHT (H): ICD-10-CM

## 2018-03-30 DIAGNOSIS — I26.99 OTHER ACUTE PULMONARY EMBOLISM WITHOUT ACUTE COR PULMONALE (H): ICD-10-CM

## 2018-03-30 DIAGNOSIS — I10 BENIGN ESSENTIAL HYPERTENSION: ICD-10-CM

## 2018-03-30 DIAGNOSIS — Z79.01 LONG-TERM (CURRENT) USE OF ANTICOAGULANTS: ICD-10-CM

## 2018-03-30 DIAGNOSIS — I82.4Y9 DEEP VEIN THROMBOSIS (DVT) OF PROXIMAL LOWER EXTREMITY, UNSPECIFIED CHRONICITY, UNSPECIFIED LATERALITY (H): Primary | ICD-10-CM

## 2018-03-30 LAB — INR POINT OF CARE: 2.1 (ref 0.86–1.14)

## 2018-03-30 PROCEDURE — 99207 ZZC NO CHARGE NURSE ONLY: CPT

## 2018-03-30 PROCEDURE — 81241 F5 GENE: CPT | Performed by: INTERNAL MEDICINE

## 2018-03-30 PROCEDURE — 81240 F2 GENE: CPT | Performed by: INTERNAL MEDICINE

## 2018-03-30 PROCEDURE — 36415 COLL VENOUS BLD VENIPUNCTURE: CPT | Performed by: INTERNAL MEDICINE

## 2018-03-30 PROCEDURE — 85610 PROTHROMBIN TIME: CPT | Mod: QW

## 2018-03-30 PROCEDURE — 86147 CARDIOLIPIN ANTIBODY EA IG: CPT | Performed by: INTERNAL MEDICINE

## 2018-03-30 PROCEDURE — G0463 HOSPITAL OUTPT CLINIC VISIT: HCPCS

## 2018-03-30 RX ORDER — WARFARIN SODIUM 5 MG/1
TABLET ORAL
Qty: 180 TABLET | Refills: 0 | Status: SHIPPED | OUTPATIENT
Start: 2018-03-30 | End: 2018-04-20

## 2018-03-30 ASSESSMENT — PAIN SCALES - GENERAL: PAINLEVEL: NO PAIN (0)

## 2018-03-30 NOTE — PATIENT INSTRUCTIONS
We would like to see you back in clinic.      Your prescription has been sent to:   [facility], [place] - [address]  [address] [place] [address]  Phone: [phone] Fax: [phone] Alternate Fax: [phone], [phone]   When you are in need of a refill, please call your pharmacy and they will send us a request.      Copy of appointments, and after visit summary (AVS) given to patient.      If you have any questions during business hours (M-F 8 AM- 4PM), please call Lilly Lara RN, BSN, OCN Oncology Hematology /Breast Cancer Navigator at [facility] [phone].       For questions after business hours, or on holidays/weekends, please call our after hours Nurse Triage line [phone]. Thank you.

## 2018-03-30 NOTE — NURSING NOTE
"Oncology Rooming Note    March 30, 2018 2:53 PM   Edwin Nuno is a 37 year old male who presents for:    Chief Complaint   Patient presents with     Hematology     NEW, Deep Venous Thrombosis.      Initial Vitals: /74 (BP Location: Right arm, Patient Position: Sitting, Cuff Size: Adult Large)  Pulse 74  Temp 97.1  F (36.2  C) (Tympanic)  Resp 16  Ht 1.816 m (5' 11.5\")  Wt 134.1 kg (295 lb 11.2 oz)  SpO2 97%  BMI 40.67 kg/m2 Estimated body mass index is 40.67 kg/(m^2) as calculated from the following:    Height as of this encounter: 1.816 m (5' 11.5\").    Weight as of this encounter: 134.1 kg (295 lb 11.2 oz). Body surface area is 2.6 meters squared.  No Pain (0) Comment: Data Unavailable   No LMP for male patient.  Allergies reviewed: Yes  Medications reviewed: Yes    Medications: Medication refills not needed today.  Pharmacy name entered into [employer]: [facility] [place], [place] - [address]    Clinical concerns: NEW, DVT x 2 Posterior right knee and right calf. PE lower right lobe.      8 minutes for nursing intake (face to face time)     Lindsay Radford CMA            "

## 2018-03-30 NOTE — MR AVS SNAPSHOT
Edwin MARCIA Nuno   3/30/2018 3:30 PM   Anticoagulation Therapy Visit    Description:  37 year old male   Provider:  [place] [facility]   Department:  [place] [facility]           INR as of 3/30/2018     Today's INR 2.1      Anticoagulation Summary as of 3/30/2018     INR goal 2.0-3.0   Today's INR 2.1   Full instructions 15 mg every day   Next INR check 4/6/2018    Indications   DVT (deep venous thrombosis) (H) [I82.409]  Pulmonary embolism on right (H) [I26.99]  Long-term (current) use of anticoagulants [Z79.01] [Z79.01]         Your next Anticoagulation Clinic appointment(s)     Apr 06, 2018  3:30 PM CDT   Anticoagulation Visit with [place] ANTI COAG   [facility]    [address] [address] [phone]              Contact Numbers     Please call [phone] with any problems or questions regarding your therapy.    If you need to cancel and/or reschedule your appointment please call one of the following numbers:  [facility] [phone]  [place] - [phone]  [facility] [phone]  [facility] [phone]  [place] - [phone]            March 2018 Details    Sun Mon Tue Wed Thu Fri Sat         1               2               3                 4               5               6               7               8               9               10                 11               12               13               14               15               16               17                 18               19               20               21               22               23               24                 25               26               27               28               29               30      15 mg   See details      31      15 mg          Date Details   03/30 This INR check               How to take your warfarin dose     To take:  15 mg Take 3 of the 5 mg tablets.           April 2018 Details    Sun Mon Tue Wed Thu Fri Sat     1      15 mg         2       15 mg         3      15 mg         4      15 mg         5      15 mg         6            7                 8               9               10               11               12               13               14                 15               16               17               18               19               20               21                 22               23               24               25               26               27               28                 29               30                     Date Details   No additional details    Date of next INR:  4/6/2018         How to take your warfarin dose     To take:  15 mg Take 3 of the 5 mg tablets.

## 2018-03-30 NOTE — MR AVS SNAPSHOT
After Visit Summary   3/30/2018    Edwin Nuno    MRN: [id number]           Patient Information     Date Of Birth          1980        Visit Information        Provider Department      3/30/2018 2:30 PM Sivakumar Hendricks MD [facility] ONCOLOGY      Today's Diagnoses     Deep vein thrombosis (DVT) of proximal lower extremity, unspecified chronicity, unspecified laterality (H)    -  1      Care Instructions    We would like to see you back in clinic.      Your prescription has been sent to:   [facility] [place], [place] - [address]  [address] [address]  Phone: [phone] Fax: [phone] Alternate Fax: [phone], [phone]   When you are in need of a refill, please call your pharmacy and they will send us a request.      Copy of appointments, and after visit summary (AVS) given to patient.      If you have any questions during business hours (M-F 8 AM- 4PM), please call Lilly Lara RN, BSN, OCN Oncology Hematology /Breast Cancer Navigator at [facility] [phone].       For questions after business hours, or on holidays/weekends, please call our after hours Nurse Triage line [phone]. Thank you.            Follow-ups after your visit        Your next 10 appointments already scheduled     Apr 12, 2018  8:30 AM CDT   Return Visit with Jose Raul Garcia MD   [facility] ([facility])    [address] [facility] [address] [phone]            Jul 06, 2018  2:30 PM CDT   LAB with [facility] ([facility])    [address] [facility] [address] [id number] [phone]           Please do not eat 10-12 hours before your appointment if you are coming in fasting for labs on lipids, cholesterol, or glucose (sugar). This does not apply to pregnant women. Water, hot tea and black coffee (with nothing added) are  okay. Do not drink other fluids, diet soda or chew gum.            Jul 06, 2018  3:00 PM CDT   US LOWER EXTREMITY VENOUS MAPPING RIGHT with WYUS[facility]   [facility] ([facility])    [address]  [facility] [address]   [phone]           Please bring a list of your medicines (including vitamins, minerals and over-the-counter drugs). Also, tell your doctor about any allergies you may have. Wear comfortable clothes and leave your valuables at home.  You do not need to do anything special to prepare for your exam.  Please call the Imaging Department at your exam site with any questions.            Jul 13, 2018  3:30 PM CDT   Return Visit with Sivakumar Hendricks MD   [facility] ([facility])    [facility]  [address]  [facility] [address]   [phone]              Future tests that were ordered for you today     Open Future Orders        Priority Expected Expires Ordered    D-Dimer ([facility]) Routine 7/1/2018 10/30/2018 3/30/2018    US Lower Extremity Venous Mapping Right Routine 7/1/2018 10/30/2018 3/30/2018            Who to contact     If you have questions or need follow up information about today's clinic visit or your schedule please contact [facility] directly at [phone].  Normal or non-critical lab and imaging results will be communicated to you by MyChart, letter or phone within 4 business days after the clinic has received the results. If you do not hear from us within 7 days, please contact the clinic through MyChart or phone. If you have a critical or abnormal lab result, we will notify you by phone as soon as possible.  Submit refill requests through [email or web address] or call your pharmacy and they will forward the refill request to us. Please allow 3 business days for your refill to be completed.          Additional Information About Your Visit        [email or web address] Information     [email or web address] gives you secure  "access to your electronic health record. If you see a primary care provider, you can also send messages to your care team and make appointments. If you have questions, please call your primary care clinic.  If you do not have a primary care provider, please call [phone] and they will assist you.        Care EveryWhere ID     This is your Care EveryWhere ID. This could be used by other organizations to access your [place] medical records  [id number]        Your Vitals Were     Pulse Temperature Respirations Height Pulse Oximetry BMI (Body Mass Index)    74 97.1  F (36.2  C) (Tympanic) 16 1.816 m (5' 11.5\") 97% 40.67 kg/m2       Blood Pressure from Last 3 Encounters:   03/30/18 135/74   03/09/18 138/81   03/08/18 136/78    Weight from Last 3 Encounters:   03/30/18 134.1 kg (295 lb 11.2 oz)   03/09/18 135.6 kg (299 lb)   03/08/18 (!) 138.1 kg (304 lb 8 oz)              We Performed the Following     Cardiolipin Alta IgG and IgM     F2 prothrombin 26120Q Mut Anal     Factor 5 leiden mutation analysis        Primary Care Provider Office Phone # Fax #    Ricardo Stoddard [phone] [address] [facility] [address]        Equal Access to Services     BERENICE BENJAMIN : Hadii aad ku hadasho Soomaali, waaxda luqadaha, qaybta kaalmada adeegyada, austen hui. So [facility] [phone].    ATENCIÓN: Si habla español, tiene a bhandari disposición servicios gratuitos de asistencia lingüística. Llame al [phone].    We comply with applicable federal civil rights laws and [place] laws. We do not discriminate on the basis of race, color, national origin, age, disability, sex, sexual orientation, or gender identity.            Thank you!     Thank you for choosing [facility]  for your care. Our goal is always to provide you with excellent care. Hearing back from our patients is one way we can continue to improve our services. Please take a few minutes to complete the " written survey that you may receive in the mail after your visit with us. Thank you!             Your Updated Medication List - Protect others around you: Learn how to safely use, store and throw away your medicines at www.disposemymeds.org.          This list is accurate as of 3/30/18  3:48 PM.  Always use your most recent med list.                   Brand Name Dispense Instructions for use Diagnosis    cetirizine 10 MG tablet    zyrTEC    30 tablet    Take 1 tablet (10 mg) by mouth every evening    Preop general physical exam       enoxaparin 150 MG/ML injection    LOVENOX    6 Syringe    Inject 0.9 mLs (135 mg) Subcutaneous every 12 hours See Pharmacy note.    Pulmonary embolism on right (H), Long-term (current) use of anticoagulants       lisinopril 20 MG tablet    PRINIVIL/ZESTRIL    90 tablet    Take 1 tablet (20 mg) by mouth daily    Benign essential hypertension       magnesium oxide 400 (241.3 MG) MG tablet    MAG-OX    60 tablet    Take 1 tablet (400 mg) by mouth daily    Preop general physical exam       warfarin 5 MG tablet    COUMADIN    45 tablet    As directed by Anticoagulation Clinic (maintenance dose being re-established)    Other acute pulmonary embolism without acute cor pulmonale (H)

## 2018-03-30 NOTE — PROGRESS NOTES
ANTICOAGULATION FOLLOW-UP CLINIC VISIT    Patient Name:  Edwin Nuno  Date:  3/30/2018  Contact Type:  Face to Face    SUBJECTIVE:     Patient Findings     Positives Activity level change (plans to resume working out at the gym next week)    Comments Patient saw Dr. Hendricks today. He will stay on warfarin until July, then have a repeat US and see Dr. Hendricks again. Some hypercoagulability labs done today as well. The patient plans to resume working out at the gym next week. He mostly lifts weights but does do some cardio. Writer will increase current 7 day warfarin total by 9% since he dropped down after lower dose yesterday and will be increasing his activity. Patient has stopped lovenox and is okay to remain off this for now.           OBJECTIVE    INR Protime   Date Value Ref Range Status   03/30/2018 2.1 (A) 0.86 - 1.14 Final       ASSESSMENT / PLAN  INR assessment THER    Recheck INR In: 1 WEEK    INR Location Clinic      Anticoagulation Summary as of 3/30/2018     INR goal 2.0-3.0   Today's INR 2.1   Maintenance plan 15 mg (5 mg x 3) every day   Full instructions 15 mg every day   Weekly total 105 mg   Plan last modified Humera Masterson RN (3/30/2018)   Next INR check 4/6/2018   Priority INR   Target end date     Indications   DVT (deep venous thrombosis) (H) [I82.409]  Pulmonary embolism on right (H) [I26.99]  Long-term (current) use of anticoagulants [Z79.01] [Z79.01]         Anticoagulation Episode Summary     INR check location     Preferred lab     Send INR reminders to [facility] POOL    Comments * 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 Length of therapy is 3-6 months. Pt will have repeat imaging prior to stopping warfarin.      Anticoagulation Care Providers     Provider Role Specialty Phone number    Ricardo Stoddard MD [facility] [phone]            See the Encounter Report to view Anticoagulation Flowsheet and Dosing Calendar (Go to Encounters tab in chart  review, and find the Anticoagulation Therapy Visit)        Humera Masterson RN

## 2018-03-30 NOTE — LETTER
3/30/2018         RE: Edwin Nuno  [address]  [facility] [address]        Dear Colleague,    Thank you for referring your patient, Edwin Nuno, to the [facility]. Please see a copy of my visit note below.    DATE OF VISIT: Mar 30, 2018    REASON FOR REFERRAL: Thromboembolic disease    CHIEF COMPLAINT:   Chief Complaint   Patient presents with     Hematology     NEW, Deep Venous Thrombosis.        HISTORY OF PRESENT ILLNESS:   Edwin Nuno is a 37 year old male with hx R knee arthroscopy 1/8 and right lower extremity deep venous thrombosis on  1/13.  This was complicated by pulmonary embolism.  Patient has been on anticoagulation with warfarin since.  He is here today to discuss management of anticoagulation.  There is no family history of deep venous thrombosis.  Patient denies any previous history of deep venous thrombosis.  .  REVIEW OF SYSTEMS:   Constitutional: Negative for fever, chills, and night sweats.  Skin: negative.  Eyes: negative.  Ears/Nose/Throat: negative.  Respiratory: No shortness of breath, dyspnea on exertion, cough, or hemoptysis.  Cardiovascular: negative.  Gastrointestinal: negative.  Genitourinary: negative.  Musculoskeletal: negative.  Neurologic: negative.  Psychiatric: negative.  Hematologic/Lymphatic/Immunologic: negative.  Endocrine: negative.    PAST MEDICAL HISTORY:   Past Medical History:   Diagnosis Date     DVT (deep vein thrombosis) in pregnancy (H)     right leg dx 1/13/2018     Family history of colon cancer      GERD (gastroesophageal reflux disease)      HTN (hypertension)      Hypotestosteronemia        PAST SURGICAL HISTORY:   Past Surgical History:   Procedure Laterality Date     APPENDECTOMY  Feb 1998     ARTHROSCOPY KNEE      right, 1/8/2018       ALLERGIES:   Allergies as of 03/30/2018     (No Known Allergies)       MEDICATIONS:   Current Outpatient Prescriptions   Medication Sig Dispense Refill     magnesium oxide (MAG-OX) 400 (241.3 MG)  "MG tablet Take 1 tablet (400 mg) by mouth daily 60 tablet 3     cetirizine (ZYRTEC) 10 MG tablet Take 1 tablet (10 mg) by mouth every evening 30 tablet 1     lisinopril (PRINIVIL/ZESTRIL) 20 MG tablet Take 1 tablet (20 mg) by mouth daily 90 tablet 3     warfarin (COUMADIN) 5 MG tablet As directed by Anticoagulation Clinic: 15 mg daily 180 tablet 0     enoxaparin (LOVENOX) 150 MG/ML injection Inject 0.9 mLs (135 mg) Subcutaneous every 12 hours See Pharmacy note. (Patient not taking: Reported on 3/30/2018) 6 Syringe 0        FAMILY HISTORY:   Family History   Problem Relation Age of Onset     Cancer - colorectal Mother      Breast Cancer Mother      Colon Cancer Mother      not malignant pollups     Cancer - colorectal Maternal Grandmother      Colon Cancer Maternal Grandmother      Large intestine and colon removal     Prostate Cancer Father      Removed prostate     OSTEOPOROSIS Paternal Grandmother      CEREBROVASCULAR DISEASE Other      Great Grandmother     C.A.D. No family hx of      DIABETES No family hx of      Hypertension No family hx of      CEREBROVASCULAR DISEASE No family hx of      Melanoma No family hx of       Family history was reviewed with the patient    SOCIAL HISTORY:   Social History     Social History     Marital status:      Spouse name: N/A     Number of children: N/A     Years of education: N/A     Social History Main Topics     Smoking status: Never Smoker     Smokeless tobacco: Former User     Quit date: 6/22/2002     Alcohol use Yes     Drug use: No     Sexual activity: Yes     Birth control/ protection: IUD     Other Topics Concern     None     Social History Narrative    .       PHYSICAL EXAMINATION:   /74 (BP Location: Right arm, Patient Position: Sitting, Cuff Size: Adult Large)  Pulse 74  Temp 97.1  F (36.2  C) (Tympanic)  Resp 16  Ht 1.816 m (5' 11.5\")  Wt 134.1 kg (295 lb 11.2 oz)  SpO2 97%  BMI 40.67 kg/m2  Wt Readings from Last 10 Encounters:   03/30/18 " 134.1 kg (295 lb 11.2 oz)   03/09/18 135.6 kg (299 lb)   03/08/18 (!) 138.1 kg (304 lb 8 oz)   02/28/18 (!) 139.5 kg (307 lb 8 oz)   02/13/18 (!) 138.3 kg (305 lb)   02/02/18 134.4 kg (296 lb 4.8 oz)   01/19/18 135.6 kg (299 lb)   01/14/18 135.5 kg (298 lb 11.6 oz)   01/13/18 136.1 kg (300 lb)   12/20/17 (!) 142 kg (313 lb)      GENERAL APPEARANCE: Healthy, alert and in no acute distress.  HEENT: Sclerae anicteric. PERRLA. Oropharynx without ulcers, lesions, or thrush.  NECK: Supple. No asymmetry or masses.  LYMPHATICS: No palpable cervical, supraclavicular, axillary, or inguinal lymphadenopathy.  RESP: Lungs clear to auscultation bilaterally without rales, rhonchi or wheezes.  CARDIOVASCULAR: Regular rate and rhythm. Normal S1, S2; no S3 or S4. No murmur, gallop, or rub.  ABDOMEN: Soft, nontender. Bowel sounds normal. No palpable organomegaly or masses.  MUSCULOSKELETAL: Extremities without gross deformities noted. No edema of bilateral lower extremities.  SKIN: No suspicious lesions or rashes.  NEURO: Alert and oriented x 3. Cranial nerves II-XII grossly intact.  PSYCHIATRIC: Mentation and affect appear normal.    LABORATORY RESULTS:  Anticoagulation Therapy Visit on 03/28/2018   Component Date Value Ref Range Status     INR Protime 03/28/2018 2.4* 0.86 - 1.14 Final       IMAGING RESULTS:  Imaging studies were reviewed today    ASSESSMENT AND PLAN:    (I82.4Y9) Deep vein thrombosis (DVT) of proximal lower extremity, unspecified chronicity, unspecified laterality (H)  (primary encounter diagnosis)  (I26.99) Pulmonary embolism on right (H)  This is a 37-year-old male patient who presented with deep venous thrombosis of the right lower extremity related to recent knee surgery followed by pulmonary embolism.  The patient has been on anticoagulation.  I reviewed with the patient today the natural history of thromboembolic disease.  We talked about recent disposing factor as well as precipitating factors.  Because of  the patient's age I will recommended to arrange for hypercoagulability workup.  Today I will check  Cardiolipin Alta IgG and IgM, F2 prothrombin 47309M Mut Anal, Factor 5 leiden mutation analysis.  Generally the duration of anticoagulation for unprovoked pulmonary embolism is 6 months.  I would recommended to repeat D-Dimer ([facility]), US Lower Extremity Venous Mapping Right prior of stopping anticoagulation.  We also reviewed with the patient different anticoagulants.  At this time patient will continue on Coumadin to keep INR 2-3.  His INR would be monitored through the anticoagulation clinic.    (I10) Benign essential hypertension  Patient currently on lisinopril 20 mg orally daily.    The patient is ready to learn, no apparent learning barriers were identified, Diagnosis and treatment plans were explained to the patient. The patient expressed understanding of the content. The patient questions were answered to his satisfaction.    Sivakumar Hendricks MD    Chart documentation with Dragon Voice recognition Software. Although reviewed after completion, some words and grammatical errors may remain.    Again, thank you for allowing me to participate in the care of your patient.        Sincerely,        Sivakumar Hendricks MD

## 2018-04-02 LAB
CARDIOLIPIN ANTIBODY IGG: <1.6 GPL-U/ML (ref 0–19.9)
CARDIOLIPIN ANTIBODY IGM: 3.5 MPL-U/ML (ref 0–19.9)

## 2018-04-02 NOTE — PROGRESS NOTES
DATE OF VISIT: Mar 30, 2018    REASON FOR REFERRAL: Thromboembolic disease    CHIEF COMPLAINT:   Chief Complaint   Patient presents with     Hematology     NEW, Deep Venous Thrombosis.        HISTORY OF PRESENT ILLNESS:   Edwin Nuno is a 37 year old male with hx R knee arthroscopy 1/8 and right lower extremity deep venous thrombosis on  1/13.  This was complicated by pulmonary embolism.  Patient has been on anticoagulation with warfarin since.  He is here today to discuss management of anticoagulation.  There is no family history of deep venous thrombosis.  Patient denies any previous history of deep venous thrombosis.  .  REVIEW OF SYSTEMS:   Constitutional: Negative for fever, chills, and night sweats.  Skin: negative.  Eyes: negative.  Ears/Nose/Throat: negative.  Respiratory: No shortness of breath, dyspnea on exertion, cough, or hemoptysis.  Cardiovascular: negative.  Gastrointestinal: negative.  Genitourinary: negative.  Musculoskeletal: negative.  Neurologic: negative.  Psychiatric: negative.  Hematologic/Lymphatic/Immunologic: negative.  Endocrine: negative.    PAST MEDICAL HISTORY:   Past Medical History:   Diagnosis Date     DVT (deep vein thrombosis) in pregnancy (H)     right leg dx 1/13/2018     Family history of colon cancer      GERD (gastroesophageal reflux disease)      HTN (hypertension)      Hypotestosteronemia        PAST SURGICAL HISTORY:   Past Surgical History:   Procedure Laterality Date     APPENDECTOMY  Feb 1998     ARTHROSCOPY KNEE      right, 1/8/2018       ALLERGIES:   Allergies as of 03/30/2018     (No Known Allergies)       MEDICATIONS:   Current Outpatient Prescriptions   Medication Sig Dispense Refill     magnesium oxide (MAG-OX) 400 (241.3 MG) MG tablet Take 1 tablet (400 mg) by mouth daily 60 tablet 3     cetirizine (ZYRTEC) 10 MG tablet Take 1 tablet (10 mg) by mouth every evening 30 tablet 1     lisinopril (PRINIVIL/ZESTRIL) 20 MG tablet Take 1 tablet (20 mg) by mouth daily  "90 tablet 3     warfarin (COUMADIN) 5 MG tablet As directed by Anticoagulation Clinic: 15 mg daily 180 tablet 0     enoxaparin (LOVENOX) 150 MG/ML injection Inject 0.9 mLs (135 mg) Subcutaneous every 12 hours See Pharmacy note. (Patient not taking: Reported on 3/30/2018) 6 Syringe 0        FAMILY HISTORY:   Family History   Problem Relation Age of Onset     Cancer - colorectal Mother      Breast Cancer Mother      Colon Cancer Mother      not malignant pollups     Cancer - colorectal Maternal Grandmother      Colon Cancer Maternal Grandmother      Large intestine and colon removal     Prostate Cancer Father      Removed prostate     OSTEOPOROSIS Paternal Grandmother      CEREBROVASCULAR DISEASE Other      Great Grandmother     C.A.D. No family hx of      DIABETES No family hx of      Hypertension No family hx of      CEREBROVASCULAR DISEASE No family hx of      Melanoma No family hx of       Family history was reviewed with the patient    SOCIAL HISTORY:   Social History     Social History     Marital status:      Spouse name: N/A     Number of children: N/A     Years of education: N/A     Social History Main Topics     Smoking status: Never Smoker     Smokeless tobacco: Former User     Quit date: 6/22/2002     Alcohol use Yes     Drug use: No     Sexual activity: Yes     Birth control/ protection: IUD     Other Topics Concern     None     Social History Narrative    .       PHYSICAL EXAMINATION:   /74 (BP Location: Right arm, Patient Position: Sitting, Cuff Size: Adult Large)  Pulse 74  Temp 97.1  F (36.2  C) (Tympanic)  Resp 16  Ht 1.816 m (5' 11.5\")  Wt 134.1 kg (295 lb 11.2 oz)  SpO2 97%  BMI 40.67 kg/m2  Wt Readings from Last 10 Encounters:   03/30/18 134.1 kg (295 lb 11.2 oz)   03/09/18 135.6 kg (299 lb)   03/08/18 (!) 138.1 kg (304 lb 8 oz)   02/28/18 (!) 139.5 kg (307 lb 8 oz)   02/13/18 (!) 138.3 kg (305 lb)   02/02/18 134.4 kg (296 lb 4.8 oz)   01/19/18 135.6 kg (299 lb) "   01/14/18 135.5 kg (298 lb 11.6 oz)   01/13/18 136.1 kg (300 lb)   12/20/17 (!) 142 kg (313 lb)      GENERAL APPEARANCE: Healthy, alert and in no acute distress.  HEENT: Sclerae anicteric. PERRLA. Oropharynx without ulcers, lesions, or thrush.  NECK: Supple. No asymmetry or masses.  LYMPHATICS: No palpable cervical, supraclavicular, axillary, or inguinal lymphadenopathy.  RESP: Lungs clear to auscultation bilaterally without rales, rhonchi or wheezes.  CARDIOVASCULAR: Regular rate and rhythm. Normal S1, S2; no S3 or S4. No murmur, gallop, or rub.  ABDOMEN: Soft, nontender. Bowel sounds normal. No palpable organomegaly or masses.  MUSCULOSKELETAL: Extremities without gross deformities noted. No edema of bilateral lower extremities.  SKIN: No suspicious lesions or rashes.  NEURO: Alert and oriented x 3. Cranial nerves II-XII grossly intact.  PSYCHIATRIC: Mentation and affect appear normal.    LABORATORY RESULTS:  Anticoagulation Therapy Visit on 03/28/2018   Component Date Value Ref Range Status     INR Protime 03/28/2018 2.4* 0.86 - 1.14 Final       IMAGING RESULTS:  Imaging studies were reviewed today    ASSESSMENT AND PLAN:    (I82.4Y9) Deep vein thrombosis (DVT) of proximal lower extremity, unspecified chronicity, unspecified laterality (H)  (primary encounter diagnosis)  (I26.99) Pulmonary embolism on right (H)  This is a 37-year-old male patient who presented with deep venous thrombosis of the right lower extremity related to recent knee surgery followed by pulmonary embolism.  The patient has been on anticoagulation.  I reviewed with the patient today the natural history of thromboembolic disease.  We talked about recent disposing factor as well as precipitating factors.  Because of the patient's age I will recommended to arrange for hypercoagulability workup.  Today I will check  Cardiolipin Alta IgG and IgM, F2 prothrombin 09367M Mut Anal, Factor 5 leiden mutation analysis.  Generally the duration of  anticoagulation for unprovoked pulmonary embolism is 6 months.  I would recommended to repeat D-Dimer (HI,[facility]), US Lower Extremity Venous Mapping Right prior of stopping anticoagulation.  We also reviewed with the patient different anticoagulants.  At this time patient will continue on Coumadin to keep INR 2-3.  His INR would be monitored through the anticoagulation clinic.    (I10) Benign essential hypertension  Patient currently on lisinopril 20 mg orally daily.    The patient is ready to learn, no apparent learning barriers were identified, Diagnosis and treatment plans were explained to the patient. The patient expressed understanding of the content. The patient questions were answered to his satisfaction.    Sivakumar Hendricks MD    Chart documentation with Dragon Voice recognition Software. Although reviewed after completion, some words and grammatical errors may remain.

## 2018-04-05 LAB — COPATH REPORT: NORMAL

## 2018-04-06 ENCOUNTER — ANTICOAGULATION THERAPY VISIT (OUTPATIENT)
Dept: ANTICOAGULATION | Facility: CLINIC | Age: 38
End: 2018-04-06
Payer: COMMERCIAL

## 2018-04-06 DIAGNOSIS — I26.99 PULMONARY EMBOLISM ON RIGHT (H): ICD-10-CM

## 2018-04-06 DIAGNOSIS — I82.409 DVT (DEEP VENOUS THROMBOSIS) (H): ICD-10-CM

## 2018-04-06 DIAGNOSIS — Z79.01 LONG-TERM (CURRENT) USE OF ANTICOAGULANTS: ICD-10-CM

## 2018-04-06 LAB — INR POINT OF CARE: 3.3 (ref 0.86–1.14)

## 2018-04-06 PROCEDURE — 99207 ZZC NO CHARGE NURSE ONLY: CPT

## 2018-04-06 PROCEDURE — 36416 COLLJ CAPILLARY BLOOD SPEC: CPT

## 2018-04-06 PROCEDURE — 85610 PROTHROMBIN TIME: CPT | Mod: QW

## 2018-04-06 NOTE — PROGRESS NOTES
ANTICOAGULATION FOLLOW-UP CLINIC VISIT    Patient Name:  Edwin Nuno  Date:  4/6/2018  Contact Type:  Face to Face    SUBJECTIVE:     Patient Findings     Positives No Problem Findings    Comments Patient denies any changes. INR is slightly supra-therapeutic. Instructed patient he can have a side of greens for dinner. Resume maintenance dose and recheck in 1 week. Patient denies signs or symptoms of bleeding. Writer educated patient regarding increased bleed risk and when to seek immediate medical attention. Patient verbalized understanding.             OBJECTIVE    INR Protime   Date Value Ref Range Status   04/06/2018 3.3 (A) 0.86 - 1.14 Final       ASSESSMENT / PLAN  INR assessment SUPRA    Recheck INR In: 1 WEEK    INR Location Clinic      Anticoagulation Summary as of 4/6/2018     INR goal 2.0-3.0   Today's INR 3.3!   Maintenance plan 15 mg (5 mg x 3) every day   Full instructions 15 mg every day   Weekly total 105 mg   No change documented Aldo Vincent RN   Plan last modified Humera Masterson RN (3/30/2018)   Next INR check 4/13/2018   Priority INR   Target end date     Indications   DVT (deep venous thrombosis) (H) [I82.409]  Pulmonary embolism on right (H) [I26.99]  Long-term (current) use of anticoagulants [Z79.01] [Z79.01]         Anticoagulation Episode Summary     INR check location     Preferred lab     Send INR reminders to [facility] POOL    Comments * 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 Length of therapy is 3-6 months. Pt will have repeat imaging prior to stopping warfarin.      Anticoagulation Care Providers     Provider Role Specialty Phone number    Ricardo Stoddard MD [facility] [phone]            See the Encounter Report to view Anticoagulation Flowsheet and Dosing Calendar (Go to Encounters tab in chart review, and find the Anticoagulation Therapy Visit)    Patient denies any changes. INR is slightly supra-therapeutic. Instructed patient he  can have a side of greens for dinner. Resume maintenance dose and recheck in 1 week. Patient denies signs or symptoms of bleeding. Writer educated patient regarding increased bleed risk and when to seek immediate medical attention. Patient verbalized understanding.    Aldo Vincent RN

## 2018-04-06 NOTE — MR AVS SNAPSHOT
Edwin Nuno   4/6/2018 3:30 PM   Anticoagulation Therapy Visit    Description:  37 year old male   Provider:  [facility] [facility]   Department:  [facility]           INR as of 4/6/2018     Today's INR 3.3!      Anticoagulation Summary as of 4/6/2018     INR goal 2.0-3.0   Today's INR 3.3!   Full instructions 15 mg every day   Next INR check 4/13/2018    Indications   DVT (deep venous thrombosis) (H) [I82.409]  Pulmonary embolism on right (H) [I26.99]  Long-term (current) use of anticoagulants [Z79.01] [Z79.01]         Your next Anticoagulation Clinic appointment(s)     Apr 13, 2018  3:30 PM CDT   [facility] with [place] [facility]   [facility]    [address] [id number] [phone]              Contact Numbers     Please call [phone] with any problems or questions regarding your therapy.    If you need to cancel and/or reschedule your appointment please call one of the following numbers:  [facility] [phone]  [facility] [phone]  [facility] [phone]  [facility] [phone]  [place] - [phone]            April 2018 Details    Sun Mon Tue Wed Thu Fri Sat     1               2               3               4               5               6      15 mg   See details      7      15 mg           8      15 mg         9      15 mg         10      15 mg         11      15 mg         12      15 mg         13            14                 15               16               17               18               19               20               21                 22               23               24               25               26               27               28                 29               30                     Date Details   04/06 This INR check       Date of next INR:  4/13/2018         How to take your warfarin dose     To take:  15 mg Take 3 of the 5 mg tablets.

## 2018-04-12 ENCOUNTER — OFFICE VISIT (OUTPATIENT)
Dept: UROLOGY | Facility: OTHER | Age: 38
End: 2018-04-12
Payer: COMMERCIAL

## 2018-04-12 VITALS
SYSTOLIC BLOOD PRESSURE: 134 MMHG | RESPIRATION RATE: 16 BRPM | WEIGHT: 280 LBS | DIASTOLIC BLOOD PRESSURE: 84 MMHG | BODY MASS INDEX: 37.93 KG/M2 | HEIGHT: 72 IN | OXYGEN SATURATION: 98 % | HEART RATE: 72 BPM

## 2018-04-12 DIAGNOSIS — R31.0 GROSS HEMATURIA: Primary | ICD-10-CM

## 2018-04-12 PROCEDURE — 52000 CYSTOURETHROSCOPY: CPT | Performed by: UROLOGY

## 2018-04-12 NOTE — PROGRESS NOTES
S: Edwin Nuno is a 37 year old male returns for hematuria.    Patient is draped and prepped.  Flexible cystoscopy placed under direct vision.      The anterior urethra is normal   The prostatic urethra is normal.     The length is 1cm,  the coaptation is 1 cm.     In the bladder there is normal mucosa.    Assessment/Plan:  (R31.0) Gross hematuria  (primary encounter diagnosis)  Comment:    Plan: neg urological evaluation            F/u if bleeding recurs

## 2018-04-12 NOTE — MR AVS SNAPSHOT
After Visit Summary   4/12/2018    Edwin Nuno    MRN: [id number]           Patient Information     Date Of Birth          1980        Visit Information        Provider Department      4/12/2018 8:30 AM Jose Raul Garcia MD [facility]        Today's Diagnoses     Gross hematuria    -  1       Follow-ups after your visit        Your next 10 appointments already scheduled     Apr 13, 2018  3:30 PM CDT   [facility] with [place] [facility]    [address] [address]   [phone]            Jul 06, 2018  2:30 PM CDT   LAB with [facility] ([facility])    [address] [address]   [phone]           Please do not eat 10-12 hours before your appointment if you are coming in fasting for labs on lipids, cholesterol, or glucose (sugar). This does not apply to pregnant women. Water, hot tea and black coffee (with nothing added) are okay. Do not drink other fluids, diet soda or chew gum.            Jul 06, 2018  3:00 PM CDT   US LOWER EXTREMITY VENOUS MAPPING RIGHT with [id number] [place] [address] Ultrasound ([facility])    [address] [address]   [phone]           Please bring a list of your medicines (including vitamins, minerals and over-the-counter drugs). Also, tell your doctor about any allergies you may have. Wear comfortable clothes and leave your valuables at home.  You do not need to do anything special to prepare for your exam.  Please call the Imaging Department at your exam site with any questions.            Jul 13, 2018  3:30 PM CDT   Return Visit with Sivakumar Hnedricks MD   [facility] ([facility])    [facility]  [address]  [facility] [address]   [phone]              Who to contact     If you have questions or need  "follow up information about today's clinic visit or your schedule please contact [facility] [place] directly at [phone].  Normal or non-critical lab and imaging results will be communicated to you by MyChart, letter or phone within 4 business days after the clinic has received the results. If you do not hear from us within 7 days, please contact the clinic through [employer] or phone. If you have a critical or abnormal lab result, we will notify you by phone as soon as possible.  Submit refill requests through [employer] or call your pharmacy and they will forward the refill request to us. Please allow 3 business days for your refill to be completed.          Additional Information About Your Visit        [employer] Information     [employer] gives you secure access to your electronic health record. If you see a primary care provider, you can also send messages to your care team and make appointments. If you have questions, please call your primary care clinic.  If you do not have a primary care provider, please call [phone] and they will assist you.        Care EveryWhere ID     This is your Care EveryWhere ID. This could be used by other organizations to access your [place] medical records  [id number]        Your Vitals Were     Pulse Respirations Height Pulse Oximetry BMI (Body Mass Index)       72 16 1.816 m (5' 11.5\") 98% 38.51 kg/m2        Blood Pressure from Last 3 Encounters:   04/12/18 134/84   03/30/18 135/74   03/09/18 138/81    Weight from Last 3 Encounters:   04/12/18 127 kg (280 lb)   03/30/18 134.1 kg (295 lb 11.2 oz)   03/09/18 135.6 kg (299 lb)              We Performed the Following     CYSTOURETHROSCOPY        Primary Care Provider Office Phone # Fax #    Ricardo Stoddard [phone] [address] [facility] [address]        Equal Access to Services     BERENICE BENJAMIN AH: Hadii melinda Cornejo, waaxda luqadaha, qaybta kaalgreg garza, austen manzanares " ildefonso matthewsamandaleeann matosSinghaamady ah. So [facility] [phone].    ATENCIÓN: Si jean pierrela jayashree, tiene a bhandari disposición servicios gratuitos de asistencia lingüística. Rochelle arreola [phone].    We comply with applicable federal civil rights laws and [place] laws. We do not discriminate on the basis of race, color, national origin, age, disability, sex, sexual orientation, or gender identity.            Thank you!     Thank you for choosing [facility]  for your care. Our goal is always to provide you with excellent care. Hearing back from our patients is one way we can continue to improve our services. Please take a few minutes to complete the written survey that you may receive in the mail after your visit with us. Thank you!             Your Updated Medication List - Protect others around you: Learn how to safely use, store and throw away your medicines at www.disposemymeds.org.          This list is accurate as of 4/12/18  8:35 AM.  Always use your most recent med list.                   Brand Name Dispense Instructions for use Diagnosis    cetirizine 10 MG tablet    zyrTEC    30 tablet    Take 1 tablet (10 mg) by mouth every evening    Preop general physical exam       enoxaparin 150 MG/ML injection    LOVENOX    6 Syringe    Inject 0.9 mLs (135 mg) Subcutaneous every 12 hours See Pharmacy note.    Pulmonary embolism on right (H), Long-term (current) use of anticoagulants       lisinopril 20 MG tablet    PRINIVIL/ZESTRIL    90 tablet    Take 1 tablet (20 mg) by mouth daily    Benign essential hypertension       magnesium oxide 400 (241.3 Mg) MG tablet    MAG-OX    60 tablet    Take 1 tablet (400 mg) by mouth daily    Preop general physical exam       warfarin 5 MG tablet    COUMADIN    180 tablet    As directed by Anticoagulation Clinic: 15 mg daily    Other acute pulmonary embolism without acute cor pulmonale (H)

## 2018-04-13 ENCOUNTER — ANTICOAGULATION THERAPY VISIT (OUTPATIENT)
Dept: ANTICOAGULATION | Facility: CLINIC | Age: 38
End: 2018-04-13
Payer: COMMERCIAL

## 2018-04-13 DIAGNOSIS — Z79.01 LONG-TERM (CURRENT) USE OF ANTICOAGULANTS: ICD-10-CM

## 2018-04-13 DIAGNOSIS — I82.409 DVT (DEEP VENOUS THROMBOSIS) (H): ICD-10-CM

## 2018-04-13 DIAGNOSIS — I26.99 PULMONARY EMBOLISM ON RIGHT (H): ICD-10-CM

## 2018-04-13 LAB — INR POINT OF CARE: 5.1 (ref 0.86–1.14)

## 2018-04-13 PROCEDURE — 85610 PROTHROMBIN TIME: CPT | Mod: QW

## 2018-04-13 PROCEDURE — 99207 ZZC NO CHARGE NURSE ONLY: CPT

## 2018-04-13 PROCEDURE — 36416 COLLJ CAPILLARY BLOOD SPEC: CPT

## 2018-04-13 NOTE — MR AVS SNAPSHOT
Edwin MARCIA Nuno   4/13/2018 3:30 PM   Anticoagulation Therapy Visit    Description:  37 year old male   Provider:  [facility] [facility]   Department:  [facility]           INR as of 4/13/2018     Today's INR 5.1!      Anticoagulation Summary as of 4/13/2018     INR goal 2.0-3.0   Today's INR 5.1!   Full instructions 4/13: Hold; Otherwise 15 mg every day   Next INR check 4/20/2018    Indications   DVT (deep venous thrombosis) (H) [I82.409]  Pulmonary embolism on right (H) [I26.99]  Long-term (current) use of anticoagulants [Z79.01] [Z79.01]         Your next Anticoagulation Clinic appointment(s)     Apr 20, 2018  3:30 PM CDT   [facility]agulation Visit with [place] [facility]   [facility]    [address] [address] [phone]              Contact Numbers     Please call [phone] with any problems or questions regarding your therapy.    If you need to cancel and/or reschedule your appointment please call one of the following numbers:  [facility] [phone]  [place] - [phone]  [facility] [phone]  hospitals [phone]  [place] - [phone]            April 2018 Details    Sun Mon Tue Wed Thu Fri Sat     1               2               3               4               5               6               7                 8               9               10               11               12               13      Hold   See details      14      15 mg           15      15 mg         16      15 mg         17      15 mg         18      15 mg         19      15 mg         20            21                 22               23               24               25               26               27               28                 29               30                     Date Details   04/13 This INR check       Date of next INR:  4/20/2018         How to take your warfarin dose     To take:  15 mg Take 3 of the 5 mg tablets.    Hold Do not take your warfarin  dose. See the Details table to the right for additional instructions.

## 2018-04-13 NOTE — PROGRESS NOTES
ANTICOAGULATION FOLLOW-UP CLINIC VISIT    Patient Name:  Edwin Nuno  Date:  4/13/2018  Contact Type:  Face to Face    SUBJECTIVE:     Patient Findings     Positives Unexplained INR or factor level change    Comments Patient denies any changes. He has not changed his nutrition or workout routine. I instructed him to hold his dose for today then resume maintenance plan.           OBJECTIVE    INR Protime   Date Value Ref Range Status   04/13/2018 5.1 (A) 0.86 - 1.14 Final       ASSESSMENT / PLAN  INR assessment SUPRA    Recheck INR In: 1 WEEK    INR Location Clinic      Anticoagulation Summary as of 4/13/2018     INR goal 2.0-3.0   Today's INR 5.1!   Maintenance plan 15 mg (5 mg x 3) every day   Full instructions 4/13: Hold; Otherwise 15 mg every day   Weekly total 105 mg   Plan last modified Humera Masterson RN (3/30/2018)   Next INR check 4/20/2018   Priority INR   Target end date     Indications   DVT (deep venous thrombosis) (H) [I82.409]  Pulmonary embolism on right (H) [I26.99]  Long-term (current) use of anticoagulants [Z79.01] [Z79.01]         Anticoagulation Episode Summary     INR check location     Preferred lab     Send INR reminders to [facility] POOL    Comments * 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 Length of therapy is 3-6 months. Pt will have repeat imaging prior to stopping warfarin.      Anticoagulation Care Providers     Provider Role Specialty Phone number    Ricardo Stoddard MD [facility] [phone]            See the Encounter Report to view Anticoagulation Flowsheet and Dosing Calendar (Go to Encounters tab in chart review, and find the Anticoagulation Therapy Visit)        Aldo Vincent RN

## 2018-04-20 ENCOUNTER — ANTICOAGULATION THERAPY VISIT (OUTPATIENT)
Dept: ANTICOAGULATION | Facility: CLINIC | Age: 38
End: 2018-04-20
Payer: COMMERCIAL

## 2018-04-20 DIAGNOSIS — I26.99 OTHER ACUTE PULMONARY EMBOLISM WITHOUT ACUTE COR PULMONALE (H): ICD-10-CM

## 2018-04-20 DIAGNOSIS — I26.99 PULMONARY EMBOLISM ON RIGHT (H): ICD-10-CM

## 2018-04-20 DIAGNOSIS — Z79.01 LONG-TERM (CURRENT) USE OF ANTICOAGULANTS: ICD-10-CM

## 2018-04-20 LAB — INR POINT OF CARE: 5.6 (ref 0.86–1.14)

## 2018-04-20 PROCEDURE — 99207 ZZC NO CHARGE NURSE ONLY: CPT

## 2018-04-20 PROCEDURE — 85610 PROTHROMBIN TIME: CPT | Mod: QW

## 2018-04-20 PROCEDURE — 36416 COLLJ CAPILLARY BLOOD SPEC: CPT

## 2018-04-20 RX ORDER — WARFARIN SODIUM 5 MG/1
TABLET ORAL
Qty: 180 TABLET | Refills: 0 | COMMUNITY
Start: 2018-04-20 | End: 2018-07-18

## 2018-04-20 NOTE — MR AVS SNAPSHOT
Edwin Nuno   4/20/2018 3:30 PM   Anticoagulation Therapy Visit    Description:  37 year old male   Provider:  [place] [facility]   Department:  [facility]           INR as of 4/20/2018     Today's INR 5.6!      Anticoagulation Summary as of 4/20/2018     INR goal 2.0-3.0   Today's INR 5.6!   Full instructions 4/20: Hold; 4/21: 7.5 mg; 4/22: 12.5 mg; 4/23: 12.5 mg; 4/24: 12.5 mg; 4/25: 12.5 mg; 4/26: 12.5 mg; Otherwise 15 mg every day   Next INR check 4/27/2018    Indications   DVT (deep venous thrombosis) (H) [I82.409]  Pulmonary embolism on right (H) [I26.99]  Long-term (current) use of anticoagulants [Z79.01] [Z79.01]         Description     NO warfarin today. 7.5 mg tomorrow. Then 12.5 mg daily for now.      Your next Anticoagulation Clinic appointment(s)     Apr 27, 2018  3:30 PM CDT   [facility] with [place] [facility]   [facility]    [address] [address] [phone]              Contact Numbers     Please call [phone] with any problems or questions regarding your therapy.    If you need to cancel and/or reschedule your appointment please call one of the following numbers:  [facility] - [phone]  [place] - [phone]  [place] - [phone]  [facility] [phone]  [place] - [phone]            April 2018 Details    Sun Mon Tue Wed Thu Fri Sat     1               2               3               4               5               6               7                 8               9               10               11               12               13               14                 15               16               17               18               19               20      Hold   See details      21      7.5 mg           22      12.5 mg         23      12.5 mg         24      12.5 mg         25      12.5 mg         26      12.5 mg         27            28 29               30                     Date  Details   04/20 This INR check       Date of next INR:  4/27/2018         How to take your warfarin dose     To take:  7.5 mg Take 1.5 of the 5 mg tablets.    To take:  12.5 mg Take 2.5 of the 5 mg tablets.    To take:  15 mg Take 3 of the 5 mg tablets.    Hold Do not take your warfarin dose. See the Details table to the right for additional instructions.

## 2018-04-20 NOTE — PROGRESS NOTES
ANTICOAGULATION FOLLOW-UP CLINIC VISIT    Patient Name:  Edwin Nuno  Date:  4/20/2018  Contact Type:  Face to Face    SUBJECTIVE:     Patient Findings     Positives Intentional hold of therapy (4-13-18), Unexplained INR or factor level change    Comments Patient needs a reduction in maintenance dose. His dose was held last Friday and then he resumed prior dosing, which is keeping his INR too high. No changes in diet, medications, health or activity. He has not had any bleeding concerns. [place] on [place] and does not want to check INR at a local lab so writer reminded patient to be careful to avoid falls or injury and seek medical attention at local urgent care or ED if any bleeding occurs. Will hold again today and have patient take half dose tomorrow. He will then start 12.5 mg daily, instead of 15 mg daily. He may need a day or two of 10 mg in the future but will try this for now.           OBJECTIVE    INR Protime   Date Value Ref Range Status   04/20/2018 5.6 (A) 0.86 - 1.14 Final       ASSESSMENT / PLAN  INR assessment SUPRA    Recheck INR In: 1 WEEK    INR Location Clinic      Anticoagulation Summary as of 4/20/2018     INR goal 2.0-3.0   Today's INR 5.6!   Maintenance plan No maintenance plan   Full instructions 4/20: Hold; 4/21: 7.5 mg; 4/22: 12.5 mg; 4/23: 12.5 mg; 4/24: 12.5 mg; 4/25: 12.5 mg; 4/26: 12.5 mg   Weekly total 105 mg   Plan last modified Humera Masterson RN (4/20/2018)   Next INR check 4/27/2018   Priority INR   Target end date     Indications   DVT (deep venous thrombosis) (H) [I82.409]  Pulmonary embolism on right (H) [I26.99]  Long-term (current) use of anticoagulants [Z79.01] [Z79.01]         Anticoagulation Episode Summary     INR check location     Preferred lab     Send INR reminders to [facility] POOL    Comments * 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 Length of therapy is 3-6 months. Pt will have repeat imaging prior to stopping  warfarin.      Anticoagulation Care Providers     Provider Role Specialty Phone number    Ricardo Stoddard MD [facility] [phone]            See the Encounter Report to view Anticoagulation Flowsheet and Dosing Calendar (Go to Encounters tab in chart review, and find the Anticoagulation Therapy Visit)        Humera Masterson RN

## 2018-04-27 ENCOUNTER — ANTICOAGULATION THERAPY VISIT (OUTPATIENT)
Dept: ANTICOAGULATION | Facility: CLINIC | Age: 38
End: 2018-04-27
Payer: COMMERCIAL

## 2018-04-27 DIAGNOSIS — I82.409 DVT (DEEP VENOUS THROMBOSIS) (H): ICD-10-CM

## 2018-04-27 DIAGNOSIS — I26.99 PULMONARY EMBOLISM ON RIGHT (H): ICD-10-CM

## 2018-04-27 DIAGNOSIS — Z79.01 LONG-TERM (CURRENT) USE OF ANTICOAGULANTS: ICD-10-CM

## 2018-04-27 LAB — INR POINT OF CARE: 3.5 (ref 0.86–1.14)

## 2018-04-27 PROCEDURE — 85610 PROTHROMBIN TIME: CPT | Mod: QW

## 2018-04-27 PROCEDURE — 36416 COLLJ CAPILLARY BLOOD SPEC: CPT

## 2018-04-27 PROCEDURE — 99207 ZZC NO CHARGE NURSE ONLY: CPT

## 2018-04-27 NOTE — PROGRESS NOTES
Addendum:  Patient unable to make the appointment for today. I spoke with him over the phone and instructed him to take 10 mg today and 12.5 mg Saturday and Sunday. He plans to go into lab tomorrow. I rescheduled him for next Thursday with [facility] as he is going to [place] on Friday.    Aldo Vincent RN, BSN, PHN  Anticoagulation Clinic   [phone]        ANTICOAGULATION FOLLOW-UP CLINIC VISIT    Patient Name:  Edwin Nuno  Date:  4/27/2018  Contact Type:  Face to Face    SUBJECTIVE:     Patient Findings     Positives Unexplained INR or factor level change    Comments Patient denies any changes. I instructed patient to take 7.5 mg today, 10 mg on Tuesday, then 12.5 mg all other days. Patient denies signs or symptoms of bleeding. Writer educated patient regarding increased bleed risk and when to seek immediate medical attention. Patient verbalized understanding.             OBJECTIVE    INR Protime   Date Value Ref Range Status   04/27/2018 3.5 (A) 0.86 - 1.14 Final       ASSESSMENT / PLAN  INR assessment SUPRA    Recheck INR In: 1 WEEK    INR Location Clinic      Anticoagulation Summary as of 4/27/2018     INR goal 2.0-3.0   Today's INR 3.5!   Maintenance plan No maintenance plan   Full instructions 4/27: 7.5 mg; 4/28: 12.5 mg; 4/29: 12.5 mg; 4/30: 12.5 mg; 5/1: 10 mg; 5/2: 12.5 mg; 5/3: 12.5 mg   Weekly total 105 mg   Plan last modified Humera Masterson RN (4/20/2018)   Next INR check 5/4/2018   Priority INR   Target end date     Indications   DVT (deep venous thrombosis) (H) [I82.409]  Pulmonary embolism on right (H) [I26.99]  Long-term (current) use of anticoagulants [Z79.01] [Z79.01]         Anticoagulation Episode Summary     INR check location     Preferred lab     Send INR reminders to [facility] POOL    Comments * 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 Length of therapy is 3-6 months. Pt will have repeat imaging prior to stopping warfarin.      Anticoagulation Care Providers     Provider Role  Specialty Phone number    Ricardo Stoddard MD [facility] [phone]            See the Encounter Report to view Anticoagulation Flowsheet and Dosing Calendar (Go to Encounters tab in chart review, and find the Anticoagulation Therapy Visit)        Aldo Vincent RN

## 2018-04-27 NOTE — MR AVS SNAPSHOT
Edwin MARCIA Nuno   4/27/2018 3:30 PM   Anticoagulation Therapy Visit    Description:  37 year old male   Provider:  [facility]   Department:  [facility]           INR as of 4/27/2018     Today's INR 3.5!      Anticoagulation Summary as of 4/27/2018     INR goal 2.0-3.0   Today's INR 3.5!   Full instructions 4/27: 7.5 mg; 4/28: 10 mg; 4/29: 10 mg; 4/30: 10 mg; 5/1: 10 mg; 5/2: 10 mg; 5/3: 10 mg   Next INR check 5/4/2018    Indications   DVT (deep venous thrombosis) (H) [I82.409]  Pulmonary embolism on right (H) [I26.99]  Long-term (current) use of anticoagulants [Z79.01] [Z79.01]         Your next Anticoagulation Clinic appointment(s)     May 04, 2018  3:30 PM CDT   [facility] Visit with [facility]   [facility]    [address] [address] [phone]              Contact Numbers     Please call [phone] with any problems or questions regarding your therapy.    If you need to cancel and/or reschedule your appointment please call one of the following numbers:  [facility] [phone]  [place] - [phone]  [place] - [phone]  [place] - [phone]  [place] - [phone]            April 2018 Details    Sun Mon Tue Wed Thu Fri Sat     1               2               3               4               5               6               7                 8               9               10               11               12               13               14                 15               16               17               18               19               20               21                 22               23               24               25               26               27      7.5 mg   See details      28      10 mg           29      10 mg         30      10 mg               Date Details   04/27 This INR check               How to take your warfarin dose     To take:  7.5 mg Take 1.5 of the 5 mg tablets.    To take:  10 mg  Take 2 of the 5 mg tablets.           May 2018 Details    Sun Mon Tue Wed Thu Fri Sat       1      10 mg         2      10 mg         3      10 mg         4            5                 6               7               8               9               10               11               12                 13               14               15               16               17               18               19                 20               21               22               23               24               25               26                 27               28               29               30               31                  Date Details   No additional details    Date of next INR:  5/4/2018         How to take your warfarin dose     To take:  10 mg Take 2 of the 5 mg tablets.

## 2018-05-05 DIAGNOSIS — Z79.01 LONG TERM CURRENT USE OF ANTICOAGULANT THERAPY: ICD-10-CM

## 2018-05-05 DIAGNOSIS — I26.99 PULMONARY EMBOLISM ON RIGHT (H): ICD-10-CM

## 2018-05-05 DIAGNOSIS — I82.409 DVT (DEEP VENOUS THROMBOSIS) (H): ICD-10-CM

## 2018-05-05 LAB — INR PPP: 2.49 (ref 0.86–1.14)

## 2018-05-05 PROCEDURE — 36415 COLL VENOUS BLD VENIPUNCTURE: CPT | Performed by: FAMILY MEDICINE

## 2018-05-05 PROCEDURE — 85610 PROTHROMBIN TIME: CPT | Performed by: FAMILY MEDICINE

## 2018-05-07 ENCOUNTER — ANTICOAGULATION THERAPY VISIT (OUTPATIENT)
Dept: ANTICOAGULATION | Facility: CLINIC | Age: 38
End: 2018-05-07
Payer: COMMERCIAL

## 2018-05-07 DIAGNOSIS — Z79.01 LONG-TERM (CURRENT) USE OF ANTICOAGULANTS: ICD-10-CM

## 2018-05-07 DIAGNOSIS — I26.99 PULMONARY EMBOLISM ON RIGHT (H): ICD-10-CM

## 2018-05-07 DIAGNOSIS — I82.409 DVT (DEEP VENOUS THROMBOSIS) (H): ICD-10-CM

## 2018-05-07 PROCEDURE — 99207 ZZC NO CHARGE NURSE ONLY: CPT | Performed by: REGISTERED NURSE

## 2018-05-07 NOTE — PROGRESS NOTES
ANTICOAGULATION FOLLOW-UP CLINIC VISIT    Patient Name:  Edwin Nuno  Date:  5/7/2018  Contact Type:  Face to Face    SUBJECTIVE:     Patient Findings     Positives No Problem Findings    Comments Patient states he followed instructions and that he took Warfarin 12.5mg Sat/Sun. He is traveling for work other than that he reports no other changes. Writer will have him remain on 82.5mg weekly. He verbalizes understanding and agrees to plan, he repeated instructions back to writer. No further questions or concerns. He already had an appt set up for Thursday to recheck his INR which writer believes is a good plan.           OBJECTIVE    INR   Date Value Ref Range Status   05/05/2018 2.49 (H) 0.86 - 1.14 Final       ASSESSMENT / PLAN  INR assessment THER    Recheck INR In: 3 DAYS    INR Location Clinic      Anticoagulation Summary as of 5/7/2018     INR goal 2.0-3.0   Today's INR 2.49 (5/5/2018)   Maintenance plan 10 mg (5 mg x 2) on Tue, Fri; 12.5 mg (5 mg x 2.5) all other days   Full instructions 5/8: 10 mg; 5/9: 12.5 mg; Otherwise 10 mg on Tue, Fri; 12.5 mg all other days   Weekly total 82.5 mg   Plan last modified Eri Connell RN (5/7/2018)   Next INR check 5/10/2018   Priority INR   Target end date     Indications   DVT (deep venous thrombosis) (H) [I82.409]  Pulmonary embolism on right (H) [I26.99]  Long-term (current) use of anticoagulants [Z79.01] [Z79.01]         Anticoagulation Episode Summary     INR check location     Preferred lab     Send INR reminders to [facility]    Comments * 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 Length of therapy is 3-6 months. Pt will have repeat imaging prior to stopping warfarin.      Anticoagulation Care Providers     Provider Role Specialty Phone number    Ricardo Stoddard MD [facility] [phone]            See the Encounter Report to view Anticoagulation Flowsheet and Dosing Calendar (Go to Encounters tab in chart review, and  find the Anticoagulation Therapy Visit)        Eri Connell RN

## 2018-05-07 NOTE — MR AVS SNAPSHOT
Edwin Nuno   5/7/2018   Anticoagulation Therapy Visit    Description:  37 year old male   Provider:  Eri Connell, RN   Department:  [facility]           INR as of 5/7/2018     Today's INR 2.49 (5/5/2018)      Anticoagulation Summary as of 5/7/2018     INR goal 2.0-3.0   Today's INR 2.49 (5/5/2018)   Full instructions 5/8: 10 mg; 5/9: 12.5 mg; Otherwise 10 mg on Tue, Fri; 12.5 mg all other days   Next INR check 5/10/2018    Indications   DVT (deep venous thrombosis) (H) [I82.409]  Pulmonary embolism on right (H) [I26.99]  Long-term (current) use of anticoagulants [Z79.01] [Z79.01]         Your next Anticoagulation Clinic appointment(s)     May 10, 2018  9:45 AM CDT   [facility] with [facility]   [facility]    [address] [address]   [phone]              May 2018 Details    Sun Mon Tue Wed Thu Fri Sat       1               2               3               4               5                 6               7      12.5 mg   See details      8      10 mg         9      12.5 mg         10            11               12                 13               14               15               16               17               18               19                 20               21               22               23               24               25               26                 27               28               29               30               31                  Date Details   05/07 This INR check       Date of next INR:  5/10/2018         How to take your warfarin dose     To take:  10 mg Take 2 of the 5 mg tablets.    To take:  12.5 mg Take 2.5 of the 5 mg tablets.

## 2018-05-10 ENCOUNTER — ANTICOAGULATION THERAPY VISIT (OUTPATIENT)
Dept: ANTICOAGULATION | Facility: CLINIC | Age: 38
End: 2018-05-10
Payer: COMMERCIAL

## 2018-05-10 DIAGNOSIS — Z79.01 LONG-TERM (CURRENT) USE OF ANTICOAGULANTS: ICD-10-CM

## 2018-05-10 DIAGNOSIS — I26.99 PULMONARY EMBOLISM ON RIGHT (H): ICD-10-CM

## 2018-05-10 DIAGNOSIS — I82.409 DVT (DEEP VENOUS THROMBOSIS) (H): ICD-10-CM

## 2018-05-10 LAB — INR POINT OF CARE: 3 (ref 0.86–1.14)

## 2018-05-10 PROCEDURE — 85610 PROTHROMBIN TIME: CPT | Mod: QW

## 2018-05-10 PROCEDURE — 36416 COLLJ CAPILLARY BLOOD SPEC: CPT

## 2018-05-10 PROCEDURE — 99207 ZZC NO CHARGE NURSE ONLY: CPT

## 2018-05-10 NOTE — MR AVS SNAPSHOT
Edwin MARCIA Nuno   5/10/2018 9:45 AM   Anticoagulation Therapy Visit    Description:  37 year old male   Provider:  [facility]   [facility]  [facility]           INR as of 5/10/2018     Today's INR 3.0      Anticoagulation Summary as of 5/10/2018     INR goal 2.0-3.0   Today's INR 3.0   Full instructions 10 mg on Tue, Fri; 12.5 mg all other days   Next INR check 5/17/2018    Indications   DVT (deep venous thrombosis) (H) [I82.409]  Pulmonary embolism on right (H) [I26.99]  Long-term (current) use of anticoagulants [Z79.01] [Z79.01]         Your next Anticoagulation Clinic appointment(s)     May 17, 2018  3:30 PM CDT   Anticoagulation Visit with [facility]   [facility]    [address] [address] [phone]              Contact Numbers     Please call [phone] with any problems or questions regarding your therapy.    If you need to cancel and/or reschedule your appointment please call one of the following numbers:  [facility] [phone]  [place] - [phone]  [facility] [phone]  [facility] [phone]  [place] - [phone]            May 2018 Details    Sun Mon Tue Wed Thu Fri Sat       1               2               3               4               5                 6               7               8               9               10      12.5 mg   See details      11      10 mg         12      12.5 mg           13      12.5 mg         14      12.5 mg         15      10 mg         16      12.5 mg         17            18               19                 20               21               22               23               24               25               26                 27               28               29               30               31                  Date Details   05/10 This INR check       Date of next INR:  5/17/2018         How to take your warfarin dose     To take:  10 mg Take 2 of the 5 mg tablets.    To  take:  12.5 mg Take 2.5 of the 5 mg tablets.

## 2018-05-10 NOTE — PROGRESS NOTES
Addendum:    Patient started on Cephalexin for a sinus infection. He started on Sunday 5/13/18 for 7 days. He is taken 500 mg TID.    Aldo Vincent RN, BSN, PHN  Anticoagulation Clinic   [phone]        ANTICOAGULATION FOLLOW-UP CLINIC VISIT    Patient Name:  Edwin Nuno  Date:  5/10/2018  Contact Type:  Face to Face    SUBJECTIVE:     Patient Findings     Positives No Problem Findings    Comments No changes in medications, diet, or activity noted. Took warfarin as instructed, denies any missed doses. Will plan to continue same warfarin dose, recheck INR in 1 week.    Patient has follow up appointments with Dr. Hendricks in July to determine length of warfarin therapy.            OBJECTIVE    INR Protime   Date Value Ref Range Status   05/10/2018 3.0 (A) 0.86 - 1.14 Final       ASSESSMENT / PLAN  No question data found.  Anticoagulation Summary as of 5/10/2018     INR goal 2.0-3.0   Today's INR 3.0   Maintenance plan 10 mg (5 mg x 2) on Tue, Fri; 12.5 mg (5 mg x 2.5) all other days   Full instructions 10 mg on Tue, Fri; 12.5 mg all other days   Weekly total 82.5 mg   No change documented Nunu Silvestre RN   Plan last modified Eri Connell RN (5/7/2018)   Next INR check 5/17/2018   Priority INR   Target end date     Indications   DVT (deep venous thrombosis) (H) [I82.409]  Pulmonary embolism on right (H) [I26.99]  Long-term (current) use of anticoagulants [Z79.01] [Z79.01]         Anticoagulation Episode Summary     INR check location     Preferred lab     Send INR reminders to [facility]    Comments * 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 Length of therapy is 3-6 months. Pt will have repeat imaging prior to stopping warfarin.      Anticoagulation Care Providers     Provider Role Specialty Phone number    Ricardo Stoddard MD [facility] [phone]            See the Encounter Report to view Anticoagulation Flowsheet and Dosing Calendar (Go to Encounters tab  in chart review, and find the Anticoagulation Therapy Visit)        Nunu Silvestre RN, CACP

## 2018-05-17 ENCOUNTER — ANTICOAGULATION THERAPY VISIT (OUTPATIENT)
Dept: ANTICOAGULATION | Facility: CLINIC | Age: 38
End: 2018-05-17
Payer: COMMERCIAL

## 2018-05-17 DIAGNOSIS — I82.409 DVT (DEEP VENOUS THROMBOSIS) (H): ICD-10-CM

## 2018-05-17 DIAGNOSIS — I26.99 PULMONARY EMBOLISM ON RIGHT (H): ICD-10-CM

## 2018-05-17 DIAGNOSIS — Z79.01 LONG-TERM (CURRENT) USE OF ANTICOAGULANTS: ICD-10-CM

## 2018-05-17 LAB — INR POINT OF CARE: 3.5 (ref 0.86–1.14)

## 2018-05-17 PROCEDURE — 36416 COLLJ CAPILLARY BLOOD SPEC: CPT

## 2018-05-17 PROCEDURE — 85610 PROTHROMBIN TIME: CPT | Mod: QW

## 2018-05-17 PROCEDURE — 99207 ZZC NO CHARGE NURSE ONLY: CPT

## 2018-05-17 NOTE — PROGRESS NOTES
ANTICOAGULATION FOLLOW-UP CLINIC VISIT    Patient Name:  Edwin Nuno  Date:  5/17/2018  Contact Type:  Face to Face    SUBJECTIVE:     Patient Findings     Positives Inflammation (sinus infection), Antibiotic use or infection (cephalexin 500 mg TID through Sat 5/19)    Comments Pt denies other changes in medications, diet, activity or health, reports taking warfarin as directed.    Pt prefers to keep INR at high end of his goal range, did not want to decrease warfarin dose by much as it took a long time for him to stabilize on a maintenance dose.  Writer instructed pt to take 1x decreased dose today of ~6%, then resume maintenance dose and recheck INR in 8 days.  Pt agrees to plan.             OBJECTIVE    INR Protime   Date Value Ref Range Status   05/17/2018 3.5 (A) 0.86 - 1.14 Final       ASSESSMENT / PLAN  INR assessment SUPRA    Recheck INR In: 8 DAYS    INR Location Clinic      Anticoagulation Summary as of 5/17/2018     INR goal 2.0-3.0   Today's INR 3.5!   Maintenance plan 10 mg (5 mg x 2) on Tue, Fri; 12.5 mg (5 mg x 2.5) all other days   Full instructions 5/17: 7.5 mg; Otherwise 10 mg on Tue, Fri; 12.5 mg all other days   Weekly total 82.5 mg   Plan last modified Eri Connell, RN (5/7/2018)   Next INR check 5/25/2018   Priority INR   Target end date     Indications   DVT (deep venous thrombosis) (H) [I82.409]  Pulmonary embolism on right (H) [I26.99]  Long-term (current) use of anticoagulants [Z79.01] [Z79.01]         Anticoagulation Episode Summary     INR check location     Preferred lab     Send INR reminders to [facility]    Comments * 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 Length of therapy is 3-6 months. Pt will have repeat imaging prior to stopping warfarin.      Anticoagulation Care Providers     Provider Role Specialty Phone number    Ricardo Stoddard MD [facility] [phone]            See the Encounter Report to view Anticoagulation Flowsheet  and Dosing Calendar (Go to Encounters tab in chart review, and find the Anticoagulation Therapy Visit)        Maria Teresa Meredith RN

## 2018-05-17 NOTE — MR AVS SNAPSHOT
Edwin KENNEDY Nurys   5/17/2018 3:30 PM   Anticoagulation Therapy Visit    Description:  37 year old male   Provider:  [facility]   Department:  [facility]           INR as of 5/17/2018     Today's INR 3.5!      Anticoagulation Summary as of 5/17/2018     INR goal 2.0-3.0   Today's INR 3.5!   Full instructions 5/17: 7.5 mg; Otherwise 10 mg on Tue, Fri; 12.5 mg all other days   Next INR check 5/25/2018    Indications   DVT (deep venous thrombosis) (H) [I82.409]  Pulmonary embolism on right (H) [I26.99]  Long-term (current) use of anticoagulants [Z79.01] [Z79.01]         Your next Anticoagulation Clinic appointment(s)     May 25, 2018  3:30 PM CDT   [facility]agulation [facility] with [facility]   [facility]    [address] [address] [phone]              Contact Numbers     Please call [phone] with any problems or questions regarding your therapy.    If you need to cancel and/or reschedule your appointment please call one of the following numbers:  [facility] - [phone]  [place] - [phone]  [facility] [phone]  [facility] [phone]  [place] - [phone]            May 2018 Details    Sun Mon Tue Wed Thu Fri Sat       1               2               3               4               5                 6               7               8               9               10               11               12                 13               14               15               16               17      7.5 mg   See details      18      10 mg         19      12.5 mg           20      12.5 mg         21      12.5 mg         22      10 mg         23      12.5 mg         24      12.5 mg         25            26                 27               28               29               30               31                  Date Details   05/17 This INR check       Date of next INR:  5/25/2018         How to take your warfarin dose     To take:  7.5 mg  Take 1.5 of the 5 mg tablets.    To take:  10 mg Take 2 of the 5 mg tablets.    To take:  12.5 mg Take 2.5 of the 5 mg tablets.

## 2018-05-25 ENCOUNTER — OFFICE VISIT (OUTPATIENT)
Dept: URGENT CARE | Facility: URGENT CARE | Age: 38
End: 2018-05-25
Payer: COMMERCIAL

## 2018-05-25 ENCOUNTER — ANTICOAGULATION THERAPY VISIT (OUTPATIENT)
Dept: ANTICOAGULATION | Facility: CLINIC | Age: 38
End: 2018-05-25
Payer: COMMERCIAL

## 2018-05-25 ENCOUNTER — TELEPHONE (OUTPATIENT)
Dept: FAMILY MEDICINE | Facility: CLINIC | Age: 38
End: 2018-05-25

## 2018-05-25 VITALS
WEIGHT: 286.4 LBS | HEART RATE: 68 BPM | DIASTOLIC BLOOD PRESSURE: 58 MMHG | RESPIRATION RATE: 14 BRPM | SYSTOLIC BLOOD PRESSURE: 130 MMHG | TEMPERATURE: 97.8 F | BODY MASS INDEX: 39.39 KG/M2

## 2018-05-25 DIAGNOSIS — Z79.01 LONG-TERM (CURRENT) USE OF ANTICOAGULANTS: ICD-10-CM

## 2018-05-25 DIAGNOSIS — I26.99 PULMONARY EMBOLISM ON RIGHT (H): ICD-10-CM

## 2018-05-25 DIAGNOSIS — J01.90 ACUTE SINUSITIS WITH SYMPTOMS > 10 DAYS: Primary | ICD-10-CM

## 2018-05-25 LAB — INR POINT OF CARE: 4.4 (ref 0.86–1.14)

## 2018-05-25 PROCEDURE — 99207 ZZC NO CHARGE NURSE ONLY: CPT

## 2018-05-25 PROCEDURE — 36416 COLLJ CAPILLARY BLOOD SPEC: CPT

## 2018-05-25 PROCEDURE — 99213 OFFICE O/P EST LOW 20 MIN: CPT | Performed by: NURSE PRACTITIONER

## 2018-05-25 PROCEDURE — 85610 PROTHROMBIN TIME: CPT | Mod: QW

## 2018-05-25 RX ORDER — LEVOFLOXACIN 500 MG/1
500 TABLET, FILM COATED ORAL DAILY
Qty: 5 TABLET | Refills: 0 | Status: SHIPPED | OUTPATIENT
Start: 2018-05-25 | End: 2018-05-27

## 2018-05-25 RX ORDER — FLUTICASONE PROPIONATE 50 MCG
1-2 SPRAY, SUSPENSION (ML) NASAL DAILY
Qty: 16 G | Refills: 0 | Status: SHIPPED | OUTPATIENT
Start: 2018-05-25 | End: 2018-11-02

## 2018-05-25 NOTE — MR AVS SNAPSHOT
After Visit Summary   5/25/2018    Edwin Nuno    MRN: [id number]           Patient Information     Date Of Birth          1980        Visit Information        Provider Department      5/25/2018 5:15 PM Twyla Galvan APRN CNP [facility]        Today's Diagnoses     Acute sinusitis with symptoms > 10 days    -  1      Care Instructions    Notify INR clinic regarding your mediations    Symptom Relief: Afdrin do not use greater then 3 days  Intranasal corticosteroid   Flonase has  been prescribed.     Tylenol or Ibuprofen if able to take for fevers and discomfort.  Do not exceed 4 grams of Tylenol in a 24 hour period.  Take Ibuprofen with food.      Follow up in 3-5 days if not improving or return sooner if worsening or fail to improve as anticipated.      Sinusitis (Antibiotic Treatment)    The sinuses are air-filled spaces within the bones of the face. They connect to the inside of the nose. Sinusitis is an inflammation of the tissue that lines the sinuses. Sinusitis can occur during a cold. It can also happen due to allergies to pollens and other particles in the air. Sinusitis can cause symptoms of sinus congestion and a feeling of fullness. A sinus infection causes fever, headache, and facial pain. There is often green or yellow fluid draining from the nose or into the back of the throat (post-nasal drip). You have been given antibiotics to treat this condition.  Home care    Take the full course of antibiotics as instructed. Do not stop taking them, even when you feel better.    Drink plenty of water, hot tea, and other liquids. This may help thin nasal mucus. It also may help your sinuses drain fluids.    Heat may help soothe painful areas of your face. Use a towel soaked in hot water. Or,  the shower and direct the warm spray onto your face. Using a vaporizer along with a menthol rub at night may also help soothe  symptoms.     An expectorant with guaifenesin may help thin nasal mucus and help your sinuses drain fluids.    You can use an over-the-counter decongestant, unless a similar medicine was prescribed to you. Nasal sprays work the fastest. Use one that contains phenylephrine or oxymetazoline. First blow your nose gently. Then use the spray. Do not use these medicines more often than directed on the label. If you do, your symptoms may get worse. You may also take pills that contain pseudoephedrine. Don t use products that combine multiple medicines. This is because side effects may be increased. Read labels. You can also ask the pharmacist for help. (People with high blood pressure should not use decongestants. They can raise blood pressure.)    Over-the-counter antihistamines may help if allergies contributed to your sinusitis.      Do not use nasal rinses or irrigation during an acute sinus infection, unless your healthcare provider tells you to. Rinsing may spread the infection to other areas in your sinuses.    Use acetaminophen or ibuprofen to control pain, unless another pain medicine was prescribed to you. If you have chronic liver or kidney disease or ever had a stomach ulcer, talk with your healthcare provider before using these medicines. (Aspirin should never be taken by anyone under age 18 who is ill with a fever. It may cause severe liver damage.)    Don't smoke. This can make symptoms worse.  Follow-up care  Follow up with your healthcare provider or our staff if you are better in 1 week.  When to seek medical advice  Call your healthcare provider if any of these occur:    Facial pain or headache that gets worse    Stiff neck    Unusual drowsiness or confusion    Swelling of your forehead or eyelids    Vision problems, such as blurred or double vision    Fever of 100.4 F (38 C) or higher, or as directed by your healthcare provider    Seizure    Breathing problems    Symptoms don't go away in 10  days  Prevention  Here are steps you can take to help prevent an infection:    Keep good hand washing habits.    Don t have close contact with people who have sore throats, colds, or other upper respiratory infections.    Don t smoke, and stay away from secondhand smoke.    Stay up to date with of your vaccines.  Date Last Reviewed: 11/1/2017 2000-2017 [employer]. [address] [place] [address]. All rights reserved. This information is not intended as a substitute for professional medical care. Always follow your healthcare professional's instructions.                Follow-ups after your visit        Your next 10 appointments already scheduled     May 29, 2018  1:00 PM CDT   Anticoagulation Visit with [place] ANTI COAG   [facility] ([facility]    [address] [address]   [phone]            Jun 08, 2018  2:00 PM CDT   US LOWER EXTREMITY VENOUS MAPPING RIGHT with [address] Ultrasound ([facility])    [address] [address]   [phone]           Please bring a list of your medicines (including vitamins, minerals and over-the-counter drugs). Also, tell your doctor about any allergies you may have. Wear comfortable clothes and leave your valuables at home.  You do not need to do anything special to prepare for your exam.  Please call the Imaging Department at your exam site with any questions.            Jun 08, 2018  2:50 PM CDT   LAB with [facility] ([facility])    [address] [address]   [phone]           Please do not eat 10-12 hours before your appointment if you are coming in fasting for labs on lipids, cholesterol, or glucose (sugar). This does not apply to pregnant women. Water, hot tea and black coffee (with nothing added) are okay. Do not drink other fluids, diet soda or chew gum.            Julius 15, 2018   3:00 PM CDT   Return Visit with Sivakumar Hendricks MD   [facility] ([facility])    [facility]  [address]  [facility] [address] [phone]              Who to contact     If you have questions or need follow up information about today's clinic visit or your schedule please contact [facility] directly at [phone].  Normal or non-critical lab and imaging results will be communicated to you by [employer], letter or phone within 4 business days after the clinic has received the results. If you do not hear from us within 7 days, please contact the clinic through [email or web address] or phone. If you have a critical or abnormal lab result, we will notify you by phone as soon as possible.  Submit refill requests through [email or web address] or call your pharmacy and they will forward the refill request to us. Please allow 3 business days for your refill to be completed.          Additional Information About Your Visit        [email or web address] Information     [email or web address] gives you secure access to your electronic health record. If you see a primary care provider, you can also send messages to your care team and make appointments. If you have questions, please call your primary care clinic.  If you do not have a primary care provider, please call [phone] and they will assist you.        Care EveryWhere ID     This is your Care EveryWhere ID. This could be used by other organizations to access your [facility] medical records  [id number]        Your Vitals Were     Pulse Temperature Respirations BMI (Body Mass Index)          68 97.8  F (36.6  C) (Tympanic) 14 39.39 kg/m2         Blood Pressure from Last 3 Encounters:   05/25/18 130/58   04/12/18 134/84   03/30/18 135/74    Weight from Last 3 Encounters:   05/25/18 286 lb 6.4 oz (129.9 kg)   04/12/18 280 lb (127 kg)   03/30/18 295 lb 11.2 oz (134.1 kg)              Today, you had the following     No orders found  for display         Today's Medication Changes          These changes are accurate as of 5/25/18  5:52 PM.  If you have any questions, ask your nurse or doctor.               Start taking these medicines.        Dose/Directions    fluticasone 50 MCG/ACT spray   Commonly known as:  FLONASE   Used for:  Acute sinusitis with symptoms > 10 days   Started by:  Twyla Galvan APRN CNP        Dose:  1-2 spray   Spray 1-2 sprays into both nostrils daily   Quantity:  16 g   Refills:  0       levofloxacin 500 MG tablet   Commonly known as:  LEVAQUIN   Used for:  Acute sinusitis with symptoms > 10 days   Started by:  Twyla Galvan APRN CNP        Dose:  500 mg   Take 1 tablet (500 mg) by mouth daily for 5 days   Quantity:  5 tablet   Refills:  0            Where to get your medicines      These medications were sent to [facility] [address] [address]     Phone:  [phone]     fluticasone 50 MCG/ACT spray    levofloxacin 500 MG tablet                Primary Care Provider Office Phone # Fax #    Ricardo Stoddard [phone] [address] [facility] [address]        Equal Access to Services     RHONDA BENJAMIN AH: Hadii melinda wilsono Soparadiseali, waaxda luqadaha, qaybta kaalmada adeegyada, austen hui. So [facility] [phone].    ATENCIÓN: Si habla español, tiene a bhandari disposición servicios gratuitos de asistencia lingüística. Rochelle al [phone].    We comply with applicable federal civil rights laws and [place] laws. We do not discriminate on the basis of race, color, national origin, age, disability, sex, sexual orientation, or gender identity.            Thank you!     Thank you for choosing [facility]  for your care. Our goal is always to provide you with excellent care. Hearing back from our patients is one way we can continue to improve our services.  Please take a few minutes to complete the written survey that you may receive in the mail after your visit with us. Thank you!             Your Updated Medication List - Protect others around you: Learn how to safely use, store and throw away your medicines at www.disposemymeds.org.          This list is accurate as of 5/25/18  5:52 PM.  Always use your most recent med list.                   Brand Name Dispense Instructions for use Diagnosis    cetirizine 10 MG tablet    zyrTEC    30 tablet    Take 1 tablet (10 mg) by mouth every evening    Preop general physical exam       fluticasone 50 MCG/ACT spray    FLONASE    16 g    Spray 1-2 sprays into both nostrils daily    Acute sinusitis with symptoms > 10 days       levofloxacin 500 MG tablet    LEVAQUIN    5 tablet    Take 1 tablet (500 mg) by mouth daily for 5 days    Acute sinusitis with symptoms > 10 days       lisinopril 20 MG tablet    PRINIVIL/ZESTRIL    90 tablet    Take 1 tablet (20 mg) by mouth daily    Benign essential hypertension       magnesium oxide 400 (241.3 Mg) MG tablet    MAG-OX    60 tablet    Take 1 tablet (400 mg) by mouth daily    Preop general physical exam       warfarin 5 MG tablet    COUMADIN    180 tablet    Take 10 mg on Tue, Fri; 12.5 mg all other days or as directed by the Anticoagulation Clinic    Other acute pulmonary embolism without acute cor pulmonale (H)

## 2018-05-25 NOTE — NURSING NOTE
"Chief Complaint   Patient presents with     Sinus Problem     started a while ago.  Started Rx 5/13, completed on 5/19 and got a little better though today started getting worse.  INR increasing.  Drainage, pressure, headaches intermittently.         Initial /58 (BP Location: Right arm, Cuff Size: Adult Large)  Pulse 68  Temp 97.8  F (36.6  C) (Tympanic)  Resp 14  Wt 286 lb 6.4 oz (129.9 kg)  BMI 39.39 kg/m2 Estimated body mass index is 39.39 kg/(m^2) as calculated from the following:    Height as of 4/12/18: 5' 11.5\" (1.816 m).    Weight as of this encounter: 286 lb 6.4 oz (129.9 kg).      Health Maintenance that is potentially due pending provider review:  NONE    n/a    Is there anyone who you would like to be able to receive your results? Not Applicable  If yes have patient fill out NOELLE    José Miguel Montoya M.A.      "

## 2018-05-25 NOTE — PROGRESS NOTES
SUBJECTIVE:  Edwin Nuno is a 37 year old male here with concerns about sinus infection.  He states onset of symptoms were 10 day(s) ago.    He has had maxillary pressure. Course of illness is worsening.   Severity moderate  Current and Associated symptoms: fever and facial pain/pressure  Predisposing factors include HX of recurrent sinusitis.   Recent treatment has included: None    Past Medical History:   Diagnosis Date     DVT (deep vein thrombosis) in pregnancy (H)     right leg dx 1/13/2018     Family history of colon cancer      GERD (gastroesophageal reflux disease)      HTN (hypertension)      Hypotestosteronemia      Social History   Substance Use Topics     Smoking status: Never Smoker     Smokeless tobacco: Former User     Quit date: 6/22/2002     Alcohol use Yes         ROS:  CONSTITUTIONAL:NEGATIVE for fever, chills, change in weight  INTEGUMENTARY/SKIN: NEGATIVE for worrisome rashes, moles or lesions  EYES: NEGATIVE for vision changes or irritation  ENT/MOUTH: See above   RESP:NEGATIVE for significant cough or SOB  CV: NEGATIVE for chest pain, palpitations or peripheral edema  MUSCULOSKELETAL: NEGATIVE for significant arthralgias or myalgia  NEURO: NEGATIVE for weakness, dizziness or paresthesias    OBJECTIVE:  /58 (BP Location: Right arm, Cuff Size: Adult Large)  Pulse 68  Temp 97.8  F (36.6  C) (Tympanic)  Resp 14  Wt 286 lb 6.4 oz (129.9 kg)  BMI 39.39 kg/m2  Exam:GENERAL APPEARANCE: healthy, alert and no distress  EYES: EOMI,  PERRL, conjunctiva clear  HENT: ear canals and TM's normal.  Nose and mouth without ulcers, erythema or lesions Maxillary sinus tenderness, bilateral turbinates inflamed and edematous    NECK: supple, nontender, no lymphadenopathy  RESP: lungs clear to auscultation - no rales, rhonchi or wheezes  CV: regular rates and rhythm, normal S1 S2, no murmur noted  ABDOMEN:  soft, nontender, no HSM or masses and bowel sounds normal  NEURO: Normal strength and tone,  sensory exam grossly normal,  normal speech and mentation  SKIN: no suspicious lesions or rashes    ASSESSMENT:    ICD-10-CM    1. Acute sinusitis with symptoms > 10 days J01.90 levofloxacin (LEVAQUIN) 500 MG tablet     fluticasone (FLONASE) 50 MCG/ACT spray           PLAN:  Patient Instructions   Notify INR clinic regarding your mediations    Symptom Relief: Afdrin do not use greater then 3 days  Intranasal corticosteroid   Flonase has  been prescribed.     Tylenol or Ibuprofen if able to take for fevers and discomfort.  Do not exceed 4 grams of Tylenol in a 24 hour period.  Take Ibuprofen with food.      Follow up in 3-5 days if not improving or return sooner if worsening or fail to improve as anticipated.      Sinusitis (Antibiotic Treatment)    The sinuses are air-filled spaces within the bones of the face. They connect to the inside of the nose. Sinusitis is an inflammation of the tissue that lines the sinuses. Sinusitis can occur during a cold. It can also happen due to allergies to pollens and other particles in the air. Sinusitis can cause symptoms of sinus congestion and a feeling of fullness. A sinus infection causes fever, headache, and facial pain. There is often green or yellow fluid draining from the nose or into the back of the throat (post-nasal drip). You have been given antibiotics to treat this condition.  Home care    Take the full course of antibiotics as instructed. Do not stop taking them, even when you feel better.    Drink plenty of water, hot tea, and other liquids. This may help thin nasal mucus. It also may help your sinuses drain fluids.    Heat may help soothe painful areas of your face. Use a towel soaked in hot water. Or,  the shower and direct the warm spray onto your face. Using a vaporizer along with a menthol rub at night may also help soothe symptoms.     An expectorant with guaifenesin may help thin nasal mucus and help your sinuses drain fluids.    You can use an  over-the-counter decongestant, unless a similar medicine was prescribed to you. Nasal sprays work the fastest. Use one that contains phenylephrine or oxymetazoline. First blow your nose gently. Then use the spray. Do not use these medicines more often than directed on the label. If you do, your symptoms may get worse. You may also take pills that contain pseudoephedrine. Don t use products that combine multiple medicines. This is because side effects may be increased. Read labels. You can also ask the pharmacist for help. (People with high blood pressure should not use decongestants. They can raise blood pressure.)    Over-the-counter antihistamines may help if allergies contributed to your sinusitis.      Do not use nasal rinses or irrigation during an acute sinus infection, unless your healthcare provider tells you to. Rinsing may spread the infection to other areas in your sinuses.    Use acetaminophen or ibuprofen to control pain, unless another pain medicine was prescribed to you. If you have chronic liver or kidney disease or ever had a stomach ulcer, talk with your healthcare provider before using these medicines. (Aspirin should never be taken by anyone under age 18 who is ill with a fever. It may cause severe liver damage.)    Don't smoke. This can make symptoms worse.  Follow-up care  Follow up with your healthcare provider or our staff if you are better in 1 week.  When to seek medical advice  Call your healthcare provider if any of these occur:    Facial pain or headache that gets worse    Stiff neck    Unusual drowsiness or confusion    Swelling of your forehead or eyelids    Vision problems, such as blurred or double vision    Fever of 100.4 F (38 C) or higher, or as directed by your healthcare provider    Seizure    Breathing problems    Symptoms don't go away in 10 days  Prevention  Here are steps you can take to help prevent an infection:    Keep good hand washing habits.    Don t have close contact  with people who have sore throats, colds, or other upper respiratory infections.    Don t smoke, and stay away from secondhand smoke.    Stay up to date with of your vaccines.  Date Last Reviewed: 11/1/2017 2000-2017 [employer]. [address] [place] [address]. All rights reserved. This information is not intended as a substitute for professional medical care. Always follow your healthcare professional's instructions.            CHAIM Calix CNP

## 2018-05-25 NOTE — TELEPHONE ENCOUNTER
Reason for call:  Patient reporting a symptom    Symptom or request: Patient calling stating he was treated with cephalexin for a sinus infection. This did not kick the sinus infection. Patient is wondering if Dr. Stoddard can give him something else?     Duration (how long have symptoms been present): ongoing    Have you been treated for this before? Yes    Additional comments:     Phone Number patient can be reached at:  Cell number on file:    Telephone Information:   Mobile [phone]       Best Time:  any    Can we leave a detailed message on this number:  YES    Call taken on 5/25/2018 at 3:49 PM by Dona Riddle

## 2018-05-25 NOTE — PROGRESS NOTES
ANTICOAGULATION FOLLOW-UP CLINIC VISIT    Patient Name:  Edwin Nuno  Date:  5/25/2018  Contact Type:  Face to Face    SUBJECTIVE:     Patient Findings     Positives Other complaints (congestion and sinus pressure. some drainage. headaches), Antibiotic use or infection (finished keflex on 5-19-18)    Comments Patient will be traveling on 5-30 and does not want INR too low. He is still having symptoms of a sinus infection and states he required sulfa products in the past to eliminate his sinus infections. He finished keflex several days ago and writer advised he call his PCP to see if they want to prescribe something stronger. He will let [facility] know if this happens. INR likely still elevated due to sinus symptoms. Writer will temporarily reduce dosing by ~12% and recheck on 5-29 before he leaves town. He prefers INR at upper end of range and small dose changes seem to affect INR greatly. No bleeding concerns.           OBJECTIVE    INR Protime   Date Value Ref Range Status   05/25/2018 4.4 (A) 0.86 - 1.14 Final       ASSESSMENT / PLAN  INR assessment SUPRA    Recheck INR In: 4 DAYS    INR Location Clinic      Anticoagulation Summary as of 5/25/2018     INR goal 2.0-3.0   Today's INR 4.4!   Warfarin maintenance plan 10 mg (5 mg x 2) on Tue, Fri; 12.5 mg (5 mg x 2.5) all other days   Full warfarin instructions 5/25: 2.5 mg; 5/26: 10 mg; Otherwise 10 mg on Tue, Fri; 12.5 mg all other days   Weekly warfarin total 82.5 mg   Plan last modified Eri Connell RN (5/7/2018)   Next INR check 5/29/2018   Priority INR   Target end date     Indications   DVT (deep venous thrombosis) (H) [I82.409]  Pulmonary embolism on right (H) [I26.99]  Long-term (current) use of anticoagulants [Z79.01] [Z79.01]         Anticoagulation Episode Summary     INR check location     Preferred lab     Send INR reminders to [facility]    Comments * 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 Length of therapy is 3-6 months.  Pt will have repeat imaging prior to stopping warfarin.      Anticoagulation Care Providers     Provider Role Specialty Phone number    Ricardo Stoddard MD [facility] [phone]            See the Encounter Report to view Anticoagulation Flowsheet and Dosing Calendar (Go to Encounters tab in chart review, and find the Anticoagulation Therapy Visit)        Humera Masterson RN

## 2018-05-25 NOTE — PATIENT INSTRUCTIONS
Notify INR clinic regarding your mediations    Symptom Relief: Afdrin do not use greater then 3 days  Intranasal corticosteroid   Flonase has  been prescribed.     Tylenol or Ibuprofen if able to take for fevers and discomfort.  Do not exceed 4 grams of Tylenol in a 24 hour period.  Take Ibuprofen with food.      Follow up in 3-5 days if not improving or return sooner if worsening or fail to improve as anticipated.      Sinusitis (Antibiotic Treatment)    The sinuses are air-filled spaces within the bones of the face. They connect to the inside of the nose. Sinusitis is an inflammation of the tissue that lines the sinuses. Sinusitis can occur during a cold. It can also happen due to allergies to pollens and other particles in the air. Sinusitis can cause symptoms of sinus congestion and a feeling of fullness. A sinus infection causes fever, headache, and facial pain. There is often green or yellow fluid draining from the nose or into the back of the throat (post-nasal drip). You have been given antibiotics to treat this condition.  Home care    Take the full course of antibiotics as instructed. Do not stop taking them, even when you feel better.    Drink plenty of water, hot tea, and other liquids. This may help thin nasal mucus. It also may help your sinuses drain fluids.    Heat may help soothe painful areas of your face. Use a towel soaked in hot water. Or,  the shower and direct the warm spray onto your face. Using a vaporizer along with a menthol rub at night may also help soothe symptoms.     An expectorant with guaifenesin may help thin nasal mucus and help your sinuses drain fluids.    You can use an over-the-counter decongestant, unless a similar medicine was prescribed to you. Nasal sprays work the fastest. Use one that contains phenylephrine or oxymetazoline. First blow your nose gently. Then use the spray. Do not use these medicines more often than directed on the label. If you do, your symptoms  may get worse. You may also take pills that contain pseudoephedrine. Don t use products that combine multiple medicines. This is because side effects may be increased. Read labels. You can also ask the pharmacist for help. (People with high blood pressure should not use decongestants. They can raise blood pressure.)    Over-the-counter antihistamines may help if allergies contributed to your sinusitis.      Do not use nasal rinses or irrigation during an acute sinus infection, unless your healthcare provider tells you to. Rinsing may spread the infection to other areas in your sinuses.    Use acetaminophen or ibuprofen to control pain, unless another pain medicine was prescribed to you. If you have chronic liver or kidney disease or ever had a stomach ulcer, talk with your healthcare provider before using these medicines. (Aspirin should never be taken by anyone under age 18 who is ill with a fever. It may cause severe liver damage.)    Don't smoke. This can make symptoms worse.  Follow-up care  Follow up with your healthcare provider or our staff if you are better in 1 week.  When to seek medical advice  Call your healthcare provider if any of these occur:    Facial pain or headache that gets worse    Stiff neck    Unusual drowsiness or confusion    Swelling of your forehead or eyelids    Vision problems, such as blurred or double vision    Fever of 100.4 F (38 C) or higher, or as directed by your healthcare provider    Seizure    Breathing problems    Symptoms don't go away in 10 days  Prevention  Here are steps you can take to help prevent an infection:    Keep good hand washing habits.    Don t have close contact with people who have sore throats, colds, or other upper respiratory infections.    Don t smoke, and stay away from secondhand smoke.    Stay up to date with of your vaccines.  Date Last Reviewed: 11/1/2017 2000-2017 [employer]. [address] [place] [address], [place] [address]. All rights  reserved. This information is not intended as a substitute for professional medical care. Always follow your healthcare professional's instructions.

## 2018-05-25 NOTE — TELEPHONE ENCOUNTER
S-(situation): Patient continues to have sinus issues after antibiotic treatment.     B-(background): Patient was treated for sinus infection on 5/13/18 for 7 days of Cephalexin.      A-(assessment): patient states he is having nasal congestion, pressure below the eyes.  Patient has intermittent headaches.  Patient states the antibiotics did not help.     R-(recommendations): Advised patient to be seen again. Patient will go to UC/[facility] for further evaluation.    Luli CAMACHO RN

## 2018-05-25 NOTE — MR AVS SNAPSHOT
Edwin Nuno   5/25/2018 3:30 PM   Anticoagulation Therapy Visit    Description:  37 year old male   Provider:  [facility] [facility]   Department:  [facility]           INR as of 5/25/2018     Today's INR 4.4!      Anticoagulation Summary as of 5/25/2018     INR goal 2.0-3.0   Today's INR 4.4!   Full warfarin instructions 5/25: 2.5 mg; 5/26: 10 mg; Otherwise 10 mg on Tue, Fri; 12.5 mg all other days   Next INR check 5/29/2018    Indications   DVT (deep venous thrombosis) (H) [I82.409]  Pulmonary embolism on right (H) [I26.99]  Long-term (current) use of anticoagulants [Z79.01] [Z79.01]         Your next Anticoagulation Clinic appointment(s)     May 29, 2018  1:00 PM CDT   [facility] with [facility] [facility]   [facility]    [address] [place] [address] [phone]              Contact Numbers     Please call [phone] with any problems or questions regarding your therapy.    If you need to cancel and/or reschedule your appointment please call one of the following numbers:  [facility] [phone]  [place] - [phone]  [facility] [phone]  [facility] [phone]  [place] - [phone]            May 2018 Details    Sun Mon Tue Wed Thu Fri Sat       1               2               3               4               5                 6               7               8               9               10               11               12                 13               14               15               16               17               18               19                 20               21               22               23               24               25      2.5 mg   See details      26      10 mg           27      12.5 mg         28      12.5 mg         29            30               31                  Date Details   05/25 This INR check       Date of next INR:  5/29/2018         How to take your warfarin dose     To take:  2.5 mg Take  0.5 of a 5 mg tablet.    To take:  10 mg Take 2 of the 5 mg tablets.    To take:  12.5 mg Take 2.5 of the 5 mg tablets.

## 2018-05-27 ENCOUNTER — RADIANT APPOINTMENT (OUTPATIENT)
Dept: GENERAL RADIOLOGY | Facility: CLINIC | Age: 38
End: 2018-05-27
Attending: FAMILY MEDICINE
Payer: COMMERCIAL

## 2018-05-27 ENCOUNTER — OFFICE VISIT (OUTPATIENT)
Dept: URGENT CARE | Facility: URGENT CARE | Age: 38
End: 2018-05-27
Payer: COMMERCIAL

## 2018-05-27 VITALS
SYSTOLIC BLOOD PRESSURE: 116 MMHG | DIASTOLIC BLOOD PRESSURE: 62 MMHG | WEIGHT: 282.8 LBS | TEMPERATURE: 97.4 F | RESPIRATION RATE: 20 BRPM | HEART RATE: 64 BPM | BODY MASS INDEX: 38.89 KG/M2

## 2018-05-27 DIAGNOSIS — R05.9 COUGH: Primary | ICD-10-CM

## 2018-05-27 DIAGNOSIS — R05.9 COUGH: ICD-10-CM

## 2018-05-27 DIAGNOSIS — J01.90 ACUTE SINUSITIS WITH SYMPTOMS > 10 DAYS: ICD-10-CM

## 2018-05-27 DIAGNOSIS — J20.9 ACUTE BRONCHITIS WITH SYMPTOMS > 10 DAYS: ICD-10-CM

## 2018-05-27 PROCEDURE — 71046 X-RAY EXAM CHEST 2 VIEWS: CPT | Mod: FY

## 2018-05-27 PROCEDURE — 99213 OFFICE O/P EST LOW 20 MIN: CPT

## 2018-05-27 RX ORDER — LEVOFLOXACIN 500 MG/1
500 TABLET, FILM COATED ORAL DAILY
Qty: 5 TABLET | Refills: 0 | Status: SHIPPED | OUTPATIENT
Start: 2018-05-27 | End: 2018-06-15

## 2018-05-27 NOTE — PATIENT INSTRUCTIONS
Stay well hydrated    Can use plain mucinex as needed      Finish out current course of antibiotics; one refill available    Fill if symptoms not resolving    If worsening, be seen promptly

## 2018-05-27 NOTE — PROGRESS NOTES
Edwin Nuno is a 37 year old male who comes in today for chest congestion    Has had 2 doses of levaquin    Coughing a lot    No discharge    Not short of breath; feels tight     No history of asthma    Never smoked    Feels like stuff present in upper chest     Patient concerned about possible pneumonia    Patient on warfarin, history of dvt/ PE     Physical Exam   Constitutional: He is oriented to person, place, and time and well-developed, well-nourished, and in no distress.   HENT:   Head: Normocephalic and atraumatic.   Right Ear: Tympanic membrane, external ear and ear canal normal.   Left Ear: Tympanic membrane, external ear and ear canal normal.   Nose: Nose normal.   Mouth/Throat: Oropharynx is clear and moist.   Mild sinus tenderness  No submandib tenderness/ nodes felt     Eyes: Conjunctivae are normal.   Neck: Neck supple.   Cardiovascular: Normal rate, regular rhythm and normal heart sounds.    Pulmonary/Chest: Effort normal and breath sounds normal. No respiratory distress. He exhibits no tenderness.   Abdominal: Soft. There is no tenderness.   No back or costovertebral angle tenderness       Lymphadenopathy:     He has no cervical adenopathy.   Neurological: He is alert and oriented to person, place, and time.       cxr done today, normal    ASSESSMENT / PLAN:  (R05) Cough  (primary encounter diagnosis)  Comment: as above  Plan: XR Chest 2 Views             (J01.90) Acute sinusitis with symptoms > 10 days  Comment: patient is on good broad spectrum antibiotic  Plan: levofloxacin (LEVAQUIN) 500 MG tablet        Finish out the current course ( 5 days total) and then I did print a refill for him.    (J20.9) Acute bronchitis with symptoms > 10 days  Comment: as above   Plan: could use over the counter plain mucinex to help thin secretions/ phlegm.      Be seen promptly if  Symptoms worsen    I reviewed the patient's medications, allergies, medical history, family history, and social  history.    Derrell Rooney MD

## 2018-05-27 NOTE — NURSING NOTE
"Chief Complaint   Patient presents with     Cough     Was seen Friday for sinus infection, last night moved to chest.  Non productive cough and pain in back.        Initial /62 (BP Location: Right arm, Cuff Size: Adult Large)  Pulse 64  Temp 97.4  F (36.3  C) (Tympanic)  Resp 20  Wt 282 lb 12.8 oz (128.3 kg)  BMI 38.89 kg/m2 Estimated body mass index is 38.89 kg/(m^2) as calculated from the following:    Height as of 4/12/18: 5' 11.5\" (1.816 m).    Weight as of this encounter: 282 lb 12.8 oz (128.3 kg).      Health Maintenance that is potentially due pending provider review:  NONE    n/a    Is there anyone who you would like to be able to receive your results? Not Applicable  If yes have patient fill out NOELLE  José Miguel Montoya M.A.        "

## 2018-05-27 NOTE — MR AVS SNAPSHOT
After Visit Summary   5/27/2018    Edwin Nuno    MRN: [id number]           Patient Information     Date Of Birth          1980        Visit Information        Provider Department      5/27/2018 4:25 PM CASE[facility] SAME DAY PROVIDER [facility]        Today's Diagnoses     Cough    -  1    Acute sinusitis with symptoms > 10 days        Acute bronchitis with symptoms > 10 days          Care Instructions    Stay well hydrated    Can use plain mucinex as needed      Finish out current course of antibiotics; one refill available    Fill if symptoms not resolving    If worsening, be seen promptly            Follow-ups after your visit        Your next 10 appointments already scheduled     May 29, 2018  1:00 PM CDT   Anticoagulation Visit with [place] [facility]   [facility] ([facility]    [address] [address]   [phone]            Jun 08, 2018  2:00 PM CDT   US LOWER EXTREMITY VENOUS MAPPING RIGHT with [facility] [facility] ([facility])    [address] [address] [phone]           Please bring a list of your medicines (including vitamins, minerals and over-the-counter drugs). Also, tell your doctor about any allergies you may have. Wear comfortable clothes and leave your valuables at home.  You do not need to do anything special to prepare for your exam.  Please call the Imaging Department at your exam site with any questions.            Jun 08, 2018  2:50 PM CDT   LAB with [facility] ([facility])    [address] [address] [phone]           Please do not eat 10-12 hours before your appointment if you are coming in fasting for labs on lipids, cholesterol, or glucose (sugar). This does not apply to pregnant women. Water, hot tea and black coffee (with nothing added) are okay. Do not drink other  fluids, diet soda or chew gum.            Julius 15, 2018  3:00 PM CDT   Return Visit with Sivakumar Hendricks MD   [facility] ([facility])    [facility]  [address]  [facility] [address] [phone]              Who to contact     If you have questions or need follow up information about today's clinic visit or your schedule please contact [facility] directly at [phone].  Normal or non-critical lab and imaging results will be communicated to you by [employer], letter or phone within 4 business days after the clinic has received the results. If you do not hear from us within 7 days, please contact the clinic through [employer] or phone. If you have a critical or abnormal lab result, we will notify you by phone as soon as possible.  Submit refill requests through [employer] or call your pharmacy and they will forward the refill request to us. Please allow 3 business days for your refill to be completed.          Additional Information About Your Visit        [employer] Information     [employer] gives you secure access to your electronic health record. If you see a primary care provider, you can also send messages to your care team and make appointments. If you have questions, please call your primary care clinic.  If you do not have a primary care provider, please call [phone] and they will assist you.        Care EveryWhere ID     This is your Care EveryWhere ID. This could be used by other organizations to access your [place] medical records  [id number]        Your Vitals Were     Pulse Temperature Respirations BMI (Body Mass Index)          64 97.4  F (36.3  C) (Tympanic) 20 38.89 kg/m2         Blood Pressure from Last 3 Encounters:   05/27/18 116/62   05/25/18 130/58   04/12/18 134/84    Weight from Last 3 Encounters:   05/27/18 282 lb 12.8 oz (128.3 kg)   05/25/18 286 lb 6.4 oz (129.9 kg)   04/12/18 280 lb (127 kg)                  Where to get your medicines      Some of these will need a paper prescription and others can be bought over the counter.  Ask your nurse if you have questions.     Bring a paper prescription for each of these medications     levofloxacin 500 MG tablet          Primary Care Provider Office Phone # Fax #    Ricardo Stoddard [phone] [address]  [facility] [address]        Equal Access to Services     RHONDA BENJAMIN : Hadii aad ku hadasho Soomaali, waaxda luqadaha, qaybta kaalmada adeegyada, waxay idiin hayaan adeeg kharash la'aan ah. So [facility] [phone].    ATENCIÓN: Si habla jayashree, tiene a bhandari disposición servicios gratuitos de asistencia lingüística. Llame al [phone].    We comply with applicable federal civil rights laws and [place] laws. We do not discriminate on the basis of race, color, national origin, age, disability, sex, sexual orientation, or gender identity.            Thank you!     Thank you for choosing [facility]  for your care. Our goal is always to provide you with excellent care. Hearing back from our patients is one way we can continue to improve our services. Please take a few minutes to complete the written survey that you may receive in the mail after your visit with us. Thank you!             Your Updated Medication List - Protect others around you: Learn how to safely use, store and throw away your medicines at www.disposemymeds.org.          This list is accurate as of 5/27/18  4:59 PM.  Always use your most recent med list.                   Brand Name Dispense Instructions for use Diagnosis    cetirizine 10 MG tablet    zyrTEC    30 tablet    Take 1 tablet (10 mg) by mouth every evening    Preop general physical exam       fluticasone 50 MCG/ACT spray    FLONASE    16 g    Spray 1-2 sprays into both nostrils daily    Acute sinusitis with symptoms > 10 days       levofloxacin 500 MG tablet    LEVAQUIN    5  tablet    Take 1 tablet (500 mg) by mouth daily    Acute sinusitis with symptoms > 10 days       lisinopril 20 MG tablet    PRINIVIL/ZESTRIL    90 tablet    Take 1 tablet (20 mg) by mouth daily    Benign essential hypertension       magnesium oxide 400 (241.3 Mg) MG tablet    MAG-OX    60 tablet    Take 1 tablet (400 mg) by mouth daily    Preop general physical exam       warfarin 5 MG tablet    COUMADIN    180 tablet    Take 10 mg on Tue, Fri; 12.5 mg all other days or as directed by the Anticoagulation Clinic    Other acute pulmonary embolism without acute cor pulmonale (H)

## 2018-05-29 ENCOUNTER — ANTICOAGULATION THERAPY VISIT (OUTPATIENT)
Dept: ANTICOAGULATION | Facility: CLINIC | Age: 38
End: 2018-05-29
Payer: COMMERCIAL

## 2018-05-29 DIAGNOSIS — Z79.01 LONG-TERM (CURRENT) USE OF ANTICOAGULANTS: ICD-10-CM

## 2018-05-29 DIAGNOSIS — I26.99 PULMONARY EMBOLISM ON RIGHT (H): ICD-10-CM

## 2018-05-29 DIAGNOSIS — I82.409 DVT (DEEP VENOUS THROMBOSIS) (H): ICD-10-CM

## 2018-05-29 LAB — INR POINT OF CARE: 3.2 (ref 0.86–1.14)

## 2018-05-29 PROCEDURE — 36416 COLLJ CAPILLARY BLOOD SPEC: CPT

## 2018-05-29 PROCEDURE — 99207 ZZC NO CHARGE NURSE ONLY: CPT

## 2018-05-29 PROCEDURE — 85610 PROTHROMBIN TIME: CPT | Mod: QW

## 2018-05-29 NOTE — PROGRESS NOTES
ANTICOAGULATION FOLLOW-UP CLINIC VISIT    Patient Name:  Edwin Nuno  Date:  5/29/2018  Contact Type:  Face to Face    SUBJECTIVE:     Patient Findings     Positives Change in medications (flonase 1-2 x's/both nostrils for sinus infection), Inflammation (sinus infection and bronchitis), Antibiotic use or infection (500 mg levofloxacin 1x/day for 5 days started 5/25/28, MD wrote refill for another 5 days, pt plans to fill), Activity level change (decreases due to illness)    Comments Pt reports he is starting to feel better, denies other changes in diet, meds, health or activity, reports taking warfarin as directed.    Writer instructed pt to resume maintenance dose and will recheck INR in 1 week.           OBJECTIVE    INR Protime   Date Value Ref Range Status   05/29/2018 3.2 (A) 0.86 - 1.14 Final       ASSESSMENT / PLAN  INR assessment SUPRA    Recheck INR In: 1 WEEK    INR Location Clinic      Anticoagulation Summary as of 5/29/2018     INR goal 2.0-3.0   Today's INR 3.2!   Warfarin maintenance plan 10 mg (5 mg x 2) on Tue, Fri; 12.5 mg (5 mg x 2.5) all other days   Full warfarin instructions 10 mg on Tue, Fri; 12.5 mg all other days   Weekly warfarin total 82.5 mg   No change documented Maria Teresa Meredith RN   Plan last modified Eri Connell, RN (5/7/2018)   Next INR check 6/5/2018   Priority INR   Target end date     Indications   DVT (deep venous thrombosis) (H) [I82.409]  Pulmonary embolism on right (H) [I26.99]  Long-term (current) use of anticoagulants [Z79.01] [Z79.01]         Anticoagulation Episode Summary     INR check location     Preferred lab     Send INR reminders to [facility]    Comments * 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 Length of therapy is 3-6 months. Pt will have repeat imaging prior to stopping warfarin.      Anticoagulation Care Providers     Provider Role Specialty Phone number    Ricardo Stoddard MD [facility] [phone]             See the Encounter Report to view Anticoagulation Flowsheet and Dosing Calendar (Go to Encounters tab in chart review, and find the Anticoagulation Therapy Visit)        Maria Teresa Meredith RN

## 2018-05-29 NOTE — MR AVS SNAPSHOT
Edwin KENNEDY Nurys   5/29/2018 1:00 PM   Anticoagulation Therapy Visit    Description:  37 year old male   Provider:  [facility] [facility]   Department:  [facility]           INR as of 5/29/2018     Today's INR 3.2!      Anticoagulation Summary as of 5/29/2018     INR goal 2.0-3.0   Today's INR 3.2!   Full warfarin instructions 10 mg on Tue, Fri; 12.5 mg all other days   Next INR check 6/5/2018    Indications   DVT (deep venous thrombosis) (H) [I82.409]  Pulmonary embolism on right (H) [I26.99]  Long-term (current) use of anticoagulants [Z79.01] [Z79.01]         Your next Anticoagulation Clinic appointment(s)     Jun 05, 2018  4:00 PM CDT   Anticoagulation Visit with [place] [facility]   [facility]    [address] [address] [phone]              Contact Numbers     Please call [phone] with any problems or questions regarding your therapy.    If you need to cancel and/or reschedule your appointment please call one of the following numbers:  [facility] - [phone]  [place] - [phone]  [facility] [phone]  [facility] [phone]  [place] - [phone]            May 2018 Details    Sun Mon Tue Wed Thu Fri Sat       1               2               3               4               5                 6               7               8               9               10               11               12                 13               14               15               16               17               18               19                 20               21               22               23               24               25               26                 27               28               29      10 mg   See details      30      12.5 mg         31      12.5 mg            Date Details   05/29 This INR check               How to take your warfarin dose     To take:  10 mg Take 2 of the 5 mg tablets.    To take:  12.5 mg Take 2.5 of the 5 mg  tablets.           June 2018 Details    Sun Mon Tue Wed Thu Fri Sat          1      10 mg         2      12.5 mg           3      12.5 mg         4      12.5 mg         5            6               7               8               9                 10               11               12               13               14               15               16                 17               18               19               20               21               22               23                 24               25               26               27               28               29               30                Date Details   No additional details    Date of next INR:  6/5/2018         How to take your warfarin dose     To take:  10 mg Take 2 of the 5 mg tablets.    To take:  12.5 mg Take 2.5 of the 5 mg tablets.

## 2018-06-05 ENCOUNTER — ANTICOAGULATION THERAPY VISIT (OUTPATIENT)
Dept: ANTICOAGULATION | Facility: CLINIC | Age: 38
End: 2018-06-05
Payer: COMMERCIAL

## 2018-06-05 DIAGNOSIS — I26.99 PULMONARY EMBOLISM ON RIGHT (H): ICD-10-CM

## 2018-06-05 DIAGNOSIS — Z79.01 LONG-TERM (CURRENT) USE OF ANTICOAGULANTS: ICD-10-CM

## 2018-06-05 DIAGNOSIS — I82.409 DVT (DEEP VENOUS THROMBOSIS) (H): ICD-10-CM

## 2018-06-05 LAB — INR POINT OF CARE: 4.4 (ref 0.86–1.14)

## 2018-06-05 PROCEDURE — 99207 ZZC NO CHARGE NURSE ONLY: CPT

## 2018-06-05 PROCEDURE — 85610 PROTHROMBIN TIME: CPT | Mod: QW

## 2018-06-05 PROCEDURE — 36416 COLLJ CAPILLARY BLOOD SPEC: CPT

## 2018-06-05 NOTE — MR AVS SNAPSHOT
Edwin KENNEDY Nuno   6/5/2018 4:00 PM   Anticoagulation Therapy Visit    Description:  37 year old male   Provider:  [facility] [facility]   [facility]           INR as of 6/5/2018     Today's INR 4.4!      Anticoagulation Summary as of 6/5/2018     INR goal 2.0-3.0   Today's INR 4.4!   Full warfarin instructions 6/5: 2.5 mg; 6/6: 10 mg; Otherwise 10 mg on Tue, Fri; 12.5 mg all other days   Next INR check 6/8/2018    Indications   DVT (deep venous thrombosis) (H) [I82.409]  Pulmonary embolism on right (H) [I26.99]  Long-term (current) use of anticoagulants [Z79.01] [Z79.01]         Contact Numbers     Please call [phone] with any problems or questions regarding your therapy.    If you need to cancel and/or reschedule your appointment please call one of the following numbers:  [facility] [phone]  [place] - [phone]  [facility] [phone]  [facility] [phone]  [place] - [phone]            June 2018 Details    Sun Mon Tue Wed Thu Fri Sat          1               2                 3               4               5      2.5 mg   See details      6      10 mg         7      12.5 mg         8            9                 10               11               12               13               14               15               16                 17               18               19               20               21               22               23                 24               25               26               27               28               29               30                Date Details   06/05 This INR check       Date of next INR:  6/8/2018         How to take your warfarin dose     To take:  2.5 mg Take 0.5 of a 5 mg tablet.    To take:  10 mg Take 2 of the 5 mg tablets.    To take:  12.5 mg Take 2.5 of the 5 mg tablets.

## 2018-06-05 NOTE — PROGRESS NOTES
ANTICOAGULATION FOLLOW-UP CLINIC VISIT    Patient Name:  Edwin Nuno  Date:  6/5/2018  Contact Type:  Face to Face    SUBJECTIVE:     Patient Findings     Positives Change in medications, Inflammation, Activity level change    Comments Patient is still on antibiotics. He is not as active as usual because he is not feeling great. I instructed him to take 2.5 mg today, 10 mg tomorrow, and 12.5 mg on Thursday. He has labs scheduled on Friday so I will have his INR drawn at that time. Patient denies signs or symptoms of bleeding. Writer educated patient regarding increased bleed risk and when to seek immediate medical attention. Patient verbalized understanding.             OBJECTIVE    INR Protime   Date Value Ref Range Status   06/05/2018 4.4 (A) 0.86 - 1.14 Final       ASSESSMENT / PLAN  INR assessment SUPRA    Recheck INR In: 3 DAYS    INR Location Clinic      Anticoagulation Summary as of 6/5/2018     INR goal 2.0-3.0   Today's INR 4.4!   Warfarin maintenance plan 10 mg (5 mg x 2) on Tue, Fri; 12.5 mg (5 mg x 2.5) all other days   Full warfarin instructions 6/5: 2.5 mg; 6/6: 10 mg; Otherwise 10 mg on Tue, Fri; 12.5 mg all other days   Weekly warfarin total 82.5 mg   Plan last modified Eri Connell, RN (5/7/2018)   Next INR check 6/8/2018   Priority INR   Target end date     Indications   DVT (deep venous thrombosis) (H) [I82.409]  Pulmonary embolism on right (H) [I26.99]  Long-term (current) use of anticoagulants [Z79.01] [Z79.01]         Anticoagulation Episode Summary     INR check location     Preferred lab     Send INR reminders to 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 Length of therapy is 3-6 months. Pt will have repeat imaging prior to stopping warfarin.      Anticoagulation Care Providers     Provider Role Specialty Phone number    Ricardo Stoddard MD [facility] [phone]            See the Encounter Report to view Anticoagulation  Flowsheet and Dosing Calendar (Go to Encounters tab in chart review, and find the Anticoagulation Therapy Visit)        Aldo Vincent RN

## 2018-06-08 ENCOUNTER — HOSPITAL ENCOUNTER (OUTPATIENT)
Dept: LAB | Facility: CLINIC | Age: 38
End: 2018-06-08
Attending: INTERNAL MEDICINE
Payer: COMMERCIAL

## 2018-06-08 ENCOUNTER — ANTICOAGULATION THERAPY VISIT (OUTPATIENT)
Dept: ANTICOAGULATION | Facility: CLINIC | Age: 38
End: 2018-06-08
Payer: COMMERCIAL

## 2018-06-08 ENCOUNTER — HOSPITAL ENCOUNTER (OUTPATIENT)
Dept: ULTRASOUND IMAGING | Facility: CLINIC | Age: 38
Discharge: HOME | End: 2018-06-08
Attending: INTERNAL MEDICINE | Admitting: INTERNAL MEDICINE
Payer: COMMERCIAL

## 2018-06-08 DIAGNOSIS — I82.4Y9 DEEP VEIN THROMBOSIS (DVT) OF PROXIMAL LOWER EXTREMITY, UNSPECIFIED CHRONICITY, UNSPECIFIED LATERALITY (H): ICD-10-CM

## 2018-06-08 DIAGNOSIS — I26.99 PULMONARY EMBOLISM ON RIGHT (H): ICD-10-CM

## 2018-06-08 DIAGNOSIS — Z79.01 LONG-TERM (CURRENT) USE OF ANTICOAGULANTS: ICD-10-CM

## 2018-06-08 DIAGNOSIS — I82.409 DVT (DEEP VENOUS THROMBOSIS) (H): ICD-10-CM

## 2018-06-08 DIAGNOSIS — Z79.01 LONG TERM CURRENT USE OF ANTICOAGULANT THERAPY: ICD-10-CM

## 2018-06-08 LAB
D DIMER PPP FEU-MCNC: <0.3 UG/ML FEU (ref 0–0.5)
INR PPP: 2.36 (ref 0.86–1.14)

## 2018-06-08 PROCEDURE — 85610 PROTHROMBIN TIME: CPT | Performed by: FAMILY MEDICINE

## 2018-06-08 PROCEDURE — 36415 COLL VENOUS BLD VENIPUNCTURE: CPT | Performed by: FAMILY MEDICINE

## 2018-06-08 PROCEDURE — 93971 EXTREMITY STUDY: CPT | Mod: RT

## 2018-06-08 PROCEDURE — 99207 ZZC NO CHARGE NURSE ONLY: CPT

## 2018-06-08 PROCEDURE — 85379 FIBRIN DEGRADATION QUANT: CPT | Performed by: FAMILY MEDICINE

## 2018-06-08 NOTE — PROGRESS NOTES
"  ANTICOAGULATION FOLLOW-UP CLINIC VISIT    Patient Name:  Edwin Nuno  Date:  6/8/2018  Contact Type:  Telephone    SUBJECTIVE:     Patient Findings     Positives Change in medications    Comments I decreased patient's dose on Tuesday to 2.5 mg. Patient had been on antibiotics which are now complete. The INR is therapeutic today so I will have patient resume his maintenance dose. He had an US done today in which he stated \"no clots could be found.\" D-dimer came back negative. Patient will see Dr. Hendricks on 6/15/18 to discuss Coumadin and if patient needs to continue.            OBJECTIVE    INR   Date Value Ref Range Status   06/08/2018 2.36 (H) 0.86 - 1.14 Final       ASSESSMENT / PLAN  INR assessment THER    Recheck INR In: 1 WEEK    INR Location Outside lab      Anticoagulation Summary as of 6/8/2018     INR goal 2.0-3.0   Today's INR 2.36   Warfarin maintenance plan 10 mg (5 mg x 2) on Tue, Fri; 12.5 mg (5 mg x 2.5) all other days   Full warfarin instructions 10 mg on Tue, Fri; 12.5 mg all other days   Weekly warfarin total 82.5 mg   No change documented Aldo Vincent, DEVIN   Plan last modified Eri Connell, RN (5/7/2018)   Next INR check 6/15/2018   Priority INR   Target end date     Indications   DVT (deep venous thrombosis) (H) [I82.409]  Pulmonary embolism on right (H) [I26.99]  Long-term (current) use of anticoagulants [Z79.01] [Z79.01]         Anticoagulation Episode Summary     INR check location     Preferred lab     Send INR reminders to [facility]    Comments * 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 Length of therapy is 3-6 months. Pt will have repeat imaging prior to stopping warfarin.      Anticoagulation Care Providers     Provider Role Specialty Phone number    Ricardo Stoddard MD [facility] [phone]            See the Encounter Report to view Anticoagulation Flowsheet and Dosing Calendar (Go to Encounters tab in chart review, and find the " Anticoagulation Therapy Visit)        Aldo Vincent RN

## 2018-06-08 NOTE — MR AVS SNAPSHOT
Edwin Nuno   6/8/2018   Anticoagulation Therapy Visit    Description:  37 year old male   Provider:  Aldo Vincent, RN   Department:  [place]           INR as of 6/8/2018     Today's INR 2.36      Anticoagulation Summary as of 6/8/2018     INR goal 2.0-3.0   Today's INR 2.36   Full warfarin instructions 10 mg on Tue, Fri; 12.5 mg all other days   Next INR check 6/15/2018    Indications   DVT (deep venous thrombosis) (H) [I82.409]  Pulmonary embolism on right (H) [I26.99]  Long-term (current) use of anticoagulants [Z79.01] [Z79.01]         June 2018 Details    Sun Mon Tue Wed Thu Fri Sat          1               2                 3               4               5               6               7               8      10 mg   See details      9      12.5 mg           10      12.5 mg         11      12.5 mg         12      10 mg         13      12.5 mg         14      12.5 mg         15            16                 17               18               19               20               21               22               23                 24               25               26               27               28               29               30                Date Details   06/08 This INR check       Date of next INR:  6/15/2018         How to take your warfarin dose     To take:  10 mg Take 2 of the 5 mg tablets.    To take:  12.5 mg Take 2.5 of the 5 mg tablets.

## 2018-06-15 ENCOUNTER — ONCOLOGY VISIT (OUTPATIENT)
Dept: ONCOLOGY | Facility: CLINIC | Age: 38
End: 2018-06-15
Attending: INTERNAL MEDICINE
Payer: COMMERCIAL

## 2018-06-15 ENCOUNTER — ANTICOAGULATION THERAPY VISIT (OUTPATIENT)
Dept: ANTICOAGULATION | Facility: CLINIC | Age: 38
End: 2018-06-15

## 2018-06-15 VITALS
DIASTOLIC BLOOD PRESSURE: 83 MMHG | OXYGEN SATURATION: 96 % | WEIGHT: 279.7 LBS | HEIGHT: 72 IN | SYSTOLIC BLOOD PRESSURE: 135 MMHG | HEART RATE: 59 BPM | TEMPERATURE: 96.9 F | RESPIRATION RATE: 18 BRPM | BODY MASS INDEX: 37.88 KG/M2

## 2018-06-15 DIAGNOSIS — I26.99 PULMONARY EMBOLISM ON RIGHT (H): Primary | ICD-10-CM

## 2018-06-15 DIAGNOSIS — I82.4Y9 DEEP VEIN THROMBOSIS (DVT) OF PROXIMAL LOWER EXTREMITY, UNSPECIFIED CHRONICITY, UNSPECIFIED LATERALITY (H): ICD-10-CM

## 2018-06-15 DIAGNOSIS — I26.99 PULMONARY EMBOLISM ON RIGHT (H): ICD-10-CM

## 2018-06-15 DIAGNOSIS — Z79.01 LONG-TERM (CURRENT) USE OF ANTICOAGULANTS: ICD-10-CM

## 2018-06-15 PROCEDURE — 99214 OFFICE O/P EST MOD 30 MIN: CPT | Performed by: INTERNAL MEDICINE

## 2018-06-15 PROCEDURE — G0463 HOSPITAL OUTPT CLINIC VISIT: HCPCS

## 2018-06-15 ASSESSMENT — PAIN SCALES - GENERAL: PAINLEVEL: NO PAIN (0)

## 2018-06-15 NOTE — PROGRESS NOTES
Patient is switching from Warfarin to Xarelto. He travels frequently and it is the best fit for him. [facility] will d/c patient from services.     Aldo Vincent RN, BSN, PHN  Anticoagulation Clinic   [phone]

## 2018-06-15 NOTE — PROGRESS NOTES
DATE OF VISIT: Julius 15, 2018    Edwin Nuno is a 37 year old male is seen today for   Chief Complaint   Patient presents with     Hematology     3 month follow up DVT. Review labs.    .       (I26.99) Pulmonary embolism on right (H)  (primary encounter diagnosis)  (I82.4Y9) Deep vein thrombosis (DVT) of proximal lower extremity, unspecified chronicity, unspecified laterality (H)  I reviewed with the patient today the results of the bladder ultrasound showing no evidence of deep venous thrombosis.  D-dimer has been within normal range.  Patient had difficulty with checking his INR because of frequent traveling.  I would recommend to start patient on Xarelto 20 mg orally daily.  She will continue with anticoagulation to complete 6 months of therapy.  Today I discussed with the patient risk for recurrence for the venous thrombosis.  We talked about precipitating factors for deep venous thrombosis.  I reviewed with the patient hypercoagulability workup which came back negative.  I will check protein C and protein S and anti-thrombin when the patient is off warfarin as well.  I have not scheduled patient for any further appointments.  We will see the patient again if there is new developments or concerns    The patient is ready to learn, no apparent learning barriers were identified.  Diagnosis and treatment plans were explained to the patient. The patient expressed understanding of the content. The patient asked appropriate questions. The patient questions were answered to his satisfaction.  Time spent 25 minutes more than 50% of the time in counseling and coordination of care.  Chart documentation with Dragon Voice recognition Software. Although reviewed after completion, some words and grammatical errors may remain.

## 2018-06-15 NOTE — PATIENT INSTRUCTIONS
We would like to see you back in as needed.      Copy of appointments, and after visit summary (AVS) given to patient.      If you have any questions during business hours (M-F 8 AM- 4PM), please call Lilly Lara RN, BSN, OCN Oncology Hematology /Breast Cancer Navigator at [facility] [phone].       For questions after business hours, or on holidays/weekends, please call our after hours Nurse Triage line [phone]. Thank you.

## 2018-06-15 NOTE — MR AVS SNAPSHOT
After Visit Summary   6/15/2018    Edwin Nuno    MRN: [id number]           Patient Information     Date Of Birth          1980        Visit Information        Provider Department      6/15/2018 3:00 PM Sivakumar Hendricks MD [facility] [place] [facility]      Today's Diagnoses     Pulmonary embolism on right (H)    -  1    Deep vein thrombosis (DVT) of proximal lower extremity, unspecified chronicity, unspecified laterality (H)           Follow-ups after your visit        Follow-up notes from your care team     Return if symptoms worsen or fail to improve.      Future tests that were ordered for you today     Open Future Orders        Priority Expected Expires Ordered    Antithrombin III Routine 7/15/2018 6/15/2019 6/15/2018    Protein S Antigen Free Routine 7/15/2018 6/15/2019 6/15/2018    Protein C chromogenic Routine 7/15/2018 6/15/2019 6/15/2018            Who to contact     If you have questions or need follow up information about today's clinic visit or your schedule please contact [facility] directly at [phone].  Normal or non-critical lab and imaging results will be communicated to you by MyChart, letter or phone within 4 business days after the clinic has received the results. If you do not hear from us within 7 days, please contact the clinic through KickAss Candyhart or phone. If you have a critical or abnormal lab result, we will notify you by phone as soon as possible.  Submit refill requests through Innovid or call your pharmacy and they will forward the refill request to us. Please allow 3 business days for your refill to be completed.          Additional Information About Your Visit        MyChart Information     Innovid gives you secure access to your electronic health record. If you see a primary care provider, you can also send messages to your care team and make appointments. If you have questions, please call your primary care clinic.  If you do not have a primary  "care provider, please call [phone] and they will assist you.        Care EveryWhere ID     This is your Care EveryWhere ID. This could be used by other organizations to access your [facility] medical records  [id number]        Your Vitals Were     Pulse Temperature Respirations Height Pulse Oximetry BMI (Body Mass Index)    59 96.9  F (36.1  C) (Tympanic) 18 1.816 m (5' 11.5\") 96% 38.47 kg/m2       Blood Pressure from Last 3 Encounters:   06/15/18 135/83   05/27/18 116/62   05/25/18 130/58    Weight from Last 3 Encounters:   06/15/18 126.9 kg (279 lb 11.2 oz)   05/27/18 128.3 kg (282 lb 12.8 oz)   05/25/18 129.9 kg (286 lb 6.4 oz)                 Today's Medication Changes          These changes are accurate as of 6/15/18  3:34 PM.  If you have any questions, ask your nurse or doctor.               Start taking these medicines.        Dose/Directions    rivaroxaban ANTICOAGULANT 20 MG Tabs tablet   Commonly known as:  XARELTO   Used for:  Pulmonary embolism on right (H)   Started by:  Sivakumar Hendricks MD        Dose:  20 mg   Take 1 tablet (20 mg) by mouth daily (with dinner)   Quantity:  30 tablet   Refills:  1            Where to get your medicines      These medications [facility] [place] [facility] [address]  [address] [address]     Phone:  [phone]     rivaroxaban ANTICOAGULANT 20 MG Tabs tablet                Primary Care Provider Office Phone # Fax #    Ricardo Stoddard [phone] [address] [address] [address]        Equal Access to Services     [facility] SOURAV AH: Anusha Cornejo, john worley, lea kaalmada kayla, austen hui. So [facility] [phone].    ATENCIÓN: Si habla español, tiene a bhandari disposición servicios gratuitos de asistencia lingüística. Llame al [phone].    We comply with applicable federal civil rights laws and [place] laws. We do not discriminate " on the basis of race, color, national origin, age, disability, sex, sexual orientation, or gender identity.            Thank you!     Thank you for choosing [facility]  for your care. Our goal is always to provide you with excellent care. Hearing back from our patients is one way we can continue to improve our services. Please take a few minutes to complete the written survey that you may receive in the mail after your visit with us. Thank you!             Your Updated Medication List - Protect others around you: Learn how to safely use, store and throw away your medicines at www.disposemymeds.org.          This list is accurate as of 6/15/18  3:34 PM.  Always use your most recent med list.                   Brand Name Dispense Instructions for use Diagnosis    cetirizine 10 MG tablet    zyrTEC    30 tablet    Take 1 tablet (10 mg) by mouth every evening    Preop general physical exam       fluticasone 50 MCG/ACT spray    FLONASE    16 g    Spray 1-2 sprays into both nostrils daily    Acute sinusitis with symptoms > 10 days       lisinopril 20 MG tablet    PRINIVIL/ZESTRIL    90 tablet    Take 1 tablet (20 mg) by mouth daily    Benign essential hypertension       magnesium oxide 400 (241.3 Mg) MG tablet    MAG-OX    60 tablet    Take 1 tablet (400 mg) by mouth daily    Preop general physical exam       rivaroxaban ANTICOAGULANT 20 MG Tabs tablet    XARELTO    30 tablet    Take 1 tablet (20 mg) by mouth daily (with dinner)    Pulmonary embolism on right (H)       warfarin 5 MG tablet    COUMADIN    180 tablet    Take 10 mg on Tue, Fri; 12.5 mg all other days or as directed by the Anticoagulation Clinic    Other acute pulmonary embolism without acute cor pulmonale (H)

## 2018-06-15 NOTE — NURSING NOTE
"Oncology Rooming Note    Carmen 15, 2018 3:03 PM   Edwin Nuno is a 37 year old male who presents for:    Chief Complaint   Patient presents with     Hematology     3 month follow up DVT. Review labs.      Initial Vitals: /83 (BP Location: Right arm, Patient Position: Sitting, Cuff Size: Adult Large)  Pulse 59  Temp 96.9  F (36.1  C) (Tympanic)  Resp 18  Ht 1.816 m (5' 11.5\")  Wt 126.9 kg (279 lb 11.2 oz)  SpO2 96%  BMI 38.47 kg/m2 Estimated body mass index is 38.47 kg/(m^2) as calculated from the following:    Height as of this encounter: 1.816 m (5' 11.5\").    Weight as of this encounter: 126.9 kg (279 lb 11.2 oz). Body surface area is 2.53 meters squared.  No Pain (0) Comment: Data Unavailable   No LMP for male patient.  Allergies reviewed: Yes  Medications reviewed: Yes    Medications: Medication refills not needed today.  Pharmacy name entered into K[employer]: [facility] [place], [place] - [address]    Clinical concerns: 3 month follow up DVT. Review labs.     8 minutes for nursing intake (face to face time)     Lindsay Radford CMA            "

## 2018-06-15 NOTE — LETTER
6/15/2018         RE: Edwin Nuno  [address]  [place] [address]        Dear Colleague,    Thank you for referring your patient, Edwin Nuno, to the [facility] [place]. Please see a copy of my visit note below.    DATE OF VISIT: Julius 15, 2018    Edwin Nuno is a 37 year old male is seen today for   Chief Complaint   Patient presents with     Hematology     3 month follow up DVT. Review labs.    .       (I26.99) Pulmonary embolism on right (H)  (primary encounter diagnosis)  (I82.4Y9) Deep vein thrombosis (DVT) of proximal lower extremity, unspecified chronicity, unspecified laterality (H)  I reviewed with the patient today the results of the bladder ultrasound showing no evidence of deep venous thrombosis.  D-dimer has been within normal range.  Patient had difficulty with checking his INR because of frequent traveling.  I would recommend to start patient on Xarelto 20 mg orally daily.  She will continue with anticoagulation to complete 6 months of therapy.  Today I discussed with the patient risk for recurrence for the venous thrombosis.  We talked about precipitating factors for deep venous thrombosis.  I reviewed with the patient hypercoagulability workup which came back negative.  I will check protein C and protein S and anti-thrombin when the patient is off warfarin as well.  I have not scheduled patient for any further appointments.  We will see the patient again if there is new developments or concerns    The patient is ready to learn, no apparent learning barriers were identified.  Diagnosis and treatment plans were explained to the patient. The patient expressed understanding of the content. The patient asked appropriate questions. The patient questions were answered to his satisfaction.  Time spent 25 minutes more than 50% of the time in counseling and coordination of care.  Chart documentation with Dragon Voice recognition Software. Although reviewed after completion, some words  and grammatical errors may remain.    Again, thank you for allowing me to participate in the care of your patient.        Sincerely,        Sivakumar Hendricks MD

## 2018-06-15 NOTE — MR AVS SNAPSHOT
Edwin Nuno   6/15/2018   Anticoagulation Therapy Visit    Description:  37 year old male   Provider:  Aldo Vincent, RN   Department:  [place] [facility]           INR as of 6/15/2018     Today's INR       Anticoagulation Summary as of 6/15/2018     INR goal 2.0-3.0   Today's INR    Full warfarin instructions 10 mg on Tue, Fri; 12.5 mg all other days   Next INR check     Indications   DVT (deep venous thrombosis) (H) [I82.409]  Pulmonary embolism on right (H) [I26.99]  Long-term (current) use of anticoagulants [Z79.01] [Z79.01]         Anticoagulation Episode Summary     Resolved date 6/15/2018    Resolved reason Changed Anticoagulant

## 2018-06-18 ENCOUNTER — OFFICE VISIT (OUTPATIENT)
Dept: FAMILY MEDICINE | Facility: CLINIC | Age: 38
End: 2018-06-18
Payer: COMMERCIAL

## 2018-06-18 VITALS
DIASTOLIC BLOOD PRESSURE: 73 MMHG | HEART RATE: 47 BPM | HEIGHT: 72 IN | TEMPERATURE: 97.3 F | WEIGHT: 283.2 LBS | SYSTOLIC BLOOD PRESSURE: 128 MMHG | BODY MASS INDEX: 38.36 KG/M2

## 2018-06-18 DIAGNOSIS — E66.01 MORBID OBESITY (H): ICD-10-CM

## 2018-06-18 DIAGNOSIS — B30.9 ACUTE VIRAL CONJUNCTIVITIS OF BOTH EYES: Primary | ICD-10-CM

## 2018-06-18 PROCEDURE — 99213 OFFICE O/P EST LOW 20 MIN: CPT | Performed by: FAMILY MEDICINE

## 2018-06-18 ASSESSMENT — PAIN SCALES - GENERAL: PAINLEVEL: NO PAIN (1)

## 2018-06-18 NOTE — MR AVS SNAPSHOT
"              After Visit Summary   6/18/2018    Edwin Nuno    MRN: [id number]           Patient Information     Date Of Birth          1980        Visit Information        Provider Department      6/18/2018 1:20 PM Davis Granados MD [facility]        Today's Diagnoses     Acute viral conjunctivitis of both eyes    -  1    Morbid obesity (H)           Follow-ups after your visit        Who to contact     Normal or non-critical lab and imaging results will be communicated to you by Lendsquarehart, letter or phone within 4 business days after the clinic has received the results. If you do not hear from us within 7 days, please contact the clinic through Lendsquarehart or phone. If you have a critical or abnormal lab result, we will notify you by phone as soon as possible.  Submit refill requests through Nutrinsic or call your pharmacy and they will forward the refill request to us. Please allow 3 business days for your refill to be completed.          If you need to speak with a  for additional information , please call: [phone]             Additional Information About Your Visit        LendsquareharTopspin Media Information     Nutrinsic gives you secure access to your electronic health record. If you see a primary care provider, you can also send messages to your care team and make appointments. If you have questions, please call your primary care clinic.  If you do not have a primary care provider, please call [phone] and they will assist you.        Care EveryWhere ID     This is your Care EveryWhere ID. This could be used by other organizations to access your [place] medical records  [id number]        Your Vitals Were     Pulse Temperature Height BMI (Body Mass Index)          47 97.3  F (36.3  C) (Tympanic) 5' 11.5\" (1.816 m) 38.95 kg/m2         Blood Pressure from Last 3 Encounters:   06/18/18 128/73   06/15/18 135/83   05/27/18 116/62    Weight from Last 3 Encounters:   06/18/18 283 lb 3.2 oz " (128.5 kg)   06/15/18 279 lb 11.2 oz (126.9 kg)   05/27/18 282 lb 12.8 oz (128.3 kg)              Today, you had the following     No orders found for display       Primary Care Provider Office Phone # Fax #    Ricardo Stoddard [phone] [address] [facility] [address]        Equal Access to Services     RHONDA BENJAMIN : Hadii aad ku hadasho Soomaali, waaxda luqadaha, qaybta kaalmada adeegyada, waxay idiin hayaan adeeg khamandash lasariahmady . So [facility] [phone].    ATENCIÓN: Si radhika blanc, tiene a bhandari disposición servicios gratuitos de asistencia lingüística. Llame al [phone].    We comply with applicable federal civil rights laws and [place] laws. We do not discriminate on the basis of race, color, national origin, age, disability, sex, sexual orientation, or gender identity.            Thank you!     Thank you for choosing [facility]  for your care. Our goal is always to provide you with excellent care. Hearing back from our patients is one way we can continue to improve our services. Please take a few minutes to complete the written survey that you may receive in the mail after your visit with us. Thank you!             Your Updated Medication List - Protect others around you: Learn how to safely use, store and throw away your medicines at www.disposemymeds.org.          This list is accurate as of 6/18/18 11:59 PM.  Always use your most recent med list.                   Brand Name Dispense Instructions for use Diagnosis    cetirizine 10 MG tablet    zyrTEC    30 tablet    Take 1 tablet (10 mg) by mouth every evening    Preop general physical exam       fluticasone 50 MCG/ACT spray    FLONASE    16 g    Spray 1-2 sprays into both nostrils daily    Acute sinusitis with symptoms > 10 days       lisinopril 20 MG tablet    PRINIVIL/ZESTRIL    90 tablet    Take 1 tablet (20 mg) by mouth daily    Benign essential hypertension       magnesium oxide 400 (241.3 Mg) MG tablet     MAG-OX    60 tablet    Take 1 tablet (400 mg) by mouth daily    Preop general physical exam       rivaroxaban ANTICOAGULANT 20 MG Tabs tablet    XARELTO    30 tablet    Take 1 tablet (20 mg) by mouth daily (with dinner)    Pulmonary embolism on right (H)       warfarin 5 MG tablet    COUMADIN    180 tablet    Take 10 mg on Tue, Fri; 12.5 mg all other days or as directed by the Anticoagulation Clinic    Other acute pulmonary embolism without acute cor pulmonale (H)

## 2018-06-18 NOTE — PROGRESS NOTES
SUBJECTIVE:                                                    Edwin Nuno is a 37 year old male who presents to clinic today for the following health issues:    Eye(s) Problem  Onset: Saturday    Description:   Location: left  Pain: YES- mild pain  Redness: YES    Accompanying Signs & Symptoms:  Discharge/mattering: no   Swelling: YES  Visual changes: no   Fever: no   Nasal Congestion: no   Bothered by bright lights: no     History:   Trauma: no   Foreign body exposure: no     Precipitating factors:   Wearing contacts: no     Alleviating factors:  Improved by: none    Therapies Tried and outcome: none    Problem list and histories reviewed & adjusted, as indicated.  Additional history: grandchild has pink eye    Patient Active Problem List   Diagnosis     Family history of colon cancer     Hyperlipidemia with target LDL less than 130     Hypotestosteronism     Esophageal reflux     Benign essential hypertension     Pulmonary embolism on right (H)     DVT (deep venous thrombosis) (H)     Long-term (current) use of anticoagulants [Z79.01]     Past Surgical History:   Procedure Laterality Date     APPENDECTOMY  Feb 1998     ARTHROSCOPY KNEE      right, 1/8/2018       Social History   Substance Use Topics     Smoking status: Never Smoker     Smokeless tobacco: Former User     Quit date: 6/22/2002     Alcohol use Yes     Family History   Problem Relation Age of Onset     Cancer - colorectal Mother      Breast Cancer Mother      Colon Cancer Mother      not malignant pollups     Cancer - colorectal Maternal Grandmother      Colon Cancer Maternal Grandmother      Large intestine and colon removal     Prostate Cancer Father      Removed prostate     Osteoperosis Paternal Grandmother      Cerebrovascular Disease Other      Great Grandmother     C.A.D. No family hx of      Diabetes No family hx of      Hypertension No family hx of      Cerebrovascular Disease No family hx of      Melanoma No family hx of       "      ROS:  Constitutional, HEENT, cardiovascular, pulmonary, gi and gu systems are negative, except as otherwise noted.    OBJECTIVE:                                                    /73 (BP Location: Right arm, Patient Position: Sitting, Cuff Size: Adult Large)  Pulse (!) 47  Temp 97.3  F (36.3  C) (Tympanic)  Ht 5' 11.5\" (1.816 m)  Wt 283 lb 3.2 oz (128.5 kg)  BMI 38.95 kg/m2 Body mass index is 38.95 kg/(m^2).   GENERAL: healthy, alert, well nourished, well hydrated, no distress  HENT: ear canals- normal; TMs- normal; Nose- normal; Mouth- no ulcers, no lesions  NECK: no tenderness, no adenopathy, no asymmetry, no masses, no stiffness; thyroid- normal to palpation  RESP: lungs clear to auscultation - no rales, no rhonchi, no wheezes  CV: regular rates and rhythm, normal S1 S2, no S3 or S4 and no murmur, no click or rub -  ABDOMEN: soft, no tenderness, no  hepatosplenomegaly, no masses, normal bowel sounds       ASSESSMENT/PLAN:                                                      (B30.9) Acute viral conjunctivitis of both eyes  (primary encounter diagnosis)  return to clinic or call if unimproved in 3-4 days sooner if worse.    (E66.01) Morbid obesity (H)  Wt Readings from Last 10 Encounters:   06/18/18 283 lb 3.2 oz (128.5 kg)   06/15/18 279 lb 11.2 oz (126.9 kg)   05/27/18 282 lb 12.8 oz (128.3 kg)   05/25/18 286 lb 6.4 oz (129.9 kg)   04/12/18 280 lb (127 kg)   03/30/18 295 lb 11.2 oz (134.1 kg)   03/09/18 299 lb (135.6 kg)   03/08/18 (!) 304 lb 8 oz (138.1 kg)   02/28/18 (!) 307 lb 8 oz (139.5 kg)   02/13/18 (!) 305 lb (138.3 kg)     [facility] working on diet and exercis           reports that he has never smoked. He quit smokeless tobacco use about 16 years ago.      Weight management plan: Discussed healthy diet and exercise guidelines and patient will follow up in 6 months in clinic to re-evaluate.      [facility]    "

## 2018-06-20 PROBLEM — E66.01 MORBID OBESITY (H): Status: ACTIVE | Noted: 2018-06-20

## 2018-07-17 ENCOUNTER — MYC REFILL (OUTPATIENT)
Dept: FAMILY MEDICINE | Facility: CLINIC | Age: 38
End: 2018-07-17

## 2018-07-17 ENCOUNTER — MYC REFILL (OUTPATIENT)
Dept: ONCOLOGY | Facility: CLINIC | Age: 38
End: 2018-07-17

## 2018-07-17 DIAGNOSIS — I26.99 PULMONARY EMBOLISM ON RIGHT (H): ICD-10-CM

## 2018-07-17 DIAGNOSIS — I10 BENIGN ESSENTIAL HYPERTENSION: ICD-10-CM

## 2018-07-18 DIAGNOSIS — I26.99 PULMONARY EMBOLISM ON RIGHT (H): ICD-10-CM

## 2018-07-18 PROCEDURE — 85303 CLOT INHIBIT PROT C ACTIVITY: CPT | Performed by: INTERNAL MEDICINE

## 2018-07-18 PROCEDURE — 85300 ANTITHROMBIN III ACTIVITY: CPT | Performed by: INTERNAL MEDICINE

## 2018-07-18 PROCEDURE — 36415 COLL VENOUS BLD VENIPUNCTURE: CPT | Performed by: INTERNAL MEDICINE

## 2018-07-18 PROCEDURE — 85306 CLOT INHIBIT PROT S FREE: CPT | Performed by: INTERNAL MEDICINE

## 2018-07-18 RX ORDER — LISINOPRIL 20 MG/1
20 TABLET ORAL DAILY
Qty: 90 TABLET | Refills: 1 | Status: SHIPPED | OUTPATIENT
Start: 2018-07-18 | End: 2019-02-03

## 2018-07-18 NOTE — TELEPHONE ENCOUNTER
Message from [employer]:  Original authorizing provider: Sivakumar Hendricks MD    Edwin Nuno would like a refill of the following medications:  rivaroxaban ANTICOAGULANT (XARELTO) 20 MG TABS tablet [Sivakumar Hendricks MD]    Preferred pharmacy: [place], [place] - [address] [place] [address]    Comment: Pt was last seen 06.15.18 and is to continue for 6 months. Refill is appropriate.     Kasia John RN on 7/18/2018 at 8:35 AM        Medication renewals requested in this message routed to other providers:  lisinopril (PRINIVIL/ZESTRIL) 20 MG tablet [Cecilio Hughes MD]

## 2018-07-18 NOTE — TELEPHONE ENCOUNTER
"Requested Prescriptions   Pending Prescriptions Disp Refills     lisinopril (PRINIVIL/ZESTRIL) 20 MG tablet 90 tablet 3     Sig: Take 1 tablet (20 mg) by mouth daily    ACE Inhibitors (Including Combos) Protocol Passed    7/18/2018  8:35 AM       Passed - Blood pressure under 140/90 in past 12 months    BP Readings from Last 3 Encounters:   06/18/18 128/73   06/15/18 135/83   05/27/18 116/62                Passed - Recent (12 mo) or future (30 days) visit within the authorizing provider's specialty    Patient had office visit in the last 12 months or has a visit in the next 30 days with authorizing provider or within the authorizing provider's specialty.  See \"Patient Info\" tab in inbasket, or \"Choose Columns\" in Meds & Orders section of the refill encounter.           Passed - Patient is age 18 or older       Passed - Normal serum creatinine on file in past 12 months    Recent Labs   Lab Test  01/15/18   0625   CR  0.89            Passed - Normal serum potassium on file in past 12 months    Recent Labs   Lab Test  01/15/18   0625   POTASSIUM  4.2               "

## 2018-07-18 NOTE — TELEPHONE ENCOUNTER
Message from [employer]:  Original authorizing provider: Cecilio Hughes MD    Edwin Nuno would like a refill of the following medications:  lisinopril (PRINIVIL/ZESTRIL) 20 MG tablet [Cecilio Hughes MD]    Preferred pharmacy: [facility], [place] - [address] [facility]    Comment:      Medication renewals requested in this message routed to other providers:  rivaroxaban ANTICOAGULANT (XARELTO) 20 MG TABS tablet [Sivakumar Hendricks MD]

## 2018-07-19 LAB
AT III ACT/NOR PPP CHRO: 106 % (ref 85–135)
PROT C ACT/NOR PPP CHRO: 125 % (ref 70–170)
PROT S FREE AG ACT/NOR PPP IA: 148 % (ref 70–148)

## 2018-07-25 ENCOUNTER — MYC MEDICAL ADVICE (OUTPATIENT)
Dept: ONCOLOGY | Facility: CLINIC | Age: 38
End: 2018-07-25

## 2018-08-20 ENCOUNTER — OFFICE VISIT (OUTPATIENT)
Dept: FAMILY MEDICINE | Facility: CLINIC | Age: 38
End: 2018-08-20
Payer: COMMERCIAL

## 2018-08-20 VITALS
OXYGEN SATURATION: 96 % | HEART RATE: 72 BPM | SYSTOLIC BLOOD PRESSURE: 132 MMHG | DIASTOLIC BLOOD PRESSURE: 80 MMHG | RESPIRATION RATE: 18 BRPM | HEIGHT: 71 IN | BODY MASS INDEX: 39.9 KG/M2 | TEMPERATURE: 97.7 F | WEIGHT: 285 LBS

## 2018-08-20 DIAGNOSIS — R25.2 CRAMP OF LIMB: Primary | ICD-10-CM

## 2018-08-20 PROCEDURE — 99213 OFFICE O/P EST LOW 20 MIN: CPT | Performed by: FAMILY MEDICINE

## 2018-08-20 NOTE — PROGRESS NOTES
"  SUBJECTIVE:   Edwin Nuno is a 37 year old male who presents to clinic today for the following health issues:      Chief Complaint   Patient presents with     Musculoskeletal Problem     leg cramps in both lower legs at night x 2 weeks .             Problem list and histories reviewed & adjusted, as indicated.  Additional history: as documented        Reviewed and updated as needed this visit by clinical staff  Tobacco  Allergies  Meds       Reviewed and updated as needed this visit by Provider       Further history obtained, clarified or corrected by physician:  He has been doing well off of his anticoagulants but now lately he started to get some nighttime cramping mostly in the right leg but also sometimes in the left leg always in the calf muscles.  He describes a classic spasm that often wakes him up and needs to be rubbed out or stretched out.  He is not having any daytime problems.    OBJECTIVE:  /80  Pulse 72  Temp 97.7  F (36.5  C)  Resp 18  Ht 5' 11\" (1.803 m)  Wt 285 lb (129.3 kg)  SpO2 96%  BMI 39.75 kg/m2  LUNGS: clear to auscultation, normal breath sounds  CV: RRR without murmur  ABD: BS+, soft, nontender, no masses, no hepatosplenomegaly  EXT: normal    ASSESSMENT:  Cramp of limb    PLAN:  No prescription medication  We discussed hydration and electrolyte balance with supplementation if needed  Return for worsening or new problems  "

## 2018-08-20 NOTE — PATIENT INSTRUCTIONS
Thank you for choosing [facility].  You may be receiving a survey in the mail from [facility] regarding your visit today.  Please take a few minutes to complete and return the survey to let us know how we are doing.      Our Clinic hours are:  Mondays    7:20 am - 7 pm  Tues - Fri  7:20 am - 5 pm    Clinic Phone: [phone]    The clinic lab opens at 7:30 am Mon - Fri and appointments are required.    [facility]. [phone]  Monday  8 am - 7pm  Tues - Fri 8 am - 5:30 pm

## 2018-08-20 NOTE — MR AVS SNAPSHOT
After Visit Summary   8/20/2018    Edwin Nuno    MRN: [id number]           Patient Information     Date Of Birth          1980        Visit Information        Provider Department      8/20/2018 3:40 PM Ricardo Stoddard MD [facility]        Today's Diagnoses     Cramp of limb    -  1      Care Instructions          Thank you for choosing [facility]  You may be receiving a survey in the mail from [facility] regarding your visit today.  Please take a few minutes to complete and return the survey to let us know how we are doing.      Our Clinic hours are:  Mondays    7:20 am - 7 pm  Tues -  Fri  7:20 am - 5 pm    Clinic Phone: [phone]    The clinic lab opens at 7:30 am Mon - Fri and appointments are required.    [facility]  Ph. [phone]  Monday  8 am - 7pm  Tues - Fri 8 am - 5:30 pm                 Follow-ups after your visit        Who to contact     If you have questions or need follow up information about today's clinic visit or your schedule please contact [facility] directly at [phone].  Normal or non-critical lab and imaging results will be communicated to you by [employer], letter or phone within 4 business days after the clinic has received the results. If you do not hear from us within 7 days, please contact the clinic through [employer] or phone. If you have a critical or abnormal lab result, we will notify you by phone as soon as possible.  Submit refill requests through [employer] or call your pharmacy and they will forward the refill request to us. Please allow 3 business days for your refill to be completed.          Additional Information About Your Visit        MyChart Information     [employer] gives you secure access to your electronic health record. If you see a primary care provider, you can also send messages to your care team and make appointments. If you have questions, please call your primary care clinic.   "If you do not have a primary care provider, please call [phone] and they will assist you.        Care EveryWhere ID     This is your Care EveryWhere ID. This could be used by other organizations to access your [place] medical records  [id number]        Your Vitals Were     Pulse Temperature Respirations Height Pulse Oximetry BMI (Body Mass Index)    72 97.7  F (36.5  C) 18 5' 11\" (1.803 m) 96% 39.75 kg/m2       Blood Pressure from Last 3 Encounters:   08/20/18 132/80   06/18/18 128/73   06/15/18 135/83    Weight from Last 3 Encounters:   08/20/18 285 lb (129.3 kg)   06/18/18 283 lb 3.2 oz (128.5 kg)   06/15/18 279 lb 11.2 oz (126.9 kg)              Today, you had the following     No orders found for display       Primary Care Provider Office Phone # Fax #    Ricardo Stoddard [phone] [address] [facility] [address]        Equal Access to Services     [facility]: Hadii aad ku hadasho Soomaali, waaxda luqadaha, qaybta kaalmada adeangela, austen thomson . So [facility] [phone].    ATENCIÓN: Si habla español, tiene a bhandari disposición servicios gratuitos de asistencia lingüística. Rochelle al [phone].    We comply with applicable federal civil rights laws and [place] laws. We do not discriminate on the basis of race, color, national origin, age, disability, sex, sexual orientation, or gender identity.            Thank you!     Thank you for choosing [facility]  for your care. Our goal is always to provide you with excellent care. Hearing back from our patients is one way we can continue to improve our services. Please take a few minutes to complete the written survey that you may receive in the mail after your visit with us. Thank you!             Your Updated Medication List - Protect others around you: Learn how to safely use, store and throw away your medicines at www.disposemymeds.org.          This list is accurate as of " 8/20/18  4:13 PM.  Always use your most recent med list.                   Brand Name Dispense Instructions for use Diagnosis    cetirizine 10 MG tablet    zyrTEC    30 tablet    Take 1 tablet (10 mg) by mouth every evening    Preop general physical exam       fluticasone 50 MCG/ACT spray    FLONASE    16 g    Spray 1-2 sprays into both nostrils daily    Acute sinusitis with symptoms > 10 days       lisinopril 20 MG tablet    PRINIVIL/ZESTRIL    90 tablet    Take 1 tablet (20 mg) by mouth daily    Benign essential hypertension       magnesium oxide 400 (241.3 Mg) MG tablet    MAG-OX    60 tablet    Take 1 tablet (400 mg) by mouth daily    Preop general physical exam       rivaroxaban ANTICOAGULANT 20 MG Tabs tablet    XARELTO    30 tablet    Take 1 tablet (20 mg) by mouth daily (with dinner)    Pulmonary embolism on right (H)

## 2018-11-02 ENCOUNTER — MYC REFILL (OUTPATIENT)
Dept: FAMILY MEDICINE | Facility: CLINIC | Age: 38
End: 2018-11-02

## 2018-11-02 DIAGNOSIS — J01.90 ACUTE SINUSITIS WITH SYMPTOMS > 10 DAYS: ICD-10-CM

## 2018-11-02 DIAGNOSIS — I10 BENIGN ESSENTIAL HYPERTENSION: ICD-10-CM

## 2018-11-02 RX ORDER — FLUTICASONE PROPIONATE 50 MCG
1-2 SPRAY, SUSPENSION (ML) NASAL DAILY
Qty: 16 G | Refills: 6 | Status: SHIPPED | OUTPATIENT
Start: 2018-11-02 | End: 2021-04-15

## 2018-11-02 RX ORDER — LISINOPRIL 20 MG/1
20 TABLET ORAL DAILY
Qty: 90 TABLET | Refills: 1 | Status: CANCELLED | OUTPATIENT
Start: 2018-11-02

## 2018-11-02 NOTE — TELEPHONE ENCOUNTER
"Requested Prescriptions   Pending Prescriptions Disp Refills     lisinopril (PRINIVIL/ZESTRIL) 20 MG tablet 90 tablet 1     Sig: Take 1 tablet (20 mg) by mouth daily    ACE Inhibitors (Including Combos) Protocol Passed    11/2/2018  7:10 AM       Passed - Blood pressure under 140/90 in past 12 months    BP Readings from Last 3 Encounters:   08/20/18 132/80   06/18/18 128/73   06/15/18 135/83                Passed - Recent (12 mo) or future (30 days) visit within the authorizing provider's specialty    Patient had office visit in the last 12 months or has a visit in the next 30 days with authorizing provider or within the authorizing provider's specialty.  See \"Patient Info\" tab in inbasket, or \"Choose Columns\" in Meds & Orders section of the refill encounter.             Passed - Patient is age 18 or older       Passed - Normal serum creatinine on file in past 12 months    Recent Labs   Lab Test  01/15/18   0625   CR  0.89            Passed - Normal serum potassium on file in past 12 months    Recent Labs   Lab Test  01/15/18   0625   POTASSIUM  4.2             There is a refill at his pharmacy.  Gisella RANDOLPH RN    "

## 2018-11-02 NOTE — TELEPHONE ENCOUNTER
Message from [employer]:  Original authorizing provider: Ricardo Stoddard MD    Edwin Nuno would like a refill of the following medications:  lisinopril (PRINIVIL/ZESTRIL) 20 MG tablet [Ricardo Stoddard MD]    Preferred pharmacy: [facility] [address] [facility]    Comment:      Medication renewals requested in this message routed to other providers:  fluticasone (FLONASE) 50 MCG/ACT spray [Twyla Galvan APRN CNP]

## 2018-11-02 NOTE — TELEPHONE ENCOUNTER
Message from [employer]:  Original authorizing provider: CHAIM Calix CNP MARCIAPetar Nurys would like a refill of the following medications:  fluticasone (FLONASE) 50 MCG/ACT spray [CHAIM Calix CNP]    Preferred pharmacy: [facility] [address]    Comment:      Medication renewals requested in this message routed to other providers:  lisinopril (PRINIVIL/ZESTRIL) 20 MG tablet [Ricardo Stoddard MD]

## 2018-11-21 ENCOUNTER — TELEPHONE (OUTPATIENT)
Dept: FAMILY MEDICINE | Facility: CLINIC | Age: 38
End: 2018-11-21

## 2018-11-21 ENCOUNTER — OFFICE VISIT (OUTPATIENT)
Dept: FAMILY MEDICINE | Facility: CLINIC | Age: 38
End: 2018-11-21
Payer: COMMERCIAL

## 2018-11-21 VITALS
RESPIRATION RATE: 16 BRPM | TEMPERATURE: 97.6 F | OXYGEN SATURATION: 95 % | BODY MASS INDEX: 40.5 KG/M2 | SYSTOLIC BLOOD PRESSURE: 130 MMHG | HEIGHT: 72 IN | WEIGHT: 299 LBS | DIASTOLIC BLOOD PRESSURE: 70 MMHG | HEART RATE: 72 BPM

## 2018-11-21 DIAGNOSIS — J32.9 CHRONIC SINUSITIS, UNSPECIFIED LOCATION: Primary | ICD-10-CM

## 2018-11-21 DIAGNOSIS — J32.9 CHRONIC SINUSITIS, UNSPECIFIED LOCATION: ICD-10-CM

## 2018-11-21 PROCEDURE — 99213 OFFICE O/P EST LOW 20 MIN: CPT | Performed by: NURSE PRACTITIONER

## 2018-11-21 NOTE — PROGRESS NOTES
SUBJECTIVE:   Edwin Nuno is a 37 year old male who presents to clinic today for the following health issues:      ENT Symptoms             Symptoms: cc Present Absent Comment   Fever/Chills   x    Fatigue  x     Muscle Aches   x    Eye Irritation  x     Sneezing  x     Nasal Titus/Drg  x     Sinus Pressure/Pain  x     Loss of smell   x    Dental pain  x  From clinching his jaw.   Sore Throat  x     Swollen Glands   x    Ear Pain/Fullness   x    Cough  x     Wheeze   x    Chest Pain   x    Shortness of breath   x    Rash   x    Other  x  headache     Symptom duration:  1 week   Symptom severity:    Treatments tried:  Tylonol, mucinex   Contacts:  no known       Problem list and histories reviewed & adjusted, as indicated.  Further history obtained, clarified or corrected by provider:  Sinus pain pressure and drainage started about 10 days ago.  Symptoms worsened 2 days ago.  He has an ongoing headache, nasal discharge is thick and yellow.  He has had a small amount of blood in the discharge for the past 3 days.  He has been taking Tylenol for discomfort but relief is only for short time.  He is also taking Mucinex.  Reports that he typically has sinus infections twice a year, usually in the spring and the fall.  Oftentimes, he waits to come into the clinic for treatment.  He has worked as a  in the past and wonders if that has anything to do with the sinus infections.        Patient Active Problem List   Diagnosis     Family history of colon cancer     Hyperlipidemia with target LDL less than 130     Hypotestosteronism     Esophageal reflux     Benign essential hypertension     Pulmonary embolism on right (H)     DVT (deep venous thrombosis) (H)     Long-term (current) use of anticoagulants [Z79.01]     Morbid obesity (H)     Past Surgical History:   Procedure Laterality Date     APPENDECTOMY  Feb 1998     ARTHROSCOPY KNEE      right, 1/8/2018       Social History   Substance Use Topics     Smoking status:  "Never Smoker     Smokeless tobacco: Former User     Quit date: 6/22/2002     Alcohol use Yes     Family History   Problem Relation Age of Onset     Cancer - colorectal Mother      Breast Cancer Mother      Colon Cancer Mother      not malignant pollups     Cancer - colorectal Maternal Grandmother      Colon Cancer Maternal Grandmother      Large intestine and colon removal     Prostate Cancer Father      Removed prostate     Osteoporosis Paternal Grandmother      Cerebrovascular Disease Other      Great Grandmother     C.A.D. No family hx of      Diabetes No family hx of      Hypertension No family hx of      Cerebrovascular Disease No family hx of      Melanoma No family hx of            Reviewed and updated as needed this visit by clinical staff  Allergies  Meds  Problems       Reviewed and updated as needed this visit by Provider  Allergies  Meds  Problems         ROS:  Constitutional, HEENT, cardiovascular, pulmonary, gi and gu systems are negative, except as otherwise noted.    OBJECTIVE:     /70  Pulse 72  Temp 97.6  F (36.4  C) (Tympanic)  Resp 16  Ht 5' 11.5\" (1.816 m)  Wt 299 lb (135.6 kg)  SpO2 95%  BMI 41.12 kg/m2  Body mass index is 41.12 kg/(m^2).  GENERAL: healthy, alert and no distress  EYES: Eyes grossly normal to inspection and conjunctivae and sclerae normal  HENT: normal cephalic/atraumatic, ear canals and TM's normal, nose and mouth without ulcers or lesions, oropharynx clear, oral mucous membranes moist and sinuses: maxillary, frontal tenderness on right  NECK: no adenopathy, no asymmetry, masses, or scars and thyroid normal to palpation  RESP: lungs clear to auscultation - no rales, rhonchi or wheezes  CV: regular rate and rhythm, normal S1 S2, no S3 or S4, no murmur, click or rub, no peripheral edema and peripheral pulses strong  MS: no gross musculoskeletal defects noted, no edema    Diagnostic Test Results:    CT SCAN OF THE PARANASAL SINUSES AND FACE  11/23/2018  4:50 PM "      HISTORY: History of chronic sinusitis. Chronic sinusitis, unspecified  location.     TECHNIQUE: Noncontrast axial scans and coronal reformations. Radiation  dose for this scan was reduced using automated exposure control,  adjustment of the mA and/or kV according to patient size, or iterative  reconstruction technique.     COMPARISON: None.     FINDINGS:   Frontal sinuses: Normal.      Ethmoid sinuses: Normal.      Right maxillary sinus: Normal. The ostiomeatal unit is normal with a  patent infundibulum.      Left maxillary sinus: Normal. The ostiomeatal unit is normal with a  patent infundibulum.      Sphenoid sinus: Normal.      Nasal septum: Mild rightward nasal septal deviation is present.      Turbinates and nasal cavity: Normal.      Laminae papyracea and cribriform plate: Normal.      Other findings: Orbits, sella, alveolus and nasopharynx appear normal.  Bony temporomandibular joints appear normal.         IMPRESSION: No evidence for active sinus disease or any nasal cavity  mass lesions.     KARI SUAREZ MD    ASSESSMENT/PLAN:     ASSESSMENT:  1. Chronic sinusitis, unspecified location.  Will treat with Augmentin.  Discussed referral to see ENT and he agrees with this.  CT of the sinus obtained prior to the appointment.       PLAN:  Orders Placed This Encounter     CT Sinus w/o Contrast     OTOLARYNGOLOGY REFERRAL     amoxicillin-clavulanate (AUGMENTIN) 875-125 MG per tablet       Patient Instructions   Complete full course of antibiotics even if you start to feel better.    Increase your fluids and rest and eat a well balanced diet.    Would be good to humidify the air in your home, especially in the bedroom.     Tylenol or Ibuprofen if able to take for fevers and discomfort. Do not exceed 4 grams of Tylenol in a 24 hour period. Take Ibuprofen with food.   Follow up in 3-5 days if not improving or return sooner if worsening or fail to improve as anticipated.  Schedule the CT scan and visit with  ENT    Patient agrees with plan of care as outlined. Call or return to the clinic with any worsening of symptoms or no resolution. Medication side effects reviewed.      Viv Hooper, NP, APRN CNP  [facility]

## 2018-11-21 NOTE — PATIENT INSTRUCTIONS
Complete full course of antibiotics even if you start to feel better.    Increase your fluids and rest and eat a well balanced diet.    Would be good to humidify the air in your home, especially in the bedroom.     Tylenol or Ibuprofen if able to take for fevers and discomfort. Do not exceed 4 grams of Tylenol in a 24 hour period. Take Ibuprofen with food.   Follow up in 3-5 days if not improving or return sooner if worsening or fail to improve as anticipated.  Schedule the CT scan and visit with ENT

## 2018-11-21 NOTE — TELEPHONE ENCOUNTER
Reason for Call: Augmentin    Detailed comments: patients wife is calling and stating that her  was seen this am by ANGELA Hooper for sinus issue and was given Augmentin. He took one dose and left to go out of town. His Rx is sitting at home. Is it possible to get more sent to where he will be? Please advise.    Phone Number Patient can be reached at: Other phone number:  [phone]    Best Time: any    Can we leave a detailed message on this number? YES   Jody Art  Clinic Station  [facility]      Call taken on 11/21/2018 at 4:24 PM by Jody Art

## 2018-11-21 NOTE — TELEPHONE ENCOUNTER
Discussed with provider and ok to send in new prescription to pharmacy close to them Patient was notified.    Luli CAMACHO RN

## 2018-11-21 NOTE — MR AVS SNAPSHOT
After Visit Summary   11/21/2018    Edwin Nuno    MRN: [id number]           Patient Information     Date Of Birth          1980        Visit Information        Provider Department      11/21/2018 9:00 AM Viv Hooper APRN CNP [facility]        Today's Diagnoses     Chronic sinusitis, unspecified location    -  1      Care Instructions    Complete full course of antibiotics even if you start to feel better.    Increase your fluids and rest and eat a well balanced diet.    Would be good to humidify the air in your home, especially in the bedroom.     Tylenol or Ibuprofen if able to take for fevers and discomfort. Do not exceed 4 grams of Tylenol in a 24 hour period. Take Ibuprofen with food.   Follow up in 3-5 days if not improving or return sooner if worsening or fail to improve as anticipated.  Schedule the CT scan and visit with ENT            Follow-ups after your visit        Additional Services     OTOLARYNGOLOGY REFERRAL       Your provider has referred you to: [facility]: [facility] [phone]   [email or web address]    Please be aware that coverage of these services is subject to the terms and limitations of your health insurance plan.  Call member services at your health plan with any benefit or coverage questions.      Please bring the following with you to your appointment:    (1) Any X-Rays, CTs or MRIs which have been performed.  Contact the facility where they were done to arrange for  prior to your scheduled appointment.   (2) List of current medications  (3) This referral request   (4) Any documents/labs given to you for this referral                  Follow-up notes from your care team     Return in about 3 days (around 11/24/2018), or if symptoms worsen or fail to improve.      Future tests that were ordered for you today     Open Future Orders        Priority Expected Expires Ordered    CT Sinus w/o  "Contrast Routine  11/21/2019 11/21/2018            Who to contact     If you have questions or need follow up information about today's clinic visit or your schedule please contact [facility] directly at [phone].  Normal or non-critical lab and imaging results will be communicated to you by A[employer], letter or phone within 4 business days after the clinic has received the results. If you do not hear from us within 7 days, please contact the clinic through Aste[employer] or phone. If you have a critical or abnormal lab result, we will notify you by phone as soon as possible.  Submit refill requests through [employer] or call your pharmacy and they will forward the refill request to us. Please allow 3 business days for your refill to be completed.          Additional Information About Your Visit        AsteresharMEDOP Information     [employer] gives you secure access to your electronic health record. If you see a primary care provider, you can also send messages to your care team and make appointments. If you have questions, please call your primary care clinic.  If you do not have a primary care provider, please call [phone] and they will assist you.        Care EveryWhere ID     This is your Care EveryWhere ID. This could be used by other organizations to access your [place] medical records  [id number]        Your Vitals Were     Pulse Temperature Respirations Height Pulse Oximetry BMI (Body Mass Index)    72 97.6  F (36.4  C) (Tympanic) 16 5' 11.5\" (1.816 m) 95% 41.12 kg/m2       Blood Pressure from Last 3 Encounters:   11/21/18 130/70   08/20/18 132/80   06/18/18 128/73    Weight from Last 3 Encounters:   11/21/18 299 lb (135.6 kg)   08/20/18 285 lb (129.3 kg)   06/18/18 283 lb 3.2 oz (128.5 kg)              We Performed the Following     OTOLARYNGOLOGY REFERRAL          Today's Medication Changes          These changes are accurate as of 11/21/18  9:33 AM.  If you have any questions, ask your nurse or " doctor.               Start taking these medicines.        Dose/Directions    amoxicillin-clavulanate 875-125 MG per tablet   Commonly known as:  AUGMENTIN   Used for:  Chronic sinusitis, unspecified location   Started by:  Viv Hooper APRN CNP        Dose:  1 tablet   Take 1 tablet by mouth 2 times daily   Quantity:  20 tablet   Refills:  0            Where to get your medicines      These medications were sent to [place], [place] - [address]  [address] [facility] [address]     Phone:  [phone]     amoxicillin-clavulanate 875-125 MG per tablet                Primary Care Provider Office Phone # Fax #    Ricardo Stoddard [phone] [address] [facility] [address]        Equal Access to Services     [facility]: Hadii melinda leyva hadasho Soomaali, waaxda luqadaha, qaybta kaalmada adeegyada, austen meyers hayradha thomson . So [facility] [phone].    ATENCIÓN: Si habla español, tiene a bhandari disposición servicios gratuitos de asistencia lingüística. Llame al [phone].    We comply with applicable federal civil rights laws and [place] laws. We do not discriminate on the basis of race, color, national origin, age, disability, sex, sexual orientation, or gender identity.            Thank you!     Thank you for choosing [facility]  for your care. Our goal is always to provide you with excellent care. Hearing back from our patients is one way we can continue to improve our services. Please take a few minutes to complete the written survey that you may receive in the mail after your visit with us. Thank you!             Your Updated Medication List - Protect others around you: Learn how to safely use, store and throw away your medicines at www.disposemymeds.org.          This list is accurate as of 11/21/18  9:33 AM.  Always use your most recent med list.                   Brand Name  Dispense Instructions for use Diagnosis    amoxicillin-clavulanate 875-125 MG per tablet    AUGMENTIN    20 tablet    Take 1 tablet by mouth 2 times daily    Chronic sinusitis, unspecified location       cetirizine 10 MG tablet    zyrTEC    30 tablet    Take 1 tablet (10 mg) by mouth every evening    Preop general physical exam       fluticasone 50 MCG/ACT spray    FLONASE    16 g    Spray 1-2 sprays into both nostrils daily    Acute sinusitis with symptoms > 10 days       lisinopril 20 MG tablet    PRINIVIL/ZESTRIL    90 tablet    Take 1 tablet (20 mg) by mouth daily    Benign essential hypertension       magnesium oxide 400 (241.3 Mg) MG tablet    MAG-OX    60 tablet    Take 1 tablet (400 mg) by mouth daily    Preop general physical exam       rivaroxaban ANTICOAGULANT 20 MG Tabs tablet    XARELTO    30 tablet    Take 1 tablet (20 mg) by mouth daily (with dinner)    Pulmonary embolism on right (H)

## 2018-11-23 ENCOUNTER — HOSPITAL ENCOUNTER (OUTPATIENT)
Dept: CT IMAGING | Facility: CLINIC | Age: 38
Discharge: HOME | End: 2018-11-23
Attending: NURSE PRACTITIONER | Admitting: NURSE PRACTITIONER
Payer: COMMERCIAL

## 2018-11-23 DIAGNOSIS — J32.9 CHRONIC SINUSITIS, UNSPECIFIED LOCATION: ICD-10-CM

## 2018-11-23 PROCEDURE — 70486 CT MAXILLOFACIAL W/O DYE: CPT

## 2018-12-10 ENCOUNTER — OFFICE VISIT (OUTPATIENT)
Dept: OTOLARYNGOLOGY | Facility: CLINIC | Age: 38
End: 2018-12-10
Payer: COMMERCIAL

## 2018-12-10 VITALS — BODY MASS INDEX: 41.12 KG/M2 | WEIGHT: 299 LBS | TEMPERATURE: 97.9 F

## 2018-12-10 DIAGNOSIS — J01.01 ACUTE RECURRENT MAXILLARY SINUSITIS: Primary | ICD-10-CM

## 2018-12-10 PROCEDURE — 99243 OFF/OP CNSLTJ NEW/EST LOW 30: CPT | Performed by: OTOLARYNGOLOGY

## 2018-12-10 ASSESSMENT — PAIN SCALES - GENERAL: PAINLEVEL: NO PAIN (0)

## 2018-12-10 NOTE — NURSING NOTE
"Initial Temp 97.9  F (36.6  C) (Oral)   Wt 135.6 kg (299 lb)   BMI 41.12 kg/m   Estimated body mass index is 41.12 kg/m  as calculated from the following:    Height as of 11/21/18: 1.816 m (5' 11.5\").    Weight as of this encounter: 135.6 kg (299 lb). .      Svetlana Stack LPN    "

## 2018-12-10 NOTE — PROGRESS NOTES
History of Present Illness - Edwin Nuno is a 37 year old male seen in consultation at the request of Viv Hooper for chronic sinusitis. He describes symptoms of nasal congestion which lasts for around 2-4 weeks. He has had asymptomatic periods between episodes. He has about 2-3 episodes per year. He usually complains of pressure in the face and postnasal drip.     Past Medical History -   Patient Active Problem List   Diagnosis     Family history of colon cancer     Hyperlipidemia with target LDL less than 130     Hypotestosteronism     Esophageal reflux     Benign essential hypertension     Pulmonary embolism on right (H)     DVT (deep venous thrombosis) (H)     Long-term (current) use of anticoagulants [Z79.01]     Morbid obesity (H)       Current Medications -   Current Outpatient Medications:      cetirizine (ZYRTEC) 10 MG tablet, Take 1 tablet (10 mg) by mouth every evening, Disp: 30 tablet, Rfl: 1     fluticasone (FLONASE) 50 MCG/ACT spray, Spray 1-2 sprays into both nostrils daily, Disp: 16 g, Rfl: 6     lisinopril (PRINIVIL/ZESTRIL) 20 MG tablet, Take 1 tablet (20 mg) by mouth daily, Disp: 90 tablet, Rfl: 1     magnesium oxide (MAG-OX) 400 (241.3 MG) MG tablet, Take 1 tablet (400 mg) by mouth daily, Disp: 60 tablet, Rfl: 3    Allergies - No Known Allergies    Social History -   Social History     Socioeconomic History     Marital status:      Spouse name: Not on file     Number of children: Not on file     Years of education: Not on file     Highest education level: Not on file   Social Needs     Financial resource strain: Not on file     Food insecurity - worry: Not on file     Food insecurity - inability: Not on file     Transportation needs - medical: Not on file     Transportation needs - non-medical: Not on file   Occupational History     Not on file   Tobacco Use     Smoking status: Never Smoker     Smokeless tobacco: Former User   Substance and Sexual Activity     Alcohol use: Yes      Drug use: No     Sexual activity: Yes     Birth control/protection: IUD   Other Topics Concern     Parent/sibling w/ CABG, MI or angioplasty before 65F 55M? No   Social History Narrative    .       Family History -   Family History   Problem Relation Age of Onset     Cancer - colorectal Mother      Breast Cancer Mother      Colon Cancer Mother         not malignant pollups     Cancer - colorectal Maternal Grandmother      Colon Cancer Maternal Grandmother         Large intestine and colon removal     Prostate Cancer Father         Removed prostate     Osteoporosis Paternal Grandmother      Cerebrovascular Disease Other         Great Grandmother     C.A.D. No family hx of      Diabetes No family hx of      Hypertension No family hx of      Cerebrovascular Disease No family hx of      Melanoma No family hx of        Review of Systems - As per HPI and PMHx, otherwise 7 system review of the head and neck negative. 10+ system review negative.    Physical Exam  Temp 97.9  F (36.6  C) (Oral)   Wt 135.6 kg (299 lb)   BMI 41.12 kg/m    General - The patient is well nourished and well developed, and appears to have good nutritional status.  Alert and oriented to person and place, answers questions and cooperates with examination appropriately.   Head and Face - Normocephalic and atraumatic, with no gross asymmetry noted of the contour of the facial features.  The facial nerve is intact, with strong symmetric movements.  Voice and Breathing - The patient was breathing comfortably without the use of accessory muscles. There was no wheezing, stridor, or stertor.  The patients voice was clear and strong, and had appropriate pitch and quality.  Ears - Bilateral pinna and EACs with normal appearing overlying skin. Tympanic membrane intact with good mobility on pneumatic otoscopy bilaterally. Bony landmarks of the ossicular chain are normal. The tympanic membranes are normal in appearance. No retraction, perforation, or  masses.  No fluid or purulence was seen in the external canal or the middle ear.   Eyes - Extraocular movements intact.  Sclera were not icteric or injected, conjunctiva were pink and moist.  Mouth - Examination of the oral cavity showed pink, healthy oral mucosa. No lesions or ulcerations noted.  The tongue was mobile and midline, and the dentition were in good condition.    Throat - The walls of the oropharynx were smooth, pink, moist, symmetric, and had no lesions or ulcerations.  The tonsillar pillars and soft palate were symmetric.  The uvula was midline on elevation.  Neck - Normal midline excursion of the laryngotracheal complex during swallowing.  Full range of motion on passive movement.  Palpation of the occipital, submental, submandibular, internal jugular chain, and supraclavicular nodes did not demonstrate any abnormal lymph nodes or masses.  The carotid pulse was palpable bilaterally.  Palpation of the thyroid was soft and smooth, with no nodules or goiter appreciated.  The trachea was mobile and midline.  Nose - External contour is symmetric, no gross deflection or scars.  Nasal mucosa is pink and moist with no abnormal mucus.  The septum was midline and non-obstructive, turbinates of normal size and position.  No polyps, masses, or purulence noted on examination.      CT Sinus 11/23/18 directly visualized, demonstrates completely normal paranasal sinuses.    Assessment - Edwin Nuno is a 37 year old male with normal recent CT Sinus, despite history of recurrent acute sinusitis. It is possible that he may have resolved his sinus disease by the time of the CT as he reports being asymptomatic at that time. However, I think the lack of mucosal inflammation on the CT also raises concern for possible alternative diagnosis, as he also has a history of teeth clenching and may be experiencing TMJ dysfunction.  I spent a good deal of time going over appropriate treatment for acute viral sinusitis, and when  to present to medical care to consider antibiotic therapy. Given the short duration of his episodes typically, and his history of DVT/ PE complicaiton with recent outpatient surgery, I would recommend considering sinus surgery only with rather unambiguous evidence to support it.       Dr. Veronica Wheeler MD  Otolaryngology  [facility]

## 2018-12-10 NOTE — LETTER
12/10/2018         RE: Edwin Nuno  [address]  [place] [address]        Dear Colleague,    Thank you for referring your patient, Edwin Nuno, to the [facility]. Please see a copy of my visit note below.        History of Present Illness - Edwin Nuno is a 37 year old male seen in consultation at the request of Viv Hooper for chronic sinusitis. He describes symptoms of nasal congestion which lasts for around 2-4 weeks. He has had asymptomatic periods between episodes. He has about 2-3 episodes per year. He usually complains of pressure in the face and postnasal drip.     Past Medical History -   Patient Active Problem List   Diagnosis     Family history of colon cancer     Hyperlipidemia with target LDL less than 130     Hypotestosteronism     Esophageal reflux     Benign essential hypertension     Pulmonary embolism on right (H)     DVT (deep venous thrombosis) (H)     Long-term (current) use of anticoagulants [Z79.01]     Morbid obesity (H)       Current Medications -   Current Outpatient Medications:      cetirizine (ZYRTEC) 10 MG tablet, Take 1 tablet (10 mg) by mouth every evening, Disp: 30 tablet, Rfl: 1     fluticasone (FLONASE) 50 MCG/ACT spray, Spray 1-2 sprays into both nostrils daily, Disp: 16 g, Rfl: 6     lisinopril (PRINIVIL/ZESTRIL) 20 MG tablet, Take 1 tablet (20 mg) by mouth daily, Disp: 90 tablet, Rfl: 1     magnesium oxide (MAG-OX) 400 (241.3 MG) MG tablet, Take 1 tablet (400 mg) by mouth daily, Disp: 60 tablet, Rfl: 3    Allergies - No Known Allergies    Social History -   Social History     Socioeconomic History     Marital status:      Spouse name: Not on file     Number of children: Not on file     Years of education: Not on file     Highest education level: Not on file   Social Needs     Financial resource strain: Not on file     Food insecurity - worry: Not on file     Food insecurity - inability: Not on file     Transportation needs -  medical: Not on file     Transportation needs - non-medical: Not on file   Occupational History     Not on file   Tobacco Use     Smoking status: Never Smoker     Smokeless tobacco: Former User   Substance and Sexual Activity     Alcohol use: Yes     Drug use: No     Sexual activity: Yes     Birth control/protection: IUD   Other Topics Concern     Parent/sibling w/ CABG, MI or angioplasty before 65F 55M? No   Social History Narrative    .       Family History -   Family History   Problem Relation Age of Onset     Cancer - colorectal Mother      Breast Cancer Mother      Colon Cancer Mother         not malignant pollups     Cancer - colorectal Maternal Grandmother      Colon Cancer Maternal Grandmother         Large intestine and colon removal     Prostate Cancer Father         Removed prostate     Osteoporosis Paternal Grandmother      Cerebrovascular Disease Other         Great Grandmother     C.A.D. No family hx of      Diabetes No family hx of      Hypertension No family hx of      Cerebrovascular Disease No family hx of      Melanoma No family hx of        Review of Systems - As per HPI and PMHx, otherwise 7 system review of the head and neck negative. 10+ system review negative.    Physical Exam  Temp 97.9  F (36.6  C) (Oral)   Wt 135.6 kg (299 lb)   BMI 41.12 kg/m     General - The patient is well nourished and well developed, and appears to have good nutritional status.  Alert and oriented to person and place, answers questions and cooperates with examination appropriately.   Head and Face - Normocephalic and atraumatic, with no gross asymmetry noted of the contour of the facial features.  The facial nerve is intact, with strong symmetric movements.  Voice and Breathing - The patient was breathing comfortably without the use of accessory muscles. There was no wheezing, stridor, or stertor.  The patients voice was clear and strong, and had appropriate pitch and quality.  Ears - Bilateral pinna and  EACs with normal appearing overlying skin. Tympanic membrane intact with good mobility on pneumatic otoscopy bilaterally. Bony landmarks of the ossicular chain are normal. The tympanic membranes are normal in appearance. No retraction, perforation, or masses.  No fluid or purulence was seen in the external canal or the middle ear.   Eyes - Extraocular movements intact.  Sclera were not icteric or injected, conjunctiva were pink and moist.  Mouth - Examination of the oral cavity showed pink, healthy oral mucosa. No lesions or ulcerations noted.  The tongue was mobile and midline, and the dentition were in good condition.    Throat - The walls of the oropharynx were smooth, pink, moist, symmetric, and had no lesions or ulcerations.  The tonsillar pillars and soft palate were symmetric.  The uvula was midline on elevation.  Neck - Normal midline excursion of the laryngotracheal complex during swallowing.  Full range of motion on passive movement.  Palpation of the occipital, submental, submandibular, internal jugular chain, and supraclavicular nodes did not demonstrate any abnormal lymph nodes or masses.  The carotid pulse was palpable bilaterally.  Palpation of the thyroid was soft and smooth, with no nodules or goiter appreciated.  The trachea was mobile and midline.  Nose - External contour is symmetric, no gross deflection or scars.  Nasal mucosa is pink and moist with no abnormal mucus.  The septum was midline and non-obstructive, turbinates of normal size and position.  No polyps, masses, or purulence noted on examination.      CT Sinus 11/23/18 directly visualized, demonstrates completely normal paranasal sinuses.    Assessment - Edwin Nuno is a 37 year old male with normal recent CT Sinus, despite history of recurrent acute sinusitis. It is possible that he may have resolved his sinus disease by the time of the CT as he reports being asymptomatic at that time. However, I think the lack of mucosal  inflammation on the CT also raises concern for possible alternative diagnosis, as he also has a history of teeth clenching and may be experiencing TMJ dysfunction.  I spent a good deal of time going over appropriate treatment for acute viral sinusitis, and when to present to medical care to consider antibiotic therapy. Given the short duration of his episodes typically, and his history of DVT/ PE complicaiton with recent outpatient surgery, I would recommend considering sinus surgery only with rather unambiguous evidence to support it.       Dr. Veronica Wheeler MD  Otolaryngology  [facility]        Again, thank you for allowing me to participate in the care of your patient.        Sincerely,        Veronica Wheeler MD

## 2019-02-03 ENCOUNTER — MYC REFILL (OUTPATIENT)
Dept: FAMILY MEDICINE | Facility: CLINIC | Age: 39
End: 2019-02-03

## 2019-02-03 DIAGNOSIS — I10 BENIGN ESSENTIAL HYPERTENSION: ICD-10-CM

## 2019-02-05 RX ORDER — LISINOPRIL 20 MG/1
20 TABLET ORAL DAILY
Qty: 90 TABLET | Refills: 1 | Status: SHIPPED | OUTPATIENT
Start: 2019-02-05 | End: 2019-10-03

## 2019-03-29 ENCOUNTER — OFFICE VISIT (OUTPATIENT)
Dept: FAMILY MEDICINE | Facility: CLINIC | Age: 39
End: 2019-03-29
Payer: COMMERCIAL

## 2019-03-29 VITALS
RESPIRATION RATE: 16 BRPM | BODY MASS INDEX: 40.63 KG/M2 | OXYGEN SATURATION: 98 % | HEART RATE: 63 BPM | DIASTOLIC BLOOD PRESSURE: 78 MMHG | HEIGHT: 72 IN | WEIGHT: 300 LBS | TEMPERATURE: 97.4 F | SYSTOLIC BLOOD PRESSURE: 128 MMHG

## 2019-03-29 DIAGNOSIS — M54.2 NECK PAIN: Primary | ICD-10-CM

## 2019-03-29 PROCEDURE — 99213 OFFICE O/P EST LOW 20 MIN: CPT | Performed by: FAMILY MEDICINE

## 2019-03-29 ASSESSMENT — MIFFLIN-ST. JEOR: SCORE: 2322.76

## 2019-03-29 NOTE — PROGRESS NOTES
"  SUBJECTIVE:   Edwin Nuno is a 38 year old male who presents to clinic today for the following health issues:      PAIN      Duration: Started 4 days ago.    Description (location/character/radiation): Pain below the left jaw area that radiates down to the neck.     Chewing is OK. It is not tender. Swallowing and moving the tongue worsens it.     Intensity:  moderate    Accompanying signs and symptoms: It is causing discomfort with swallowing and moving the tongue.    History (similar episodes/previous evaluation): No past history of symptoms.  He had a recent dental visit with x-rays that was normal.    Precipitating or alleviating factors: Worse when waking up on the morning, always present.  He is able to move his neck with no symptoms.    Therapies tried and outcome: Ibuprofen, took 400 mg around 6:00 am today. That is helping.       Current Outpatient Medications:      cetirizine (ZYRTEC) 10 MG tablet, Take 1 tablet (10 mg) by mouth every evening, Disp: 30 tablet, Rfl: 1     lisinopril (PRINIVIL/ZESTRIL) 20 MG tablet, Take 1 tablet (20 mg) by mouth daily, Disp: 90 tablet, Rfl: 1     magnesium oxide (MAG-OX) 400 (241.3 MG) MG tablet, Take 1 tablet (400 mg) by mouth daily, Disp: 60 tablet, Rfl: 3     NEW MED, Potassium taking one daily to help with leg cramps., Disp: , Rfl:      fluticasone (FLONASE) 50 MCG/ACT spray, Spray 1-2 sprays into both nostrils daily, Disp: 16 g, Rfl: 6    Patient Active Problem List   Diagnosis     Family history of colon cancer     Hyperlipidemia with target LDL less than 130     Hypotestosteronism     Esophageal reflux     Benign essential hypertension     Pulmonary embolism on right (H)     DVT (deep venous thrombosis) (H)     Long-term (current) use of anticoagulants [Z79.01]     Morbid obesity (H)       Blood pressure 128/78, pulse 63, temperature 97.4  F (36.3  C), temperature source Tympanic, resp. rate 16, height 1.835 m (6' 0.25\"), weight 136.1 kg (300 lb), SpO2 98 " %.    Exam:  GENERAL APPEARANCE: healthy, alert and no distress  EYES: EOMI,  PERRL  HENT: ear canals and TM's normal and nose and mouth without ulcers or lesions;  Palpating the inside of the mouth is not tender; the TMJs and mastoids are not tender.   NECK: no adenopathy, no asymmetry, masses, or scars and thyroid normal to palpation;  The cervical spine has full ROM and is not tender. The carotid arteries are not tender.   PSYCH: mentation appears normal and affect normal/bright  LYMPHATICS: No axillary, cervical, inguinal, or supraclavicular nodes      (M54.2) Neck pain  (primary encounter diagnosis)  Comment:   Plan: CT Soft Tissue Neck w Contrast, OTOLARYNGOLOGY         REFERRAL        Activities as tolerated. Consider the advil and tylenol as needed for pain.   Monitor for fever or swollen glands. If worse go to the Urgent care. If not better then consider   The CT of the neck and call [phone] to schedule. ENT is in lobby D and call [phone] for this.     Cecilio Hughes

## 2019-03-29 NOTE — PATIENT INSTRUCTIONS
Thank you for choosing [facility].  You may be receiving an email and/or telephone survey request from [facility] Customer Experience regarding your visit today.  Please take a few minutes to respond to the survey to let us know how we are doing.      If you have questions or concerns, please contact us via [employer] or you can contact your care team at [phone].    Our Clinic hours are:  Monday 6:40 am  to 7:00 pm  Tuesday -Friday 6:40 am to 5:00 pm    The [place] outpatient lab hours are:  Monday - Friday 6:10 am to 4:45 pm  Saturdays 7:00 am to 11:00 am  Appointments are required, call [phone]    If you have clinical questions after hours or would like to schedule an appointment,  call the clinic at [phone].    (M54.2) Neck pain  (primary encounter diagnosis)  Comment:   Plan: CT Soft Tissue Neck w Contrast, OTOLARYNGOLOGY         REFERRAL        Activities as tolerated. Consider the advil and tylenol as needed for pain.   Monitor for fever or swollen glands. If worse go to the Urgent care. If not better then consider   The CT of the neck and call [phone] to schedule. ENT is in lobby D and call [phone] for this.

## 2019-08-25 ENCOUNTER — TRANSFERRED RECORDS (OUTPATIENT)
Dept: HEALTH INFORMATION MANAGEMENT | Facility: CLINIC | Age: 39
End: 2019-08-25

## 2019-10-02 DIAGNOSIS — I10 BENIGN ESSENTIAL HYPERTENSION: ICD-10-CM

## 2019-10-02 NOTE — TELEPHONE ENCOUNTER
"Requested Prescriptions   Pending Prescriptions Disp Refills     lisinopril (PRINIVIL/ZESTRIL) 20 MG tablet 90 tablet 1     Sig: Take 1 tablet (20 mg) by mouth daily   Last Written Prescription Date:  2/5/19  Last Fill Quantity: 90 tab,  # refills: 1   Last office visit: 3/29/2019 with prescribing provider:  TENNILLE Hughes   Future Office Visit:        ACE Inhibitors (Including Combos) Protocol Failed - 10/2/2019  8:55 AM        Failed - Recent (12 mo) or future (30 days) visit within the authorizing provider's specialty     Patient has had an office visit with the authorizing provider or a provider within the authorizing providers department within the previous 12 mos or has a future within next 30 days. See \"Patient Info\" tab in inbasket, or \"Choose Columns\" in Meds & Orders section of the refill encounter.              Failed - Normal serum creatinine on file in past 12 months     Recent Labs   Lab Test 01/15/18  0625   CR 0.89             Failed - Normal serum potassium on file in past 12 months     Recent Labs   Lab Test 01/15/18  0625   POTASSIUM 4.2             Passed - Blood pressure under 140/90 in past 12 months     BP Readings from Last 3 Encounters:   03/29/19 128/78   11/21/18 130/70   08/20/18 132/80                 Passed - Medication is active on med list        Passed - Patient is age 18 or older          "

## 2019-10-03 RX ORDER — LISINOPRIL 20 MG/1
20 TABLET ORAL DAILY
Qty: 90 TABLET | Refills: 0 | Status: SHIPPED | OUTPATIENT
Start: 2019-10-03 | End: 2019-10-22

## 2019-10-03 NOTE — TELEPHONE ENCOUNTER
Called pt, he only has 5 tabs left, his insurance will not pay for Rx if it is sent to a local pharmacy  (likely because it is not a new Rx).   Routing refill request to provider for review/approval because: Per [facility] Refill Protocol, RN can only refill x30 days when pt due, mail order requires x90 days.    Appt has been made for 10/22/19 with Dr. Stoddard.    Rohini LE RN

## 2019-10-19 ASSESSMENT — ENCOUNTER SYMPTOMS
HEMATURIA: 0
FEVER: 0
ABDOMINAL PAIN: 0
DIARRHEA: 0
PALPITATIONS: 0
ARTHRALGIAS: 0
MYALGIAS: 0
CHILLS: 0
SORE THROAT: 0
DYSURIA: 0
HEMATOCHEZIA: 0
NERVOUS/ANXIOUS: 0
SHORTNESS OF BREATH: 0
COUGH: 0
DIZZINESS: 0
FREQUENCY: 0
CONSTIPATION: 0
EYE PAIN: 0
WEAKNESS: 0
PARESTHESIAS: 0
JOINT SWELLING: 0
HEADACHES: 0
HEARTBURN: 0
NAUSEA: 0

## 2019-10-22 ENCOUNTER — OFFICE VISIT (OUTPATIENT)
Dept: FAMILY MEDICINE | Facility: CLINIC | Age: 39
End: 2019-10-22
Payer: COMMERCIAL

## 2019-10-22 VITALS
SYSTOLIC BLOOD PRESSURE: 132 MMHG | OXYGEN SATURATION: 98 % | HEART RATE: 80 BPM | WEIGHT: 300 LBS | DIASTOLIC BLOOD PRESSURE: 78 MMHG | RESPIRATION RATE: 16 BRPM | TEMPERATURE: 97.3 F | BODY MASS INDEX: 40.63 KG/M2 | HEIGHT: 72 IN

## 2019-10-22 DIAGNOSIS — I10 BENIGN ESSENTIAL HYPERTENSION: ICD-10-CM

## 2019-10-22 DIAGNOSIS — Z00.00 ROUTINE GENERAL MEDICAL EXAMINATION AT A HEALTH CARE FACILITY: Primary | ICD-10-CM

## 2019-10-22 LAB
ANION GAP SERPL CALCULATED.3IONS-SCNC: 5 MMOL/L (ref 3–14)
BUN SERPL-MCNC: 23 MG/DL (ref 7–30)
CALCIUM SERPL-MCNC: 9.1 MG/DL (ref 8.5–10.1)
CHLORIDE SERPL-SCNC: 105 MMOL/L (ref 94–109)
CHOLEST SERPL-MCNC: 225 MG/DL
CO2 SERPL-SCNC: 29 MMOL/L (ref 20–32)
CREAT SERPL-MCNC: 1.03 MG/DL (ref 0.66–1.25)
GFR SERPL CREATININE-BSD FRML MDRD: >90 ML/MIN/{1.73_M2}
GLUCOSE SERPL-MCNC: 83 MG/DL (ref 70–99)
HDLC SERPL-MCNC: 33 MG/DL
LDLC SERPL CALC-MCNC: 131 MG/DL
NONHDLC SERPL-MCNC: 192 MG/DL
POTASSIUM SERPL-SCNC: 4 MMOL/L (ref 3.4–5.3)
SODIUM SERPL-SCNC: 139 MMOL/L (ref 133–144)
TRIGL SERPL-MCNC: 307 MG/DL

## 2019-10-22 PROCEDURE — 99395 PREV VISIT EST AGE 18-39: CPT | Mod: 25 | Performed by: FAMILY MEDICINE

## 2019-10-22 PROCEDURE — 80048 BASIC METABOLIC PNL TOTAL CA: CPT | Performed by: FAMILY MEDICINE

## 2019-10-22 PROCEDURE — 90686 IIV4 VACC NO PRSV 0.5 ML IM: CPT | Performed by: FAMILY MEDICINE

## 2019-10-22 PROCEDURE — 80061 LIPID PANEL: CPT | Performed by: FAMILY MEDICINE

## 2019-10-22 PROCEDURE — 36415 COLL VENOUS BLD VENIPUNCTURE: CPT | Performed by: FAMILY MEDICINE

## 2019-10-22 PROCEDURE — 90471 IMMUNIZATION ADMIN: CPT | Performed by: FAMILY MEDICINE

## 2019-10-22 RX ORDER — LISINOPRIL 20 MG/1
20 TABLET ORAL DAILY
Qty: 90 TABLET | Refills: 3 | Status: SHIPPED | OUTPATIENT
Start: 2019-10-22 | End: 2020-10-26

## 2019-10-22 ASSESSMENT — MIFFLIN-ST. JEOR: SCORE: 2314.82

## 2019-10-22 NOTE — PROGRESS NOTES
SUBJECTIVE:   CC: Edwin Nuno is an 38 year old male who presents for preventive health visit.     Healthy Habits:    Do you get at least three servings of calcium containing foods daily (dairy, green leafy vegetables, etc.)? yes    Amount of exercise or daily activities, outside of work: 4 day(s) per week    Problems taking medications regularly No    Medication side effects: No    Have you had an eye exam in the past two years? yes    Do you see a dentist twice per year? yes    Do you have sleep apnea, excessive snoring or daytime drowsiness?no      Hypertension Follow-up      Do you check your blood pressure regularly outside of the clinic? Yes     Are you following a low salt diet? Yes    Are your blood pressures ever more than 140 on the top number (systolic) OR more   than 90 on the bottom number (diastolic), for example 140/90? No    Today's PHQ-2 Score:   PHQ-2 ( 1999 [employer]) 10/22/2019 10/19/2019   Q1: Little interest or pleasure in doing things 0 0   Q2: Feeling down, depressed or hopeless 0 0   PHQ-2 Score 0 0   Q1: Little interest or pleasure in doing things - Not at all   Q2: Feeling down, depressed or hopeless - Not at all   PHQ-2 Score - 0       Abuse: Current or Past(Physical, Sexual or Emotional)- No  Do you feel safe in your environment? Yes    Social History     Tobacco Use     Smoking status: Never Smoker     Smokeless tobacco: Former User   Substance Use Topics     Alcohol use: Yes     Comment: occas     If you drink alcohol do you typically have >3 drinks per day or >7 drinks per week? No                      Last PSA: No results found for: PSA    Reviewed orders with patient. Reviewed health maintenance and updated orders accordingly -   Lab work is in process    Reviewed and updated as needed this visit by clinical staff  Tobacco  Allergies  Meds         Reviewed and updated as needed this visit by Provider            ROS:  CONSTITUTIONAL: NEGATIVE for fever, chills, change in  "weight  INTEGUMENTARY/SKIN: NEGATIVE for worrisome rashes, moles or lesions  EYES: NEGATIVE for vision changes or irritation  ENT: NEGATIVE for ear, mouth and throat problems  RESP: NEGATIVE for significant cough or SOB  CV: NEGATIVE for chest pain, palpitations or peripheral edema  GI: NEGATIVE for nausea, abdominal pain, heartburn, or change in bowel habits   male: negative for dysuria, hematuria, decreased urinary stream, erectile dysfunction, urethral discharge  MUSCULOSKELETAL: NEGATIVE for significant arthralgias or myalgia  NEURO: NEGATIVE for weakness, dizziness or paresthesias  PSYCHIATRIC: NEGATIVE for changes in mood or affect    OBJECTIVE:   /78   Pulse 80   Temp 97.3  F (36.3  C)   Resp 16   Ht 1.822 m (5' 11.75\")   Wt 136.1 kg (300 lb)   SpO2 98%   BMI 40.97 kg/m    EXAM:  GENERAL: healthy, alert and no distress  EYES: Eyes grossly normal to inspection, PERRL and conjunctivae and sclerae normal  HENT: ear canals and TM's normal, nose and mouth without ulcers or lesions  NECK: no adenopathy, no asymmetry, masses, or scars and thyroid normal to palpation  RESP: lungs clear to auscultation - no rales, rhonchi or wheezes  CV: regular rate and rhythm, normal S1 S2, no S3 or S4, no murmur, click or rub, no peripheral edema and peripheral pulses strong  ABDOMEN: soft, nontender, no hepatosplenomegaly, no masses and bowel sounds normal  MS: no gross musculoskeletal defects noted, no edema  SKIN: no suspicious lesions or rashes  NEURO: Normal strength and tone, mentation intact and speech normal  PSYCH: mentation appears normal, affect normal/bright    Diagnostic Test Results:  Labs reviewed in Epic    ASSESSMENT/PLAN:   Edwin was seen today for physical.    Diagnoses and all orders for this visit:    Routine general medical examination at a health care facility  -     Lipid Profile  -     Basic metabolic panel    Benign essential hypertension  -     lisinopril (PRINIVIL/ZESTRIL) 20 MG tablet; " "Take 1 tablet (20 mg) by mouth daily    Other orders  -     INFLUENZA VACCINE IM > 6 MONTHS VALENT IIV4 [52001]        COUNSELING:  Reviewed preventive health counseling, as reflected in patient instructions       Regular exercise       Healthy diet/nutrition    Estimated body mass index is 40.97 kg/m  as calculated from the following:    Height as of this encounter: 1.822 m (5' 11.75\").    Weight as of this encounter: 136.1 kg (300 lb).    Weight management plan: Discussed healthy diet and exercise guidelines     reports that he has never smoked. He quit smokeless tobacco use about 17 years ago.      Counseling Resources:  ATP IV Guidelines  Pooled Cohorts Equation Calculator  FRAX Risk Assessment  ICSI Preventive Guidelines  Dietary Guidelines for Americans, 2010  USDA's MyPlate  ASA Prophylaxis  Lung CA Screening    Ricardo Stoddard MD  [facility]  "

## 2019-12-07 ENCOUNTER — TRANSFERRED RECORDS (OUTPATIENT)
Dept: HEALTH INFORMATION MANAGEMENT | Facility: CLINIC | Age: 39
End: 2019-12-07

## 2020-02-21 ENCOUNTER — TRANSFERRED RECORDS (OUTPATIENT)
Dept: HEALTH INFORMATION MANAGEMENT | Facility: CLINIC | Age: 40
End: 2020-02-21

## 2020-06-05 ENCOUNTER — TRANSFERRED RECORDS (OUTPATIENT)
Dept: HEALTH INFORMATION MANAGEMENT | Facility: CLINIC | Age: 40
End: 2020-06-05

## 2020-06-10 NOTE — PROGRESS NOTES
"    ANTICOAGULATION FOLLOW-UP CLINIC VISIT    Patient Name:  Edwin Nuno  Date:  3/22/2018  Contact Type:  Telephone/ spoke with pt/FAX received from [place]    SUBJECTIVE:     Patient Findings     Positives Unexplained INR or factor level change    Comments The only change pt reports is as previously stated, his activity level has increased significantly since returning to work. Pt states he is on his feet, running, walking all day long.  Pt states he has taken warfarin as directed, is not eating any vit K rich foods.      Writer instructed pt to continue BID lovenox injections, take 12.5 mg dose of warfarin today (18% increase), check INR at lab again tomorrow.  Pt voiced understanding, states he wonders if he should get on a different anticoagulant, which writer stated he will need to speak to MD about.  Pt also reports his insurance co \"is throwing a fit about the continued lovenox injections\".  Writer stated that for pt safety, he needs to continue until in therapeutic range, with understanding voiced.           OBJECTIVE    INR   Date Value Ref Range Status   03/22/2018 1.6  Final       ASSESSMENT / PLAN  No question data found.  Anticoagulation Summary as of 3/22/2018     INR goal 2.0-3.0   Today's INR 1.6!   Maintenance plan 7.5 mg (5 mg x 1.5) every day   Full instructions 3/22: 12.5 mg; Otherwise 7.5 mg every day   Weekly total 52.5 mg   Plan last modified Humera Masterson RN (3/16/2018)   Next INR check 3/23/2018   Priority INR   Target end date     Indications   DVT (deep venous thrombosis) (H) [I82.409]  Pulmonary embolism on right (H) [I26.99]  Long-term (current) use of anticoagulants [Z79.01] [Z79.01]         Anticoagulation Episode Summary     INR check location     Preferred lab     Send INR reminders to [place] [facility]    Comments * 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 Length of therapy is 3-6 months. Pt will have repeat imaging prior to stopping warfarin.    "   Anticoagulation Care Providers     Provider Role Specialty Phone number    Ricardo Stoddard MD [facility] [phone]            See the Encounter Report to view Anticoagulation Flowsheet and Dosing Calendar (Go to Encounters tab in chart review, and find the Anticoagulation Therapy Visit)        Maria Teresa Meredith RN                n/a

## 2020-06-12 ENCOUNTER — HOSPITAL ENCOUNTER (OUTPATIENT)
Dept: MRI IMAGING | Facility: CLINIC | Age: 40
Discharge: HOME | End: 2020-06-12
Attending: ORTHOPAEDIC SURGERY | Admitting: ORTHOPAEDIC SURGERY
Payer: COMMERCIAL

## 2020-06-12 ENCOUNTER — OFFICE VISIT (OUTPATIENT)
Dept: FAMILY MEDICINE | Facility: CLINIC | Age: 40
End: 2020-06-12
Payer: COMMERCIAL

## 2020-06-12 VITALS
DIASTOLIC BLOOD PRESSURE: 80 MMHG | HEIGHT: 72 IN | RESPIRATION RATE: 16 BRPM | BODY MASS INDEX: 41.99 KG/M2 | OXYGEN SATURATION: 94 % | WEIGHT: 310 LBS | SYSTOLIC BLOOD PRESSURE: 122 MMHG | HEART RATE: 78 BPM | TEMPERATURE: 97.8 F

## 2020-06-12 DIAGNOSIS — M25.569 KNEE PAIN: ICD-10-CM

## 2020-06-12 DIAGNOSIS — R60.9 EDEMA, UNSPECIFIED TYPE: Primary | ICD-10-CM

## 2020-06-12 PROCEDURE — 73721 MRI JNT OF LWR EXTRE W/O DYE: CPT | Mod: RT

## 2020-06-12 PROCEDURE — 99213 OFFICE O/P EST LOW 20 MIN: CPT | Performed by: FAMILY MEDICINE

## 2020-06-12 ASSESSMENT — MIFFLIN-ST. JEOR: SCORE: 2351.21

## 2020-06-12 NOTE — PROGRESS NOTES
"Subjective     Edwin Nuno is a 39 year old male who presents to clinic today for the following health issues:    HPI     Chief Complaint   Patient presents with     Edema     swelling in both lower legs                Reviewed and updated as needed this visit by Provider         Review of Systems         Objective    /80   Pulse 78   Temp 97.8  F (36.6  C)   Resp 16   Ht 1.816 m (5' 11.5\")   Wt 140.6 kg (310 lb)   SpO2 94%   BMI 42.63 kg/m    Body mass index is 42.63 kg/m .  Physical Exam               Further history obtained, clarified or corrected by physician:  For about a week he has had swelling in both lower extremities below the knees.  There is no pain involved.  He does have a past history of DVT so he is a bit nervous about that.    OBJECTIVE  /80   Pulse 78   Temp 97.8  F (36.6  C)   Resp 16   Ht 1.816 m (5' 11.5\")   Wt 140.6 kg (310 lb)   SpO2 94%   BMI 42.63 kg/m    GENERAL: Pleasant and interactive. No acute distress.  HEENT: Mild injection of conjunctiva.  Clear nasal discharge.  Oropharynx moist and clear.   NECK: supple and free of adenopathy or masses, the thyroid is normal without enlargement or nodules.  CHEST: Mild expiratory wheezes crackles noted anteriorly.  Good air movement otherwise. No accessory muscle use or retractions.    HEART:  S1 and S2 normal, no murmurs, clicks, gallops or rubs. Regular rate and rhythm.  ABDOMEN: Soft without tenderness, guarding, mass, rebound or organomegaly. Bowel sounds are normal. No CVA tenderness or inguinal adenopathy noted.  SKIN:  Only benign skin findings. No unusual rashes or suspicious skin lesions noted. Nails appear normal.    Rapid Influenza Test: Positive  /80   Pulse 78   Temp 97.8  F (36.6  C)   Resp 16   Ht 1.816 m (5' 11.5\")   Wt 140.6 kg (310 lb)   SpO2 94%   BMI 42.63 kg/m    LUNGS: clear to auscultation, normal breath sounds  CV: RRR without murmur  ABD: BS+, soft, nontender, no masses, no " hepatosplenomegaly  EXTREMITIES: without joint tenderness, swelling or erythema.  No muscle tenderness or abnormality.  Mild swelling of both lower extremities without pitting edema  SKIN: No rashes or abnormalities  NEURO:non focal exam    ASSESSMENT:  Edema, unspecified type    PLAN:  Orders Placed This Encounter     US Lower Extremity Venous Duplex Bilateral

## 2020-06-16 ENCOUNTER — MYC MEDICAL ADVICE (OUTPATIENT)
Dept: FAMILY MEDICINE | Facility: CLINIC | Age: 40
End: 2020-06-16

## 2020-06-16 ENCOUNTER — HOSPITAL ENCOUNTER (OUTPATIENT)
Dept: ULTRASOUND IMAGING | Facility: CLINIC | Age: 40
Discharge: HOME | End: 2020-06-16
Attending: FAMILY MEDICINE | Admitting: FAMILY MEDICINE
Payer: COMMERCIAL

## 2020-06-16 DIAGNOSIS — R60.9 EDEMA, UNSPECIFIED TYPE: ICD-10-CM

## 2020-06-16 PROCEDURE — 93970 EXTREMITY STUDY: CPT

## 2020-06-19 ENCOUNTER — TRANSFERRED RECORDS (OUTPATIENT)
Dept: HEALTH INFORMATION MANAGEMENT | Facility: CLINIC | Age: 40
End: 2020-06-19

## 2020-06-23 NOTE — TELEPHONE ENCOUNTER
Pt was seen on 6/12/20 for bilat lower extremity edema.  US lower extremity done with no DVT.  Pt asking if needing to f/u?  Bilateral edema has gotten worse over the last month.  Edema is not pitting.  Pt is concerned and not wanting to miss anything.  Next step?  Advise.  Nellie

## 2020-07-07 ENCOUNTER — OFFICE VISIT (OUTPATIENT)
Dept: FAMILY MEDICINE | Facility: CLINIC | Age: 40
End: 2020-07-07
Payer: COMMERCIAL

## 2020-07-07 VITALS
HEIGHT: 72 IN | TEMPERATURE: 97.8 F | HEART RATE: 84 BPM | DIASTOLIC BLOOD PRESSURE: 78 MMHG | RESPIRATION RATE: 16 BRPM | BODY MASS INDEX: 42.66 KG/M2 | WEIGHT: 315 LBS | SYSTOLIC BLOOD PRESSURE: 124 MMHG | OXYGEN SATURATION: 96 %

## 2020-07-07 DIAGNOSIS — I82.4Y9 DEEP VEIN THROMBOSIS (DVT) OF PROXIMAL LOWER EXTREMITY, UNSPECIFIED CHRONICITY, UNSPECIFIED LATERALITY (H): ICD-10-CM

## 2020-07-07 DIAGNOSIS — Z01.818 PRE-OP EXAM: ICD-10-CM

## 2020-07-07 DIAGNOSIS — I26.99 PULMONARY EMBOLISM ON RIGHT (H): ICD-10-CM

## 2020-07-07 DIAGNOSIS — M23.91 INTERNAL DERANGEMENT OF KNEE, RIGHT: Primary | ICD-10-CM

## 2020-07-07 LAB
CRP SERPL-MCNC: <2.9 MG/L (ref 0–8)
ERYTHROCYTE [DISTWIDTH] IN BLOOD BY AUTOMATED COUNT: 12.8 % (ref 10–15)
ERYTHROCYTE [SEDIMENTATION RATE] IN BLOOD BY WESTERGREN METHOD: 8 MM/H (ref 0–15)
HCT VFR BLD AUTO: 39.1 % (ref 40–53)
HGB BLD-MCNC: 13.3 G/DL (ref 13.3–17.7)
INR PPP: 1.01 (ref 0.86–1.14)
MCH RBC QN AUTO: 29.6 PG (ref 26.5–33)
MCHC RBC AUTO-ENTMCNC: 34 G/DL (ref 31.5–36.5)
MCV RBC AUTO: 87 FL (ref 78–100)
MRSA DNA SPEC QL NAA+PROBE: NEGATIVE
PLATELET # BLD AUTO: 241 10E9/L (ref 150–450)
RBC # BLD AUTO: 4.5 10E12/L (ref 4.4–5.9)
SPECIMEN SOURCE: NORMAL
WBC # BLD AUTO: 6.4 10E9/L (ref 4–11)

## 2020-07-07 PROCEDURE — 85027 COMPLETE CBC AUTOMATED: CPT | Performed by: FAMILY MEDICINE

## 2020-07-07 PROCEDURE — 87641 MR-STAPH DNA AMP PROBE: CPT | Performed by: FAMILY MEDICINE

## 2020-07-07 PROCEDURE — 86140 C-REACTIVE PROTEIN: CPT | Performed by: FAMILY MEDICINE

## 2020-07-07 PROCEDURE — 85652 RBC SED RATE AUTOMATED: CPT | Performed by: FAMILY MEDICINE

## 2020-07-07 PROCEDURE — 87640 STAPH A DNA AMP PROBE: CPT | Mod: 59 | Performed by: FAMILY MEDICINE

## 2020-07-07 PROCEDURE — 99215 OFFICE O/P EST HI 40 MIN: CPT | Performed by: FAMILY MEDICINE

## 2020-07-07 PROCEDURE — 85610 PROTHROMBIN TIME: CPT | Performed by: FAMILY MEDICINE

## 2020-07-07 PROCEDURE — 36415 COLL VENOUS BLD VENIPUNCTURE: CPT | Performed by: FAMILY MEDICINE

## 2020-07-07 ASSESSMENT — PAIN SCALES - GENERAL: PAINLEVEL: SEVERE PAIN (6)

## 2020-07-07 ASSESSMENT — MIFFLIN-ST. JEOR: SCORE: 2386.37

## 2020-07-07 NOTE — PROGRESS NOTES
[facility]  [address] [address]  [phone]  Dept: [phone]    PRE-OP EVALUATION:  Today's date: 2020    Edwin Nuno (: 1980) presents for pre-operative evaluation assessment as requested by Dr. Lao.  He requires evaluation and anesthesia risk assessment prior to undergoing surgery/procedure for treatment of Orthoscopy right knee .    Proposed Surgery/ Procedure: Right knee Orthoscopy  Date of Surgery/ Procedure: 20  Time of Surgery/ Procedure: ?  Hospital/Surgical Facility: [facility]  Fax number for surgical facility: [phone]  Primary Physician: Ricardo Stoddard  Type of Anesthesia Anticipated: to be determined    Patient has a Health Care Directive or Living Will:  NO    1. NO - Do you have a history of heart attack, stroke, stent, bypass or surgery on an artery in the head, neck, heart or legs?  2. NO - Do you ever have any pain or discomfort in your chest?  3. NO - Do you have a history of  Heart Failure?  4. NO - Are you troubled by shortness of breath when: walking on the level, up a slight hill or at night?  5. NO - Do you currently have a cold, bronchitis or other respiratory infection?  6. NO - Do you have a cough, shortness of breath or wheezing?  7. YES - Do you sometimes get pains in the calves of your legs when you walk?  8. YES - Do you or anyone in your family have previous history of blood clots?  9. NO - Do you or does anyone in your family have a serious bleeding problem such as prolonged bleeding following surgeries or cuts?  10. NO - Have you ever had problems with anemia or been told to take iron pills?  11. NO - Have you had any abnormal blood loss such as black, tarry or bloody stools, or abnormal vaginal bleeding?  12. NO - Have you ever had a blood transfusion?  13. NO - Have you or any of your relatives ever had problems with anesthesia?  14. NO - Do you have sleep apnea, excessive snoring or daytime  drowsiness?  15. NO - Do you have any prosthetic heart valves?  16. NO - Do you have prosthetic joints?  17. NO - Is there any chance that you may be pregnant?      HPI:     HPI related to upcoming procedure:     He is scheduled for arthroscopic surgery of the right knee.  He has a history of having had DVT and subsequent PE following right knee surgery in January 2018.  He was treated for 6 months with anticoagulation at that time and has been well ever since.  This is his first surgery since that event. The surgeon is requesting consultation regarding anesthesia and surgical risk for this patient with respect to current and past medical conditions.        MEDICAL HISTORY:     Patient Active Problem List    Diagnosis Date Noted     Morbid obesity (H) 06/20/2018     Priority: Medium     DVT (deep venous thrombosis) (H) 01/15/2018     Priority: Medium     Long-term (current) use of anticoagulants [Z79.01] 01/15/2018     Priority: Medium     Pulmonary embolism on right (H) 01/14/2018     Priority: Medium     Benign essential hypertension 01/12/2017     Priority: Medium     Hypotestosteronism 10/29/2015     Priority: Medium     He has been on replacement therapies in the past.        Esophageal reflux 10/29/2015     Priority: Medium     Family history of colon cancer 11/07/2014     Priority: Medium     His mother and MGM had colon cancer. Recommend starting colonoscopies at age 40.        Hyperlipidemia with target LDL less than 130 11/07/2014     Priority: Medium     Diagnosis updated by automated process. Provider to review and confirm.        Past Medical History:   Diagnosis Date     DVT (deep vein thrombosis) in pregnancy     right leg dx 1/13/2018     Family history of colon cancer      GERD (gastroesophageal reflux disease)      HTN (hypertension)      Hypotestosteronemia      Past Surgical History:   Procedure Laterality Date     APPENDECTOMY  Feb 1998     ARTHROSCOPY KNEE      right, 1/8/2018     Current  Outpatient Medications   Medication Sig Dispense Refill     cetirizine (ZYRTEC) 10 MG tablet Take 1 tablet (10 mg) by mouth every evening 30 tablet 1     fluticasone (FLONASE) 50 MCG/ACT spray Spray 1-2 sprays into both nostrils daily (Patient not taking: Reported on 6/12/2020) 16 g 6     lisinopril (PRINIVIL/ZESTRIL) 20 MG tablet Take 1 tablet (20 mg) by mouth daily 90 tablet 3     magnesium oxide (MAG-OX) 400 (241.3 MG) MG tablet Take 1 tablet (400 mg) by mouth daily 60 tablet 3     NEW MED Potassium taking one daily to help with leg cramps.       OTC products: None, except as noted above    No Known Allergies   Latex Allergy: NO    Social History     Tobacco Use     Smoking status: Never Smoker     Smokeless tobacco: Former User   Substance Use Topics     Alcohol use: Yes     Comment: occas     History   Drug Use No       REVIEW OF SYSTEMS:   Constitutional, neuro, ENT, endocrine, pulmonary, cardiac, gastrointestinal, genitourinary, musculoskeletal, integument and psychiatric systems are negative, except as otherwise noted.    EXAM:   /78   Pulse 84   Temp 97.8  F (36.6  C) (Tympanic)   Resp 16   Ht 1.829 m (6')   Wt 143.3 kg (316 lb)   SpO2 96%   BMI 42.86 kg/m      GENERAL APPEARANCE: healthy, alert and no distress     EYES: EOMI,  PERRL     HENT: ear canals and TM's normal and nose and mouth without ulcers or lesions     NECK: no adenopathy, no asymmetry, masses, or scars and thyroid normal to palpation     RESP: lungs clear to auscultation - no rales, rhonchi or wheezes     CV: regular rates and rhythm, normal S1 S2, no S3 or S4 and no murmur, click or rub     ABDOMEN:  soft, nontender, no HSM or masses and bowel sounds normal     MS: extremities normal- no gross deformities noted, no evidence of inflammation in joints, FROM in all extremities.     SKIN: no suspicious lesions or rashes     NEURO: Normal strength and tone, sensory exam grossly normal, mentation intact and speech normal     PSYCH:  mentation appears normal. and affect normal/bright     LYMPHATICS: No cervical adenopathy    DIAGNOSTICS:   EKG: Not indicated due to non-vascular surgery and low risk of event (age <65 and without cardiac risk factors)    Recent Labs   Lab Test 10/22/19  1558 06/08/18  1450 06/05/18 01/29/18  2320  01/15/18  0625 01/14/18  1540  02/11/13   HGB  --   --   --   --  12.8*  --   --  14.5   < >  --    PLT  --   --   --   --  305  --   --  203   < >  --    INR  --  2.36* 4.4*   < > 3.17*   < > 1.11 1.04   < >  --      --   --   --   --   --  138 136   < > 140   POTASSIUM 4.0  --   --   --   --   --  4.2 4.2   < > 4.6   CR 1.03  --   --   --   --   --  0.89 1.41*   < > 1.07   A1C  --   --   --   --   --   --   --   --   --  5.4    < > = values in this interval not displayed.        IMPRESSION:   Reason for surgery/procedure: Knee  Diagnosis/reason for consult:    Pre-op exam  Internal derangement of knee, right  Deep vein thrombosis (DVT) of proximal lower extremity, unspecified chronicity, unspecified laterality (H)  Pulmonary embolism on right (H)      The proposed surgical procedure is considered LOW risk.    REVISED CARDIAC RISK INDEX  The patient has the following serious cardiovascular risks for perioperative complications such as (MI, PE, VFib and 3  AV Block):  No serious cardiac risks  INTERPRETATION: 0 risks: Class I (very low risk - 0.4% complication rate)    The patient has the following additional risks for perioperative complications:  No identified additional risks      ICD-10-CM    1. Preop general physical exam  Z01.818        RECOMMENDATIONS:     --Because of DVT or PE history, hematology recommends postoperative anticoagulation and likely extended prophylaxis beyond the usual protocol.  Recommendation is for 3 to 4 weeks of anticoagulation and longer if ambulation has not been restored or if the leg is immobilized.  In that case he should remain on anticoagulation until normal ambulation is  restored.  --Patient is to take all scheduled medications on the day of surgery EXCEPT for modifications listed below.    APPROVAL GIVEN to proceed with proposed procedure, without further diagnostic evaluation       Signed Electronically by: Ricardo Stoddard MD    Copy of this evaluation report is provided to requesting physician.    [facility] Preop Guidelines    Revised Cardiac Risk Index

## 2020-07-20 ENCOUNTER — TELEPHONE (OUTPATIENT)
Dept: FAMILY MEDICINE | Facility: CLINIC | Age: 40
End: 2020-07-20

## 2020-07-20 NOTE — TELEPHONE ENCOUNTER
Reason for call:  Patient reporting a symptom    Symptom or request: Pt had knee scope surgery done today in [place] and is calling to ask that Dr. Stoddard send an Rx for Xarelto to [facility] to prevent clots.  I advised pt to ask surgeon for Rx and pt declines stating that surgeon only gave him enough for 10 days?  Please call patient and advise.      Duration (how long have symptoms been present): ongoing    Have you been treated for this before? Yes    Additional comments:     Phone Number patient can be reached at:  Cell number on file:    Telephone Information:   Mobile [phone]       Best Time:  any    Can we leave a detailed message on this number:  YES    Call taken on 7/20/2020 at 2:09 PM by Rita Hoang

## 2020-07-20 NOTE — TELEPHONE ENCOUNTER
Prior Authorization required on [employer]  Insurance Phone:[phone]  Patient ID:[id number]  Please contact the pharmacy with Prior Auth status (approved/denied).    Thank you, Angela Dickson - Pharmacy [facility]  [phone]

## 2020-07-20 NOTE — TELEPHONE ENCOUNTER
Prior Authorization Retail Medication Request    Medication/Dose: Xarelto 20 mg  ICD code (if different than what is on RX):    Previously Tried and Failed:    Rationale:  PA required    Insurance Name:  [facility]/[employer]  Insurance ID:  [id number]      Yamileth Upton Technician   [facility]  On behalf of  [facility]  (#[id number])  [address] [place] [address]  Phone : [phone]  Fax : [phone]

## 2020-07-21 NOTE — TELEPHONE ENCOUNTER
PA Initiation    Medication: Xarelto 20mg  Insurance Company: [employer] - Phone [phone] Fax [phone]  Pharmacy Filling the Rx: [facility] [place], [place] - [address] [place] [address]  Filling Pharmacy Phone: [phone]  Filling Pharmacy Fax: [phone]  Start Date: 7/21/2020

## 2020-07-21 NOTE — TELEPHONE ENCOUNTER
Prior Authorization Approval    Authorization Effective Date: 6/21/2020  Authorization Expiration Date: 7/21/2021  Medication: Xarelto 20mg  Approved Dose/Quantity:   Reference #: [id number]   Insurance Company: [employer] - Phone [phone] Fax [phone]  Expected CoPay:       CoPay Card Available:      Foundation Assistance Needed:    Which Pharmacy is filling the prescription (Not needed for infusion/clinic administered): [facility] [address]  Pharmacy Notified: Yes  Patient Notified: Yes, **Instructed pharmacy to notify patient when script is ready to /ship.**

## 2020-07-22 NOTE — TELEPHONE ENCOUNTER
Prior Authorization Approval     Authorization Effective Date: 6/21/2020  Authorization Expiration Date: 7/21/2021  Medication: Xarelto 20mg  Approved Dose/Quantity:   Reference #: [id number]   Insurance Company: [employer] - Phone [phone] Fax [phone]  Expected CoPay:       CoPay Card Available:      Foundation Assistance Needed:    Which Pharmacy is filling the prescription (Not needed for infusion/clinic administered): [facility] [address]  Pharmacy Notified: Yes  Patient Notified: Yes, **Instructed pharmacy to notify patient when script is ready to /ship.**

## 2020-08-03 ENCOUNTER — TRANSFERRED RECORDS (OUTPATIENT)
Dept: HEALTH INFORMATION MANAGEMENT | Facility: CLINIC | Age: 40
End: 2020-08-03

## 2020-10-19 ENCOUNTER — TRANSFERRED RECORDS (OUTPATIENT)
Dept: HEALTH INFORMATION MANAGEMENT | Facility: CLINIC | Age: 40
End: 2020-10-19

## 2020-10-23 DIAGNOSIS — I10 BENIGN ESSENTIAL HYPERTENSION: ICD-10-CM

## 2020-10-23 NOTE — TELEPHONE ENCOUNTER
Routing refill request to provider for review/approval because:  Labs not current:  Cr, potassium    Gisella RANDOLPH RN BSN

## 2020-10-23 NOTE — TELEPHONE ENCOUNTER
"Requested Prescriptions   Pending Prescriptions Disp Refills     lisinopril (ZESTRIL) 20 MG tablet [Pharmacy Med Name: LISINOPRIL TABS 20MG] 90 tablet 3     Sig: TAKE 1 TABLET DAILY       ACE Inhibitors (Including Combos) Protocol Failed - 10/23/2020  6:22 AM        Failed - Normal serum creatinine on file in past 12 months     Recent Labs   Lab Test 10/22/19  1558   CR 1.03       Ok to refill medication if creatinine is low          Failed - Normal serum potassium on file in past 12 months     Recent Labs   Lab Test 10/22/19  1558   POTASSIUM 4.0             Passed - Blood pressure under 140/90 in past 12 months     BP Readings from Last 3 Encounters:   07/07/20 124/78   06/12/20 122/80   10/22/19 132/78                 Passed - Recent (12 mo) or future (30 days) visit within the authorizing provider's specialty     Patient has had an office visit with the authorizing provider or a provider within the authorizing providers department within the previous 12 mos or has a future within next 30 days. See \"Patient Info\" tab in inbasket, or \"Choose Columns\" in Meds & Orders section of the refill encounter.              Passed - Medication is active on med list        Passed - Patient is age 18 or older             "

## 2020-10-26 RX ORDER — LISINOPRIL 20 MG/1
TABLET ORAL
Qty: 90 TABLET | Refills: 3 | Status: SHIPPED | OUTPATIENT
Start: 2020-10-26 | End: 2021-09-28

## 2020-11-09 ASSESSMENT — ENCOUNTER SYMPTOMS
PARESTHESIAS: 1
DYSURIA: 0
JOINT SWELLING: 1
DIARRHEA: 0
SHORTNESS OF BREATH: 0
HEARTBURN: 0
EYE PAIN: 0
HEMATURIA: 0
HEADACHES: 0
SORE THROAT: 0
ARTHRALGIAS: 1
FEVER: 0
COUGH: 0
NAUSEA: 0
HEMATOCHEZIA: 0
CHILLS: 0
PALPITATIONS: 0
MYALGIAS: 0
CONSTIPATION: 0
FREQUENCY: 0
NERVOUS/ANXIOUS: 0
WEAKNESS: 0
ABDOMINAL PAIN: 0
DIZZINESS: 0

## 2020-11-10 ENCOUNTER — OFFICE VISIT (OUTPATIENT)
Dept: FAMILY MEDICINE | Facility: CLINIC | Age: 40
End: 2020-11-10
Payer: COMMERCIAL

## 2020-11-10 VITALS
TEMPERATURE: 97.6 F | BODY MASS INDEX: 42.66 KG/M2 | SYSTOLIC BLOOD PRESSURE: 120 MMHG | DIASTOLIC BLOOD PRESSURE: 72 MMHG | HEIGHT: 72 IN | HEART RATE: 68 BPM | RESPIRATION RATE: 16 BRPM | OXYGEN SATURATION: 96 % | WEIGHT: 315 LBS

## 2020-11-10 DIAGNOSIS — Z00.00 ROUTINE GENERAL MEDICAL EXAMINATION AT A HEALTH CARE FACILITY: Primary | ICD-10-CM

## 2020-11-10 DIAGNOSIS — E66.01 MORBID OBESITY (H): ICD-10-CM

## 2020-11-10 PROCEDURE — 90686 IIV4 VACC NO PRSV 0.5 ML IM: CPT | Performed by: FAMILY MEDICINE

## 2020-11-10 PROCEDURE — 90471 IMMUNIZATION ADMIN: CPT | Performed by: FAMILY MEDICINE

## 2020-11-10 PROCEDURE — 99395 PREV VISIT EST AGE 18-39: CPT | Mod: 25 | Performed by: FAMILY MEDICINE

## 2020-11-10 ASSESSMENT — MIFFLIN-ST. JEOR: SCORE: 2413.58

## 2020-11-10 NOTE — PROGRESS NOTES
3  SUBJECTIVE:   CC: Edwin Nuno is an 39 year old male who presents for preventive health visit.       Patient has been advised of split billing requirements and indicates understanding: Yes  Healthy Habits:    Do you get at least three servings of calcium containing foods daily (dairy, green leafy vegetables, etc.)? yes    Amount of exercise or daily activities, outside of work: 3 day(s) per week    Problems taking medications regularly No    Medication side effects: No    Have you had an eye exam in the past two years? yes    Do you see a dentist twice per year? yes    Do you have sleep apnea, excessive snoring or daytime drowsiness?no      Hypertension Follow-up      Do you check your blood pressure regularly outside of the clinic? No     Are you following a low salt diet? Yes    Are your blood pressures ever more than 140 on the top number (systolic) OR more   than 90 on the bottom number (diastolic), for example 140/90? No      Today's PHQ-2 Score:   PHQ-2 ( 1999 [employer]) 11/9/2020 10/22/2019   Q1: Little interest or pleasure in doing things 1 0   Q2: Feeling down, depressed or hopeless 0 0   PHQ-2 Score 1 0   Q1: Little interest or pleasure in doing things Several days -   Q2: Feeling down, depressed or hopeless Not at all -   PHQ-2 Score 1 -       Abuse: Current or Past(Physical, Sexual or Emotional)- No  Do you feel safe in your environment? Yes        Social History     Tobacco Use     Smoking status: Never Smoker     Smokeless tobacco: Former User   Substance Use Topics     Alcohol use: Yes     Comment: occas     If you drink alcohol do you typically have >3 drinks per day or >7 drinks per week? No                      Last PSA: No results found for: PSA    Reviewed orders with patient. Reviewed health maintenance and updated orders accordingly - Yes  Labs reviewed in EPIC    Reviewed and updated as needed this visit by clinical staff  Tobacco  Allergies  Meds   Med Hx  Surg Hx  Fam Hx  Soc Hx         Reviewed and updated as needed this visit by Provider                    ROS:  CONSTITUTIONAL: NEGATIVE for fever, chills, change in weight  INTEGUMENTARY/SKIN: NEGATIVE for worrisome rashes, moles or lesions  EYES: NEGATIVE for vision changes or irritation  ENT: NEGATIVE for ear, mouth and throat problems  RESP: NEGATIVE for significant cough or SOB  CV: NEGATIVE for chest pain, palpitations or peripheral edema  GI: NEGATIVE for nausea, abdominal pain, heartburn, or change in bowel habits   male: negative for dysuria, hematuria, decreased urinary stream, erectile dysfunction, urethral discharge  MUSCULOSKELETAL: NEGATIVE for significant arthralgias or myalgia  NEURO: NEGATIVE for weakness, dizziness or paresthesias  PSYCHIATRIC: NEGATIVE for changes in mood or affect    OBJECTIVE:   /72   Pulse 68   Temp 97.6  F (36.4  C)   Resp 16   Ht 1.829 m (6')   Wt 146.1 kg (322 lb)   SpO2 96%   BMI 43.67 kg/m    EXAM:  GENERAL: healthy, alert and no distress  EYES: Eyes grossly normal to inspection, PERRL and conjunctivae and sclerae normal  HENT: ear canals and TM's normal, nose and mouth without ulcers or lesions  NECK: no adenopathy, no asymmetry, masses, or scars and thyroid normal to palpation  RESP: lungs clear to auscultation - no rales, rhonchi or wheezes  CV: regular rate and rhythm, normal S1 S2, no S3 or S4, no murmur, click or rub, no peripheral edema and peripheral pulses strong  ABDOMEN: soft, nontender, no hepatosplenomegaly, no masses and bowel sounds normal  MS: no gross musculoskeletal defects noted, no edema  SKIN: no suspicious lesions or rashes  NEURO: Normal strength and tone, mentation intact and speech normal  PSYCH: mentation appears normal, affect normal/bright    Diagnostic Test Results:  Labs reviewed in Epic    ASSESSMENT/PLAN:   Edwin was seen today for physical.    Diagnoses and all orders for this visit:    Routine general medical examination at a [facility]  facility    Morbid obesity (H)    Other orders  -     INFLUENZA VACCINE IM > 6 MONTHS VALENT IIV4 [69839]        Patient has been advised of split billing requirements and indicates understanding:   COUNSELING:  Reviewed preventive health counseling, as reflected in patient instructions       Regular exercise       Healthy diet/nutrition    Estimated body mass index is 43.67 kg/m  as calculated from the following:    Height as of this encounter: 1.829 m (6').    Weight as of this encounter: 146.1 kg (322 lb).    Weight management plan: Discussed healthy diet and exercise guidelines    He reports that he has never smoked. He quit smokeless tobacco use about 18 years ago.      Counseling Resources:  ATP IV Guidelines  Pooled Cohorts Equation Calculator  FRAX Risk Assessment  ICSI Preventive Guidelines  Dietary Guidelines for Americans, 2010  USDA's MyPlate  ASA Prophylaxis  Lung CA Screening    Ricardo Stoddard MD  [facility]

## 2021-01-20 ENCOUNTER — TRANSFERRED RECORDS (OUTPATIENT)
Dept: HEALTH INFORMATION MANAGEMENT | Facility: CLINIC | Age: 41
End: 2021-01-20

## 2021-01-25 ENCOUNTER — TRANSFERRED RECORDS (OUTPATIENT)
Dept: HEALTH INFORMATION MANAGEMENT | Facility: CLINIC | Age: 41
End: 2021-01-25

## 2021-04-15 ENCOUNTER — HOSPITAL ENCOUNTER (EMERGENCY)
Facility: CLINIC | Age: 41
Discharge: HOME | End: 2021-04-15
Attending: EMERGENCY MEDICINE | Admitting: EMERGENCY MEDICINE
Payer: COMMERCIAL

## 2021-04-15 ENCOUNTER — APPOINTMENT (OUTPATIENT)
Dept: ULTRASOUND IMAGING | Facility: CLINIC | Age: 41
End: 2021-04-15
Attending: EMERGENCY MEDICINE
Payer: COMMERCIAL

## 2021-04-15 VITALS
BODY MASS INDEX: 42.66 KG/M2 | OXYGEN SATURATION: 98 % | TEMPERATURE: 97.7 F | HEIGHT: 72 IN | WEIGHT: 315 LBS | SYSTOLIC BLOOD PRESSURE: 162 MMHG | DIASTOLIC BLOOD PRESSURE: 90 MMHG | HEART RATE: 83 BPM | RESPIRATION RATE: 20 BRPM

## 2021-04-15 DIAGNOSIS — I82.401 ACUTE DEEP VEIN THROMBOSIS (DVT) OF RIGHT LOWER EXTREMITY, UNSPECIFIED VEIN (H): ICD-10-CM

## 2021-04-15 LAB
ALBUMIN SERPL-MCNC: 3.8 G/DL (ref 3.4–5)
ALP SERPL-CCNC: 53 U/L (ref 40–150)
ALT SERPL W P-5'-P-CCNC: 48 U/L (ref 0–70)
ANION GAP SERPL CALCULATED.3IONS-SCNC: 6 MMOL/L (ref 3–14)
APTT PPP: 24 SEC (ref 22–37)
AST SERPL W P-5'-P-CCNC: 17 U/L (ref 0–45)
BASOPHILS # BLD AUTO: 0 10E9/L (ref 0–0.2)
BASOPHILS NFR BLD AUTO: 0.2 %
BILIRUB SERPL-MCNC: 0.2 MG/DL (ref 0.2–1.3)
BUN SERPL-MCNC: 27 MG/DL (ref 7–30)
CALCIUM SERPL-MCNC: 9.2 MG/DL (ref 8.5–10.1)
CHLORIDE SERPL-SCNC: 103 MMOL/L (ref 94–109)
CO2 SERPL-SCNC: 27 MMOL/L (ref 20–32)
CREAT SERPL-MCNC: 1.17 MG/DL (ref 0.66–1.25)
DIFFERENTIAL METHOD BLD: ABNORMAL
EOSINOPHIL # BLD AUTO: 0.2 10E9/L (ref 0–0.7)
EOSINOPHIL NFR BLD AUTO: 2.3 %
ERYTHROCYTE [DISTWIDTH] IN BLOOD BY AUTOMATED COUNT: 12.1 % (ref 10–15)
GFR SERPL CREATININE-BSD FRML MDRD: 77 ML/MIN/{1.73_M2}
GLUCOSE SERPL-MCNC: 104 MG/DL (ref 70–99)
HCT VFR BLD AUTO: 38.1 % (ref 40–53)
HGB BLD-MCNC: 13.1 G/DL (ref 13.3–17.7)
IMM GRANULOCYTES # BLD: 0 10E9/L (ref 0–0.4)
IMM GRANULOCYTES NFR BLD: 0.5 %
INR PPP: 1.07 (ref 0.86–1.14)
LYMPHOCYTES # BLD AUTO: 3.5 10E9/L (ref 0.8–5.3)
LYMPHOCYTES NFR BLD AUTO: 39.4 %
MCH RBC QN AUTO: 29.3 PG (ref 26.5–33)
MCHC RBC AUTO-ENTMCNC: 34.4 G/DL (ref 31.5–36.5)
MCV RBC AUTO: 85 FL (ref 78–100)
MONOCYTES # BLD AUTO: 0.7 10E9/L (ref 0–1.3)
MONOCYTES NFR BLD AUTO: 8.1 %
NEUTROPHILS # BLD AUTO: 4.4 10E9/L (ref 1.6–8.3)
NEUTROPHILS NFR BLD AUTO: 49.5 %
NRBC # BLD AUTO: 0 10*3/UL
NRBC BLD AUTO-RTO: 0 /100
PLATELET # BLD AUTO: 217 10E9/L (ref 150–450)
POTASSIUM SERPL-SCNC: 4 MMOL/L (ref 3.4–5.3)
PROT SERPL-MCNC: 7.5 G/DL (ref 6.8–8.8)
RBC # BLD AUTO: 4.47 10E12/L (ref 4.4–5.9)
SODIUM SERPL-SCNC: 136 MMOL/L (ref 133–144)
WBC # BLD AUTO: 8.9 10E9/L (ref 4–11)

## 2021-04-15 PROCEDURE — 250N000013 HC RX MED GY IP 250 OP 250 PS 637: Performed by: EMERGENCY MEDICINE

## 2021-04-15 PROCEDURE — 93971 EXTREMITY STUDY: CPT | Mod: RT

## 2021-04-15 PROCEDURE — 99284 EMERGENCY DEPT VISIT MOD MDM: CPT | Performed by: EMERGENCY MEDICINE

## 2021-04-15 PROCEDURE — 85730 THROMBOPLASTIN TIME PARTIAL: CPT | Performed by: EMERGENCY MEDICINE

## 2021-04-15 PROCEDURE — 85610 PROTHROMBIN TIME: CPT | Performed by: EMERGENCY MEDICINE

## 2021-04-15 PROCEDURE — 85025 COMPLETE CBC W/AUTO DIFF WBC: CPT | Performed by: EMERGENCY MEDICINE

## 2021-04-15 PROCEDURE — 80053 COMPREHEN METABOLIC PANEL: CPT | Performed by: EMERGENCY MEDICINE

## 2021-04-15 PROCEDURE — 99284 EMERGENCY DEPT VISIT MOD MDM: CPT | Mod: 25 | Performed by: EMERGENCY MEDICINE

## 2021-04-15 RX ORDER — RIVAROXABAN 15 MG-20MG
KIT ORAL
Qty: 51 EACH | Refills: 0 | Status: SHIPPED | OUTPATIENT
Start: 2021-04-15 | End: 2021-04-22

## 2021-04-15 RX ADMIN — RIVAROXABAN 15 MG: 15 TABLET, FILM COATED ORAL at 23:51

## 2021-04-15 ASSESSMENT — MIFFLIN-ST. JEOR: SCORE: 2422.19

## 2021-04-16 NOTE — ED PROVIDER NOTES
History     Chief Complaint   Patient presents with     Knee Pain     pain behind right knee. hx of post surgical DVT and PE. pt reports increased sitting. full ROM. denies change in swelling.      HPI  Edwin Nuno is a 40 year old male with history of DVT/PE in 2018 who presents for atraumatic right posterior leg pain of insidious onset today.  No leg swelling, redness or CMS dysfunction.  No signs or symptoms of PE.  Recent history remarkable for receiving the single dose [employer] Covid-19 vaccination 6 days ago.   He reports that prior DVT/PE in January 2018 was felt to be secondary to postoperative state from right knee surgery.  He was seen and evaluated by Hematology. No other known risk factors for DVT/PE.     Allergies:  No Known Allergies    Problem List:    Patient Active Problem List    Diagnosis Date Noted     Morbid obesity (H) 06/20/2018     Priority: Medium     DVT (deep venous thrombosis) (H) 01/15/2018     Priority: Medium     Long-term (current) use of anticoagulants [Z79.01] 01/15/2018     Priority: Medium     Pulmonary embolism on right (H) 01/14/2018     Priority: Medium     Benign essential hypertension 01/12/2017     Priority: Medium     Hypotestosteronism 10/29/2015     Priority: Medium     He has been on replacement therapies in the past.        Esophageal reflux 10/29/2015     Priority: Medium     Family history of colon cancer 11/07/2014     Priority: Medium     His mother and MGM had colon cancer. Recommend starting colonoscopies at age 40.        Hyperlipidemia with target LDL less than 130 11/07/2014     Priority: Medium     Diagnosis updated by automated process. Provider to review and confirm.          Past Medical History:    Past Medical History:   Diagnosis Date     DVT (deep vein thrombosis) in pregnancy      Family history of colon cancer      GERD (gastroesophageal reflux disease)      HTN (hypertension)      Hypotestosteronemia        Past Surgical History:     Past Surgical History:   Procedure Laterality Date     APPENDECTOMY  Feb 1998     ARTHROSCOPY KNEE      right, 1/8/2018     SURGICAL HISTORY OF -  Left 1997, 2003    left knee surgery     SURGICAL HISTORY OF -  Right 1999    right shoulder       Family History:    Family History   Problem Relation Age of Onset     Cancer - colorectal Mother      Breast Cancer Mother      Colon Cancer Mother         not malignant pollups     Cancer - colorectal Maternal Grandmother      Colon Cancer Maternal Grandmother         Large intestine and colon removal     Prostate Cancer Father         Removed prostate     Osteoporosis Paternal Grandmother      Cerebrovascular Disease Other         Great Grandmother     C.A.D. No family hx of      Diabetes No family hx of      Hypertension No family hx of      Cerebrovascular Disease No family hx of      Melanoma No family hx of        Social History:  Marital Status:   [2]  Social History     Tobacco Use     Smoking status: Never Smoker     Smokeless tobacco: Former User   Substance Use Topics     Alcohol use: Yes     Comment: occas     Drug use: No        Medications:    XARELTO STARTER PACK ANTICOAGULANT 15 & 20 MG TBPK  lisinopril (ZESTRIL) 20 MG tablet  magnesium oxide (MAG-OX) 400 (241.3 MG) MG tablet  NEW MED        Review of Systems  As mentioned above in the history present illness.  All other systems were reviewed and are negative.      Physical Exam   BP: (!) 162/90  Pulse: 83  Temp: 97.7  F (36.5  C)  Resp: 20  Height: 182.9 cm (6')  Weight: 147.4 kg (325 lb)  SpO2: 98 %      Physical Exam  Vitals signs and nursing note reviewed.   Constitutional:       General: He is not in acute distress.     Appearance: Normal appearance. He is well-developed. He is not ill-appearing or diaphoretic.   HENT:      Head: Normocephalic and atraumatic.      Right Ear: External ear normal.      Left Ear: External ear normal.   Eyes:      General: No scleral icterus.     Extraocular  Movements: Extraocular movements intact.      Conjunctiva/sclera: Conjunctivae normal.   Neck:      Musculoskeletal: Normal range of motion and neck supple.      Trachea: No tracheal deviation.   Cardiovascular:      Rate and Rhythm: Normal rate and regular rhythm.      Heart sounds: Normal heart sounds. No murmur. No friction rub. No gallop.    Pulmonary:      Effort: Pulmonary effort is normal. No respiratory distress.      Breath sounds: Normal breath sounds. No wheezing, rhonchi or rales.   Musculoskeletal: Normal range of motion.         General: No swelling or tenderness ( Right popliteal soft tissue tenderness with no erythema, warmth, cords, crepitance or fluctuance.  Distal CMS function intact.).      Right lower leg: No edema.      Left lower leg: No edema.   Skin:     General: Skin is warm and dry.      Coloration: Skin is not pale.      Findings: No erythema or rash.   Neurological:      General: No focal deficit present.      Mental Status: He is alert and oriented to person, place, and time.      Sensory: No sensory deficit.      Motor: No weakness.   Psychiatric:         Mood and Affect: Mood normal.         Behavior: Behavior normal.         ED Course        Procedures               Results for orders placed or performed during the hospital encounter of 04/15/21 (from the past 24 hour(s))   US Lower Extremity Venous Duplex Right    Addendum: 4/15/2021    Addendum:  Laterality error in initial impression which should read as follows:    IMPRESSION:  1.  Deep venous thrombosis within the RIGHT popliteal and peroneal veins.    Results were conveyed to Dr. Walden at approximately 2315 hours on the date of the exam.          Narrative    EXAM: US LOWER EXTREMITY VENOUS DUPLEX RIGHT  LOCATION: [facility]  DATE/TIME: 4/15/2021 10:20 PM    INDICATION: Atraumatic posterior knee pain  COMPARISON: 06/16/2020  TECHNIQUE: Venous Duplex ultrasound of the right lower extremity with and without  compression, augmentation and duplex. Color flow and spectral Doppler with waveform analysis performed.    FINDINGS: Exam includes the common femoral, femoral, popliteal, and contralateral common femoral veins as well as segmentally visualized deep calf veins and greater saphenous vein.     RIGHT: No deep venous thrombosis within the left popliteal and peroneal veins. Remaining deep veins in the right lower extremity show normal compressibility and flow. No superficial thrombophlebitis. No popliteal cyst.      Impression    IMPRESSION:  1.  Deep venous thrombosis within the left popliteal and peroneal veins.   CBC with platelets differential   Result Value Ref Range    WBC 8.9 4.0 - 11.0 10e9/L    RBC Count 4.47 4.4 - 5.9 10e12/L    Hemoglobin 13.1 (L) 13.3 - 17.7 g/dL    Hematocrit 38.1 (L) 40.0 - 53.0 %    MCV 85 78 - 100 fl    MCH 29.3 26.5 - 33.0 pg    MCHC 34.4 31.5 - 36.5 g/dL    RDW 12.1 10.0 - 15.0 %    Platelet Count 217 150 - 450 10e9/L    Diff Method Automated Method     % Neutrophils 49.5 %    % Lymphocytes 39.4 %    % Monocytes 8.1 %    % Eosinophils 2.3 %    % Basophils 0.2 %    % Immature Granulocytes 0.5 %    Nucleated RBCs 0 0 /100    Absolute Neutrophil 4.4 1.6 - 8.3 10e9/L    Absolute Lymphocytes 3.5 0.8 - 5.3 10e9/L    Absolute Monocytes 0.7 0.0 - 1.3 10e9/L    Absolute Eosinophils 0.2 0.0 - 0.7 10e9/L    Absolute Basophils 0.0 0.0 - 0.2 10e9/L    Abs Immature Granulocytes 0.0 0 - 0.4 10e9/L    Absolute Nucleated RBC 0.0    Comprehensive metabolic panel   Result Value Ref Range    Sodium 136 133 - 144 mmol/L    Potassium 4.0 3.4 - 5.3 mmol/L    Chloride 103 94 - 109 mmol/L    Carbon Dioxide 27 20 - 32 mmol/L    Anion Gap 6 3 - 14 mmol/L    Glucose 104 (H) 70 - 99 mg/dL    Urea Nitrogen 27 7 - 30 mg/dL    Creatinine 1.17 0.66 - 1.25 mg/dL    GFR Estimate 77 >60 mL/min/[1.73_m2]    GFR Estimate If Black 90 >60 mL/min/[1.73_m2]    Calcium 9.2 8.5 - 10.1 mg/dL    Bilirubin Total 0.2 0.2 - 1.3 mg/dL     Albumin 3.8 3.4 - 5.0 g/dL    Protein Total 7.5 6.8 - 8.8 g/dL    Alkaline Phosphatase 53 40 - 150 U/L    ALT 48 0 - 70 U/L    AST 17 0 - 45 U/L   Partial thromboplastin time   Result Value Ref Range    PTT 24 22 - 37 sec   INR   Result Value Ref Range    INR 1.07 0.86 - 1.14       Medications   rivaroxaban ANTICOAGULANT (XARELTO) tablet 15 mg (15 mg Oral Given 4/15/21 2361)     We discussed the results of his ultrasound evaluation and anticoagulation therapy.  He responds to resume anticoagulation therapy with Xarelto, as he had for prior DVT/PE in January 2018.  We discussed the risks and benefits of DOAC therapy and need to avoid NSAIDs.  He understands these issues.    Assessments & Plan (with Medical Decision Making)   40-year-old male with history of postoperative DVT/PE in January 2018, treated with Xarelto, with atraumatic right posterior mid leg pain today, with ultrasound finding of right popliteal and peroneal DVT.  He wished to Xarelto anticoagulation therapy again.  He was counseled on the risks and need to avoid NSAIDs with its use.  I prescribed a 1 month supply of Xarelto and recommended he follow-up with a primary care provider in the near future, and made a Hematology clinic referral for him.  He has no signs or symptoms of PE, and was counseled on the use and signs and symptoms would indicate need for emergent reevaluation.  He was provided instructions for supportive care and will return as needed for worsened condition or worsening symptoms, or new problems or concerns.      I have reviewed the nursing notes.    I have reviewed the findings, diagnosis, plan and need for follow up with the patient.    New Prescriptions    XARELTO STARTER PACK ANTICOAGULANT 15 & 20 MG TBPK    15 mg p.o. twice daily with food for 21 days, then begin 20 mg p.o. once daily with food.       Final diagnoses:   Acute deep vein thrombosis (DVT) of right lower extremity, unspecified vein (H)       4/15/2021   [facility]  [facility] EMERGENCY DEPT     Joe Walden MD  04/15/21 [id number]

## 2021-04-16 NOTE — ED TRIAGE NOTES
Dull pain behind right knee--feels similar to last DVT he has had. Dull ache started today around noon. Pt reports several DVT's and a PE in past. Pt reports increased sitting but no injury or long travel. Pt not currently on blood thinner. Pt is concerned as he did have michael and michael vaccine.     No meds taken prior to arrival. Pt is able to walk without difficulty.

## 2021-04-20 ENCOUNTER — TELEPHONE (OUTPATIENT)
Dept: ONCOLOGY | Facility: CLINIC | Age: 41
End: 2021-04-20

## 2021-04-20 NOTE — TELEPHONE ENCOUNTER
----- Message from Vika Henao sent at 4/20/2021  2:24 PM CDT -----  I called him yesterday and he is going to [place]. Thanks  ----- Message -----  From: Fatuma Babb RN  Sent: 4/20/2021   2:12 PM CDT  To: Vika Sandy,  Yes due to his new DVT and ER visit, he needs to be sent to new patient scheduling for new provider appt. He should not wait until Dr. Hendricks is back.  Thanks!  Jody  ----- Message -----  From: Vika Henao  Sent: 4/19/2021  12:02 PM CDT  To: Fatuma Babb RN    Patient called and left message that he wants appointment with Dr Hendricks. He was last seen 6/2018. We are referring to new patient scheduling, correct? Thanks.  Vika

## 2021-04-22 ENCOUNTER — OFFICE VISIT (OUTPATIENT)
Dept: FAMILY MEDICINE | Facility: CLINIC | Age: 41
End: 2021-04-22
Payer: COMMERCIAL

## 2021-04-22 VITALS
WEIGHT: 315 LBS | TEMPERATURE: 96.8 F | DIASTOLIC BLOOD PRESSURE: 80 MMHG | BODY MASS INDEX: 42.66 KG/M2 | OXYGEN SATURATION: 97 % | HEIGHT: 72 IN | HEART RATE: 74 BPM | SYSTOLIC BLOOD PRESSURE: 120 MMHG | RESPIRATION RATE: 18 BRPM

## 2021-04-22 DIAGNOSIS — I82.4Y1 ACUTE DEEP VEIN THROMBOSIS (DVT) OF PROXIMAL VEIN OF RIGHT LOWER EXTREMITY (H): Primary | ICD-10-CM

## 2021-04-22 PROCEDURE — 99213 OFFICE O/P EST LOW 20 MIN: CPT | Performed by: FAMILY MEDICINE

## 2021-04-22 ASSESSMENT — MIFFLIN-ST. JEOR: SCORE: 2422.19

## 2021-04-22 NOTE — PATIENT INSTRUCTIONS
(I82.4Y1) Acute deep vein thrombosis (DVT) of proximal vein of right lower extremity (H)  (primary encounter diagnosis)  Comment:   Plan: rivaroxaban ANTICOAGULANT (XARELTO         ANTICOAGULANT) 20 MG TABS tablet, US Lower         Extremity Venous Duplex Right        We reviewed and discussed the issues and since there was a previous clot in 2018, the recommendation is to stay on the Xarelto indefinitely to   Prevent future clots. Monitor for resolution of the leg symptoms. Monitor for future clot symptoms as discussed. The Ultrasound of the leg is ordered to call   [phone] in about 6 months, if doing well. The results will be called. You have a Hematology appt at the [place], [facility], on 5-3-21. Stay well hydrated and   Gradually increase activities over the next few weeks.

## 2021-04-22 NOTE — PROGRESS NOTES
Nessa Pineda is a 40 year old who presents for the following health issues  accompanied by his self :    HPI   Patient had COVID vaccine on 4-9-21 he had the Emanuel   Patient is here for F/u after having DVT on 4-15-21    FINDINGS: Exam includes the common femoral, femoral, popliteal, and contralateral common femoral veins as well as segmentally visualized deep calf veins and greater saphenous vein.      RIGHT: No deep venous thrombosis within the left popliteal and peroneal veins. Remaining deep veins in the right lower extremity show normal compressibility and flow. No superficial thrombophlebitis. No popliteal cyst.                                                                      IMPRESSION:  1.  Deep venous thrombosis within the left popliteal and peroneal veins.  ED/UC Followup:    [place]  [facility]   Date of visit: 4-15-21  Reason for visit: DVT   Current Status: patient is still having pressure behind knee. No dyspnea or chest pain.   He had one previous DVT 2018, post surgical, for seven months. The follow up US showed resolution.      ABDOMINAL   PAIN     Onset: 4-18-21    Description:   Character: Cramping  Location:Left  lower quadrant  Radiation: Left side      Intensity: moderate    Progression of Symptoms:  Worsening last night     Accompanying Signs & Symptoms:  Fever/Chills?: no   Gas/Bloating: no   Nausea: no   Vomitting: no   Diarrhea?: no   Constipation:no   Dysuria or Hematuria: no    History:   Trauma: no   Previous similar pain: no    Previous tests done: none    Precipitating factors:   Does the pain change with:     Food: no      BM: no     Urination: no     Alleviating factors:  Patient has not try anything     Therapies Tried and outcome: none     LMP:  not applicable       Current Outpatient Medications   Medication Instructions     lisinopril (ZESTRIL) 20 MG tablet TAKE 1 TABLET DAILY     magnesium oxide (MAG-OX) 400 mg, Oral, DAILY     NEW MED Potassium taking one daily  to help with leg cramps.     XARELTO STARTER PACK ANTICOAGULANT 15 & 20 MG TBPK 15 mg p.o. twice daily with food for 21 days, then begin 20 mg p.o. once daily with food.       Patient Active Problem List   Diagnosis     Family history of colon cancer     Hyperlipidemia with target LDL less than 130     Hypotestosteronism     Esophageal reflux     Benign essential hypertension     Pulmonary embolism on right (H)     DVT (deep venous thrombosis) (H)     Long-term (current) use of anticoagulants [Z79.01]     Morbid obesity (H)       Blood pressure 120/80, pulse 74, temperature 96.8  F (36  C), temperature source Tympanic, resp. rate 18, height 1.829 m (6'), weight 147.4 kg (325 lb), SpO2 97 %.    Exam:  GENERAL APPEARANCE: healthy, alert and no distress  CV: regular rates and rhythm  MS: the right lower leg is swollen and red; no tenderness.   PSYCH: mentation appears normal and affect normal/bright    (I82.4Y1) Acute deep vein thrombosis (DVT) of proximal vein of right lower extremity (H)  (primary encounter diagnosis)  Comment:   Plan: rivaroxaban ANTICOAGULANT (XARELTO         ANTICOAGULANT) 20 MG TABS tablet, US Lower         Extremity Venous Duplex Right        We reviewed and discussed the issues and since there was a previous clot in 2018, the recommendation is to stay on the Xarelto indefinitely to   Prevent future clots. Monitor for resolution of the leg symptoms. Monitor for future clot symptoms as discussed. The Ultrasound of the leg is ordered to call   [phone] in about 6 months, if doing well. The results will be called. You have a Hematology appt at the [place], on 5-3-21. Stay well hydrated and   Gradually increase activities over the next few weeks.     Cecilio Hughes MD

## 2021-05-03 ENCOUNTER — VIRTUAL VISIT (OUTPATIENT)
Dept: HEMATOLOGY | Facility: CLINIC | Age: 41
End: 2021-05-03
Attending: PHYSICIAN ASSISTANT
Payer: COMMERCIAL

## 2021-05-03 VITALS — BODY MASS INDEX: 43.4 KG/M2 | WEIGHT: 315 LBS

## 2021-05-03 DIAGNOSIS — I82.401 ACUTE DEEP VEIN THROMBOSIS (DVT) OF RIGHT LOWER EXTREMITY, UNSPECIFIED VEIN (H): ICD-10-CM

## 2021-05-03 PROCEDURE — 99203 OFFICE O/P NEW LOW 30 MIN: CPT | Mod: GT | Performed by: PHYSICIAN ASSISTANT
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
## 2021-05-03 NOTE — PROGRESS NOTES
Patient was contacted to complete the pre-visit call prior to their video visit with the provider.      Allergies and medications were reviewed.    I thanked them for their time to cover this information     Prasanth Mckinney CMA

## 2021-05-14 ENCOUNTER — TRANSFERRED RECORDS (OUTPATIENT)
Dept: HEALTH INFORMATION MANAGEMENT | Facility: CLINIC | Age: 41
End: 2021-05-14

## 2021-06-20 ENCOUNTER — MYC MEDICAL ADVICE (OUTPATIENT)
Dept: FAMILY MEDICINE | Facility: CLINIC | Age: 41
End: 2021-06-20

## 2021-06-20 DIAGNOSIS — I82.4Y1 ACUTE DEEP VEIN THROMBOSIS (DVT) OF PROXIMAL VEIN OF RIGHT LOWER EXTREMITY (H): ICD-10-CM

## 2021-07-07 ENCOUNTER — TELEPHONE (OUTPATIENT)
Dept: FAMILY MEDICINE | Facility: CLINIC | Age: 41
End: 2021-07-07

## 2021-07-07 NOTE — TELEPHONE ENCOUNTER
Prior Authorization Retail Medication Request    Medication/Dose: (RENEWAL) - rivaroxaban ANTICOAGULANT (XARELTO ANTICOAGULANT) 20 MG TABS   ICD code (if different than what is on RX):  Acute deep vein thrombosis (DVT) of proximal vein of right lower extremity (H) [I82.4Y1]  Previously Tried and Failed:    Rationale:      Insurance Name:  [employer]  Insurance ID:  [id number]    Pharmacy Information (if different than what is on RX)  Name:  [facility] - [address]  Phone:  [phone]

## 2021-07-07 NOTE — TELEPHONE ENCOUNTER
Prior Authorization Approval    Authorization Effective Date: 6/7/2021  Authorization Expiration Date: 7/7/2022  Medication: (RENEWAL) - rivaroxaban ANTICOAGULANT (XARELTO ANTICOAGULANT) 20 MG TABS   Approved Dose/Quantity:   Reference #: [id number]   Insurance Company: [employer] - Phone [phone] Fax [phone]  Expected CoPay:       CoPay Card Available:      Foundation Assistance Needed:    Which Pharmacy is filling the prescription (Not needed for infusion/clinic administered): [facility] - [address]  Pharmacy Notified: Yes  Patient Notified: Yes

## 2021-07-07 NOTE — TELEPHONE ENCOUNTER
PA Initiation    Medication: (RENEWAL) - rivaroxaban ANTICOAGULANT (XARELTO ANTICOAGULANT) 20 MG TABS   Insurance Company: [employer] - Phone [phone] Fax [phone]  Pharmacy Filling the Rx: [facility] - [address]  Filling Pharmacy Phone: [phone]  Filling Pharmacy Fax: [phone]  Start Date: 7/7/2021

## 2021-09-28 ENCOUNTER — VIRTUAL VISIT (OUTPATIENT)
Dept: FAMILY MEDICINE | Facility: CLINIC | Age: 41
End: 2021-09-28
Payer: COMMERCIAL

## 2021-09-28 DIAGNOSIS — I10 BENIGN ESSENTIAL HYPERTENSION: ICD-10-CM

## 2021-09-28 PROCEDURE — 99213 OFFICE O/P EST LOW 20 MIN: CPT | Mod: 95 | Performed by: NURSE PRACTITIONER

## 2021-09-28 RX ORDER — LISINOPRIL 20 MG/1
20 TABLET ORAL DAILY
Qty: 90 TABLET | Refills: 3 | Status: SHIPPED | OUTPATIENT
Start: 2021-09-28 | End: 2022-01-24

## 2021-09-28 NOTE — PROGRESS NOTES
Milad is a 40 year old who is being evaluated via a billable video visit.      How would you like to obtain your AVS? MyChart  If the video visit is dropped, the invitation should be resent by: Text to cell phone: [phone]  Will anyone else be joining your video visit? No    Video Start Time: 3:30    Assessment & Plan     Benign essential hypertension    - lisinopril (ZESTRIL) 20 MG tablet; Take 1 tablet (20 mg) by mouth daily             BMI:   Estimated body mass index is 43.4 kg/m  as calculated from the following:    Height as of 4/22/21: 1.829 m (6').    Weight as of 5/3/21: 145.2 kg (320 lb).       See Patient Instructions  Patient Instructions   Continue same medications, follow up to establish care with new PCP, do yearly exam and labs, recheck blood pressure in 2 months.      Our Clinic hours are:  Mondays    7:20 am - 7 pm  Tues -  Fri  7:20 am - 5 pm    Clinic Phone: [phone]    The clinic lab opens at 7:30 am Mon - Fri and appointments are required.    [facility]. [phone]  Monday  8 am - 7pm  Tues - Fri 8 am - 5:30 pm             Return in about 2 months (around 11/28/2021) for Lab Work, Routine Visit, Med Check.    CHAIM Chase CNP  [facility]    Nessa Stinson is a 40 year old who presents for the following health issues  accompanied by his self :    HPI     Hypertension Follow-up      Do you check your blood pressure regularly outside of the clinic? Yes     Are you following a low salt diet? Yes    Are your blood pressures ever more than 140 on the top number (systolic) OR more   than 90 on the bottom number (diastolic), for example 140/90? No      How many servings of fruits and vegetables do you eat daily?  4 or more    On average, how many sweetened beverages do you drink each day (Examples: soda, juice, sweet tea, etc.  Do NOT count diet or artificially sweetened beverages)?   2    How many days per week do you exercise  enough to make your heart beat faster? 3 or less    How many minutes a day do you exercise enough to make your heart beat faster? 30 - 60    How many days per week do you miss taking your medication? 0        Review of Systems   See above      Objective           Vitals:  No vitals were obtained today due to virtual visit.    Physical Exam   GENERAL: Healthy, alert and no distress  EYES: Eyes grossly normal to inspection.  No discharge or erythema, or obvious scleral/conjunctival abnormalities.  RESP: No audible wheeze, cough, or visible cyanosis.  No visible retractions or increased work of breathing.    SKIN: Visible skin clear. No significant rash, abnormal pigmentation or lesions.  NEURO: Cranial nerves grossly intact.  Mentation and speech appropriate for age.  PSYCH: Mentation appears normal, affect normal/bright, judgement and insight intact, normal speech and appearance well-groomed.                Video-Visit Details    Type of service:  Video Visit    Video End Time:3:38 PM    Originating Location (pt. Location): Home    Distant Location (provider location):  [facility]     Platform used for Video Visit: Gaming[employer]

## 2021-09-28 NOTE — PATIENT INSTRUCTIONS
Continue same medications, follow up to establish care with new PCP, do yearly exam and labs, recheck blood pressure in 2 months.      Our Clinic hours are:  Mondays    7:20 am - 7 pm  Tues -  Fri  7:20 am - 5 pm    Clinic Phone: [phone]    The clinic lab opens at 7:30 am Mon - Fri and appointments are required.    [facility]. [phone]  Monday  8 am - 7pm  Tues - Fri 8 am - 5:30 pm

## 2021-10-03 ENCOUNTER — HEALTH MAINTENANCE LETTER (OUTPATIENT)
Age: 41
End: 2021-10-03

## 2021-12-05 ENCOUNTER — HOSPITAL ENCOUNTER (EMERGENCY)
Facility: CLINIC | Age: 41
Discharge: HOME | End: 2021-12-05
Attending: EMERGENCY MEDICINE | Admitting: EMERGENCY MEDICINE
Payer: COMMERCIAL

## 2021-12-05 ENCOUNTER — APPOINTMENT (OUTPATIENT)
Dept: GENERAL RADIOLOGY | Facility: CLINIC | Age: 41
End: 2021-12-05
Attending: EMERGENCY MEDICINE
Payer: COMMERCIAL

## 2021-12-05 VITALS
DIASTOLIC BLOOD PRESSURE: 98 MMHG | RESPIRATION RATE: 20 BRPM | BODY MASS INDEX: 42.66 KG/M2 | WEIGHT: 315 LBS | TEMPERATURE: 97.2 F | HEIGHT: 72 IN | OXYGEN SATURATION: 97 % | SYSTOLIC BLOOD PRESSURE: 176 MMHG | HEART RATE: 78 BPM

## 2021-12-05 DIAGNOSIS — M79.672 LEFT FOOT PAIN: ICD-10-CM

## 2021-12-05 DIAGNOSIS — Z79.01 CHRONIC ANTICOAGULATION: ICD-10-CM

## 2021-12-05 PROCEDURE — 99284 EMERGENCY DEPT VISIT MOD MDM: CPT | Performed by: EMERGENCY MEDICINE

## 2021-12-05 PROCEDURE — 73630 X-RAY EXAM OF FOOT: CPT | Mod: LT

## 2021-12-05 RX ORDER — PREDNISONE 10 MG/1
TABLET ORAL
Qty: 7 TABLET | Refills: 0 | Status: SHIPPED | OUTPATIENT
Start: 2021-12-05 | End: 2021-12-11

## 2021-12-05 RX ORDER — HYDROCODONE BITARTRATE AND ACETAMINOPHEN 5; 325 MG/1; MG/1
1 TABLET ORAL EVERY 6 HOURS PRN
Qty: 7 TABLET | Refills: 0 | Status: SHIPPED | OUTPATIENT
Start: 2021-12-05 | End: 2022-02-07

## 2021-12-05 ASSESSMENT — ENCOUNTER SYMPTOMS
EYES NEGATIVE: 1
NEUROLOGICAL NEGATIVE: 1
GASTROINTESTINAL NEGATIVE: 1
PSYCHIATRIC NEGATIVE: 1
HEMATOLOGIC/LYMPHATIC NEGATIVE: 1
CONSTITUTIONAL NEGATIVE: 1
RESPIRATORY NEGATIVE: 1
ENDOCRINE NEGATIVE: 1
ALLERGIC/IMMUNOLOGIC NEGATIVE: 1
CARDIOVASCULAR NEGATIVE: 1

## 2021-12-05 ASSESSMENT — MIFFLIN-ST. JEOR: SCORE: 2444.87

## 2021-12-05 NOTE — ED TRIAGE NOTES
left ankle pain over the last few days, pt is on blood thinners; no recent injury, Hx of surgery on the joint

## 2021-12-05 NOTE — ED PROVIDER NOTES
History     Chief Complaint   Patient presents with     Ankle Pain     left ankle pain over the last few days, pt is on blood thinners; no recent injury, Hx of surgery on the joint      HPI  Edwin Nuno is a 40 year old male who presents for evaluation of left ankle pain.  Patient has a history of GERD, hypertension, history of pulmonary embolism and DVT on chronic anticoagulation rivaroxaban.  Patient is also lisinopril.  History of morbid obesity.  On examination patient reports.  Over the last 5 days patient developed pain over the dorsum of the left foot.  Pain is worsened with plantar flexion.  Pain extends from the base of the first second and third metatarsal towards the midfoot.  No ankle pain.  Prior history of ACL surgery in the right knee.  No hip pain.  No fever.  No prior diagnosis of crystalline arthropathy.  Patient reports no paresthesias or anesthesias about the foot.  No fever or chills.  No inciting trauma.  Works as a supervisor for [employer].    Allergies:  No Known Allergies    Problem List:    Patient Active Problem List    Diagnosis Date Noted     Morbid obesity (H) 06/20/2018     Priority: Medium     DVT (deep venous thrombosis) (H) 01/15/2018     Priority: Medium     Long-term (current) use of anticoagulants [Z79.01] 01/15/2018     Priority: Medium     Pulmonary embolism on right (H) 01/14/2018     Priority: Medium     Benign essential hypertension 01/12/2017     Priority: Medium     Hypotestosteronism 10/29/2015     Priority: Medium     He has been on replacement therapies in the past.        Esophageal reflux 10/29/2015     Priority: Medium     Family history of colon cancer 11/07/2014     Priority: Medium     His mother and MGM had colon cancer. Recommend starting colonoscopies at age 40.        Hyperlipidemia with target LDL less than 130 11/07/2014     Priority: Medium     Diagnosis updated by automated process. Provider to review and confirm.          Past Medical  History:    Past Medical History:   Diagnosis Date     DVT (deep vein thrombosis) in pregnancy      Family history of colon cancer      GERD (gastroesophageal reflux disease)      HTN (hypertension)      Hypotestosteronemia        Past Surgical History:    Past Surgical History:   Procedure Laterality Date     APPENDECTOMY  Feb 1998     ARTHROSCOPY KNEE      right, 1/8/2018     SURGICAL HISTORY OF -  Left 1997, 2003    left knee surgery     SURGICAL HISTORY OF -  Right 1999    right shoulder       Family History:    Family History   Problem Relation Age of Onset     Cancer - colorectal Mother      Breast Cancer Mother      Colon Cancer Mother         not malignant pollups     Cancer - colorectal Maternal Grandmother      Colon Cancer Maternal Grandmother         Large intestine and colon removal     Prostate Cancer Father         Removed prostate     Osteoporosis Paternal Grandmother      Cerebrovascular Disease Other         Great Grandmother     C.A.D. No family hx of      Diabetes No family hx of      Hypertension No family hx of      Cerebrovascular Disease No family hx of      Melanoma No family hx of        Social History:  Marital Status:   [2]  Social History     Tobacco Use     Smoking status: Never Smoker     Smokeless tobacco: Former User   Substance Use Topics     Alcohol use: Yes     Comment: occas     Drug use: No        Medications:    HYDROcodone-acetaminophen (NORCO) 5-325 MG tablet  predniSONE (DELTASONE) 10 MG tablet  lisinopril (ZESTRIL) 20 MG tablet  magnesium oxide (MAG-OX) 400 (241.3 MG) MG tablet  NEW MED  potassium 75 MG TABS  rivaroxaban ANTICOAGULANT (XARELTO ANTICOAGULANT) 20 MG TABS tablet          Review of Systems   Constitutional: Negative.    HENT: Negative.    Eyes: Negative.    Respiratory: Negative.    Cardiovascular: Negative.    Gastrointestinal: Negative.    Endocrine: Negative.    Genitourinary: Negative.    Musculoskeletal:        Left foot pain   Skin: Negative.     Allergic/Immunologic: Negative.    Neurological: Negative.    Hematological: Negative.    Psychiatric/Behavioral: Negative.    All other systems reviewed and are negative.      Physical Exam   BP: (!) 164/81  Pulse: 83  Temp: 97.2  F (36.2  C)  Resp: 16  Height: 182.9 cm (6')  Weight: 149.7 kg (330 lb)  SpO2: 97 %      Physical Exam  Constitutional:       General: He is not in acute distress.     Appearance: Normal appearance. He is not ill-appearing, toxic-appearing or diaphoretic.   HENT:      Head: Normocephalic and atraumatic.      Right Ear: Tympanic membrane normal.      Nose: Nose normal.      Mouth/Throat:      Mouth: Mucous membranes are moist.   Eyes:      Extraocular Movements: Extraocular movements intact.      Pupils: Pupils are equal, round, and reactive to light.   Cardiovascular:      Rate and Rhythm: Normal rate.      Pulses: Normal pulses.   Pulmonary:      Effort: Pulmonary effort is normal. No respiratory distress.      Breath sounds: Normal breath sounds. No stridor. No wheezing, rhonchi or rales.   Chest:      Chest wall: No tenderness.   Musculoskeletal:         General: Swelling and tenderness present. No signs of injury.      Cervical back: Normal range of motion.        Legs:    Skin:     Capillary Refill: Capillary refill takes less than 2 seconds.      Coloration: Skin is not jaundiced or pale.      Findings: No bruising, erythema, lesion or rash.   Neurological:      General: No focal deficit present.      Mental Status: He is alert and oriented to person, place, and time.      Cranial Nerves: No cranial nerve deficit.      Sensory: No sensory deficit.      Motor: No weakness.      Coordination: Coordination normal.      Gait: Gait normal.      Deep Tendon Reflexes: Reflexes normal.   Psychiatric:         Mood and Affect: Mood normal.         Behavior: Behavior normal.         Thought Content: Thought content normal.         Judgment: Judgment normal.                   ED Course                  Procedures              Critical Care time:  none               ED medications: none      ED Vitals  Vitals:    12/05/21 0908 12/05/21 1208   BP: (!) 164/81 (!) 176/98   Pulse: 83 78   Resp: 16 20   Temp: 97.2  F (36.2  C)    TempSrc: Temporal    SpO2: 97%    Weight: 149.7 kg (330 lb)    Height: 1.829 m (6')      ED labs and imaging:  Results for orders placed or performed during the hospital encounter of 12/05/21   Foot XR, G/E 3 views, left     Status: None    Narrative    EXAM: XR FOOT LEFT G/E 3 VIEWS  LOCATION: [facility]  DATE/TIME: 12/5/2021 11:11 AM    INDICATION: 5-day history of left foot pain. large BMI, anticoagulated.  Suspect crystalline arthropathy.  Eval for acute bony process  COMPARISON: None.      Impression    IMPRESSION: Normal joint spaces and alignment. No fracture.         Assessments & Plan (with Medical Decision Making)   Assessment Summary and Clinical Impression: 40-year-old male who presented with 5-day history of atraumatic left foot pain.  Symptoms suspicious for crystal arthropathy given report of modest alcohol use with drinking beer. Patient is on chronic anticoagulation with rivaroxaban for history of DVT and PE- (life long).  Patient reported symptoms steadily progressed and worsened with plantar flexion.  Some warmth about the foot on exam but no surrounding erythema or pitting edema.  Sensation was grossly preserved.  No ankle pain and no knee pain.  Arrived afebrile.  Intake blood pressure was 164/81.  Known history of hypertension on lisinopril.  See photo images in the physical exam section above about the foot.  After reviewing treatment options patient elected to trial steroids and pain medication given his chronic anticoagulation which limits his ability to use NSAIDs due to bleeding risk.  Outpatient follow-up with podiatry clinic if needed.    ED course and Plan:  Reviewed the medical record.  Reviewed ED visit on April 15, 2021.   Reviewed hematology visit on May 3, 2020.  We discussed and reviewed possible causes for his foot pain.  In the absence of trauma my suspicion that he has a bony fracture or dislocation is low.  History and exam did not suggest septic arthritis or cellulitis.  He requested x-ray imaging which was obtained and reviewed independently.  No acute findings.  See additional details in the radiology report.  We reviewed options for treatment. He agreed to plan to manage symptoms with prednisone and pain medication if needed with ongoing supportive care and close outpatient follow-up.      Disclaimer: This note consists of symbols derived from keyboarding, dictation and/or voice recognition software. As a result, there may be errors in the script that have gone undetected. Please consider this when interpreting information found in this chart.  I have reviewed the nursing notes.    I have reviewed the findings, diagnosis, plan and need for follow up with the patient.       New Prescriptions    HYDROCODONE-ACETAMINOPHEN (NORCO) 5-325 MG TABLET    Take 1 tablet by mouth every 6 hours as needed for moderate to severe pain    PREDNISONE (DELTASONE) 10 MG TABLET    Two tablets daily for 2 days, then one tab for two days, the 1/2 tab daily for 2 days       Final diagnoses:   Left foot pain - Over the last 5 days. Dorsum to mid foot (first through 3rd MTP)   Chronic anticoagulation - Lifelong on rivaroxaban-for history of DVT and PE       12/5/2021   [facility] EMERGENCY DEPT     Alexander Hartley MD  12/05/21 0506

## 2021-12-05 NOTE — ED NOTES
Left foot pain top of outer aspect, pain worse with stepping on so has been using a cam boot and crutches for comfort, mild edema to LE, CMS intact, no known injury

## 2021-12-05 NOTE — DISCHARGE INSTRUCTIONS
1) Your evaluation today does not suggest that you have a skin or soft tissue infection.  X-ray imaging did not show any bony fracture or dislocation.  We discussed the possibility that he could have a crystalline arthropathy.  Such as gout versus CPPD.    2) with associated warmth and tenderness about the metatarsals we have agreed to have a trial of steroids over the next 5 days and pain medication if needed because you are currently on Xarelto.    3) if symptoms worsen or you have increasing pain or new concerns you should call the podiatry clinic for follow-up evaluation in the next 1 to 2 weeks

## 2022-01-21 DIAGNOSIS — I10 BENIGN ESSENTIAL HYPERTENSION: ICD-10-CM

## 2022-01-23 ENCOUNTER — HEALTH MAINTENANCE LETTER (OUTPATIENT)
Age: 42
End: 2022-01-23

## 2022-01-24 RX ORDER — LISINOPRIL 20 MG/1
20 TABLET ORAL DAILY
Qty: 90 TABLET | Refills: 0 | Status: SHIPPED | OUTPATIENT
Start: 2022-01-24 | End: 2022-02-07

## 2022-01-24 NOTE — TELEPHONE ENCOUNTER
Call placed to patient. Recommended return in 9/21. Blood pressure not in range 12/21. He is advised he will need visit for further refills. He states he will try to schedule appointment.    Medication is being filled for 1 time refill only due to:  Patient needs to be seen because follow up recommended 9/21, bp not in range.

## 2022-02-06 ASSESSMENT — ENCOUNTER SYMPTOMS
SHORTNESS OF BREATH: 0
CONSTIPATION: 0
COUGH: 0
PALPITATIONS: 0
DYSURIA: 0
SORE THROAT: 0
HEMATOCHEZIA: 0
FEVER: 0
NAUSEA: 0
NERVOUS/ANXIOUS: 0
ABDOMINAL PAIN: 0
JOINT SWELLING: 1
WEAKNESS: 0
FREQUENCY: 0
DIARRHEA: 0
CHILLS: 0
ARTHRALGIAS: 1
HEADACHES: 0
MYALGIAS: 1
DIZZINESS: 0
HEMATURIA: 0
EYE PAIN: 0
HEARTBURN: 1
PARESTHESIAS: 0

## 2022-02-07 ENCOUNTER — OFFICE VISIT (OUTPATIENT)
Dept: FAMILY MEDICINE | Facility: CLINIC | Age: 42
End: 2022-02-07
Payer: COMMERCIAL

## 2022-02-07 VITALS
RESPIRATION RATE: 16 BRPM | HEART RATE: 66 BPM | SYSTOLIC BLOOD PRESSURE: 138 MMHG | DIASTOLIC BLOOD PRESSURE: 80 MMHG | HEIGHT: 72 IN | WEIGHT: 315 LBS | OXYGEN SATURATION: 98 % | TEMPERATURE: 97.3 F | BODY MASS INDEX: 42.66 KG/M2

## 2022-02-07 DIAGNOSIS — Z11.4 SCREENING FOR HIV (HUMAN IMMUNODEFICIENCY VIRUS): ICD-10-CM

## 2022-02-07 DIAGNOSIS — Z00.00 ROUTINE GENERAL MEDICAL EXAMINATION AT A HEALTH CARE FACILITY: Primary | ICD-10-CM

## 2022-02-07 DIAGNOSIS — I82.4Y9 DEEP VEIN THROMBOSIS (DVT) OF PROXIMAL LOWER EXTREMITY, UNSPECIFIED CHRONICITY, UNSPECIFIED LATERALITY (H): ICD-10-CM

## 2022-02-07 DIAGNOSIS — Z11.59 ENCOUNTER FOR HEPATITIS C SCREENING TEST FOR LOW RISK PATIENT: ICD-10-CM

## 2022-02-07 DIAGNOSIS — I10 BENIGN ESSENTIAL HYPERTENSION: ICD-10-CM

## 2022-02-07 LAB
ANION GAP SERPL CALCULATED.3IONS-SCNC: 4 MMOL/L (ref 3–14)
BUN SERPL-MCNC: 19 MG/DL (ref 7–30)
CALCIUM SERPL-MCNC: 9.6 MG/DL (ref 8.5–10.1)
CHLORIDE BLD-SCNC: 103 MMOL/L (ref 94–109)
CO2 SERPL-SCNC: 28 MMOL/L (ref 20–32)
CREAT SERPL-MCNC: 0.98 MG/DL (ref 0.66–1.25)
GFR SERPL CREATININE-BSD FRML MDRD: >90 ML/MIN/1.73M2
GLUCOSE BLD-MCNC: 96 MG/DL (ref 70–99)
POTASSIUM BLD-SCNC: 4.1 MMOL/L (ref 3.4–5.3)
SODIUM SERPL-SCNC: 135 MMOL/L (ref 133–144)

## 2022-02-07 PROCEDURE — 99213 OFFICE O/P EST LOW 20 MIN: CPT | Mod: 25 | Performed by: NURSE PRACTITIONER

## 2022-02-07 PROCEDURE — 90471 IMMUNIZATION ADMIN: CPT | Performed by: NURSE PRACTITIONER

## 2022-02-07 PROCEDURE — 86803 HEPATITIS C AB TEST: CPT | Performed by: NURSE PRACTITIONER

## 2022-02-07 PROCEDURE — 36415 COLL VENOUS BLD VENIPUNCTURE: CPT | Performed by: NURSE PRACTITIONER

## 2022-02-07 PROCEDURE — 99396 PREV VISIT EST AGE 40-64: CPT | Mod: 25 | Performed by: NURSE PRACTITIONER

## 2022-02-07 PROCEDURE — 87389 HIV-1 AG W/HIV-1&-2 AB AG IA: CPT | Performed by: NURSE PRACTITIONER

## 2022-02-07 PROCEDURE — 80048 BASIC METABOLIC PNL TOTAL CA: CPT | Performed by: NURSE PRACTITIONER

## 2022-02-07 PROCEDURE — 90686 IIV4 VACC NO PRSV 0.5 ML IM: CPT | Performed by: NURSE PRACTITIONER

## 2022-02-07 RX ORDER — DIOSMIN COMPLEX NO.1 630 MG
1 TABLET ORAL 2 TIMES DAILY
COMMUNITY
Start: 2021-08-19 | End: 2023-01-13

## 2022-02-07 RX ORDER — LISINOPRIL 20 MG/1
20 TABLET ORAL DAILY
Qty: 90 TABLET | Refills: 3 | Status: SHIPPED | OUTPATIENT
Start: 2022-02-07 | End: 2022-10-19 | Stop reason: DRUGHIGH

## 2022-02-07 ASSESSMENT — ENCOUNTER SYMPTOMS
COUGH: 0
CHILLS: 0
SORE THROAT: 0
JOINT SWELLING: 1
DIZZINESS: 0
FREQUENCY: 0
PALPITATIONS: 0
HEMATOCHEZIA: 0
DYSURIA: 0
HEMATURIA: 0
ARTHRALGIAS: 1
SHORTNESS OF BREATH: 0
PARESTHESIAS: 0
NAUSEA: 0
DIARRHEA: 0
FEVER: 0
ABDOMINAL PAIN: 0
MYALGIAS: 1
WEAKNESS: 0
HEARTBURN: 1
NERVOUS/ANXIOUS: 0
EYE PAIN: 0
HEADACHES: 0
CONSTIPATION: 0

## 2022-02-07 ASSESSMENT — MIFFLIN-ST. JEOR: SCORE: 2421.16

## 2022-02-07 NOTE — PROGRESS NOTES
SUBJECTIVE:   CC: Edwin Nuno is an 41 year old male who presents for preventative health visit.       Patient has been advised of split billing requirements and indicates understanding: Yes  Healthy Habits:     Getting at least 3 servings of Calcium per day:  Yes    Bi-annual eye exam:  Yes    Dental care twice a year:  Yes    Sleep apnea or symptoms of sleep apnea:  None    Diet:  Other    Frequency of exercise:  None    Taking medications regularly:  Yes    Medication side effects:  None    PHQ-2 Total Score: 0    Additional concerns today:  Yes    States he struggles with his weight. He has been told his blood pressure is not controlled, states he has white coat syndrome.        Hypertension Follow-up      Do you check your blood pressure regularly outside of the clinic? Yes     Are you following a low salt diet? Yes    Are your blood pressures ever more than 140 on the top number (systolic) OR more   than 90 on the bottom number (diastolic), for example 140/90? No      Today's PHQ-2 Score:   PHQ-2 ( 1999 [employer]) 2/6/2022   Q1: Little interest or pleasure in doing things 0   Q2: Feeling down, depressed or hopeless 0   PHQ-2 Score 0   PHQ-2 Total Score (12-17 Years)- Positive if 3 or more points; Administer PHQ-A if positive -   Q1: Little interest or pleasure in doing things Not at all   Q2: Feeling down, depressed or hopeless Not at all   PHQ-2 Score 0       Abuse: Current or Past(Physical, Sexual or Emotional)- No  Do you feel safe in your environment? Yes    Have you ever done Advance Care Planning? (For example, a Health Directive, POLST, or a discussion with a medical provider or your loved ones about your wishes): No, advance care planning information given to patient to review.  Patient plans to discuss their wishes with loved ones or provider.      Social History     Tobacco Use     Smoking status: Never Smoker     Smokeless tobacco: Former User   Substance Use Topics     Alcohol use: Yes     Comment:  occas     If you drink alcohol do you typically have >3 drinks per day or >7 drinks per week? No    Alcohol Use 2/6/2022   Prescreen: >3 drinks/day or >7 drinks/week? No   Prescreen: >3 drinks/day or >7 drinks/week? -   AUDIT SCORE  -       Last PSA: No results found for: PSA    Reviewed orders with patient. Reviewed health maintenance and updated orders accordingly - Yes  BP Readings from Last 3 Encounters:   02/07/22 138/80   12/05/21 (!) 176/98   04/22/21 120/80    Wt Readings from Last 3 Encounters:   02/07/22 147.4 kg (325 lb)   12/05/21 149.7 kg (330 lb)   05/03/21 145.2 kg (320 lb)                  Patient Active Problem List   Diagnosis     Family history of colon cancer     Hyperlipidemia with target LDL less than 130     Hypotestosteronism     Esophageal reflux     Benign essential hypertension     Pulmonary embolism on right (H)     DVT (deep venous thrombosis) (H)     Long-term (current) use of anticoagulants [Z79.01]     Morbid obesity (H)     Past Surgical History:   Procedure Laterality Date     APPENDECTOMY  Feb 1998     ARTHROSCOPY KNEE      right, 1/8/2018     SURGICAL HISTORY OF -  Left 1997, 2003    left knee surgery     SURGICAL HISTORY OF -  Right 1999    right shoulder       Social History     Tobacco Use     Smoking status: Never Smoker     Smokeless tobacco: Former User   Substance Use Topics     Alcohol use: Yes     Comment: occas     Family History   Problem Relation Age of Onset     Cancer - colorectal Mother      Breast Cancer Mother      Colon Cancer Mother         not malignant pollups     Cancer - colorectal Maternal Grandmother      Colon Cancer Maternal Grandmother         Large intestine and colon removal     Prostate Cancer Father         Removed prostate     Osteoporosis Paternal Grandmother      Cerebrovascular Disease Other         Great Grandmother     C.A.D. No family hx of      Diabetes No family hx of      Hypertension No family hx of      Cerebrovascular Disease No family  hx of      Melanoma No family hx of          Current Outpatient Medications   Medication Sig Dispense Refill     Dietary Management Product (VASCULERA) TABS Take 1 tablet by mouth 2 times daily       lisinopril (ZESTRIL) 20 MG tablet Take 1 tablet (20 mg) by mouth daily 90 tablet 3     magnesium oxide (MAG-OX) 400 (241.3 MG) MG tablet Take 1 tablet (400 mg) by mouth daily 60 tablet 3     rivaroxaban ANTICOAGULANT (XARELTO ANTICOAGULANT) 20 MG TABS tablet Take 1 tablet (20 mg) by mouth daily (with dinner) 90 tablet 3     No Known Allergies  Recent Labs   Lab Test 04/15/21  2323 10/22/19  1558 02/01/17  1552 03/05/16  0830 10/29/15  0951   LDL  --  131*  --  129* 132*   HDL  --  33*  --  37* 36*   TRIG  --  307*  --  140 185*   ALT 48  --   --   --   --    CR 1.17 1.03   < >  --   --    GFRESTIMATED 77 >90   < >  --   --    GFRESTBLACK 90 >90   < >  --   --    POTASSIUM 4.0 4.0   < >  --   --    TSH  --   --   --   --  2.22    < > = values in this interval not displayed.        Reviewed and updated as needed this visit by clinical staff  Tobacco  Allergies  Meds   Med Hx  Surg Hx  Fam Hx  Soc Hx       Reviewed and updated as needed this visit by Provider               Past Medical History:   Diagnosis Date     DVT (deep vein thrombosis) in pregnancy     right leg dx 1/13/2018     Family history of colon cancer      GERD (gastroesophageal reflux disease)      HTN (hypertension)      Hypotestosteronemia       Past Surgical History:   Procedure Laterality Date     APPENDECTOMY  Feb 1998     ARTHROSCOPY KNEE      right, 1/8/2018     SURGICAL HISTORY OF -  Left 1997, 2003    left knee surgery     SURGICAL HISTORY OF -  Right 1999    right shoulder       Review of Systems   Constitutional: Negative for chills and fever.   HENT: Positive for congestion. Negative for ear pain, hearing loss and sore throat.    Eyes: Negative for pain and visual disturbance.   Respiratory: Negative for cough and shortness of breath.     Cardiovascular: Positive for peripheral edema. Negative for chest pain and palpitations.   Gastrointestinal: Positive for heartburn. Negative for abdominal pain, constipation, diarrhea, hematochezia and nausea.   Genitourinary: Positive for impotence. Negative for dysuria, frequency, genital sores, hematuria, penile discharge and urgency.   Musculoskeletal: Positive for arthralgias, joint swelling and myalgias.   Skin: Negative for rash.   Neurological: Negative for dizziness, weakness, headaches and paresthesias.   Psychiatric/Behavioral: Positive for mood changes. The patient is not nervous/anxious.          OBJECTIVE:   There were no vitals taken for this visit.    Physical Exam  GENERAL: healthy, alert and no distress  EYES: Eyes grossly normal to inspection, PERRL and conjunctivae and sclerae normal  HENT: ear canals and TM's normal, nose and mouth without ulcers or lesions  NECK: no adenopathy, no asymmetry, masses, or scars and thyroid normal to palpation  RESP: lungs clear to auscultation - no rales, rhonchi or wheezes  CV: regular rate and rhythm, normal S1 S2, no S3 or S4, no murmur, click or rub, no peripheral edema and peripheral pulses strong  ABDOMEN: soft, obese, nontender, no hepatosplenomegaly, no masses and bowel sounds normal  MS: no gross musculoskeletal defects noted, bilateral lower leg edema worse on righSKIN: no suspicious lesions or rashes  NEURO: Normal strength and tone, mentation intact and speech normal  PSYCH: mentation appears normal, affect normal/bright    Diagnostic Test Results:  Labs reviewed in Epic  No results found for this or any previous visit (from the past 24 hour(s)).    ASSESSMENT/PLAN:       ICD-10-CM    1. Routine general medical examination at a health care facility  Z00.00    2. Benign essential hypertension  I10 lisinopril (ZESTRIL) 20 MG tablet     Basic metabolic panel  (Ca, Cl, CO2, Creat, Gluc, K, Na, BUN)     Basic metabolic panel  (Ca, Cl, CO2, Creat, Gluc,  K, Na, BUN)   3. Deep vein thrombosis (DVT) of proximal lower extremity, unspecified chronicity, unspecified laterality (H)  I82.4Y9 rivaroxaban ANTICOAGULANT (XARELTO ANTICOAGULANT) 20 MG TABS tablet   4. Screening for HIV (human immunodeficiency virus)  Z11.4 HIV Antigen Antibody Combo     HIV Antigen Antibody Combo   5. Encounter for hepatitis C screening test for low risk patient  Z11.59 Hepatitis C antibody     Hepatitis C antibody     Continue to monitor blood pressure, healthy diet, avoidance of caffeine, exercise as able.  Goal is consistently <140/90.  Will be notified of pending labs.  Continue with Xarelto indefinitely.        COUNSELING:   Reviewed preventive health counseling, as reflected in patient instructions       Regular exercise       Healthy diet/nutrition       Vision screening       Consider Hep C screening for all patients one time for ages 18-79 years       HIV screeninx in teen years, 1x in adult years, and at intervals if high risk       Advance Care Planning    Estimated body mass index is 44.76 kg/m  as calculated from the following:    Height as of 21: 1.829 m (6').    Weight as of 21: 149.7 kg (330 lb).     Weight management plan: Discussed healthy diet and exercise guidelines    He reports that he has never smoked. He quit smokeless tobacco use about 19 years ago.      Counseling Resources:  ATP IV Guidelines  Pooled Cohorts Equation Calculator  FRAX Risk Assessment  ICSI Preventive Guidelines  Dietary Guidelines for Americans,   USDA's MyPlate  ASA Prophylaxis  Lung CA Screening    Vika Horton, CHAIM CNP  [facility]

## 2022-02-08 LAB
HCV AB SERPL QL IA: NONREACTIVE
HIV 1+2 AB+HIV1 P24 AG SERPL QL IA: NONREACTIVE

## 2022-04-14 ENCOUNTER — TRANSFERRED RECORDS (OUTPATIENT)
Dept: HEALTH INFORMATION MANAGEMENT | Facility: CLINIC | Age: 42
End: 2022-04-14
Payer: COMMERCIAL

## 2022-05-21 ENCOUNTER — TRANSFERRED RECORDS (OUTPATIENT)
Dept: HEALTH INFORMATION MANAGEMENT | Facility: CLINIC | Age: 42
End: 2022-05-21
Payer: COMMERCIAL

## 2022-07-14 ENCOUNTER — TRANSFERRED RECORDS (OUTPATIENT)
Dept: FAMILY MEDICINE | Facility: CLINIC | Age: 42
End: 2022-07-14

## 2022-09-04 ENCOUNTER — HEALTH MAINTENANCE LETTER (OUTPATIENT)
Age: 42
End: 2022-09-04

## 2022-09-10 ENCOUNTER — OFFICE VISIT (OUTPATIENT)
Dept: URGENT CARE | Facility: URGENT CARE | Age: 42
End: 2022-09-10
Payer: COMMERCIAL

## 2022-09-10 VITALS
HEART RATE: 89 BPM | OXYGEN SATURATION: 98 % | DIASTOLIC BLOOD PRESSURE: 85 MMHG | SYSTOLIC BLOOD PRESSURE: 140 MMHG | TEMPERATURE: 97.3 F | BODY MASS INDEX: 45.52 KG/M2 | WEIGHT: 315 LBS

## 2022-09-10 DIAGNOSIS — H69.92 DYSFUNCTION OF LEFT EUSTACHIAN TUBE: Primary | ICD-10-CM

## 2022-09-10 PROCEDURE — 99213 OFFICE O/P EST LOW 20 MIN: CPT | Performed by: FAMILY MEDICINE

## 2022-09-10 ASSESSMENT — ENCOUNTER SYMPTOMS
ENDOCRINE NEGATIVE: 1
HEMATOLOGIC/LYMPHATIC NEGATIVE: 1
GASTROINTESTINAL NEGATIVE: 1
CARDIOVASCULAR NEGATIVE: 1
NEUROLOGICAL NEGATIVE: 1
CONSTITUTIONAL NEGATIVE: 1
COUGH: 0
ALLERGIC/IMMUNOLOGIC NEGATIVE: 1
PSYCHIATRIC NEGATIVE: 1
MUSCULOSKELETAL NEGATIVE: 1

## 2022-09-10 ASSESSMENT — PAIN SCALES - GENERAL: PAINLEVEL: NO PAIN (0)

## 2022-09-10 NOTE — PROGRESS NOTES
SUBJECTIVE:   Edwin Nuno is a 41 year old male presenting with a chief complaint of   Chief Complaint   Patient presents with     Ear Problem     Ringing in ear and heard time hearing in ear since Thursday        He is an established patient of [facility].    URI Adult    Onset of symptoms was 3 day(s) ago.  Course of illness is waxing and waning.    Severity moderate  Current and Associated symptoms: ear pain left  Treatment measures tried include Tylenol/Ibuprofen.  Predisposing factors include None.      Patient is a 41 yr old male here ringing in his left ear and decreased hearing since Thursday. He reports no pain or drainage. He denies any fevers or chills. No dizziness. He reports no recent URI symptoms. He did mention that he went to his dentist the day before.       Review of Systems   Constitutional: Negative.    HENT: Positive for ear pain. Negative for congestion.    Respiratory: Negative for cough.    Cardiovascular: Negative.    Gastrointestinal: Negative.    Endocrine: Negative.    Genitourinary: Negative.    Musculoskeletal: Negative.    Skin: Negative.    Allergic/Immunologic: Negative.    Neurological: Negative.    Hematological: Negative.    Psychiatric/Behavioral: Negative.        Past Medical History:   Diagnosis Date     DVT (deep vein thrombosis) in pregnancy     right leg dx 1/13/2018     Family history of colon cancer      GERD (gastroesophageal reflux disease)      HTN (hypertension)      Hypotestosteronemia      Family History   Problem Relation Age of Onset     Cancer - colorectal Mother      Breast Cancer Mother      Colon Cancer Mother         not malignant pollups     Cancer - colorectal Maternal Grandmother      Colon Cancer Maternal Grandmother         Large intestine and colon removal     Prostate Cancer Father         Removed prostate     Osteoporosis Paternal Grandmother      Cerebrovascular Disease Other         Great Grandmother     C.A.D. No family hx of      Diabetes No  family hx of      Hypertension No family hx of      Cerebrovascular Disease No family hx of      Melanoma No family hx of      Current Outpatient Medications   Medication Sig Dispense Refill     Dietary Management Product (VASCULERA) TABS Take 1 tablet by mouth 2 times daily       lisinopril (ZESTRIL) 20 MG tablet Take 1 tablet (20 mg) by mouth daily 90 tablet 3     magnesium oxide (MAG-OX) 400 (241.3 MG) MG tablet Take 1 tablet (400 mg) by mouth daily 60 tablet 3     rivaroxaban ANTICOAGULANT (XARELTO ANTICOAGULANT) 20 MG TABS tablet Take 1 tablet (20 mg) by mouth daily (with dinner) 90 tablet 3     Social History     Tobacco Use     Smoking status: Never Smoker     Smokeless tobacco: Former User     Quit date: 6/22/2002   Substance Use Topics     Alcohol use: Yes     Comment: occas       OBJECTIVE  BP (!) 140/85   Pulse 89   Temp 97.3  F (36.3  C) (Tympanic)   Wt (!) 153.3 kg (338 lb)   SpO2 98%   BMI 45.52 kg/m      Physical Exam  Constitutional:       Appearance: Normal appearance.   HENT:      Head: Normocephalic and atraumatic.      Right Ear: Tympanic membrane normal.      Left Ear: Tympanic membrane normal.   Eyes:      Extraocular Movements: Extraocular movements intact.      Pupils: Pupils are equal, round, and reactive to light.   Neurological:      Mental Status: He is alert.         Labs:  No results found for this or any previous visit (from the past 24 hour(s)).    X-Ray was not done.    ASSESSMENT:      ICD-10-CM    1. Dysfunction of left eustachian tube  H69.82    Recommend Sudafed, if symptoms persist recommend f/u with primary care doctor.     Medical Decision Making:    Differential Diagnosis:  URI Adult/Peds:  Ear effusion    Serious Comorbid Conditions:  Adult:  None    PLAN:    URI Adult:  Rx otitis media  with effusion    Followup:    If not improving or if condition worsens, follow up with your Primary Care Provider    There are no Patient Instructions on file for this visit.

## 2022-10-19 ENCOUNTER — OFFICE VISIT (OUTPATIENT)
Dept: FAMILY MEDICINE | Facility: CLINIC | Age: 42
End: 2022-10-19
Payer: COMMERCIAL

## 2022-10-19 VITALS
HEART RATE: 96 BPM | SYSTOLIC BLOOD PRESSURE: 138 MMHG | BODY MASS INDEX: 44.1 KG/M2 | TEMPERATURE: 98.9 F | DIASTOLIC BLOOD PRESSURE: 82 MMHG | WEIGHT: 315 LBS | RESPIRATION RATE: 18 BRPM | OXYGEN SATURATION: 100 % | HEIGHT: 71 IN

## 2022-10-19 DIAGNOSIS — I82.4Y9 DEEP VEIN THROMBOSIS (DVT) OF PROXIMAL LOWER EXTREMITY, UNSPECIFIED CHRONICITY, UNSPECIFIED LATERALITY (H): ICD-10-CM

## 2022-10-19 DIAGNOSIS — Z13.220 SCREENING FOR LIPOID DISORDERS: ICD-10-CM

## 2022-10-19 DIAGNOSIS — Z13.1 SCREENING FOR DIABETES MELLITUS: ICD-10-CM

## 2022-10-19 DIAGNOSIS — I26.99 PULMONARY EMBOLISM ON RIGHT (H): ICD-10-CM

## 2022-10-19 DIAGNOSIS — Z00.00 ROUTINE GENERAL MEDICAL EXAMINATION AT A HEALTH CARE FACILITY: Primary | ICD-10-CM

## 2022-10-19 DIAGNOSIS — E66.01 MORBID OBESITY (H): ICD-10-CM

## 2022-10-19 DIAGNOSIS — I10 BENIGN ESSENTIAL HYPERTENSION: ICD-10-CM

## 2022-10-19 LAB
ALBUMIN SERPL BCG-MCNC: 4.7 G/DL (ref 3.5–5.2)
ALP SERPL-CCNC: 59 U/L (ref 40–129)
ALT SERPL W P-5'-P-CCNC: 75 U/L (ref 10–50)
ANION GAP SERPL CALCULATED.3IONS-SCNC: 14 MMOL/L (ref 7–15)
AST SERPL W P-5'-P-CCNC: 33 U/L (ref 10–50)
BILIRUB SERPL-MCNC: 0.4 MG/DL
BUN SERPL-MCNC: 21.5 MG/DL (ref 6–20)
CALCIUM SERPL-MCNC: 9.9 MG/DL (ref 8.6–10)
CHLORIDE SERPL-SCNC: 99 MMOL/L (ref 98–107)
CHOLEST SERPL-MCNC: 281 MG/DL
CREAT SERPL-MCNC: 0.94 MG/DL (ref 0.67–1.17)
DEPRECATED HCO3 PLAS-SCNC: 21 MMOL/L (ref 22–29)
GFR SERPL CREATININE-BSD FRML MDRD: >90 ML/MIN/1.73M2
GLUCOSE SERPL-MCNC: 156 MG/DL (ref 70–99)
HDLC SERPL-MCNC: 36 MG/DL
LDLC SERPL CALC-MCNC: ABNORMAL MG/DL
LDLC SERPL DIRECT ASSAY-MCNC: 170 MG/DL
NONHDLC SERPL-MCNC: 245 MG/DL
POTASSIUM SERPL-SCNC: 4.9 MMOL/L (ref 3.4–5.3)
PROT SERPL-MCNC: 7.6 G/DL (ref 6.4–8.3)
SODIUM SERPL-SCNC: 134 MMOL/L (ref 136–145)
TRIGL SERPL-MCNC: 420 MG/DL
TSH SERPL DL<=0.005 MIU/L-ACNC: 0.98 UIU/ML (ref 0.3–4.2)

## 2022-10-19 PROCEDURE — 90471 IMMUNIZATION ADMIN: CPT | Performed by: NURSE PRACTITIONER

## 2022-10-19 PROCEDURE — 80061 LIPID PANEL: CPT | Performed by: NURSE PRACTITIONER

## 2022-10-19 PROCEDURE — 36415 COLL VENOUS BLD VENIPUNCTURE: CPT | Performed by: NURSE PRACTITIONER

## 2022-10-19 PROCEDURE — 99396 PREV VISIT EST AGE 40-64: CPT | Mod: 25 | Performed by: NURSE PRACTITIONER

## 2022-10-19 PROCEDURE — 84443 ASSAY THYROID STIM HORMONE: CPT | Performed by: NURSE PRACTITIONER

## 2022-10-19 PROCEDURE — 80053 COMPREHEN METABOLIC PANEL: CPT | Performed by: NURSE PRACTITIONER

## 2022-10-19 PROCEDURE — 83721 ASSAY OF BLOOD LIPOPROTEIN: CPT | Mod: 59 | Performed by: NURSE PRACTITIONER

## 2022-10-19 PROCEDURE — 90686 IIV4 VACC NO PRSV 0.5 ML IM: CPT | Performed by: NURSE PRACTITIONER

## 2022-10-19 PROCEDURE — 99214 OFFICE O/P EST MOD 30 MIN: CPT | Mod: 25 | Performed by: NURSE PRACTITIONER

## 2022-10-19 RX ORDER — LISINOPRIL 30 MG/1
30 TABLET ORAL DAILY
Qty: 90 TABLET | Refills: 1 | Status: SHIPPED | OUTPATIENT
Start: 2022-10-19 | End: 2023-04-06

## 2022-10-19 RX ORDER — LISINOPRIL 40 MG/1
40 TABLET ORAL DAILY
Qty: 90 TABLET | Refills: 3 | Status: SHIPPED | OUTPATIENT
Start: 2022-10-19 | End: 2022-10-19 | Stop reason: DRUGHIGH

## 2022-10-19 ASSESSMENT — ENCOUNTER SYMPTOMS
FEVER: 0
ARTHRALGIAS: 1
HEADACHES: 1
COUGH: 0
DYSURIA: 0
NAUSEA: 0
HEMATOCHEZIA: 0
DIARRHEA: 0
SHORTNESS OF BREATH: 0
MYALGIAS: 1
DIZZINESS: 0
CHILLS: 0
CONSTIPATION: 0
ABDOMINAL PAIN: 0
EYE PAIN: 0
HEARTBURN: 1
NERVOUS/ANXIOUS: 0
PALPITATIONS: 0
FREQUENCY: 0
HEMATURIA: 0
WEAKNESS: 0
SORE THROAT: 0
PARESTHESIAS: 1
JOINT SWELLING: 1

## 2022-10-19 ASSESSMENT — PAIN SCALES - GENERAL: PAINLEVEL: MODERATE PAIN (5)

## 2022-10-19 NOTE — LETTER
October 21, 2022      Milad MARCIA Nurys  [address] [address]  [place] [address]        Dear ,    We are writing to inform you of your test results.    A couple of your labs were slightly off, including elevated cholesterol, lower sodium, one elevated liver test and elevated glucose.   Work on healthier diet, decrease alcohol, increase physical activity.   I will place a better lab test (non fasting) to rule out diabetes.   Repeat other labs in six months to monitor.       Resulted Orders   TSH with free T4 reflex   Result Value Ref Range    TSH 0.98 0.30 - 4.20 uIU/mL   Comprehensive metabolic panel (BMP + Alb, Alk Phos, ALT, AST, Total. Bili, TP)   Result Value Ref Range    Sodium 134 (L) 136 - 145 mmol/L    Potassium 4.9 3.4 - 5.3 mmol/L    Chloride 99 98 - 107 mmol/L    Carbon Dioxide (CO2) 21 (L) 22 - 29 mmol/L    Anion Gap 14 7 - 15 mmol/L    Urea Nitrogen 21.5 (H) 6.0 - 20.0 mg/dL    Creatinine 0.94 0.67 - 1.17 mg/dL    Calcium 9.9 8.6 - 10.0 mg/dL    Glucose 156 (H) 70 - 99 mg/dL    Alkaline Phosphatase 59 40 - 129 U/L    AST 33 10 - 50 U/L    ALT 75 (H) 10 - 50 U/L    Protein Total 7.6 6.4 - 8.3 g/dL    Albumin 4.7 3.5 - 5.2 g/dL    Bilirubin Total 0.4 <=1.2 mg/dL    GFR Estimate >90 >60 mL/min/1.73m2      Comment:      Effective December 21, 2021 eGFRcr in adults is calculated using the 2021 CKD-EPI creatinine equation which includes age and gender (Isabel lynch al., NEJM, DOI: 10.1056/XPIHuh6758290)   Lipid panel reflex to direct LDL Fasting   Result Value Ref Range    Cholesterol 281 (H) <200 mg/dL    Triglycerides 420 (H) <150 mg/dL    Direct Measure HDL 36 (L) >=40 mg/dL    LDL Cholesterol Calculated        Comment:      Cannot estimate LDL when triglyceride exceeds 400 mg/dL    Non HDL Cholesterol 245 (H) <130 mg/dL    Narrative    Cholesterol  Desirable:  <200 mg/dL    Triglycerides  Normal:  Less than 150 mg/dL  Borderline High:  150-199 mg/dL  High:  200-499 mg/dL  Very High:  Greater than or  equal to 500 mg/dL    Direct Measure HDL  Female:  Greater than or equal to 50 mg/dL   Male:  Greater than or equal to 40 mg/dL    LDL Cholesterol  Desirable:  <100mg/dL  Above Desirable:  100-129 mg/dL   Borderline High:  130-159 mg/dL   High:  160-189 mg/dL   Very High:  >= 190 mg/dL    Non HDL Cholesterol  Desirable:  130 mg/dL  Above Desirable:  130-159 mg/dL  Borderline High:  160-189 mg/dL  High:  190-219 mg/dL  Very High:  Greater than or equal to 220 mg/dL   LDL cholesterol direct   Result Value Ref Range    LDL Cholesterol Direct 170 (H) <100 mg/dL      Comment:      Age 2-19 years:  Desirable: 0-110 mg/dL   Borderline high: 110-129 mg/dL   High: >= 130 mg/dL    Age 20 years and older:  Desirable: <100mg/dL  Above desirable: 100-129 mg/dL   Borderline high: 130-159 mg/dL   High: 160-189 mg/dL   Very high: >= 190 mg/dL       If you have any questions or concerns, please call the clinic at the number listed above.       Sincerely,      CHAIM Chase CNP

## 2022-10-19 NOTE — LETTER
October 21, 2022      Milad MARCIA Nurys  [address] [address]  [place] [address]        Dear ,    We are writing to inform you of your test results.    A couple of your labs were slightly off, including elevated cholesterol, lower sodium, one elevated liver test and elevated glucose.   Work on healthier diet, decrease alcohol, increase physical activity.   I will place a better lab test (non fasting) to rule out diabetes.   Repeat other labs in six months to monitor.       Resulted Orders   TSH with free T4 reflex   Result Value Ref Range    TSH 0.98 0.30 - 4.20 uIU/mL   Comprehensive metabolic panel (BMP + Alb, Alk Phos, ALT, AST, Total. Bili, TP)   Result Value Ref Range    Sodium 134 (L) 136 - 145 mmol/L    Potassium 4.9 3.4 - 5.3 mmol/L    Chloride 99 98 - 107 mmol/L    Carbon Dioxide (CO2) 21 (L) 22 - 29 mmol/L    Anion Gap 14 7 - 15 mmol/L    Urea Nitrogen 21.5 (H) 6.0 - 20.0 mg/dL    Creatinine 0.94 0.67 - 1.17 mg/dL    Calcium 9.9 8.6 - 10.0 mg/dL    Glucose 156 (H) 70 - 99 mg/dL    Alkaline Phosphatase 59 40 - 129 U/L    AST 33 10 - 50 U/L    ALT 75 (H) 10 - 50 U/L    Protein Total 7.6 6.4 - 8.3 g/dL    Albumin 4.7 3.5 - 5.2 g/dL    Bilirubin Total 0.4 <=1.2 mg/dL    GFR Estimate >90 >60 mL/min/1.73m2      Comment:      Effective December 21, 2021 eGFRcr in adults is calculated using the 2021 CKD-EPI creatinine equation which includes age and gender (Isabel lynch al., NEJM, DOI: 10.1056/JTTLkp0746919)   Lipid panel reflex to direct LDL Fasting   Result Value Ref Range    Cholesterol 281 (H) <200 mg/dL    Triglycerides 420 (H) <150 mg/dL    Direct Measure HDL 36 (L) >=40 mg/dL    LDL Cholesterol Calculated        Comment:      Cannot estimate LDL when triglyceride exceeds 400 mg/dL    Non HDL Cholesterol 245 (H) <130 mg/dL    Narrative    Cholesterol  Desirable:  <200 mg/dL    Triglycerides  Normal:  Less than 150 mg/dL  Borderline High:  150-199 mg/dL  High:  200-499 mg/dL  Very High:  Greater than or  equal to 500 mg/dL    Direct Measure HDL  Female:  Greater than or equal to 50 mg/dL   Male:  Greater than or equal to 40 mg/dL    LDL Cholesterol  Desirable:  <100mg/dL  Above Desirable:  100-129 mg/dL   Borderline High:  130-159 mg/dL   High:  160-189 mg/dL   Very High:  >= 190 mg/dL    Non HDL Cholesterol  Desirable:  130 mg/dL  Above Desirable:  130-159 mg/dL  Borderline High:  160-189 mg/dL  High:  190-219 mg/dL  Very High:  Greater than or equal to 220 mg/dL   LDL cholesterol direct   Result Value Ref Range    LDL Cholesterol Direct 170 (H) <100 mg/dL      Comment:      Age 2-19 years:  Desirable: 0-110 mg/dL   Borderline high: 110-129 mg/dL   High: >= 130 mg/dL    Age 20 years and older:  Desirable: <100mg/dL  Above desirable: 100-129 mg/dL   Borderline high: 130-159 mg/dL   High: 160-189 mg/dL   Very high: >= 190 mg/dL       If you have any questions or concerns, please call the clinic at the number listed above.       Sincerely,      CHAIM Chase CNP

## 2022-10-19 NOTE — PATIENT INSTRUCTIONS
Increase Lisinopril to 30 mg daily.Continue to monitor pressure.  Will be notified of pending labs.  Increase exercise and healthy diet.  Wear compression stockings.  Return to have nurses recheck pressure in 2 weeks.    Preventive Health Recommendations  Male Ages 40 to 49    Yearly exam:             See your health care provider every year in order to  o   Review health changes.   o   Discuss preventive care.    o   Review your medicines if your doctor has prescribed any.  You should be tested each year for STDs (sexually transmitted diseases) if you re at risk.   Have a cholesterol test every 5 years.   Have a colonoscopy (test for colon cancer) if someone in your family has had colon cancer or polyps before age 50.   After age 45, have a diabetes test (fasting glucose). If you are at risk for diabetes, you should have this test every 3 years.    Talk with your health care provider about whether or not a prostate cancer screening test (PSA) is right for you.    Shots: Get a flu shot each year. Get a tetanus shot every 10 years.     Nutrition:  Eat at least 5 servings of fruits and vegetables daily.   Eat whole-grain bread, whole-wheat pasta and brown rice instead of white grains and rice.   Get adequate Calcium and Vitamin D.     Lifestyle  Exercise for at least 150 minutes a week (30 minutes a day, 5 days a week). This will help you control your weight and prevent disease.   Limit alcohol to one drink per day.   No smoking.   Wear sunscreen to prevent skin cancer.   See your dentist every six months for an exam and cleaning.      
unknown

## 2022-10-19 NOTE — PROGRESS NOTES
SUBJECTIVE:   CC: Milad is an 41 year old who presents for preventative health visit.       Patient has been advised of split billing requirements and indicates understanding: Yes  Healthy Habits:     Getting at least 3 servings of Calcium per day:  Yes    Bi-annual eye exam:  Yes    Dental care twice a year:  Yes    Sleep apnea or symptoms of sleep apnea:  None    Diet:  Breakfast skipped    Frequency of exercise:  None    Taking medications regularly:  Yes    Medication side effects:  None    PHQ-2 Total Score: 0    Additional concerns today:  Yes    He is feeling very stressed with his work, he's gained weight, less exercising and blood pressure is up.  He feels physically well. He has a plan to switch jobs.  Will be on Xarelto indefinitely.        Medication Followup of  xarelto     Taking Medication as prescribed: yes    Side Effects:  None    Medication Helping Symptoms:  yes      Today's PHQ-2 Score:   PHQ-2 ( 1999 [employer]) 10/19/2022   Q1: Little interest or pleasure in doing things 0   Q2: Feeling down, depressed or hopeless 0   PHQ-2 Score 0   PHQ-2 Total Score (12-17 Years)- Positive if 3 or more points; Administer PHQ-A if positive -   Q1: Little interest or pleasure in doing things Not at all   Q2: Feeling down, depressed or hopeless Not at all   PHQ-2 Score 0       Abuse: Current or Past(Physical, Sexual or Emotional)- No  Do you feel safe in your environment? Yes        Social History     Tobacco Use     Smoking status: Never     Smokeless tobacco: Former     Quit date: 6/22/2002   Substance Use Topics     Alcohol use: Yes     Comment: occas     If you drink alcohol do you typically have >3 drinks per day or >7 drinks per week? No    Alcohol Use 10/19/2022   Prescreen: >3 drinks/day or >7 drinks/week? Yes   Prescreen: >3 drinks/day or >7 drinks/week? -   AUDIT SCORE  8     AUDIT - Alcohol Use Disorders Identification Test - Reproduced from the World Health Organization Audit 2001 (Second Edition)  10/19/2022   1.  How often do you have a drink containing alcohol? 4 or more times a week   2.  How many drinks containing alcohol do you have on a typical day when you are drinking? 3 or 4   3.  How often do you have five or more drinks on one occasion? Less than monthly   4.  How often during the last year have you found that you were not able to stop drinking once you had started? Never   5.  How often during the last year have you failed to do what was normally expected of you because of drinking? Never   6.  How often during the last year have you needed a first drink in the morning to get yourself going after a heavy drinking session? Never   7.  How often during the last year have you had a feeling of guilt or remorse after drinking? Monthly   8.  How often during the last year have you been unable to remember what happened the night before because of your drinking? Never   9.  Have you or someone else been injured because of your drinking? No   10. Has a relative, friend, doctor or other health care worker been concerned about your drinking or suggested you cut down? No   TOTAL SCORE 8       Last PSA: No results found for: PSA    Reviewed orders with patient. Reviewed health maintenance and updated orders accordingly - Yes  BP Readings from Last 3 Encounters:   10/19/22 138/82   09/10/22 (!) 140/85   02/07/22 138/80    Wt Readings from Last 3 Encounters:   10/19/22 (!) 153.8 kg (339 lb)   09/10/22 (!) 153.3 kg (338 lb)   02/07/22 147.4 kg (325 lb)                  Patient Active Problem List   Diagnosis     Family history of colon cancer     Hyperlipidemia with target LDL less than 130     Hypotestosteronism     Esophageal reflux     Benign essential hypertension     Pulmonary embolism on right (H)     DVT (deep venous thrombosis) (H)     Long-term (current) use of anticoagulants [Z79.01]     Morbid obesity (H)     Past Surgical History:   Procedure Laterality Date     APPENDECTOMY  Feb 1998     ARTHROSCOPY  KNEE      right, 1/8/2018     SURGICAL HISTORY OF -  Left 1997, 2003    left knee surgery     SURGICAL HISTORY OF -  Right 1999    right shoulder       Social History     Tobacco Use     Smoking status: Never     Smokeless tobacco: Former     Quit date: 6/22/2002   Substance Use Topics     Alcohol use: Yes     Comment: occas     Family History   Problem Relation Age of Onset     Cancer - colorectal Mother      Breast Cancer Mother      Colon Cancer Mother         not malignant pollups     Cancer - colorectal Maternal Grandmother      Colon Cancer Maternal Grandmother         Large intestine and colon removal     Prostate Cancer Father         Removed prostate     Osteoporosis Paternal Grandmother      Cerebrovascular Disease Other         Great Grandmother     C.A.D. No family hx of      Diabetes No family hx of      Hypertension No family hx of      Cerebrovascular Disease No family hx of      Melanoma No family hx of          Current Outpatient Medications   Medication Sig Dispense Refill     lisinopril (ZESTRIL) 30 MG tablet Take 1 tablet (30 mg) by mouth daily 90 tablet 1     magnesium oxide (MAG-OX) 400 (241.3 MG) MG tablet Take 1 tablet (400 mg) by mouth daily 60 tablet 3     rivaroxaban ANTICOAGULANT (XARELTO ANTICOAGULANT) 20 MG TABS tablet Take 1 tablet (20 mg) by mouth daily (with dinner) 90 tablet 3     Dietary Management Product (VASCULERA) TABS Take 1 tablet by mouth 2 times daily (Patient not taking: Reported on 10/19/2022)       No Known Allergies  Recent Labs   Lab Test 02/07/22  1351 04/15/21  2323 10/22/19  1558 02/01/17  1552 03/05/16  0830 10/29/15  0951   LDL  --   --  131*  --  129* 132*   HDL  --   --  33*  --  37* 36*   TRIG  --   --  307*  --  140 185*   ALT  --  48  --   --   --   --    CR 0.98 1.17 1.03   < >  --   --    GFRESTIMATED >90 77 >90   < >  --   --    GFRESTBLACK  --  90 >90   < >  --   --    POTASSIUM 4.1 4.0 4.0   < >  --   --    TSH  --   --   --   --   --  2.22    < > =  "values in this interval not displayed.        Reviewed and updated as needed this visit by clinical staff   Tobacco  Allergies  Meds   Med Hx  Surg Hx  Fam Hx  Soc Hx        Reviewed and updated as needed this visit by Provider                 Past Medical History:   Diagnosis Date     DVT (deep vein thrombosis) in pregnancy     right leg dx 1/13/2018     Family history of colon cancer      GERD (gastroesophageal reflux disease)      HTN (hypertension)      Hypotestosteronemia       Past Surgical History:   Procedure Laterality Date     APPENDECTOMY  Feb 1998     ARTHROSCOPY KNEE      right, 1/8/2018     SURGICAL HISTORY OF -  Left 1997, 2003    left knee surgery     SURGICAL HISTORY OF -  Right 1999    right shoulder       Review of Systems   Constitutional: Negative for chills and fever.   HENT: Negative for congestion, ear pain, hearing loss and sore throat.    Eyes: Negative for pain and visual disturbance.   Respiratory: Negative for cough and shortness of breath.    Cardiovascular: Positive for peripheral edema. Negative for chest pain and palpitations.   Gastrointestinal: Positive for heartburn. Negative for abdominal pain, constipation, diarrhea, hematochezia and nausea.   Genitourinary: Negative for dysuria, frequency, genital sores, hematuria and urgency.   Musculoskeletal: Positive for arthralgias, joint swelling and myalgias.   Skin: Negative for rash.   Neurological: Positive for headaches and paresthesias. Negative for dizziness and weakness.   Psychiatric/Behavioral: Positive for mood changes. The patient is not nervous/anxious.          OBJECTIVE:   /82   Pulse 96   Temp 98.9  F (37.2  C) (Tympanic)   Resp 18   Ht 1.81 m (5' 11.26\")   Wt (!) 153.8 kg (339 lb)   SpO2 100%   BMI 46.94 kg/m      Physical Exam  GENERAL: healthy, alert and no distress  EYES: Eyes grossly normal to inspection and conjunctivae and sclerae normal  NECK: no adenopathy, no asymmetry, masses, or scars and " thyroid normal to palpation  RESP: lungs clear to auscultation - no rales, rhonchi or wheezes  CV: regular rate and rhythm, normal S1 S2, no S3 or S4, no murmur, click or rub, peripheral edema present and peripheral pulses strong  ABDOMEN: soft, obese, nontender, no hepatosplenomegaly, no masses and bowel sounds normal  MS: no gross musculoskeletal defects noted, no edema  SKIN: no suspicious lesions or rashes  NEURO: Normal strength and tone, mentation intact and speech normal  PSYCH: mentation appears normal, affect normal/bright    Diagnostic Test Results:  Labs reviewed in Epic  No results found for this or any previous visit (from the past 24 hour(s)).    ASSESSMENT/PLAN:       ICD-10-CM    1. Routine general medical examination at a health care facility  Z00.00 CANCELED: INFLUENZA VACCINE IM > 6 MONTHS VALENT IIV4 (AFLURIA/FLUZONE)      2. Pulmonary embolism on right (H)  I26.99 rivaroxaban ANTICOAGULANT (XARELTO ANTICOAGULANT) 20 MG TABS tablet      3. Morbid obesity (H)  E66.01       4. Deep vein thrombosis (DVT) of proximal lower extremity, unspecified chronicity, unspecified laterality (H)  I82.4Y9 rivaroxaban ANTICOAGULANT (XARELTO ANTICOAGULANT) 20 MG TABS tablet      5. Benign essential hypertension  I10 TSH with free T4 reflex     Comprehensive metabolic panel (BMP + Alb, Alk Phos, ALT, AST, Total. Bili, TP)     lisinopril (ZESTRIL) 30 MG tablet     TSH with free T4 reflex     Comprehensive metabolic panel (BMP + Alb, Alk Phos, ALT, AST, Total. Bili, TP)     DISCONTINUED: lisinopril (ZESTRIL) 40 MG tablet      6. Screening for diabetes mellitus  Z13.1 Comprehensive metabolic panel (BMP + Alb, Alk Phos, ALT, AST, Total. Bili, TP)     Comprehensive metabolic panel (BMP + Alb, Alk Phos, ALT, AST, Total. Bili, TP)      7. Screening for lipoid disorders  Z13.220 Lipid panel reflex to direct LDL Fasting     Lipid panel reflex to direct LDL Fasting        Labs pending.  Blood pressure mild elevated, will  increase Lisinopril to 30 mg daily and continue to monitor. Return to have nurses recheck blood pressure in 2 weeks.  Continue with xarelto and monitoring.  Wear compression stockings.    Patient Instructions   Increase Lisinopril to 30 mg daily.Continue to monitor pressure.  Will be notified of pending labs.  Increase exercise and healthy diet.  Wear compression stockings.  Return to have nurses recheck pressure in 2 weeks.    Preventive Health Recommendations  Male Ages 40 to 49    Yearly exam:             See your health care provider every year in order to  o   Review health changes.   o   Discuss preventive care.    o   Review your medicines if your doctor has prescribed any.    You should be tested each year for STDs (sexually transmitted diseases) if you re at risk.     Have a cholesterol test every 5 years.     Have a colonoscopy (test for colon cancer) if someone in your family has had colon cancer or polyps before age 50.     After age 45, have a diabetes test (fasting glucose). If you are at risk for diabetes, you should have this test every 3 years.      Talk with your health care provider about whether or not a prostate cancer screening test (PSA) is right for you.    Shots: Get a flu shot each year. Get a tetanus shot every 10 years.     Nutrition:    Eat at least 5 servings of fruits and vegetables daily.     Eat whole-grain bread, whole-wheat pasta and brown rice instead of white grains and rice.     Get adequate Calcium and Vitamin D.     Lifestyle    Exercise for at least 150 minutes a week (30 minutes a day, 5 days a week). This will help you control your weight and prevent disease.     Limit alcohol to one drink per day.     No smoking.     Wear sunscreen to prevent skin cancer.     See your dentist every six months for an exam and cleaning.            COUNSELING:   Reviewed preventive health counseling, as reflected in patient instructions       Regular exercise       Healthy  "diet/nutrition    Estimated body mass index is 46.94 kg/m  as calculated from the following:    Height as of this encounter: 1.81 m (5' 11.26\").    Weight as of this encounter: 153.8 kg (339 lb).     Weight management plan: Discussed healthy diet and exercise guidelines    He reports that he has never smoked. He quit smokeless tobacco use about 20 years ago.      Counseling Resources:  ATP IV Guidelines  Pooled Cohorts Equation Calculator  FRAX Risk Assessment  ICSI Preventive Guidelines  Dietary Guidelines for Americans, 2010  USDA's MyPlate  ASA Prophylaxis  Lung CA Screening    CHAIM Chase CNP  [facility]  "

## 2022-11-02 ENCOUNTER — TELEPHONE (OUTPATIENT)
Dept: FAMILY MEDICINE | Facility: CLINIC | Age: 42
End: 2022-11-02

## 2022-11-02 ENCOUNTER — ALLIED HEALTH/NURSE VISIT (OUTPATIENT)
Dept: FAMILY MEDICINE | Facility: CLINIC | Age: 42
End: 2022-11-02
Payer: COMMERCIAL

## 2022-11-02 VITALS — HEART RATE: 87 BPM | DIASTOLIC BLOOD PRESSURE: 74 MMHG | OXYGEN SATURATION: 95 % | SYSTOLIC BLOOD PRESSURE: 128 MMHG

## 2022-11-02 DIAGNOSIS — I10 BENIGN ESSENTIAL HYPERTENSION: Primary | ICD-10-CM

## 2022-11-02 PROCEDURE — 99207 PR NO CHARGE NURSE ONLY: CPT

## 2022-11-02 NOTE — TELEPHONE ENCOUNTER
Edwin Nuno is a 41 year old year old patient who comes in today for a Blood Pressure check because of medication change.    Vital Signs as repeated by RN 95% 87 bpm 128/74    Patient is taking medication as prescribed    Patient is tolerating medications well.    Patient is not monitoring Blood Pressure at home.  Average readings if yes are NA    Current complaints: none    Disposition:  Will be copied into tele encounter and routed to provider Sol

## 2022-11-02 NOTE — PROGRESS NOTES
Edwin Nuno is a 41 year old year old patient who comes in today for a Blood Pressure check because of medication change.    Vital Signs as repeated by RN 95% 87 bpm 128/74    Patient is taking medication as prescribed    Patient is tolerating medications well.    Patient is not monitoring Blood Pressure at home.  Average readings if yes are NA    Current complaints: none    Disposition:  Will be copied into tele encounter and routed to provider Sol Henao RN [facility]

## 2023-01-13 ENCOUNTER — OFFICE VISIT (OUTPATIENT)
Dept: FAMILY MEDICINE | Facility: CLINIC | Age: 43
End: 2023-01-13
Payer: COMMERCIAL

## 2023-01-13 VITALS
SYSTOLIC BLOOD PRESSURE: 136 MMHG | RESPIRATION RATE: 18 BRPM | OXYGEN SATURATION: 97 % | TEMPERATURE: 98 F | HEART RATE: 66 BPM | DIASTOLIC BLOOD PRESSURE: 84 MMHG

## 2023-01-13 DIAGNOSIS — J06.9 VIRAL URI WITH COUGH: Primary | ICD-10-CM

## 2023-01-13 DIAGNOSIS — Z87.09 HISTORY OF REACTIVE AIRWAY DISEASE: ICD-10-CM

## 2023-01-13 PROCEDURE — 99213 OFFICE O/P EST LOW 20 MIN: CPT | Performed by: FAMILY MEDICINE

## 2023-01-13 RX ORDER — INHALER, ASSIST DEVICES
SPACER (EA) MISCELLANEOUS
Qty: 1 EACH | Refills: 0 | Status: SHIPPED | OUTPATIENT
Start: 2023-01-13 | End: 2024-01-15

## 2023-01-13 RX ORDER — ALBUTEROL SULFATE 90 UG/1
2 AEROSOL, METERED RESPIRATORY (INHALATION) EVERY 6 HOURS PRN
Qty: 18 G | Refills: 0 | Status: SHIPPED | OUTPATIENT
Start: 2023-01-13 | End: 2024-01-15

## 2023-01-13 ASSESSMENT — PAIN SCALES - GENERAL: PAINLEVEL: NO PAIN (1)

## 2023-01-13 NOTE — PROGRESS NOTES
Assessment & Plan     Viral URI with cough  Discussed, with history of RAD, plan albuterol if needed  Discussed OTC medication to use.  - albuterol (PROAIR HFA/PROVENTIL HFA/VENTOLIN HFA) 108 (90 Base) MCG/ACT inhaler; Inhale 2 puffs into the lungs every 6 hours as needed for shortness of breath, wheezing or cough  - spacer (OPTICHAMBER DANIELITO) holding chamber; Use with each HFA inhaler use    History of reactive airway disease    - albuterol (PROAIR HFA/PROVENTIL HFA/VENTOLIN HFA) 108 (90 Base) MCG/ACT inhaler; Inhale 2 puffs into the lungs every 6 hours as needed for shortness of breath, wheezing or cough  - spacer (OPTICHAMBER DANIELITO) holding chamber; Use with each HFA inhaler use             See Patient Instructions    Return if symptoms worsen or fail to improve.    Zion Mtz MD  [facility] MONI Stinson is a 42 year old, presenting for the following health issues:  URI (Possible bronchitis-patient was exposed )      URI    History of Present Illness       Reason for visit:  Possible acute bronchitis  Symptom onset:  1-3 days ago  Symptoms include:  Sore throat, chest pain, fatigue  Symptom intensity:  Mild  Symptom progression:  Staying the same  Had these symptoms before:  No  What makes it worse:  Cold air    He eats 0-1 servings of fruits and vegetables daily.He consumes 0 sweetened beverage(s) daily.He exercises with enough effort to increase his heart rate 9 or less minutes per day.  He exercises with enough effort to increase his heart rate 3 or less days per week.   He is taking medications regularly.   Patient did test for Covid last night at home and it was negative.     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    Started with sore throat that felt dry and then in the lungs feeling short of breath  Last night negative COVID test.    No history of lung issues, no albuterol.  No personal smoking history.  In past used albuterol inhaler when ever had exposures to smoke, when lived at home as a kid Second hand smoke exposure.      Review of Systems   Constitutional, HEENT, cardiovascular, pulmonary, gi and gu systems are negative, except as otherwise noted.      Objective    /84 (BP Location: Right arm, Patient Position: Sitting, Cuff Size: Adult Large)   Pulse 66   Temp 98  F (36.7  C) (Tympanic)   Resp 18   SpO2 97%   There is no height or weight on file to calculate BMI.  Physical Exam   GENERAL: healthy, alert and no distress  HENT: ear canals and TM's normal, nose and mouth without ulcers or lesions  NECK: no adenopathy, no asymmetry, masses, or scars and thyroid normal to palpation  RESP: lungs clear to auscultation - no rales, rhonchi or wheezes  CV: regular rate and rhythm, normal S1 S2, no S3 or S4, no murmur, click or rub, no peripheral edema and peripheral pulses strong  ABDOMEN: soft, nontender, no hepatosplenomegaly, no masses and bowel sounds normal  MS: no gross musculoskeletal defects noted, no edema

## 2023-01-13 NOTE — PATIENT INSTRUCTIONS
No pill decongestants (sudafed, pseudoephedrine,)  OK to use coricidin HBP (safe medication dextromethorphan cough suppressant, guaifenesin mucinex to break up mucous)  Ok to use afrin nasal decongestant if nasal passages are plugged/congested    Not ok to use NSAIDs or aspirin.  OK to use tylenol/acetaminophen

## 2023-04-06 ENCOUNTER — VIRTUAL VISIT (OUTPATIENT)
Dept: FAMILY MEDICINE | Facility: CLINIC | Age: 43
End: 2023-04-06
Payer: COMMERCIAL

## 2023-04-06 DIAGNOSIS — K21.9 GASTROESOPHAGEAL REFLUX DISEASE WITHOUT ESOPHAGITIS: ICD-10-CM

## 2023-04-06 DIAGNOSIS — I10 BENIGN ESSENTIAL HYPERTENSION: Primary | ICD-10-CM

## 2023-04-06 DIAGNOSIS — E78.5 HYPERLIPIDEMIA WITH TARGET LDL LESS THAN 130: ICD-10-CM

## 2023-04-06 DIAGNOSIS — I26.99 PULMONARY EMBOLISM ON RIGHT (H): ICD-10-CM

## 2023-04-06 DIAGNOSIS — R73.09 ELEVATED GLUCOSE: ICD-10-CM

## 2023-04-06 DIAGNOSIS — Z80.0 FAMILY HISTORY OF COLON CANCER: ICD-10-CM

## 2023-04-06 DIAGNOSIS — E34.9 HYPOTESTOSTERONISM: ICD-10-CM

## 2023-04-06 DIAGNOSIS — E66.01 MORBID OBESITY (H): ICD-10-CM

## 2023-04-06 DIAGNOSIS — I82.4Y9 DEEP VEIN THROMBOSIS (DVT) OF PROXIMAL LOWER EXTREMITY, UNSPECIFIED CHRONICITY, UNSPECIFIED LATERALITY (H): ICD-10-CM

## 2023-04-06 PROCEDURE — 99213 OFFICE O/P EST LOW 20 MIN: CPT | Mod: VID | Performed by: FAMILY MEDICINE

## 2023-04-06 RX ORDER — OMEPRAZOLE 40 MG/1
1 CAPSULE, DELAYED RELEASE ORAL DAILY
COMMUNITY
Start: 2023-03-01 | End: 2023-04-06

## 2023-04-06 RX ORDER — OMEPRAZOLE 40 MG/1
40 CAPSULE, DELAYED RELEASE ORAL DAILY
Qty: 90 CAPSULE | Refills: 3 | Status: SHIPPED | OUTPATIENT
Start: 2023-04-06

## 2023-04-06 RX ORDER — LISINOPRIL 30 MG/1
30 TABLET ORAL DAILY
Qty: 90 TABLET | Refills: 1 | Status: SHIPPED | OUTPATIENT
Start: 2023-04-06 | End: 2023-10-18

## 2023-04-06 NOTE — PROGRESS NOTES
Milad is a 42 year old who is being evaluated via a billable video visit.      How would you like to obtain your AVS? MyChart  If the video visit is dropped, the invitation should be resent by: Text to cell phone: [phone]  Will anyone else be joining your video visit? 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  - Lipid panel reflex to direct LDL Fasting; Future    Elevated glucose  We will get A1c in 6 months.  - Hemoglobin A1c; Future    Gastroesophageal reflux disease without esophagitis  Chronic he has occasional recurrent symptoms on omeprazole daily will get H. pylori test consider EGD  - omeprazole (PRILOSEC) 40 MG DR capsule; Take 1 capsule (40 mg) by mouth daily  - Helicobacter pylori Antigen Stool; Future    Family history of colon cancer  In grandmother mother in her mid 50s mother had polyps starting in her later 40s consider colonoscopy before age 45 he will discuss with his primary in 6 months    Morbid obesity (H)  Working on weight loss.  Consider Ozempic.  Will discuss with his primary in 6 months    Hypotestosteronism  Off of testosterone since his  DVTs            Jose Orozco MD  [facility]    Nessa Stinson is a 42 year old, presenting for the following health issues: Overall patient is doing well.  Continues with his medications for his blood pressure and for his recurrent DVTs.  Blood pressures have been 1 teens 120s over 60s and 70s at home.  Working on weight loss.  Still occasional reflux symptoms but not very often.  Medication Refill (Lisinopril, Xarelto, Omeprazole.)        4/6/2023     6:58 AM   Additional Questions   Roomed by Ghada     History of Present Illness       Reason for visit:  Medication check up and refills    He eats 0-1 servings of fruits and vegetables daily.He consumes 0 sweetened beverage(s) daily.He exercises with enough effort to increase his heart rate 30 to 60 minutes per day.  He exercises with enough effort to increase his heart rate 3 or less days per week.   He is taking medications regularly.       Medication refills.           Review of Systems   Constitutional, HEENT, cardiovascular, pulmonary, gi and gu systems are negative, except as otherwise noted.      Objective           Vitals:  No vitals were obtained today due to virtual visit.    Physical Exam   GENERAL: Healthy, alert and no distress  EYES: Eyes grossly normal to inspection.  No discharge or erythema, or obvious scleral/conjunctival abnormalities.  RESP: No audible wheeze, cough, or visible cyanosis.  No visible retractions or increased work of breathing.    SKIN: Visible skin clear. No significant rash, abnormal pigmentation or lesions.  NEURO: Cranial nerves grossly intact.  Mentation and speech appropriate for age.  PSYCH: Mentation appears normal, affect normal/bright, judgement and insight intact, normal speech and appearance well-groomed.    Office Visit on 10/19/2022   Component Date Value Ref Range Status     TSH 10/19/2022 0.98  0.30 - 4.20 uIU/mL Final     Sodium 10/19/2022 134 (L)  136 - 145 mmol/L Final      Potassium 10/19/2022 4.9  3.4 - 5.3 mmol/L Final     Chloride 10/19/2022 99  98 - 107 mmol/L Final     Carbon Dioxide (CO2) 10/19/2022 21 (L)  22 - 29 mmol/L Final     Anion Gap 10/19/2022 14  7 - 15 mmol/L Final     Urea Nitrogen 10/19/2022 21.5 (H)  6.0 - 20.0 mg/dL Final     Creatinine 10/19/2022 0.94  0.67 - 1.17 mg/dL Final     Calcium 10/19/2022 9.9  8.6 - 10.0 mg/dL Final     Glucose 10/19/2022 156 (H)  70 - 99 mg/dL Final     Alkaline Phosphatase 10/19/2022 59  40 - 129 U/L Final     AST 10/19/2022 33  10 - 50 U/L Final     ALT 10/19/2022 75 (H)  10 - 50 U/L Final     Protein Total 10/19/2022 7.6  6.4 - 8.3 g/dL Final     Albumin 10/19/2022 4.7  3.5 - 5.2 g/dL Final     Bilirubin Total 10/19/2022 0.4  <=1.2 mg/dL Final     GFR Estimate 10/19/2022 >90  >60 mL/min/1.73m2 Final    Effective December 21, 2021 eGFRcr in adults is calculated using the 2021 CKD-EPI creatinine equation which includes age and gender (Isabel et al., NEJM, DOI: 10.1056/EYAPwl0444246)     Cholesterol 10/19/2022 281 (H)  <200 mg/dL Final     Triglycerides 10/19/2022 420 (H)  <150 mg/dL Final     Direct Measure HDL 10/19/2022 36 (L)  >=40 mg/dL Final     LDL Cholesterol Calculated 10/19/2022    Final    Cannot estimate LDL when triglyceride exceeds 400 mg/dL     Non HDL Cholesterol 10/19/2022 245 (H)  <130 mg/dL Final     LDL Cholesterol Direct 10/19/2022 170 (H)  <100 mg/dL Final    Age 2-19 years:  Desirable: 0-110 mg/dL   Borderline high: 110-129 mg/dL   High: >= 130 mg/dL    Age 20 years and older:  Desirable: <100mg/dL  Above desirable: 100-129 mg/dL   Borderline high: 130-159 mg/dL   High: 160-189 mg/dL   Very high: >= 190 mg/dL               Video-Visit Details    Type of service:  Video Visit     Originating Location (pt. Location): Other Car  Distant Location (provider location):  On-site  Platform used for Video Visit: Well

## 2023-05-16 ENCOUNTER — LAB (OUTPATIENT)
Dept: LAB | Facility: CLINIC | Age: 43
End: 2023-05-16
Payer: COMMERCIAL

## 2023-05-16 DIAGNOSIS — R73.09 ELEVATED GLUCOSE: ICD-10-CM

## 2023-05-16 DIAGNOSIS — I10 BENIGN ESSENTIAL HYPERTENSION: ICD-10-CM

## 2023-05-16 DIAGNOSIS — E78.5 HYPERLIPIDEMIA WITH TARGET LDL LESS THAN 130: ICD-10-CM

## 2023-05-16 LAB
ALBUMIN SERPL BCG-MCNC: 4.6 G/DL (ref 3.5–5.2)
ALP SERPL-CCNC: 57 U/L (ref 40–129)
ALT SERPL W P-5'-P-CCNC: 79 U/L (ref 10–50)
ANION GAP SERPL CALCULATED.3IONS-SCNC: 11 MMOL/L (ref 7–15)
AST SERPL W P-5'-P-CCNC: 47 U/L (ref 10–50)
BASOPHILS # BLD AUTO: 0 10E3/UL (ref 0–0.2)
BASOPHILS NFR BLD AUTO: 0 %
BILIRUB SERPL-MCNC: 0.4 MG/DL
BUN SERPL-MCNC: 17.8 MG/DL (ref 6–20)
CALCIUM SERPL-MCNC: 9.4 MG/DL (ref 8.6–10)
CHLORIDE SERPL-SCNC: 103 MMOL/L (ref 98–107)
CHOLEST SERPL-MCNC: 223 MG/DL
CREAT SERPL-MCNC: 1.01 MG/DL (ref 0.67–1.17)
DEPRECATED HCO3 PLAS-SCNC: 26 MMOL/L (ref 22–29)
EOSINOPHIL # BLD AUTO: 0.1 10E3/UL (ref 0–0.7)
EOSINOPHIL NFR BLD AUTO: 2 %
ERYTHROCYTE [DISTWIDTH] IN BLOOD BY AUTOMATED COUNT: 12.5 % (ref 10–15)
GFR SERPL CREATININE-BSD FRML MDRD: >90 ML/MIN/1.73M2
GLUCOSE SERPL-MCNC: 95 MG/DL (ref 70–99)
HBA1C MFR BLD: 6.1 % (ref 0–5.6)
HCT VFR BLD AUTO: 38.6 % (ref 40–53)
HDLC SERPL-MCNC: 31 MG/DL
HGB BLD-MCNC: 13.1 G/DL (ref 13.3–17.7)
IMM GRANULOCYTES # BLD: 0 10E3/UL
IMM GRANULOCYTES NFR BLD: 0 %
LDLC SERPL CALC-MCNC: 145 MG/DL
LYMPHOCYTES # BLD AUTO: 2.6 10E3/UL (ref 0.8–5.3)
LYMPHOCYTES NFR BLD AUTO: 38 %
MCH RBC QN AUTO: 29.9 PG (ref 26.5–33)
MCHC RBC AUTO-ENTMCNC: 33.9 G/DL (ref 31.5–36.5)
MCV RBC AUTO: 88 FL (ref 78–100)
MONOCYTES # BLD AUTO: 0.5 10E3/UL (ref 0–1.3)
MONOCYTES NFR BLD AUTO: 7 %
NEUTROPHILS # BLD AUTO: 3.7 10E3/UL (ref 1.6–8.3)
NEUTROPHILS NFR BLD AUTO: 53 %
NONHDLC SERPL-MCNC: 192 MG/DL
PLATELET # BLD AUTO: 206 10E3/UL (ref 150–450)
POTASSIUM SERPL-SCNC: 4.4 MMOL/L (ref 3.4–5.3)
PROT SERPL-MCNC: 7.3 G/DL (ref 6.4–8.3)
RBC # BLD AUTO: 4.38 10E6/UL (ref 4.4–5.9)
SODIUM SERPL-SCNC: 140 MMOL/L (ref 136–145)
TRIGL SERPL-MCNC: 235 MG/DL
WBC # BLD AUTO: 7 10E3/UL (ref 4–11)

## 2023-05-16 PROCEDURE — 36415 COLL VENOUS BLD VENIPUNCTURE: CPT

## 2023-05-16 PROCEDURE — 80061 LIPID PANEL: CPT

## 2023-05-16 PROCEDURE — 85025 COMPLETE CBC W/AUTO DIFF WBC: CPT

## 2023-05-16 PROCEDURE — 80053 COMPREHEN METABOLIC PANEL: CPT

## 2023-05-16 PROCEDURE — 83036 HEMOGLOBIN GLYCOSYLATED A1C: CPT

## 2023-06-06 ENCOUNTER — LAB (OUTPATIENT)
Dept: LAB | Facility: CLINIC | Age: 43
End: 2023-06-06
Payer: COMMERCIAL

## 2023-06-06 ENCOUNTER — OFFICE VISIT (OUTPATIENT)
Dept: FAMILY MEDICINE | Facility: CLINIC | Age: 43
End: 2023-06-06
Payer: COMMERCIAL

## 2023-06-06 VITALS
RESPIRATION RATE: 16 BRPM | WEIGHT: 315 LBS | TEMPERATURE: 97 F | HEIGHT: 72 IN | BODY MASS INDEX: 42.66 KG/M2 | OXYGEN SATURATION: 95 % | SYSTOLIC BLOOD PRESSURE: 128 MMHG | HEART RATE: 75 BPM | DIASTOLIC BLOOD PRESSURE: 78 MMHG

## 2023-06-06 DIAGNOSIS — R79.89 LFT ELEVATION: ICD-10-CM

## 2023-06-06 DIAGNOSIS — Z13.220 SCREENING FOR LIPOID DISORDERS: ICD-10-CM

## 2023-06-06 DIAGNOSIS — I10 BENIGN ESSENTIAL HYPERTENSION: ICD-10-CM

## 2023-06-06 DIAGNOSIS — E66.01 MORBID OBESITY (H): ICD-10-CM

## 2023-06-06 DIAGNOSIS — R53.83 OTHER FATIGUE: ICD-10-CM

## 2023-06-06 DIAGNOSIS — Z13.1 SCREENING FOR DIABETES MELLITUS: ICD-10-CM

## 2023-06-06 DIAGNOSIS — R53.83 OTHER FATIGUE: Primary | ICD-10-CM

## 2023-06-06 LAB
ALBUMIN SERPL BCG-MCNC: 4.7 G/DL (ref 3.5–5.2)
ALP SERPL-CCNC: 60 U/L (ref 40–129)
ALT SERPL W P-5'-P-CCNC: 80 U/L (ref 10–50)
ANION GAP SERPL CALCULATED.3IONS-SCNC: 10 MMOL/L (ref 7–15)
AST SERPL W P-5'-P-CCNC: 47 U/L (ref 10–50)
BILIRUB SERPL-MCNC: 0.4 MG/DL
BUN SERPL-MCNC: 17.1 MG/DL (ref 6–20)
CALCIUM SERPL-MCNC: 9.8 MG/DL (ref 8.6–10)
CHLORIDE SERPL-SCNC: 104 MMOL/L (ref 98–107)
CREAT SERPL-MCNC: 1.1 MG/DL (ref 0.67–1.17)
DEPRECATED HCO3 PLAS-SCNC: 28 MMOL/L (ref 22–29)
ERYTHROCYTE [DISTWIDTH] IN BLOOD BY AUTOMATED COUNT: 12.4 % (ref 10–15)
GFR SERPL CREATININE-BSD FRML MDRD: 86 ML/MIN/1.73M2
GLUCOSE SERPL-MCNC: 95 MG/DL (ref 70–99)
HBA1C MFR BLD: 6.1 % (ref 0–5.6)
HCT VFR BLD AUTO: 36.9 % (ref 40–53)
HGB BLD-MCNC: 12.8 G/DL (ref 13.3–17.7)
MCH RBC QN AUTO: 30 PG (ref 26.5–33)
MCHC RBC AUTO-ENTMCNC: 34.7 G/DL (ref 31.5–36.5)
MCV RBC AUTO: 87 FL (ref 78–100)
PLATELET # BLD AUTO: 231 10E3/UL (ref 150–450)
POTASSIUM SERPL-SCNC: 4.5 MMOL/L (ref 3.4–5.3)
PROT SERPL-MCNC: 7.1 G/DL (ref 6.4–8.3)
RBC # BLD AUTO: 4.26 10E6/UL (ref 4.4–5.9)
SODIUM SERPL-SCNC: 142 MMOL/L (ref 136–145)
WBC # BLD AUTO: 7.1 10E3/UL (ref 4–11)

## 2023-06-06 PROCEDURE — 83036 HEMOGLOBIN GLYCOSYLATED A1C: CPT

## 2023-06-06 PROCEDURE — 36415 COLL VENOUS BLD VENIPUNCTURE: CPT

## 2023-06-06 PROCEDURE — 90471 IMMUNIZATION ADMIN: CPT | Performed by: NURSE PRACTITIONER

## 2023-06-06 PROCEDURE — 80053 COMPREHEN METABOLIC PANEL: CPT

## 2023-06-06 PROCEDURE — 85027 COMPLETE CBC AUTOMATED: CPT

## 2023-06-06 PROCEDURE — 99214 OFFICE O/P EST MOD 30 MIN: CPT | Mod: 25 | Performed by: NURSE PRACTITIONER

## 2023-06-06 PROCEDURE — 90715 TDAP VACCINE 7 YRS/> IM: CPT | Performed by: NURSE PRACTITIONER

## 2023-06-06 RX ORDER — CETIRIZINE HYDROCHLORIDE 10 MG/1
10 TABLET ORAL DAILY
COMMUNITY

## 2023-06-06 ASSESSMENT — PAIN SCALES - GENERAL: PAINLEVEL: NO PAIN (0)

## 2023-06-06 NOTE — PATIENT INSTRUCTIONS
Will be notified of pending labs.  Tetanus booster given.      Our Clinic hours are:  Mondays    7:20 am - 7 pm  Tues -  Fri  7:20 am - 5 pm    Clinic Phone: [phone]    The clinic lab opens at 7:30 am Mon - Fri and appointments are required.    [facility]  Ph. [phone]  Monday  8 am - 7pm  Tues - Fri 8 am - 5:30 pm

## 2023-06-06 NOTE — PROGRESS NOTES
"  Assessment & Plan     Other fatigue    - CBC with platelets; Future  - Comprehensive metabolic panel (BMP + Alb, Alk Phos, ALT, AST, Total. Bili, TP); Future    Screening for diabetes mellitus    - Hemoglobin A1c; Future    Benign essential hypertension    - Comprehensive metabolic panel (BMP + Alb, Alk Phos, ALT, AST, Total. Bili, TP); Future      Will repeat labs to see if they are trending down to normal with recent weight loss.  Reviewed lifestyle modifications stressing healthier diet, exercise.         BMI:   Estimated body mass index is 44.42 kg/m  as calculated from the following:    Height as of this encounter: 1.826 m (5' 11.89\").    Weight as of this encounter: 148.1 kg (326 lb 8 oz).       See Patient Instructions  Patient Instructions   Will be notified of pending labs.  Tetanus booster given.      Our Clinic hours are:  Mondays    7:20 am - 7 pm  Tues -  Fri  7:20 am - 5 pm    Clinic Phone: [phone]    The clinic lab opens at 7:30 am Mon - Fri and appointments are required.    [facility]  Ph. [phone]  Monday  8 am - 7pm  Tues - Fri 8 am - 5:30 pm           Vika Horton, CHAIM CNP  [facility]    Nessa Stinson is a 42 year old, presenting for the following health issues:  Results        6/6/2023     4:01 PM   Additional Questions   Roomed by Bre Ayala CMA     History of Present Illness       Reason for visit:  Irregular blood work (liver enzymes) blood thinners    He eats 4 or more servings of fruits and vegetables daily.He consumes 0 sweetened beverage(s) daily.He exercises with enough effort to increase his heart rate 10 to 19 minutes per day.  He exercises with enough effort to increase his heart rate 3 or less days per week.   He is taking medications regularly.         States he was feeling \"like crap\" and did a virtual visit about 3 weeks ago. Would like to discuss the lab results. He is primarily worried about one liver enzyme " "that was elevated.  Denies a lot of alcohol and does not take tylenol. He tried the anti inflammatory diet and was successful with 20 pound weight loss.  Current Outpatient Medications   Medication     cetirizine (ZYRTEC) 10 MG tablet     lisinopril (ZESTRIL) 30 MG tablet     omeprazole (PRILOSEC) 40 MG DR capsule     rivaroxaban ANTICOAGULANT (XARELTO ANTICOAGULANT) 20 MG TABS tablet     spacer (OPTICHAMBER DANIELITO) holding chamber     albuterol (PROAIR HFA/PROVENTIL HFA/VENTOLIN HFA) 108 (90 Base) MCG/ACT inhaler     magnesium oxide (MAG-OX) 400 (241.3 MG) MG tablet     No current facility-administered medications for this visit.     Past Medical History:   Diagnosis Date     DVT     right leg dx 1/13/2018     Family history of colon cancer      GERD (gastroesophageal reflux disease)      HTN (hypertension)      Hypotestosteronemia            Review of Systems   Constitutional, HEENT, cardiovascular, pulmonary, gi and gu systems are negative, except as otherwise noted.      Objective    /78 (BP Location: Right arm, Patient Position: Sitting, Cuff Size: Adult Large)   Pulse 75   Temp 97  F (36.1  C)   Resp 16   Ht 1.826 m (5' 11.89\")   Wt 148.1 kg (326 lb 8 oz)   SpO2 95%   BMI 44.42 kg/m    Body mass index is 44.42 kg/m .  Physical Exam   GENERAL: healthy, alert and no distress  NECK: no adenopathy, no asymmetry  RESP: lungs clear to auscultation - no rales, rhonchi or wheezes  CV: regular rate and rhythm, normal S1 S2, no S3 or S4, no murmur  ABDOMEN: soft, obese, nontender, no hepatosplenomegaly, no masses and bowel sounds normal  MS: no gross musculoskeletal defects noted                      "

## 2023-06-12 ENCOUNTER — HOSPITAL ENCOUNTER (OUTPATIENT)
Dept: ULTRASOUND IMAGING | Facility: CLINIC | Age: 43
Discharge: HOME | End: 2023-06-12
Attending: NURSE PRACTITIONER | Admitting: NURSE PRACTITIONER
Payer: COMMERCIAL

## 2023-06-12 DIAGNOSIS — E66.01 MORBID OBESITY (H): ICD-10-CM

## 2023-06-12 DIAGNOSIS — R79.89 LFT ELEVATION: ICD-10-CM

## 2023-06-12 PROCEDURE — 76700 US EXAM ABDOM COMPLETE: CPT

## 2023-06-22 ENCOUNTER — LAB (OUTPATIENT)
Dept: LAB | Facility: CLINIC | Age: 43
End: 2023-06-22
Payer: COMMERCIAL

## 2023-06-22 DIAGNOSIS — Z13.220 SCREENING FOR LIPOID DISORDERS: ICD-10-CM

## 2023-06-22 LAB
CHOLEST SERPL-MCNC: 200 MG/DL
HDLC SERPL-MCNC: 27 MG/DL
LDLC SERPL CALC-MCNC: 123 MG/DL
NONHDLC SERPL-MCNC: 173 MG/DL
TRIGL SERPL-MCNC: 252 MG/DL

## 2023-06-22 PROCEDURE — 36415 COLL VENOUS BLD VENIPUNCTURE: CPT

## 2023-06-22 PROCEDURE — 80061 LIPID PANEL: CPT

## 2023-09-06 NOTE — MR AVS SNAPSHOT
Edwin MARCIA Nuno   1/17/2018 1:45 PM   Anticoagulation Therapy Visit    Description:  37 year old male   Provider:  [facility]   Department:  [facility]           INR as of 1/17/2018     Today's INR 1.4!      Anticoagulation Summary as of 1/17/2018     INR goal 2.0-3.0   Today's INR 1.4!   Full instructions 1/17: 10 mg; 1/18: 10 mg; Otherwise No maintenance plan   Next INR check 1/19/2018    Indications   DVT (deep venous thrombosis) (H) [I82.409]  Pulmonary embolism on right (H) [I26.99]  Long-term (current) use of anticoagulants [Z79.01] [Z79.01]         Description     Take 10 mg warfarin today and tomorrow. Continue lovenox injections every 12 hours.      Your next Anticoagulation Clinic appointment(s)     Jan 19, 2018  8:30 AM CST   [facility]    [address] [address] [phone]              Contact Numbers     Please call [phone] with any problems or questions regarding your therapy.    If you need to cancel and/or reschedule your appointment please call one of the following numbers:  [facility] - [phone]  [facility] - [phone]  [facility] [phone]  [facility] [phone]  [place] - [phone]            January 2018 Details    Sun Mon Tue Wed Thu Fri Sat      1               2               3               4               5               6                 7               8               9               10               11               12               13                 14               15               16               17      10 mg   See details      18      10 mg         19            20                 21               22               23               24               25               26               27                 28               29               30               31                   Date Details   01/17 This INR check       Date of next INR:  1/19/2018     Refilled to appointment date 10/3/23.         How to take your warfarin dose     To take:  10 mg Take 2 of the 5 mg tablets.

## 2023-09-19 ENCOUNTER — PATIENT OUTREACH (OUTPATIENT)
Dept: CARE COORDINATION | Facility: CLINIC | Age: 43
End: 2023-09-19
Payer: COMMERCIAL

## 2023-10-03 ENCOUNTER — PATIENT OUTREACH (OUTPATIENT)
Dept: CARE COORDINATION | Facility: CLINIC | Age: 43
End: 2023-10-03
Payer: COMMERCIAL

## 2023-10-18 DIAGNOSIS — I10 BENIGN ESSENTIAL HYPERTENSION: ICD-10-CM

## 2023-10-18 RX ORDER — LISINOPRIL 30 MG/1
30 TABLET ORAL DAILY
Qty: 90 TABLET | Refills: 1 | Status: SHIPPED | OUTPATIENT
Start: 2023-10-18 | End: 2024-01-24

## 2023-12-10 ENCOUNTER — HEALTH MAINTENANCE LETTER (OUTPATIENT)
Age: 43
End: 2023-12-10

## 2023-12-19 ENCOUNTER — OFFICE VISIT (OUTPATIENT)
Dept: URGENT CARE | Facility: URGENT CARE | Age: 43
End: 2023-12-19
Payer: COMMERCIAL

## 2023-12-19 VITALS
OXYGEN SATURATION: 97 % | HEART RATE: 73 BPM | RESPIRATION RATE: 17 BRPM | DIASTOLIC BLOOD PRESSURE: 80 MMHG | WEIGHT: 315 LBS | SYSTOLIC BLOOD PRESSURE: 129 MMHG | TEMPERATURE: 97 F | BODY MASS INDEX: 45.57 KG/M2

## 2023-12-19 DIAGNOSIS — J01.00 ACUTE MAXILLARY SINUSITIS, RECURRENCE NOT SPECIFIED: Primary | ICD-10-CM

## 2023-12-19 DIAGNOSIS — J98.01 ACUTE BRONCHOSPASM: ICD-10-CM

## 2023-12-19 DIAGNOSIS — J10.1 INFLUENZA A: ICD-10-CM

## 2023-12-19 PROCEDURE — 99213 OFFICE O/P EST LOW 20 MIN: CPT | Performed by: NURSE PRACTITIONER

## 2023-12-19 RX ORDER — OSELTAMIVIR PHOSPHATE 75 MG/1
75 CAPSULE ORAL 2 TIMES DAILY
Qty: 10 CAPSULE | Refills: 0 | Status: SHIPPED | OUTPATIENT
Start: 2023-12-19 | End: 2023-12-24

## 2023-12-19 RX ORDER — ALBUTEROL SULFATE 90 UG/1
2 AEROSOL, METERED RESPIRATORY (INHALATION) EVERY 6 HOURS PRN
Qty: 18 G | Refills: 0 | Status: SHIPPED | OUTPATIENT
Start: 2023-12-19 | End: 2024-01-15

## 2023-12-19 ASSESSMENT — ENCOUNTER SYMPTOMS
WHEEZING: 0
EYE ITCHING: 0
DIARRHEA: 0
SHORTNESS OF BREATH: 0
NAUSEA: 0
VOMITING: 0

## 2023-12-19 NOTE — PROGRESS NOTES
Assessment & Plan     Acute maxillary sinusitis, recurrence not specified  - amoxicillin-clavulanate (AUGMENTIN) 875-125 MG tablet  Dispense: 20 tablet; Refill: 0    Acute bronchospasm  - albuterol (PROAIR HFA/PROVENTIL HFA/VENTOLIN HFA) 108 (90 Base) MCG/ACT inhaler  Dispense: 18 g; Refill:    Influenza A  - NP offered to treat since patient has son positive for influenza A during this visit and patient is symptomatic so and patient is in agreement to treat   - oseltamivir (TAMIFLU) 75 MG capsule  Dispense: 10 capsule; Refill: 0     Return in about 2 days (around 12/21/2023).    Mendy Cooper NP, NP  [facility]    Nessa Stinson is a 43 year old male (DVT x2, HTN, GERD)  who presents to clinic today for the following health issues: Patient gives the history of present illness of having intermittent fever, runny nose, stuffy nose, bilateral ear pain, frontal and maxillary pressure/headache more bilateral maxillary pressure than frontal and cough for the last 3 weeks but worse yesterday. Patient is not sure if he has an albuterol inhaler and would like a refill. Patient has been taking Tylenol/Ibuprofen. Patient son was diagnosed with strep throat and influenza A during this office visit.     Patient has a history of sinusitis     Chief Complaint   Patient presents with    Sinus Problem     Congestion, cough, sinus pressure. Woke up with pressure starting yesterday, had sinus infection 3 wks ago. Don't think it went fully away.   URI Adult  Onset of symptoms was 3 week(s) ago.  Course of illness is waxing and waning.    Severity moderately severe  Current and Associated symptoms: fever, runny nose, stuffy nose, bilateral ear pain, frontal and maxillary pressure/headache more bilateral maxillary pressure than frontal   Treatment measures tried include Tylenol/Ibuprofen.  Predisposing factors include HX of chronic sinusitis.    Review of Systems   Eyes:  Negative for itching.    Respiratory:  Negative for shortness of breath and wheezing.    Cardiovascular:  Negative for chest pain.   Gastrointestinal:  Negative for diarrhea, nausea and vomiting.   Skin:  Negative for rash.         Objective    /80   Pulse 73   Temp 97  F (36.1  C) (Tympanic)   Resp 17   Wt (!) 152 kg (335 lb)   SpO2 97%   BMI 45.57 kg/m    Physical Exam  Vitals and nursing note reviewed.   Constitutional:       Appearance: Normal appearance.   HENT:      Head: Normocephalic and atraumatic.      Right Ear: Tympanic membrane, ear canal and external ear normal.      Left Ear: Tympanic membrane, ear canal and external ear normal.      Nose: Rhinorrhea present.      Right Turbinates: Swollen.      Left Turbinates: Swollen.      Right Sinus: Maxillary sinus tenderness and frontal sinus tenderness present.      Left Sinus: Maxillary sinus tenderness and frontal sinus tenderness present.      Mouth/Throat:      Mouth: Mucous membranes are moist.      Pharynx: Posterior oropharyngeal erythema present.   Eyes:      Conjunctiva/sclera: Conjunctivae normal.   Cardiovascular:      Rate and Rhythm: Normal rate and regular rhythm.      Pulses: Normal pulses.      Heart sounds: Normal heart sounds.   Pulmonary:      Effort: Pulmonary effort is normal.      Breath sounds: Normal breath sounds.   Lymphadenopathy:      Cervical: Cervical adenopathy present.   Neurological:      Mental Status: He is alert.

## 2023-12-19 NOTE — PATIENT INSTRUCTIONS
Start Tamiflu (antiviral medicine) twice a day for 5 days with food  Drink plenty of fluids and rest  Tylenol or ibuprofen as needed for aches and fever   Stay home and away from others until you have been fever free for 24 hours without medications.  Be seen in person at urgent care if your symptoms do not improve after 10 days.  Continue to receive the influenza vaccine yearly.

## 2024-01-02 ENCOUNTER — HOSPITAL ENCOUNTER (EMERGENCY)
Facility: CLINIC | Age: 44
Discharge: HOME | End: 2024-01-03
Attending: STUDENT IN AN ORGANIZED HEALTH CARE EDUCATION/TRAINING PROGRAM | Admitting: STUDENT IN AN ORGANIZED HEALTH CARE EDUCATION/TRAINING PROGRAM
Payer: COMMERCIAL

## 2024-01-02 DIAGNOSIS — K63.89 EPIPLOIC APPENDAGITIS: ICD-10-CM

## 2024-01-02 LAB
BASOPHILS # BLD AUTO: 0 10E3/UL (ref 0–0.2)
BASOPHILS NFR BLD AUTO: 0 %
EOSINOPHIL # BLD AUTO: 0.1 10E3/UL (ref 0–0.7)
EOSINOPHIL NFR BLD AUTO: 1 %
ERYTHROCYTE [DISTWIDTH] IN BLOOD BY AUTOMATED COUNT: 12.7 % (ref 10–15)
HCT VFR BLD AUTO: 36 % (ref 40–53)
HGB BLD-MCNC: 12.5 G/DL (ref 13.3–17.7)
IMM GRANULOCYTES # BLD: 0 10E3/UL
IMM GRANULOCYTES NFR BLD: 0 %
LYMPHOCYTES # BLD AUTO: 3.4 10E3/UL (ref 0.8–5.3)
LYMPHOCYTES NFR BLD AUTO: 35 %
MCH RBC QN AUTO: 30.4 PG (ref 26.5–33)
MCHC RBC AUTO-ENTMCNC: 34.7 G/DL (ref 31.5–36.5)
MCV RBC AUTO: 88 FL (ref 78–100)
MONOCYTES # BLD AUTO: 0.6 10E3/UL (ref 0–1.3)
MONOCYTES NFR BLD AUTO: 7 %
NEUTROPHILS # BLD AUTO: 5.4 10E3/UL (ref 1.6–8.3)
NEUTROPHILS NFR BLD AUTO: 57 %
NRBC # BLD AUTO: 0 10E3/UL
NRBC BLD AUTO-RTO: 0 /100
PLATELET # BLD AUTO: 243 10E3/UL (ref 150–450)
RBC # BLD AUTO: 4.11 10E6/UL (ref 4.4–5.9)
WBC # BLD AUTO: 9.6 10E3/UL (ref 4–11)

## 2024-01-02 PROCEDURE — 96360 HYDRATION IV INFUSION INIT: CPT | Mod: 59 | Performed by: STUDENT IN AN ORGANIZED HEALTH CARE EDUCATION/TRAINING PROGRAM

## 2024-01-02 PROCEDURE — 36415 COLL VENOUS BLD VENIPUNCTURE: CPT | Performed by: STUDENT IN AN ORGANIZED HEALTH CARE EDUCATION/TRAINING PROGRAM

## 2024-01-02 PROCEDURE — 80053 COMPREHEN METABOLIC PANEL: CPT | Performed by: STUDENT IN AN ORGANIZED HEALTH CARE EDUCATION/TRAINING PROGRAM

## 2024-01-02 PROCEDURE — 85025 COMPLETE CBC W/AUTO DIFF WBC: CPT | Performed by: STUDENT IN AN ORGANIZED HEALTH CARE EDUCATION/TRAINING PROGRAM

## 2024-01-02 PROCEDURE — 99285 EMERGENCY DEPT VISIT HI MDM: CPT | Mod: 25 | Performed by: STUDENT IN AN ORGANIZED HEALTH CARE EDUCATION/TRAINING PROGRAM

## 2024-01-02 PROCEDURE — 99284 EMERGENCY DEPT VISIT MOD MDM: CPT | Performed by: STUDENT IN AN ORGANIZED HEALTH CARE EDUCATION/TRAINING PROGRAM

## 2024-01-03 ENCOUNTER — APPOINTMENT (OUTPATIENT)
Dept: CT IMAGING | Facility: CLINIC | Age: 44
End: 2024-01-03
Attending: STUDENT IN AN ORGANIZED HEALTH CARE EDUCATION/TRAINING PROGRAM
Payer: COMMERCIAL

## 2024-01-03 VITALS
TEMPERATURE: 98.3 F | RESPIRATION RATE: 20 BRPM | OXYGEN SATURATION: 96 % | HEART RATE: 80 BPM | SYSTOLIC BLOOD PRESSURE: 152 MMHG | DIASTOLIC BLOOD PRESSURE: 67 MMHG

## 2024-01-03 LAB
ALBUMIN SERPL BCG-MCNC: 4.4 G/DL (ref 3.5–5.2)
ALP SERPL-CCNC: 64 U/L (ref 40–150)
ALT SERPL W P-5'-P-CCNC: 69 U/L (ref 0–70)
ANION GAP SERPL CALCULATED.3IONS-SCNC: 9 MMOL/L (ref 7–15)
AST SERPL W P-5'-P-CCNC: 36 U/L (ref 0–45)
BILIRUB SERPL-MCNC: 0.2 MG/DL
BUN SERPL-MCNC: 19.2 MG/DL (ref 6–20)
CALCIUM SERPL-MCNC: 9.8 MG/DL (ref 8.6–10)
CHLORIDE SERPL-SCNC: 100 MMOL/L (ref 98–107)
CREAT SERPL-MCNC: 1.26 MG/DL (ref 0.67–1.17)
DEPRECATED HCO3 PLAS-SCNC: 28 MMOL/L (ref 22–29)
EGFRCR SERPLBLD CKD-EPI 2021: 73 ML/MIN/1.73M2
GLUCOSE SERPL-MCNC: 105 MG/DL (ref 70–99)
HOLD SPECIMEN: NORMAL
POTASSIUM SERPL-SCNC: 4.2 MMOL/L (ref 3.4–5.3)
PROT SERPL-MCNC: 7.4 G/DL (ref 6.4–8.3)
SODIUM SERPL-SCNC: 137 MMOL/L (ref 135–145)

## 2024-01-03 PROCEDURE — 250N000009 HC RX 250: Performed by: STUDENT IN AN ORGANIZED HEALTH CARE EDUCATION/TRAINING PROGRAM

## 2024-01-03 PROCEDURE — 258N000003 HC RX IP 258 OP 636: Performed by: STUDENT IN AN ORGANIZED HEALTH CARE EDUCATION/TRAINING PROGRAM

## 2024-01-03 PROCEDURE — 74177 CT ABD & PELVIS W/CONTRAST: CPT

## 2024-01-03 PROCEDURE — 250N000011 HC RX IP 250 OP 636: Performed by: STUDENT IN AN ORGANIZED HEALTH CARE EDUCATION/TRAINING PROGRAM

## 2024-01-03 RX ORDER — IOPAMIDOL 755 MG/ML
100 INJECTION, SOLUTION INTRAVASCULAR ONCE
Status: COMPLETED | OUTPATIENT
Start: 2024-01-03 | End: 2024-01-03

## 2024-01-03 RX ORDER — OXYCODONE HYDROCHLORIDE 5 MG/1
5 TABLET ORAL EVERY 6 HOURS PRN
Qty: 6 TABLET | Refills: 0 | Status: SHIPPED | OUTPATIENT
Start: 2024-01-03 | End: 2024-01-15

## 2024-01-03 RX ADMIN — SODIUM CHLORIDE 1000 ML: 9 INJECTION, SOLUTION INTRAVENOUS at 00:50

## 2024-01-03 RX ADMIN — IOPAMIDOL 100 ML: 755 INJECTION, SOLUTION INTRAVENOUS at 00:35

## 2024-01-03 RX ADMIN — SODIUM CHLORIDE 74 ML: 9 INJECTION, SOLUTION INTRAVENOUS at 00:36

## 2024-01-03 ASSESSMENT — ACTIVITIES OF DAILY LIVING (ADL): ADLS_ACUITY_SCORE: 35

## 2024-01-03 NOTE — ED TRIAGE NOTES
LLQ abd pain since yesterday. Pt denied N/V/D. LBM 1/1/24. Hx of appy 1997.      Triage Assessment (Adult)       Row Name 01/02/24 8897          Triage Assessment    Airway WDL WDL        Respiratory WDL    Respiratory WDL WDL        Skin Circulation/Temperature WDL    Skin Circulation/Temperature WDL WDL        Cardiac WDL    Cardiac WDL WDL        Peripheral/Neurovascular WDL    Peripheral Neurovascular WDL WDL        Cognitive/Neuro/Behavioral WDL    Cognitive/Neuro/Behavioral WDL WDL

## 2024-01-03 NOTE — DISCHARGE INSTRUCTIONS
You are diagnosed with a condition called epiploic appendagitis.  This is not a dangerous condition and it should resolve on its own in the next several days.  It is treated with pain medications and rest.  Take Tylenol every 4-6 hours and then use the prescribed pain medication for severe pain only.  Follow-up with your regular doctor when you are able to ensure your condition is improving.  If you develop fever, severe pain that does not improve with medications, or any other new or concerning symptoms return to the ER.

## 2024-01-03 NOTE — ED PROVIDER NOTES
History     Chief Complaint   Patient presents with    Abdominal Pain     LLQ abd pain since yesterday. Pt denied N/V/D. LBM 1/1/24. Hx of appy 1997.      HPI  Edwin Nuno is a 43 year old male who has GERD, hypertension, PE on Xarelto who presents to the emergency department for evaluation of abdominal pain.  Patient has been having left lower quadrant abdominal pain for the last day.  He is never had anything like this before.  Pain has been fairly constant.  Last bowel movement was earlier today and was normal.  He has a history of an appendectomy.  No history of diverticulitis.  He denies black or bloody stools.  No nausea or vomiting.  No fevers.  Pain is not worse with movement.  No chest pain or trouble breathing.  No urinary complaints.  He is not taking anything for pain.  Denies alcohol use.    Allergies:  No Known Allergies    Problem List:    Patient Active Problem List    Diagnosis Date Noted    Morbid obesity (H) 06/20/2018     Priority: Medium    DVT (deep venous thrombosis) (H) 01/15/2018     Priority: Medium    Long-term (current) use of anticoagulants [Z79.01] 01/15/2018     Priority: Medium    Pulmonary embolism on right (H) 01/14/2018     Priority: Medium    Benign essential hypertension 01/12/2017     Priority: Medium    Hypotestosteronism 10/29/2015     Priority: Medium     He has been on replacement therapies in the past.       Esophageal reflux 10/29/2015     Priority: Medium    Family history of colon cancer 11/07/2014     Priority: Medium     His mother and MGM had colon cancer. Recommend starting colonoscopies at age 40.       Hyperlipidemia with target LDL less than 130 11/07/2014     Priority: Medium     Diagnosis updated by automated process. Provider to review and confirm.          Past Medical History:    Past Medical History:   Diagnosis Date    DVT     Family history of colon cancer     GERD (gastroesophageal reflux disease)     HTN (hypertension)     Hypotestosteronemia         Past Surgical History:    Past Surgical History:   Procedure Laterality Date    APPENDECTOMY  Feb 1998    ARTHROSCOPY KNEE      right, 1/8/2018    SURGICAL HISTORY OF -  Left 1997, 2003    left knee surgery    SURGICAL HISTORY OF -  Right 1999    right shoulder       Family History:    Family History   Problem Relation Age of Onset    Cancer - colorectal Mother     Breast Cancer Mother     Colon Cancer Mother         not malignant pollups    Cancer - colorectal Maternal Grandmother     Colon Cancer Maternal Grandmother         Large intestine and colon removal    Prostate Cancer Father         Removed prostate    Osteoporosis Paternal Grandmother     Cerebrovascular Disease Other         Great Grandmother    C.A.D. No family hx of     Diabetes No family hx of     Hypertension No family hx of     Cerebrovascular Disease No family hx of     Melanoma No family hx of        Social History:  Marital Status:   [2]  Social History     Tobacco Use    Smoking status: Never    Smokeless tobacco: Former     Quit date: 6/22/2002   Vaping Use    Vaping Use: Never used   Substance Use Topics    Alcohol use: Yes     Comment: occas    Drug use: No        Medications:    oxyCODONE (ROXICODONE) 5 MG tablet  albuterol (PROAIR HFA/PROVENTIL HFA/VENTOLIN HFA) 108 (90 Base) MCG/ACT inhaler  albuterol (PROAIR HFA/PROVENTIL HFA/VENTOLIN HFA) 108 (90 Base) MCG/ACT inhaler  cetirizine (ZYRTEC) 10 MG tablet  lisinopril (ZESTRIL) 30 MG tablet  magnesium oxide (MAG-OX) 400 (241.3 MG) MG tablet  omeprazole (PRILOSEC) 40 MG DR capsule  rivaroxaban ANTICOAGULANT (XARELTO ANTICOAGULANT) 20 MG TABS tablet  spacer (OPTICHAMBER DANIELITO) holding chamber          Review of Systems  See HPI  Physical Exam   BP: (!) 169/110  Pulse: 84  Temp: 98.3  F (36.8  C)  Resp: 20  SpO2: 98 %      Physical Exam  BP (!) 152/67   Pulse 80   Temp 98.3  F (36.8  C)   Resp 20   SpO2 96%   General: alert, interactive, in no apparent distress  Head:  atraumatic  Nose: no rhinorrhea or epistaxis  Ears: no external auditory canal discharge or bleeding.    Eyes: Sclera nonicteric. Conjunctiva noninjected. PERRL, EOMI  Mouth: no tonsillar erythema, edema, or exudate.  Moist mucous membranes  Neck: supple, moving spontaneously no midline cervical tenderness  Lungs: No increased work of breathing.  Clear to auscultation bilaterally.  CV: RRR, peripheral pulses palpable and symmetric  Abdomen: soft, tender to palpation in the left lower quadrant.  No rebound or guarding and no rigidity  Extremities: Warm and well-perfused.   Skin: no rash or diaphoresis  Neuro: CN II-XII grossly intact, strength 5/5 in UE and LEs bilaterally, sensation intact to light touch in UE and LEs bilaterally;     ED Course                 Procedures           Critical Care time:  none         Results for orders placed or performed during the hospital encounter of 01/02/24 (from the past 24 hour(s))   [place] Draw    Narrative    The following orders were created for panel order [place] Draw.  Procedure                               Abnormality         Status                     ---------                               -----------         ------                     Extra Blue Top Tube[[id number]]                              Final result                 Please view results for these tests on the individual orders.   CBC with Platelets & Differential    Narrative    The following orders were created for panel order CBC with Platelets & Differential.  Procedure                               Abnormality         Status                     ---------                               -----------         ------                     CBC with platelets and d...[[id number]]  Abnormal            Final result                 Please view results for these tests on the individual orders.   Comprehensive metabolic panel   Result Value Ref Range    Sodium 137 135 - 145 mmol/L    Potassium 4.2 3.4 - 5.3 mmol/L    Carbon  Dioxide (CO2) 28 22 - 29 mmol/L    Anion Gap 9 7 - 15 mmol/L    Urea Nitrogen 19.2 6.0 - 20.0 mg/dL    Creatinine 1.26 (H) 0.67 - 1.17 mg/dL    GFR Estimate 73 >60 mL/min/1.73m2    Calcium 9.8 8.6 - 10.0 mg/dL    Chloride 100 98 - 107 mmol/L    Glucose 105 (H) 70 - 99 mg/dL    Alkaline Phosphatase 64 40 - 150 U/L    AST 36 0 - 45 U/L    ALT 69 0 - 70 U/L    Protein Total 7.4 6.4 - 8.3 g/dL    Albumin 4.4 3.5 - 5.2 g/dL    Bilirubin Total 0.2 <=1.2 mg/dL   Extra Blue Top Tube   Result Value Ref Range    Hold Specimen JI    CBC with platelets and differential   Result Value Ref Range    WBC Count 9.6 4.0 - 11.0 10e3/uL    RBC Count 4.11 (L) 4.40 - 5.90 10e6/uL    Hemoglobin 12.5 (L) 13.3 - 17.7 g/dL    Hematocrit 36.0 (L) 40.0 - 53.0 %    MCV 88 78 - 100 fL    MCH 30.4 26.5 - 33.0 pg    MCHC 34.7 31.5 - 36.5 g/dL    RDW 12.7 10.0 - 15.0 %    Platelet Count 243 150 - 450 10e3/uL    % Neutrophils 57 %    % Lymphocytes 35 %    % Monocytes 7 %    % Eosinophils 1 %    % Basophils 0 %    % Immature Granulocytes 0 %    NRBCs per 100 WBC 0 <1 /100    Absolute Neutrophils 5.4 1.6 - 8.3 10e3/uL    Absolute Lymphocytes 3.4 0.8 - 5.3 10e3/uL    Absolute Monocytes 0.6 0.0 - 1.3 10e3/uL    Absolute Eosinophils 0.1 0.0 - 0.7 10e3/uL    Absolute Basophils 0.0 0.0 - 0.2 10e3/uL    Absolute Immature Granulocytes 0.0 <=0.4 10e3/uL    Absolute NRBCs 0.0 10e3/uL   CT Abdomen Pelvis w Contrast    Narrative    EXAM: CT ABDOMEN PELVIS W CONTRAST  LOCATION: [facility]  DATE: 1/3/2024    INDICATION: LLQ pain  COMPARISON: CT abdomen and pelvis 02/20/2018. Abdominal ultrasound 06/12/2023.  TECHNIQUE: CT scan of the abdomen and pelvis was performed following injection of IV contrast. Multiplanar reformats were obtained. Dose reduction techniques were used.  CONTRAST: 100 mL Isovue 370    FINDINGS:   LOWER CHEST: Normal.    HEPATOBILIARY: Diffuse hepatic steatosis. Normal gallbladder and bile ducts.    PANCREAS:  Normal.    SPLEEN: Normal.    ADRENAL GLANDS: Normal.    KIDNEYS/BLADDER: Normal.    BOWEL: Focal inflammation of fat along the anterior aspect of the distal descending colon consistent with epiploic appendagitis. No diverticulosis or evidence for diverticulitis. No bowel obstruction. Appendix not separately visualized.    LYMPH NODES: Normal.    VASCULATURE: Unremarkable.    PELVIC ORGANS: Normal.    MUSCULOSKELETAL: Normal.      Impression    IMPRESSION:   1.  Epiploic appendagitis of the distal descending colon.  2.  Hepatic steatosis.       Medications   sodium chloride 0.9% BOLUS 1,000 mL (0 mLs Intravenous Stopped 1/3/24 0147)   iopamidol (ISOVUE-370) solution 100 mL (100 mLs Intravenous $Given 1/3/24 0035)   sodium chloride 0.9 % bag 500mL for CT scan flush use (74 mLs Intravenous $Given 1/3/24 0036)       Assessments & Plan (with Medical Decision Making)     I have reviewed the nursing notes.    I have reviewed the findings, diagnosis, plan and need for follow up with the patient.    Medical Decision Making  Edwin Nuno is a 43 year old male who has GERD, hypertension, PE on Xarelto who presents to the emergency department for evaluation of abdominal pain.  Vital signs reviewed and notable for hypertension, otherwise reassuring.  Patient given fluid bolus and offered something for pain and he declined.  CT was obtained due to abdominal tenderness on exam.  CT showed epiploic appendagitis and he fatty liver.  Imaging was otherwise normal.  Labs are fairly reassuring.  He has a creatinine of 1.26 which is up from his baseline.  He was given a liter of fluids here.  CBC is without leukocytosis.  Hemoglobin is at baseline.  I discussed the findings with the patient.  We discussed the expected course of epiploic appendagitis.  I reassured him that this is typically a benign condition that self resolves with analgesia.  Unfortunately, he cannot take anti-inflammatories due to being on blood thinners.  I  recommended that he schedule Tylenol and then I have prescribed oxycodone to use for severe pain.  If he develops fever or worsening pain he should return to the ER.  All questions answered.  He is discharged in stable condition.        Discharge Medication List as of 1/3/2024  1:47 AM        START taking these medications    Details   oxyCODONE (ROXICODONE) 5 MG tablet Take 1 tablet (5 mg) by mouth every 6 hours as needed for severe pain, Disp-6 tablet, R-0, [facility]             Final diagnoses:   Epiploic appendagitis       1/2/2024   [facility] EMERGENCY DEPT       Channing York MD  01/03/24 7326

## 2024-01-04 ENCOUNTER — PATIENT OUTREACH (OUTPATIENT)
Dept: FAMILY MEDICINE | Facility: CLINIC | Age: 44
End: 2024-01-04
Payer: COMMERCIAL

## 2024-01-04 NOTE — TELEPHONE ENCOUNTER
"ED/Discharge Protocol    \"Hi, my name is David Altman RN, a registered nurse, and I am calling on behalf of Dr. chester's office at [facility].  I am calling to follow up and see how things are going for you after your recent visit.\"    \"I see that you were in the (ER/UC/IP) on 01/02.    How are you doing now that you are home?\" About the same. Pain is tolerable with tylenol    Is patient experiencing symptoms that may require a hospital visit?  no    Discharge Instructions    \"Let's review your discharge instructions.  What is/are the follow-up recommendations?  Pt. Response: follow up with primary    \"Were you instructed to make a follow-up appointment?\"  Pt. Response: Yes.  Has appointment been made?   Yes      \"When you see the provider, I would recommend that you bring your discharge instructions with you.    Medications    \"How many new medications are you on since your hospitalization/ED visit?\"    0-1  \"How many of your current medicines changed (dose, timing, name, etc.) while you were in the hospital/ED visit?\"   0-1  \"Do you have questions about your medications?\"   No  \"Were you newly diagnosed with heart failure, COPD, diabetes or did you have a heart attack?\"   No  For patients on insulin: \"Did you start on insulin in the hospital or did you have your insulin dose changed?\"   No  Post Discharge Medication Reconciliation Status: discharge medications reconciled, continue medications without change.    Was MTM referral placed (*Make sure to put transitions as reason for referral)?   No    Call Summary    \"Do you have any questions or concerns about your condition or care plan at the moment?\"    No  Triage nurse advice given: no    Patient was in ER 01 in the past year (assess appropriateness of ER visits.)      \"If you have questions or things don't continue to improve, we encourage you contact us through the main clinic number,   .  Even if the clinic is not open, triage nurses are available 24/7 to " "help you.     We would like you to know that our clinic has extended hours (provide information).  We also have urgent care (provide details on closest location and hours/contact info)\"      \"Thank you for your time and take care!\"     "

## 2024-01-04 NOTE — TELEPHONE ENCOUNTER
What type of discharge? Emergency Department  Risk of Hospital admission or ED visit: 40.3%  Is a TCM episode required? No  When should the patient follow up with PCP? within 30 days of discharge.    Both MRI's scheduled for 6-14.

## 2024-01-15 ENCOUNTER — LAB (OUTPATIENT)
Dept: LAB | Facility: CLINIC | Age: 44
End: 2024-01-15
Payer: COMMERCIAL

## 2024-01-15 ENCOUNTER — OFFICE VISIT (OUTPATIENT)
Dept: FAMILY MEDICINE | Facility: CLINIC | Age: 44
End: 2024-01-15
Payer: COMMERCIAL

## 2024-01-15 VITALS
OXYGEN SATURATION: 96 % | HEIGHT: 72 IN | HEART RATE: 74 BPM | TEMPERATURE: 97.7 F | BODY MASS INDEX: 42.66 KG/M2 | RESPIRATION RATE: 16 BRPM | SYSTOLIC BLOOD PRESSURE: 138 MMHG | DIASTOLIC BLOOD PRESSURE: 80 MMHG | WEIGHT: 315 LBS

## 2024-01-15 DIAGNOSIS — E66.01 MORBID OBESITY (H): ICD-10-CM

## 2024-01-15 DIAGNOSIS — R10.84 ABDOMINAL PAIN, GENERALIZED: Primary | ICD-10-CM

## 2024-01-15 DIAGNOSIS — R53.83 OTHER FATIGUE: ICD-10-CM

## 2024-01-15 DIAGNOSIS — I26.99 PULMONARY EMBOLISM ON RIGHT (H): ICD-10-CM

## 2024-01-15 DIAGNOSIS — I10 BENIGN ESSENTIAL HYPERTENSION: ICD-10-CM

## 2024-01-15 DIAGNOSIS — I82.4Y1 DEEP VEIN THROMBOSIS (DVT) OF PROXIMAL VEIN OF RIGHT LOWER EXTREMITY, UNSPECIFIED CHRONICITY (H): ICD-10-CM

## 2024-01-15 DIAGNOSIS — J98.01 ACUTE BRONCHOSPASM: ICD-10-CM

## 2024-01-15 LAB
HBA1C MFR BLD: 5.5 % (ref 0–5.6)
TSH SERPL DL<=0.005 MIU/L-ACNC: 2.68 UIU/ML (ref 0.3–4.2)

## 2024-01-15 PROCEDURE — 84270 ASSAY OF SEX HORMONE GLOBUL: CPT

## 2024-01-15 PROCEDURE — 99214 OFFICE O/P EST MOD 30 MIN: CPT | Performed by: NURSE PRACTITIONER

## 2024-01-15 PROCEDURE — 36415 COLL VENOUS BLD VENIPUNCTURE: CPT

## 2024-01-15 PROCEDURE — 90686 IIV4 VACC NO PRSV 0.5 ML IM: CPT | Performed by: NURSE PRACTITIONER

## 2024-01-15 PROCEDURE — 84443 ASSAY THYROID STIM HORMONE: CPT

## 2024-01-15 PROCEDURE — 91320 SARSCV2 VAC 30MCG TRS-SUC IM: CPT | Performed by: NURSE PRACTITIONER

## 2024-01-15 PROCEDURE — 84403 ASSAY OF TOTAL TESTOSTERONE: CPT

## 2024-01-15 PROCEDURE — 83036 HEMOGLOBIN GLYCOSYLATED A1C: CPT

## 2024-01-15 PROCEDURE — 90471 IMMUNIZATION ADMIN: CPT | Performed by: NURSE PRACTITIONER

## 2024-01-15 PROCEDURE — 90480 ADMN SARSCOV2 VAC 1/ONLY CMP: CPT | Performed by: NURSE PRACTITIONER

## 2024-01-15 RX ORDER — LISINOPRIL 30 MG/1
30 TABLET ORAL DAILY
Qty: 90 TABLET | Refills: 1 | Status: CANCELLED | OUTPATIENT
Start: 2024-01-15

## 2024-01-15 RX ORDER — ALBUTEROL SULFATE 90 UG/1
2 AEROSOL, METERED RESPIRATORY (INHALATION) EVERY 6 HOURS PRN
Qty: 18 G | Refills: 3 | Status: SHIPPED | OUTPATIENT
Start: 2024-01-15

## 2024-01-15 ASSESSMENT — PAIN SCALES - GENERAL: PAINLEVEL: NO PAIN (0)

## 2024-01-15 NOTE — PROGRESS NOTES
"  Assessment & Plan     Abdominal pain, generalized    Resolved, continue to monitor.    Acute bronchospasm    - albuterol (PROAIR HFA/PROVENTIL HFA/VENTOLIN HFA) 108 (90 Base) MCG/ACT inhaler; Inhale 2 puffs into the lungs every 6 hours as needed for shortness of breath, wheezing or cough    Benign essential hypertension    Controlled, continue present medications and monitoring.    Pulmonary embolism on right (H)    - Adult Oncology/Hematology  Referral; Future  Follow up with hematology for conversation in regards to continuing Xarelto. If he is off of Xarelto, could consider resuming testosterone.    Morbid obesity (H)    - Adult Sleep Eval & Management  Referral; Future  - Adult Comprehensive Weight Management  Referral; Future    Other fatigue    - Adult Oncology/Hematology  Referral; Future  - Adult Sleep Eval & Management  Referral; Future  - TSH with free T4 reflex; Future  - Hemoglobin A1c; Future  - Testosterone Free and Total; Future    Deep vein thrombosis (DVT) of proximal lower extremity, unspecified chronicity, right.  182.4Y1    - Adult Oncology/Hematology  Referral; Future           MED REC REQUIRED  Post Medication Reconciliation Status: discharge medications reconciled, continue medications without change  BMI:   Estimated body mass index is 47.12 kg/m  as calculated from the following:    Height as of this encounter: 1.822 m (5' 11.75\").    Weight as of this encounter: 156.5 kg (345 lb).       See Patient Instructions  Patient Instructions   Monitor symptoms.  Will be notified of pending labs.  Follow up with sleep medicine, hematology, and weight loss clinic.      Our Clinic hours are:  Mondays    7:20 am - 7 pm  Tues -  Fri  7:20 am - 5 pm    Clinic Phone: [phone]    The clinic lab opens at 7:30 am Mon - Fri and appointments are required.    [facility]. [phone]  Monday  8 am - 7pm  Tues - Fri 8 am - 5:30 " "pm         CHAIM Chase CNP  [facility] ANAHI Stinson is a 43 year old, presenting for the following health issues:  ER F/U, Hypertension, and Recheck Medication        1/15/2024     3:15 PM   Additional Questions   Roomed by        History of Present Illness       Reason for visit:  Follow up from ER visit and medication renewal    He eats 2-3 servings of fruits and vegetables daily.He consumes 1 sweetened beverage(s) daily.He exercises with enough effort to increase his heart rate 9 or less minutes per day.  He exercises with enough effort to increase his heart rate 3 or less days per week.   He is taking medications regularly.         ED/UC Followup:    Facility:  [facility]  Date of visit: 1/2/24  Reason for visit: Epiploic appendagitis   Current Status: no pain     He developed severe abdominal pain and was seen in ED. See results of CT scan below.  He states pain has resolved and he is feeling ok as far as that.  He is frustrated about his weight, has not been successful with weight loss.  He reports he feels \"awful\" every day. No energy, no motivation to work out, etc.  History of DVT and one PE, he thought it might of been from [employer] COVID vaccine. He states he had been on testosterone and felt good but that was stopped after his blood clots.            NDICATION: LLQ pain  COMPARISON: CT abdomen and pelvis 02/20/2018. Abdominal ultrasound 06/12/2023.  TECHNIQUE: CT scan of the abdomen and pelvis was performed following injection of IV contrast. Multiplanar reformats were obtained. Dose reduction techniques were used.  CONTRAST: 100 mL Isovue 370     FINDINGS:   LOWER CHEST: Normal.     HEPATOBILIARY: Diffuse hepatic steatosis. Normal gallbladder and bile ducts.     PANCREAS: Normal.     SPLEEN: Normal.     ADRENAL GLANDS: Normal.     KIDNEYS/BLADDER: Normal.     BOWEL: Focal inflammation of fat along the anterior aspect of the distal descending colon consistent " "with epiploic appendagitis. No diverticulosis or evidence for diverticulitis. No bowel obstruction. Appendix not separately visualized.     LYMPH NODES: Normal.     VASCULATURE: Unremarkable.     PELVIC ORGANS: Normal.     MUSCULOSKELETAL: Normal.                                                                      IMPRESSION:   1.  Epiploic appendagitis of the distal descending colon.  2.  Hepatic steatosis.  Current Outpatient Medications   Medication    albuterol (PROAIR HFA/PROVENTIL HFA/VENTOLIN HFA) 108 (90 Base) MCG/ACT inhaler    cetirizine (ZYRTEC) 10 MG tablet    lisinopril (ZESTRIL) 30 MG tablet    omeprazole (PRILOSEC) 40 MG DR capsule    rivaroxaban ANTICOAGULANT (XARELTO ANTICOAGULANT) 20 MG TABS tablet     No current facility-administered medications for this visit.     Past Medical History:   Diagnosis Date    DVT     right leg dx 1/13/2018    Family history of colon cancer     GERD (gastroesophageal reflux disease)     HTN (hypertension)     Hypotestosteronemia          Review of Systems   Constitutional, HEENT, cardiovascular, pulmonary, gi and gu systems are negative, except as otherwise noted.      Objective    /80   Pulse 74   Temp 97.7  F (36.5  C) (Tympanic)   Resp 16   Ht 1.822 m (5' 11.75\")   Wt (!) 156.5 kg (345 lb)   SpO2 96%   BMI 47.12 kg/m    Body mass index is 47.12 kg/m .  Physical Exam   GENERAL: healthy, alert and no distress  NECK: no adenopathy, no asymmetry  RESP: lungs clear to auscultation - no rales, rhonchi or wheezes  CV: regular rate and rhythm, normal S1 S2, no S3 or S4, no murmur  ABDOMEN: soft, obese, non tender, no hepatosplenomegaly, no masses and bowel sounds normal  MS: no gross musculoskeletal defects noted      Results for orders placed or performed in visit on 01/15/24   TSH with free T4 reflex     Status: Normal   Result Value Ref Range    TSH 2.68 0.30 - 4.20 uIU/mL   Hemoglobin A1c     Status: Normal   Result Value Ref Range    Hemoglobin A1C 5.5 " 0.0 - 5.6 %   Sex Hormone Binding Globulin     Status: Normal   Result Value Ref Range    Sex Hormone Binding Globulin 11 11 - 80 nmol/L   Testosterone Free and Total     Status: Abnormal   Result Value Ref Range    Free Testosterone Calculated 4.15 ng/dL    Testosterone Total 137 (L) 240 - 950 ng/dL    Narrative    This test was developed and its performance characteristics determined by the [facility],  Special Chemistry Laboratory. It has not been cleared or approved by the FDA. The laboratory is regulated under CLIA as qualified to perform high-complexity testing. This test is used for clinical purposes. It should not be regarded as investigational or for research.   Testosterone Free and Total     Status: Abnormal    Narrative    The following orders were created for panel order Testosterone Free and Total.  Procedure                               Abnormality         Status                     ---------                               -----------         ------                     Sex Hormone Binding Glob...[[id number]]  Normal              Final result               Testosterone Free and Total[[id number]]  Abnormal            Final result                 Please view results for these tests on the individual orders.

## 2024-01-15 NOTE — PATIENT INSTRUCTIONS
Monitor symptoms.  Will be notified of pending labs.  Follow up with sleep medicine, hematology, and weight loss clinic.      Our Clinic hours are:  Mondays    7:20 am - 7 pm  Tues -  Fri  7:20 am - 5 pm    Clinic Phone: [phone]    The clinic lab opens at 7:30 am Mon - Fri and appointments are required.    [facility]  Ph. [phone]  Monday  8 am - 7pm  Tues - Fri 8 am - 5:30 pm

## 2024-01-16 LAB — SHBG SERPL-SCNC: 11 NMOL/L (ref 11–80)

## 2024-01-17 LAB
TESTOST FREE SERPL-MCNC: 4.15 NG/DL
TESTOST SERPL-MCNC: 137 NG/DL (ref 240–950)

## 2024-01-24 ENCOUNTER — MYC REFILL (OUTPATIENT)
Dept: FAMILY MEDICINE | Facility: CLINIC | Age: 44
End: 2024-01-24
Payer: COMMERCIAL

## 2024-01-24 DIAGNOSIS — I10 BENIGN ESSENTIAL HYPERTENSION: ICD-10-CM

## 2024-01-24 RX ORDER — LISINOPRIL 30 MG/1
30 TABLET ORAL DAILY
Qty: 90 TABLET | Refills: 1 | Status: SHIPPED | OUTPATIENT
Start: 2024-01-24 | End: 2024-07-26

## 2024-01-30 NOTE — PROGRESS NOTES
[facility]  [address] [place] [address], [place] [address]  Main: [phone], Fax: [phone]    [facility] seen at: [facility] at [address] [place] [address]    Outpatient Visit Note:    Patient: Edwin Nuno  MRN: [id number]  : 1980  TONA: 2024    Reason for visit:  History of unprovoked RLE DVT 2021, reassess anticoagulation plan     Clinical History Summary:  Edwin Nuno is a 43 year old male with past medical history significant for hypertension, low testosterone, and recurrent venous thromboembolism. He presents today for anticoagulation discussion and follow up.     Milad has history of surgically provoked DVT and PE after right knee surgery and while on testosterone therapy in . He was treated with anticoagulation for 6 months. He had very labile INRs on warfarin. He never had lupus anticoagulant checked nor chromogenic factor X level. His testosterone was discontinued after his thrombotic event. Of note, he did not have any erythrocytosis at the time of his event. He did have bleeding on coumadin with elevated INR. No source was found on testing. He was supposed to have a colonoscopy however this was never completed.     He had repeat left knee surgery in  and was treated with Xarelto at prophylactic intensity postoperatively and tolearted the procedure well.     Milad was diagnosed with right lower extremity DVT on 4/15/2021. 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 He had no other signs/symptoms of VITT but no laboratory testing was completed.     He has had prior inheritable thrombophilia evaluation which was negative. His cardiolipin antibodies were negative.     He saw SIENA Flood in 2021 who recommended long term anticoagulation with Xarelto. He has not been seen in follow up  since then as his primary care team fills his prescriptions. He did not have any follow up imaging to assess for thrombus resolution.     Interim History:  Today, Milad presents today to discuss anticoagulation plan. He is currently on Xarelto and has been tolerating it. He denies any trip bleeding symptoms. He has had persistent anemia of unknown cause. He has not had any testing for this. He has been having fatigue and was previously on testosterone that was stopped after his thrombosis events. His most total recent testosterone level was low at 137 (240-950). He would like to resume testosterone however he was told that he cannot do that on Xarelto.     He notes the only bleeding he ever had was while on warfarin with labile INRs. He has not had LAC testing or CFX testing.     He notes he may need a knee replacement in the future but would like to lose weight first.     He denies any lower extremity pain or swelling.     Milad reports IBS symptoms. He thinks it is dependent on what he eats. He denies any melanotic stools. He has not seen GI. He does have family history of colon cancer in paternal great grandmother, maternal grandmother (age 56). Mother has no polyps.       ROS:  Denies any bleeding complications. Specifically, no frequent epistaxis. No issues with oral mucosal bleeding. Denies any hematuria or blood in stools. Denies any shortness of breath. No chest pain. No cough. No fever.      Medications:   Current Outpatient Medications   Medication Sig Dispense Refill    albuterol (PROAIR HFA/PROVENTIL HFA/VENTOLIN HFA) 108 (90 Base) MCG/ACT inhaler Inhale 2 puffs into the lungs every 6 hours as needed for shortness of breath, wheezing or cough 18 g 3    cetirizine (ZYRTEC) 10 MG tablet Take 10 mg by mouth daily      lisinopril (ZESTRIL) 30 MG tablet Take 1 tablet (30 mg) by mouth daily 90 tablet 1    omeprazole (PRILOSEC) 40 MG DR capsule Take 1 capsule (40 mg) by mouth daily 90 capsule 3    rivaroxaban  ANTICOAGULANT (XARELTO ANTICOAGULANT) 20 MG TABS tablet Take 1 tablet (20 mg) by mouth daily (with dinner) 90 tablet 3        Allergies:    No Known Allergies    PMH:  Past Medical History:   Diagnosis Date    DVT     right leg dx 1/13/2018    Family history of colon cancer     GERD (gastroesophageal reflux disease)     HTN (hypertension)     Hypotestosteronemia         Social History:   Social History     Tobacco Use    Smoking status: Never    Smokeless tobacco: Former     Quit date: 6/22/2002   Vaping Use    Vaping Use: Never used   Substance Use Topics    Alcohol use: Yes     Comment: occas    Drug use: No       Family History:  Deferred.    Objective:  Vitals: BP (!) 149/77 (BP Location: Right arm, Patient Position: Sitting, Cuff Size: Adult Large)   Pulse 72   Temp (!) 95.9  F (35.5  C) (Tympanic)   Wt (!) 153.4 kg (338 lb 3.2 oz)   SpO2 94%   BMI 46.19 kg/m       Exam:   Constitutional: Appears well, no distress  HEENT: Pupils equal and round. No scleral icterus or hemorrhage.   Respiratory: no increased work of breathing.   Mus/Skele: no edema of lower extremities  Skin: no petechiae, no ecchymosis on exposed dermis.  Neuro: CN II-XII intact. Normal gait. AOx3      Labs:  CBC RESULTS:   Recent Labs   Lab Test 01/02/24  2339   WBC 9.6   RBC 4.11*   HGB 12.5*   HCT 36.0*   MCV 88   MCH 30.4   MCHC 34.7   RDW 12.7        Last Comprehensive Metabolic Panel:  Sodium   Date Value Ref Range Status   01/02/2024 137 135 - 145 mmol/L Final     Comment:     Reference intervals for this test were updated on 09/26/2023 to more accurately reflect our healthy population. There may be differences in the flagging of prior results with similar values performed with this method. Interpretation of those prior results can be made in the context of the updated reference intervals.    04/15/2021 136 133 - 144 mmol/L Final     Potassium   Date Value Ref Range Status   01/02/2024 4.2 3.4 - 5.3 mmol/L Final   02/07/2022  4.1 3.4 - 5.3 mmol/L Final   04/15/2021 4.0 3.4 - 5.3 mmol/L Final     Chloride   Date Value Ref Range Status   2024 100 98 - 107 mmol/L Final   2022 103 94 - 109 mmol/L Final   04/15/2021 103 94 - 109 mmol/L Final     Carbon Dioxide   Date Value Ref Range Status   04/15/2021 27 20 - 32 mmol/L Final     Carbon Dioxide (CO2)   Date Value Ref Range Status   2024 28 22 - 29 mmol/L Final   2022 28 20 - 32 mmol/L Final     Anion Gap   Date Value Ref Range Status   2024 9 7 - 15 mmol/L Final   2022 4 3 - 14 mmol/L Final   04/15/2021 6 3 - 14 mmol/L Final     Glucose   Date Value Ref Range Status   2024 105 (H) 70 - 99 mg/dL Final   2022 96 70 - 99 mg/dL Final   04/15/2021 104 (H) 70 - 99 mg/dL Final     Urea Nitrogen   Date Value Ref Range Status   2024 19.2 6.0 - 20.0 mg/dL Final   2022 19 7 - 30 mg/dL Final   04/15/2021 27 7 - 30 mg/dL Final     Creatinine   Date Value Ref Range Status   2024 1.26 (H) 0.67 - 1.17 mg/dL Final   04/15/2021 1.17 0.66 - 1.25 mg/dL Final     GFR Estimate   Date Value Ref Range Status   2024 73 >60 mL/min/1.73m2 Final   04/15/2021 77 >60 mL/min/[1.73_m2] Final     Comment:     Non  GFR Calc  Starting 2018, serum creatinine based estimated GFR (eGFR) will be   calculated using the Chronic Kidney Disease Epidemiology Collaboration   (CKD-EPI) equation.       Calcium   Date Value Ref Range Status   2024 9.8 8.6 - 10.0 mg/dL Final   04/15/2021 9.2 8.5 - 10.1 mg/dL Final     Liver Function Studies -   Recent Labs   Lab Test 24  2339   PROTTOTAL 7.4   ALBUMIN 4.4   BILITOTAL 0.2   ALKPHOS 64   AST 36   ALT 69      18:  ATIII: 106%  Protein C: 125%  Protein S: 148%  Factor V Leiden: negative  Prothrombin gene mutation: negative  Cardiolipin antibodies: negative      Imagin/15/2021:  US of right lower extemity  Deep venous thrombosis within the RIGHT popliteal and peroneal veins.      6/16/2020:  Bilateral US done of extremities: negative for clot     6/8/2018:   Right lower extremity US: negative for clot     1/14/2018:  CT lungs:  There is a single pulmonary embolus in a right lower lobe  segmental artery (images 85 through 100 of series 4). No other pulmonary emboli are identified.      1/13/2018:  Positive occlusive deep venous thrombosis involving the right popliteal vein through the posterior tibialis veins.      Recent Results (from the past 744 hour(s))   CT Abdomen Pelvis w Contrast    Narrative    EXAM: CT ABDOMEN PELVIS W CONTRAST  LOCATION: [facility]  DATE: 1/3/2024    INDICATION: LLQ pain  COMPARISON: CT abdomen and pelvis 02/20/2018. Abdominal ultrasound 06/12/2023.  TECHNIQUE: CT scan of the abdomen and pelvis was performed following injection of IV contrast. Multiplanar reformats were obtained. Dose reduction techniques were used.  CONTRAST: 100 mL Isovue 370    FINDINGS:   LOWER CHEST: Normal.    HEPATOBILIARY: Diffuse hepatic steatosis. Normal gallbladder and bile ducts.    PANCREAS: Normal.    SPLEEN: Normal.    ADRENAL GLANDS: Normal.    KIDNEYS/BLADDER: Normal.    BOWEL: Focal inflammation of fat along the anterior aspect of the distal descending colon consistent with epiploic appendagitis. No diverticulosis or evidence for diverticulitis. No bowel obstruction. Appendix not separately visualized.    LYMPH NODES: Normal.    VASCULATURE: Unremarkable.    PELVIC ORGANS: Normal.    MUSCULOSKELETAL: Normal.      Impression    IMPRESSION:   1.  Epiploic appendagitis of the distal descending colon.  2.  Hepatic steatosis.        Assessment:  History of recurrent venous thromboembolism on long term anticoagulation for secondary prevention  Anemia, normocytic - etiology unknown   History of low testosterone, previously on testosterone, stopped after first DVT event despite no erythrocytosis    Plan:  I do agree with recommendation to continue Rich on  long term anticoagulation for secondary prevention of venous thromboembolism.   It is reasonable for him to resume testosterone therapy as long as he remains on therapeutic anticoagulation. He should have CBC to ensure that his hematocrit stays below 50. He should get CBC 6 weeks after initiation and also every 6 weeks until he is on stable dose and can show stability of labs.   Discussed consideration of lupus anticoagulant in future when off AC for procedure. This is not likely to change his current regimen however it would help explain his past labile INRs.   Testing for anemia to be completed today. If no deficiency found in iron, B12 or folate and still anemic, would recommend more urgent GI evaluation.     Patient instructed to contact the clinic should they require any surgical procedures for perioperative planning. Planning future knee replacement after weight loss.     Return to clinic in 3 months after starting testosterone therapy.      Rena Yuan, MPH, PA-C  [facility]    30 minutes spent by me on the date of the encounter doing chart review, review of outside records, review of test results, interpretation of tests, patient visit, and discussion with other provider(s)

## 2024-01-31 ENCOUNTER — OFFICE VISIT (OUTPATIENT)
Dept: HEMATOLOGY | Facility: CLINIC | Age: 44
End: 2024-01-31
Attending: NURSE PRACTITIONER
Payer: COMMERCIAL

## 2024-01-31 VITALS
DIASTOLIC BLOOD PRESSURE: 77 MMHG | WEIGHT: 315 LBS | HEART RATE: 72 BPM | OXYGEN SATURATION: 94 % | SYSTOLIC BLOOD PRESSURE: 149 MMHG | BODY MASS INDEX: 46.19 KG/M2 | TEMPERATURE: 95.9 F

## 2024-01-31 DIAGNOSIS — R79.89 LOW TESTOSTERONE: ICD-10-CM

## 2024-01-31 DIAGNOSIS — I26.99 PULMONARY EMBOLISM ON RIGHT (H): ICD-10-CM

## 2024-01-31 DIAGNOSIS — D64.9 ANEMIA, UNSPECIFIED TYPE: Primary | ICD-10-CM

## 2024-01-31 DIAGNOSIS — R53.83 OTHER FATIGUE: ICD-10-CM

## 2024-01-31 DIAGNOSIS — I82.4Y1 DEEP VEIN THROMBOSIS (DVT) OF PROXIMAL VEIN OF RIGHT LOWER EXTREMITY, UNSPECIFIED CHRONICITY (H): ICD-10-CM

## 2024-01-31 LAB
ERYTHROCYTE [DISTWIDTH] IN BLOOD BY AUTOMATED COUNT: 12.3 % (ref 10–15)
FERRITIN SERPL-MCNC: 259 NG/ML (ref 31–409)
FOLATE SERPL-MCNC: 11.7 NG/ML (ref 4.6–34.8)
HCT VFR BLD AUTO: 40.6 % (ref 40–53)
HGB BLD-MCNC: 14.2 G/DL (ref 13.3–17.7)
IRON BINDING CAPACITY (ROCHE): 310 UG/DL (ref 240–430)
IRON SATN MFR SERPL: 37 % (ref 15–46)
IRON SERPL-MCNC: 115 UG/DL (ref 61–157)
MCH RBC QN AUTO: 30.2 PG (ref 26.5–33)
MCHC RBC AUTO-ENTMCNC: 35 G/DL (ref 31.5–36.5)
MCV RBC AUTO: 86 FL (ref 78–100)
PLATELET # BLD AUTO: 220 10E3/UL (ref 150–450)
RBC # BLD AUTO: 4.7 10E6/UL (ref 4.4–5.9)
VIT B12 SERPL-MCNC: 1417 PG/ML (ref 232–1245)
WBC # BLD AUTO: 7.7 10E3/UL (ref 4–11)

## 2024-01-31 PROCEDURE — 99213 OFFICE O/P EST LOW 20 MIN: CPT | Performed by: PHYSICIAN ASSISTANT

## 2024-01-31 PROCEDURE — 83550 IRON BINDING TEST: CPT | Performed by: PHYSICIAN ASSISTANT

## 2024-01-31 PROCEDURE — 85027 COMPLETE CBC AUTOMATED: CPT | Performed by: PHYSICIAN ASSISTANT

## 2024-01-31 PROCEDURE — 82728 ASSAY OF FERRITIN: CPT | Performed by: PHYSICIAN ASSISTANT

## 2024-01-31 PROCEDURE — 82746 ASSAY OF FOLIC ACID SERUM: CPT | Performed by: PHYSICIAN ASSISTANT

## 2024-01-31 PROCEDURE — 36415 COLL VENOUS BLD VENIPUNCTURE: CPT | Performed by: PHYSICIAN ASSISTANT

## 2024-01-31 PROCEDURE — 99203 OFFICE O/P NEW LOW 30 MIN: CPT | Performed by: PHYSICIAN ASSISTANT

## 2024-01-31 PROCEDURE — 82607 VITAMIN B-12: CPT | Performed by: PHYSICIAN ASSISTANT

## 2024-01-31 NOTE — LETTER
2024      RE: Edwin Nuno  [id number]  [facility] [address]           [facility]  [address] [place] [address] [address], [place] [address]  Main: [phone], Fax: [phone]    Patient seen at: [facility] at [address] [place] [address]    Outpatient Visit Note:    Patient: Edwin Nuno  MRN: [id number]  : 1980  TONA: 2024    Reason for visit:  History of unprovoked RLE DVT 2021, reassess anticoagulation plan     Clinical History Summary:  Edwin Nuno is a 43 year old male with past medical history significant for hypertension, low testosterone, and recurrent venous thromboembolism. He presents today for anticoagulation discussion and follow up.     Milad has history of surgically provoked DVT and PE after right knee surgery and while on testosterone therapy in . He was treated with anticoagulation for 6 months. He had very labile INRs on warfarin. He never had lupus anticoagulant checked nor chromogenic factor X level. His testosterone was discontinued after his thrombotic event. Of note, he did not have any erythrocytosis at the time of his event. He did have bleeding on coumadin with elevated INR. No source was found on testing. He was supposed to have a colonoscopy however this was never completed.     He had repeat left knee surgery in  and was treated with Xarelto at prophylactic intensity postoperatively and tolearted the procedure well.     Milad was diagnosed with right lower extremity DVT on 4/15/2021. 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 He had no other signs/symptoms of VITT but no laboratory testing was completed.     He has had prior inheritable thrombophilia evaluation which was negative. His cardiolipin antibodies were negative.     He saw SIENA Flood in 2021 who  recommended long term anticoagulation with Xarelto. He has not been seen in follow up since then as his primary care team fills his prescriptions. He did not have any follow up imaging to assess for thrombus resolution.     Interim History:  Today, Milad presents today to discuss anticoagulation plan. He is currently on Xarelto and has been tolerating it. He denies any trip bleeding symptoms. He has had persistent anemia of unknown cause. He has not had any testing for this. He has been having fatigue and was previously on testosterone that was stopped after his thrombosis events. His most total recent testosterone level was low at 137 (240-950). He would like to resume testosterone however he was told that he cannot do that on Xarelto.     He notes the only bleeding he ever had was while on warfarin with labile INRs. He has not had LAC testing or CFX testing.     He notes he may need a knee replacement in the future but would like to lose weight first.     He denies any lower extremity pain or swelling.     Milad reports IBS symptoms. He thinks it is dependent on what he eats. He denies any melanotic stools. He has not seen GI. He does have family history of colon cancer in paternal great grandmother, maternal grandmother (age 56). Mother has no polyps.       ROS:  Denies any bleeding complications. Specifically, no frequent epistaxis. No issues with oral mucosal bleeding. Denies any hematuria or blood in stools. Denies any shortness of breath. No chest pain. No cough. No fever.      Medications:   Current Outpatient Medications   Medication Sig Dispense Refill     albuterol (PROAIR HFA/PROVENTIL HFA/VENTOLIN HFA) 108 (90 Base) MCG/ACT inhaler Inhale 2 puffs into the lungs every 6 hours as needed for shortness of breath, wheezing or cough 18 g 3     cetirizine (ZYRTEC) 10 MG tablet Take 10 mg by mouth daily       lisinopril (ZESTRIL) 30 MG tablet Take 1 tablet (30 mg) by mouth daily 90 tablet 1     omeprazole  (PRILOSEC) 40 MG DR capsule Take 1 capsule (40 mg) by mouth daily 90 capsule 3     rivaroxaban ANTICOAGULANT (XARELTO ANTICOAGULANT) 20 MG TABS tablet Take 1 tablet (20 mg) by mouth daily (with dinner) 90 tablet 3        Allergies:    No Known Allergies    PMH:  Past Medical History:   Diagnosis Date     DVT     right leg dx 1/13/2018     Family history of colon cancer      GERD (gastroesophageal reflux disease)      HTN (hypertension)      Hypotestosteronemia         Social History:   Social History     Tobacco Use     Smoking status: Never     Smokeless tobacco: Former     Quit date: 6/22/2002   Vaping Use     Vaping Use: Never used   Substance Use Topics     Alcohol use: Yes     Comment: occas     Drug use: No       Family History:  Deferred.    Objective:  Vitals: BP (!) 149/77 (BP Location: Right arm, Patient Position: Sitting, Cuff Size: Adult Large)   Pulse 72   Temp (!) 95.9  F (35.5  C) (Tympanic)   Wt (!) 153.4 kg (338 lb 3.2 oz)   SpO2 94%   BMI 46.19 kg/m       Exam:   Constitutional: Appears well, no distress  HEENT: Pupils equal and round. No scleral icterus or hemorrhage.   Respiratory: no increased work of breathing.   Mus/Skele: no edema of lower extremities  Skin: no petechiae, no ecchymosis on exposed dermis.  Neuro: CN II-XII intact. Normal gait. AOx3      Labs:  CBC RESULTS:   Recent Labs   Lab Test 01/02/24  2339   WBC 9.6   RBC 4.11*   HGB 12.5*   HCT 36.0*   MCV 88   MCH 30.4   MCHC 34.7   RDW 12.7        Last Comprehensive Metabolic Panel:  Sodium   Date Value Ref Range Status   01/02/2024 137 135 - 145 mmol/L Final     Comment:     Reference intervals for this test were updated on 09/26/2023 to more accurately reflect our healthy population. There may be differences in the flagging of prior results with similar values performed with this method. Interpretation of those prior results can be made in the context of the updated reference intervals.    04/15/2021 136 133 - 144 mmol/L  Final     Potassium   Date Value Ref Range Status   01/02/2024 4.2 3.4 - 5.3 mmol/L Final   02/07/2022 4.1 3.4 - 5.3 mmol/L Final   04/15/2021 4.0 3.4 - 5.3 mmol/L Final     Chloride   Date Value Ref Range Status   01/02/2024 100 98 - 107 mmol/L Final   02/07/2022 103 94 - 109 mmol/L Final   04/15/2021 103 94 - 109 mmol/L Final     Carbon Dioxide   Date Value Ref Range Status   04/15/2021 27 20 - 32 mmol/L Final     Carbon Dioxide (CO2)   Date Value Ref Range Status   01/02/2024 28 22 - 29 mmol/L Final   02/07/2022 28 20 - 32 mmol/L Final     Anion Gap   Date Value Ref Range Status   01/02/2024 9 7 - 15 mmol/L Final   02/07/2022 4 3 - 14 mmol/L Final   04/15/2021 6 3 - 14 mmol/L Final     Glucose   Date Value Ref Range Status   01/02/2024 105 (H) 70 - 99 mg/dL Final   02/07/2022 96 70 - 99 mg/dL Final   04/15/2021 104 (H) 70 - 99 mg/dL Final     Urea Nitrogen   Date Value Ref Range Status   01/02/2024 19.2 6.0 - 20.0 mg/dL Final   02/07/2022 19 7 - 30 mg/dL Final   04/15/2021 27 7 - 30 mg/dL Final     Creatinine   Date Value Ref Range Status   01/02/2024 1.26 (H) 0.67 - 1.17 mg/dL Final   04/15/2021 1.17 0.66 - 1.25 mg/dL Final     GFR Estimate   Date Value Ref Range Status   01/02/2024 73 >60 mL/min/1.73m2 Final   04/15/2021 77 >60 mL/min/[1.73_m2] Final     Comment:     Non  GFR Calc  Starting 12/18/2018, serum creatinine based estimated GFR (eGFR) will be   calculated using the Chronic Kidney Disease Epidemiology Collaboration   (CKD-EPI) equation.       Calcium   Date Value Ref Range Status   01/02/2024 9.8 8.6 - 10.0 mg/dL Final   04/15/2021 9.2 8.5 - 10.1 mg/dL Final     Liver Function Studies -   Recent Labs   Lab Test 01/02/24  2339   PROTTOTAL 7.4   ALBUMIN 4.4   BILITOTAL 0.2   ALKPHOS 64   AST 36   ALT 69      7/18/18:  ATIII: 106%  Protein C: 125%  Protein S: 148%  Factor V Leiden: negative  Prothrombin gene mutation: negative  Cardiolipin antibodies:  negative      Imagin/15/2021:  US of right lower extemity  Deep venous thrombosis within the RIGHT popliteal and peroneal veins.     2020:  Bilateral US done of extremities: negative for clot     2018:   Right lower extremity US: negative for clot     2018:  CT lungs:  There is a single pulmonary embolus in a right lower lobe  segmental artery (images 85 through 100 of series 4). No other pulmonary emboli are identified.      2018:  Positive occlusive deep venous thrombosis involving the right popliteal vein through the posterior tibialis veins.      Recent Results (from the past 744 hour(s))   CT Abdomen Pelvis w Contrast    Narrative    EXAM: CT ABDOMEN PELVIS W CONTRAST  LOCATION: [facility]  DATE: 1/3/2024    INDICATION: LLQ pain  COMPARISON: CT abdomen and pelvis 2018. Abdominal ultrasound 2023.  TECHNIQUE: CT scan of the abdomen and pelvis was performed following injection of IV contrast. Multiplanar reformats were obtained. Dose reduction techniques were used.  CONTRAST: 100 mL Isovue 370    FINDINGS:   LOWER CHEST: Normal.    HEPATOBILIARY: Diffuse hepatic steatosis. Normal gallbladder and bile ducts.    PANCREAS: Normal.    SPLEEN: Normal.    ADRENAL GLANDS: Normal.    KIDNEYS/BLADDER: Normal.    BOWEL: Focal inflammation of fat along the anterior aspect of the distal descending colon consistent with epiploic appendagitis. No diverticulosis or evidence for diverticulitis. No bowel obstruction. Appendix not separately visualized.    LYMPH NODES: Normal.    VASCULATURE: Unremarkable.    PELVIC ORGANS: Normal.    MUSCULOSKELETAL: Normal.      Impression    IMPRESSION:   1.  Epiploic appendagitis of the distal descending colon.  2.  Hepatic steatosis.        Assessment:  History of recurrent venous thromboembolism on long term anticoagulation for secondary prevention  Anemia, normocytic - etiology unknown   History of low testosterone, previously  on testosterone, stopped after first DVT event despite no erythrocytosis    Plan:  I do agree with recommendation to continue Rich on long term anticoagulation for secondary prevention of venous thromboembolism.   It is reasonable for him to resume testosterone therapy as long as he remains on therapeutic anticoagulation. He should have CBC to ensure that his hematocrit stays below 50. He should get CBC 6 weeks after initiation and also every 6 weeks until he is on stable dose and can show stability of labs.   Discussed consideration of lupus anticoagulant in future when off AC for procedure. This is not likely to change his current regimen however it would help explain his past labile INRs.   Testing for anemia to be completed today. If no deficiency found in iron, B12 or folate and still anemic, would recommend more urgent GI evaluation.     Patient instructed to contact the clinic should they require any surgical procedures for perioperative planning. Planning future knee replacement after weight loss.     Return to clinic in 3 months after starting testosterone therapy.      Rena Yuan, MPH, PA-C  [facility]    30 minutes spent by me on the date of the encounter doing chart review, review of outside records, review of test results, interpretation of tests, patient visit, and discussion with other provider(s)       SIENA DIEHL-KEYSHA

## 2024-01-31 NOTE — PATIENT INSTRUCTIONS
[facility]  [facility]  [facility] [address], [address], [place] [address]  Main: [phone], Fax: [phone]    Edwin,   It was a pleasure seeing you today.  Thank you for allowing us to be involved in your care.  Please let us know if there is anything else we can do for you, so that we can be sure you are leaving completely satisfied with your care experience. Below is the plan that we discussed.     Labs today to assess for cause of anemia   OK to start testosterone through your primary but we should check labs every 6 weeks until they are stable   If hematocrit gets higher than 50, we will have to reduce the dose or stop. You should stay on blood thinner while on testosterone   Call if you have other procedures or surgeries scheduled.      We would like a provider on our team to see you at least annually for optimal care and to allow us to continue to prescribe for you.      Return to clinic in  3-6, about 3 months after starting testosterone therapy.      If you have questions or concerns, please don't hesitate to send a [employer] Message for non urgent matters or contact my nurse clinician, Christa Saha at [phone]. If they are unavailable and you have immediate concerns, please call [phone] and ask for a nurse.     Rena Yuan, MPH, PA-C  [facility]

## 2024-02-02 DIAGNOSIS — I26.99 PULMONARY EMBOLISM ON RIGHT (H): Primary | ICD-10-CM

## 2024-02-02 DIAGNOSIS — Z51.81 ENCOUNTER FOR MONITORING TESTOSTERONE REPLACEMENT THERAPY: ICD-10-CM

## 2024-02-02 DIAGNOSIS — I82.4Y1 DEEP VEIN THROMBOSIS (DVT) OF PROXIMAL VEIN OF RIGHT LOWER EXTREMITY, UNSPECIFIED CHRONICITY (H): ICD-10-CM

## 2024-02-02 DIAGNOSIS — Z79.890 ENCOUNTER FOR MONITORING TESTOSTERONE REPLACEMENT THERAPY: ICD-10-CM

## 2024-02-21 ENCOUNTER — TRANSFERRED RECORDS (OUTPATIENT)
Dept: HEALTH INFORMATION MANAGEMENT | Facility: CLINIC | Age: 44
End: 2024-02-21
Payer: COMMERCIAL

## 2024-04-15 DIAGNOSIS — I26.99 PULMONARY EMBOLISM ON RIGHT (H): ICD-10-CM

## 2024-04-15 DIAGNOSIS — I82.4Y9 DEEP VEIN THROMBOSIS (DVT) OF PROXIMAL LOWER EXTREMITY, UNSPECIFIED CHRONICITY, UNSPECIFIED LATERALITY (H): ICD-10-CM

## 2024-04-15 NOTE — TELEPHONE ENCOUNTER
Incoming call from Milad requesting Xarelto refill.    Edwin Nuno is [facility] their history of DVT.     They were last evaluated by our team on 01/31/2024 with the plan to remain on long term anticoagulation and follow up 3 months after starting testosterone.    Patient is scheduled for office visit with ANA Nguyen on 05/08. Will give 30 day fill to get patient through until follow up visit.    Harley HUDSON RN  [facility]  Office: [phone]  Clinic: [phone]  Fax: [phone]

## 2024-05-06 NOTE — PROGRESS NOTES
[facility]  [address] [place] [id number]  Main: [phone], Fax: [phone]    Patient seen at: [facility] at [address] [place] [address]    Outpatient Visit Note:    Patient: Edwin Nuno  MRN: [id number]  : 1980  TONA: May 8, 2024    Reason for visit:  History of venous thromboembolism     Clinical History Summary:  Edwin Nuno is a 43 year old male with past medical history significant for hypertension, low testosterone, and recurrent venous thromboembolism. He presents today for follow up.      Milad has history of surgically provoked DVT and PE after right knee surgery and while on testosterone therapy in 2018. He was treated with anticoagulation for 6 months. He had very labile INRs on warfarin. He never had lupus anticoagulant checked nor chromogenic factor X level. His testosterone was discontinued after his thrombotic event. Of note, he did not have any erythrocytosis at the time of his event. He did have bleeding on coumadin with elevated INR. No source was found on testing. He was supposed to have a colonoscopy however this was never completed.      He had repeat left knee surgery in  and was treated with Xarelto at prophylactic intensity postoperatively and tolearted the procedure well.      Milad was diagnosed with right lower extremity DVT on 4/15/2021. 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 He had no other signs/symptoms of VITT but no laboratory testing was completed.      He has had prior inheritable thrombophilia evaluation which was negative. His cardiolipin antibodies were negative.      He saw SIENA Flood in 2021 who recommended long term anticoagulation with Xarelto. He has not been seen in follow up since then as his primary care team fills his prescriptions. He did  not have any follow up imaging to assess for thrombus resolution.      He recently re-established care with me in 1/2024. He was tolerating Xarelto well without any bleeding issues. He noted peristent anemia however without documented bleeding. His repeat labs showed normal hemoglobin, iron, ferritin, B12 and folate levels.     He also reported he has been off testosterone and had worsening symptoms and his T level is again low and he would like to resume testosterone. He was allowed to do this as long as he remained on therapeutic anticoagulation and had labd to ensure no elevated hemoglobin/hematocrit levels. He was recommended to have labs every 6 weeks until he was on stable dosing. He presents today for follow up.     Interim History:  Today, Milad notes that he has not started the testosterone. He notes that he has done some research and is concerned that if he starts on it he will need to remain on it for life. He did start on tirzepatide through an online company. He notes some GI side effects but has had weight loss. He has been continuing low salt diet. He notes occasional ankle edema. He wears compression garments intermittently. He notes that he will likely need knee replacement later this year but it is not currently scheduled. He needs to establish with new orthopedic surgeon.     ROS:  Denies any bleeding complications. Specifically, no frequent epistaxis. No issues with oral mucosal bleeding. Denies any hematuria or blood in stools. Denies any shortness of breath. No chest pain. No cough. No fever.      Medications:   Current Outpatient Medications   Medication Sig Dispense Refill    albuterol (PROAIR HFA/PROVENTIL HFA/VENTOLIN HFA) 108 (90 Base) MCG/ACT inhaler Inhale 2 puffs into the lungs every 6 hours as needed for shortness of breath, wheezing or cough 18 g 3    cetirizine (ZYRTEC) 10 MG tablet Take 10 mg by mouth daily      lisinopril (ZESTRIL) 30 MG tablet Take 1 tablet (30 mg) by mouth daily  90 tablet 1    omeprazole (PRILOSEC) 40 MG DR capsule Take 1 capsule (40 mg) by mouth daily 90 capsule 3    rivaroxaban ANTICOAGULANT (XARELTO ANTICOAGULANT) 20 MG TABS tablet Take 1 tablet (20 mg) by mouth daily (with dinner) 90 tablet 3        Allergies:    No Known Allergies    PMH:  Past Medical History:   Diagnosis Date    DVT     right leg dx 1/13/2018    Family history of colon cancer     GERD (gastroesophageal reflux disease)     HTN (hypertension)     Hypotestosteronemia         Social History:   Social History     Tobacco Use    Smoking status: Never    Smokeless tobacco: Former     Quit date: 6/22/2002   Vaping Use    Vaping status: Never Used   Substance Use Topics    Alcohol use: Yes     Comment: occas    Drug use: No       Family History:  Deferred.    Objective:  Vitals: BP (!) 143/77   Pulse 76   Temp 97.9  F (36.6  C) (Oral)   Ht 1.829 m (6')   Wt 147.4 kg (324 lb 14.4 oz)   SpO2 99%   BMI 44.06 kg/m       Exam:   Constitutional: Appears well, no distress  HEENT: Pupils equal and round. No scleral icterus or hemorrhage.   Respiratory: no increased work of breathing.   Mus/Skele: no significant edema of lower extremities  Skin: no petechiae, no ecchymosis on exposed dermis.  Neuro: CN II-XII intact. Normal gait. AOx3      Labs:  CBC RESULTS:   Recent Labs   Lab Test 01/31/24  1354   WBC 7.7   RBC 4.70   HGB 14.2   HCT 40.6   MCV 86   MCH 30.2   MCHC 35.0   RDW 12.3        Last Comprehensive Metabolic Panel:  Sodium   Date Value Ref Range Status   01/02/2024 137 135 - 145 mmol/L Final     Comment:     Reference intervals for this test were updated on 09/26/2023 to more accurately reflect our healthy population. There may be differences in the flagging of prior results with similar values performed with this method. Interpretation of those prior results can be made in the context of the updated reference intervals.    04/15/2021 136 133 - 144 mmol/L Final     Potassium   Date Value Ref  Range Status   01/02/2024 4.2 3.4 - 5.3 mmol/L Final   02/07/2022 4.1 3.4 - 5.3 mmol/L Final   04/15/2021 4.0 3.4 - 5.3 mmol/L Final     Chloride   Date Value Ref Range Status   01/02/2024 100 98 - 107 mmol/L Final   02/07/2022 103 94 - 109 mmol/L Final   04/15/2021 103 94 - 109 mmol/L Final     Carbon Dioxide   Date Value Ref Range Status   04/15/2021 27 20 - 32 mmol/L Final     Carbon Dioxide (CO2)   Date Value Ref Range Status   01/02/2024 28 22 - 29 mmol/L Final   02/07/2022 28 20 - 32 mmol/L Final     Anion Gap   Date Value Ref Range Status   01/02/2024 9 7 - 15 mmol/L Final   02/07/2022 4 3 - 14 mmol/L Final   04/15/2021 6 3 - 14 mmol/L Final     Glucose   Date Value Ref Range Status   01/02/2024 105 (H) 70 - 99 mg/dL Final   02/07/2022 96 70 - 99 mg/dL Final   04/15/2021 104 (H) 70 - 99 mg/dL Final     Urea Nitrogen   Date Value Ref Range Status   01/02/2024 19.2 6.0 - 20.0 mg/dL Final   02/07/2022 19 7 - 30 mg/dL Final   04/15/2021 27 7 - 30 mg/dL Final     Creatinine   Date Value Ref Range Status   01/02/2024 1.26 (H) 0.67 - 1.17 mg/dL Final   04/15/2021 1.17 0.66 - 1.25 mg/dL Final     GFR Estimate   Date Value Ref Range Status   01/02/2024 73 >60 mL/min/1.73m2 Final   04/15/2021 77 >60 mL/min/[1.73_m2] Final     Comment:     Non  GFR Calc  Starting 12/18/2018, serum creatinine based estimated GFR (eGFR) will be   calculated using the Chronic Kidney Disease Epidemiology Collaboration   (CKD-EPI) equation.       Calcium   Date Value Ref Range Status   01/02/2024 9.8 8.6 - 10.0 mg/dL Final   04/15/2021 9.2 8.5 - 10.1 mg/dL Final     Liver Function Studies -   Recent Labs   Lab Test 01/02/24  2339   PROTTOTAL 7.4   ALBUMIN 4.4   BILITOTAL 0.2   ALKPHOS 64   AST 36   ALT 69        Imaging:  No results found for this or any previous visit (from the past 744 hour(s)).     Assessment:  History of recurrent venous thromboembolism on long term anticoagulation with Xarelto 20mg once daily for  secondary prevention   History of normocytic anemia- resolved on last labs.   Folate, B12, iron all WNL.  History of low testosterone, previously on testosterone, stopped after first DVT event despite no erythrocytosis    Plan:  He remains an appropriate candidate to stay on long term anticoagulation with Xarelto 20mg once daily for venous thromboembolism prevention.   He will let me know if he resumes T therapy to monitor for HGB/hematocrit elevation.   He will notify the office when his TKA is scheduled. We typically hold Xarelto 48 hours prior to procedure and start it again 12-24 hours postop.     Patient instructed to contact the clinic should they require any surgical procedures for perioperative planning.     Return to clinic in 1 year, sooner if any concerns.       Rena Yuan, MPH, PA-C  [facility]    20 minutes spent by me on the date of the encounter doing chart review, review of outside records, review of test results, interpretation of tests, patient visit, and documentation

## 2024-05-08 ENCOUNTER — OFFICE VISIT (OUTPATIENT)
Dept: HEMATOLOGY | Facility: CLINIC | Age: 44
End: 2024-05-08
Attending: PHYSICIAN ASSISTANT
Payer: COMMERCIAL

## 2024-05-08 VITALS
BODY MASS INDEX: 42.66 KG/M2 | HEIGHT: 72 IN | OXYGEN SATURATION: 99 % | WEIGHT: 315 LBS | SYSTOLIC BLOOD PRESSURE: 143 MMHG | TEMPERATURE: 97.9 F | HEART RATE: 76 BPM | DIASTOLIC BLOOD PRESSURE: 77 MMHG

## 2024-05-08 DIAGNOSIS — Z79.01 LONG TERM CURRENT USE OF ANTICOAGULANT THERAPY: ICD-10-CM

## 2024-05-08 DIAGNOSIS — Z86.711 HISTORY OF PULMONARY EMBOLISM: Primary | ICD-10-CM

## 2024-05-08 DIAGNOSIS — R79.89 LOW TESTOSTERONE: ICD-10-CM

## 2024-05-08 DIAGNOSIS — R53.83 OTHER FATIGUE: ICD-10-CM

## 2024-05-08 DIAGNOSIS — Z86.718 HISTORY OF DVT (DEEP VEIN THROMBOSIS): ICD-10-CM

## 2024-05-08 PROCEDURE — 99213 OFFICE O/P EST LOW 20 MIN: CPT | Performed by: PHYSICIAN ASSISTANT

## 2024-05-08 NOTE — PATIENT INSTRUCTIONS
[facility]  [facility]  [facility] [address], [address], [place] [address]  Main: [phone], Fax: [phone]    Edwin,   It was a pleasure seeing you today.  Thank you for allowing us to be involved in your care.  Please let us know if there is anything else we can do for you, so that we can be sure you are leaving completely satisfied with your care experience. Below is the plan that we discussed.     Continue Xarelto 20mg once daily   Call if any bleeding issues   Let me know if you do start the T therapy so we can monitor your labs.   Let me know if your knee replacement gets scheduled.       We would like a provider on our team to see you at least annually for optimal care and to allow us to continue to prescribe for you.        Return to clinic in  in one year.    If you have questions or concerns, please don't hesitate to send a [employer] Message for non urgent matters or contact my nurse clinician, Harley. His number is [phone]. If they are unavailable and you have immediate concerns, please call [phone] and ask for a nurse.     Rena Yuan, MPH, PA-C  [facility]

## 2024-06-25 ENCOUNTER — APPOINTMENT (OUTPATIENT)
Dept: ULTRASOUND IMAGING | Facility: CLINIC | Age: 44
End: 2024-06-25
Attending: STUDENT IN AN ORGANIZED HEALTH CARE EDUCATION/TRAINING PROGRAM
Payer: COMMERCIAL

## 2024-06-25 ENCOUNTER — HOSPITAL ENCOUNTER (EMERGENCY)
Facility: CLINIC | Age: 44
Discharge: HOME | End: 2024-06-25
Attending: STUDENT IN AN ORGANIZED HEALTH CARE EDUCATION/TRAINING PROGRAM | Admitting: STUDENT IN AN ORGANIZED HEALTH CARE EDUCATION/TRAINING PROGRAM
Payer: COMMERCIAL

## 2024-06-25 ENCOUNTER — APPOINTMENT (OUTPATIENT)
Dept: GENERAL RADIOLOGY | Facility: CLINIC | Age: 44
End: 2024-06-25
Attending: STUDENT IN AN ORGANIZED HEALTH CARE EDUCATION/TRAINING PROGRAM
Payer: COMMERCIAL

## 2024-06-25 VITALS
TEMPERATURE: 98.9 F | SYSTOLIC BLOOD PRESSURE: 154 MMHG | RESPIRATION RATE: 18 BRPM | HEART RATE: 85 BPM | OXYGEN SATURATION: 99 % | DIASTOLIC BLOOD PRESSURE: 91 MMHG

## 2024-06-25 DIAGNOSIS — M79.671 RIGHT FOOT PAIN: ICD-10-CM

## 2024-06-25 PROCEDURE — 99284 EMERGENCY DEPT VISIT MOD MDM: CPT | Mod: 25 | Performed by: STUDENT IN AN ORGANIZED HEALTH CARE EDUCATION/TRAINING PROGRAM

## 2024-06-25 PROCEDURE — 73630 X-RAY EXAM OF FOOT: CPT | Mod: RT

## 2024-06-25 PROCEDURE — 93971 EXTREMITY STUDY: CPT | Mod: RT

## 2024-06-25 PROCEDURE — 99283 EMERGENCY DEPT VISIT LOW MDM: CPT | Performed by: STUDENT IN AN ORGANIZED HEALTH CARE EDUCATION/TRAINING PROGRAM

## 2024-06-25 ASSESSMENT — ACTIVITIES OF DAILY LIVING (ADL)
ADLS_ACUITY_SCORE: 37

## 2024-06-25 ASSESSMENT — COLUMBIA-SUICIDE SEVERITY RATING SCALE - C-SSRS
1. IN THE PAST MONTH, HAVE YOU WISHED YOU WERE DEAD OR WISHED YOU COULD GO TO SLEEP AND NOT WAKE UP?: NO
6. HAVE YOU EVER DONE ANYTHING, STARTED TO DO ANYTHING, OR PREPARED TO DO ANYTHING TO END YOUR LIFE?: NO
2. HAVE YOU ACTUALLY HAD ANY THOUGHTS OF KILLING YOURSELF IN THE PAST MONTH?: NO

## 2024-06-25 NOTE — ED TRIAGE NOTES
Hx of DVT and PE. Woke up today with soreness in the right foot pt. Reports flying to [place] and back today for work. No redness, swelling, or warmth.

## 2024-06-25 NOTE — DISCHARGE INSTRUCTIONS
Your ultrasound was negative for blood clot.  Your x-rays did not show any fractures.  I do not know exactly what is causing your symptoms, but does not appear to be an infection or anything dangerous.  Follow-up with your regular doctor or orthopedics if not improving.

## 2024-06-25 NOTE — ED PROVIDER NOTES
History     Chief Complaint   Patient presents with    Foot Pain     HPI  Edwin Nuno is a 43 year old male who has history of DVT on Xarelto, who presents to the emergency department for evaluation of right foot pain.  Patient states that he woke up today and had some pain on the top of his foot.  He denies any trauma or injuries.  States that he flew to [place] and back today for work.  Denies fever.  Denies swelling or redness.  He is able to ambulate.  No numbness or tingling.  States that he began having some pain in the back of his calf and was concerned that he may have a blood clot.  He is on Xarelto for previous DVT.  He has no other complaints or symptoms.    Allergies:  No Known Allergies    Problem List:    Patient Active Problem List    Diagnosis Date Noted    Morbid obesity (H) 06/20/2018     Priority: Medium    DVT (deep venous thrombosis) (H) 01/15/2018     Priority: Medium    Long-term (current) use of anticoagulants [Z79.01] 01/15/2018     Priority: Medium    Pulmonary embolism on right (H) 01/14/2018     Priority: Medium    Benign essential hypertension 01/12/2017     Priority: Medium    Hypotestosteronism 10/29/2015     Priority: Medium     He has been on replacement therapies in the past.       Esophageal reflux 10/29/2015     Priority: Medium    Family history of colon cancer 11/07/2014     Priority: Medium     His mother and MGM had colon cancer. Recommend starting colonoscopies at age 40.       Hyperlipidemia with target LDL less than 130 11/07/2014     Priority: Medium     Diagnosis updated by automated process. Provider to review and confirm.          Past Medical History:    Past Medical History:   Diagnosis Date    DVT     Family history of colon cancer     GERD (gastroesophageal reflux disease)     HTN (hypertension)     Hypotestosteronemia        Past Surgical History:    Past Surgical History:   Procedure Laterality Date    APPENDECTOMY  Feb 1998    ARTHROSCOPY KNEE      right,  1/8/2018    SURGICAL HISTORY OF -  Left 1997, 2003    left knee surgery    SURGICAL HISTORY OF -  Right 1999    right shoulder       Family History:    Family History   Problem Relation Age of Onset    Cancer - colorectal Mother     Breast Cancer Mother     Colon Cancer Mother         not malignant pollups    Cancer - colorectal Maternal Grandmother     Colon Cancer Maternal Grandmother         Large intestine and colon removal    Prostate Cancer Father         Removed prostate    Osteoporosis Paternal Grandmother     Cerebrovascular Disease Other         Great Grandmother    C.A.D. No family hx of     Diabetes No family hx of     Hypertension No family hx of     Cerebrovascular Disease No family hx of     Melanoma No family hx of        Social History:  Marital Status:   [2]  Social History     Tobacco Use    Smoking status: Never    Smokeless tobacco: Former     Quit date: 6/22/2002   Vaping Use    Vaping status: Never Used   Substance Use Topics    Alcohol use: Yes     Comment: occas    Drug use: No        Medications:    albuterol (PROAIR HFA/PROVENTIL HFA/VENTOLIN HFA) 108 (90 Base) MCG/ACT inhaler  cetirizine (ZYRTEC) 10 MG tablet  lisinopril (ZESTRIL) 30 MG tablet  omeprazole (PRILOSEC) 40 MG DR capsule  rivaroxaban ANTICOAGULANT (XARELTO ANTICOAGULANT) 20 MG TABS tablet          Review of Systems  See HPI  Physical Exam   BP: (!) 154/91  Pulse: 85  Temp: 98.9  F (37.2  C)  Resp: 18  SpO2: 99 %      Physical Exam  Constitutional:       General: He is not in acute distress.  HENT:      Head: Atraumatic.   Cardiovascular:      Pulses: Normal pulses.   Pulmonary:      Effort: Pulmonary effort is normal.   Musculoskeletal:      Right lower leg: Normal.      Left lower leg: Normal.      Right ankle: Normal.      Left ankle: Normal.      Right foot: Normal range of motion. Bony tenderness (Over the 4th and fifth metatarsal.) present. No swelling, deformity or crepitus. Normal pulse.      Left foot: Normal.    Skin:     General: Skin is warm and dry.      Capillary Refill: Capillary refill takes less than 2 seconds.   Neurological:      Sensory: No sensory deficit.      Motor: No weakness.         ED Course        Procedures             Critical Care time:  none             Results for orders placed or performed during the hospital encounter of 06/25/24 (from the past 24 hour(s))   US Lower Extremity Venous Duplex Right    Narrative    EXAM: US LOWER EXTREMITY VENOUS DUPLEX RIGHT  LOCATION: [facility]  DATE: 6/25/2024    INDICATION: Pain. Suspected DVT.  COMPARISON: Ultrasound venous right lower extremity Doppler 4/15/2021.  TECHNIQUE: Venous Duplex ultrasound of the right lower extremity with and without compression, augmentation and duplex. Color flow and spectral Doppler with waveform analysis performed.    FINDINGS: Exam includes the common femoral, femoral, popliteal, and contralateral common femoral veins as well as segmentally visualized deep calf veins and greater saphenous vein.     RIGHT: Interval resolution of right popliteal and peroneal vein DVT demonstrated previously. No DVT in the right lower extremity veins on current examination. No superficial thrombophlebitis. No popliteal cyst.      Impression    IMPRESSION:  1.  Resolved right popliteal and peroneal vein DVT demonstrated previously. No deep venous thrombosis in the right lower extremity on current study.    2.  No popliteal cyst.   Foot  XR, G/E 3 views, right    Narrative    EXAM: XR FOOT RIGHT G/E 3 VIEWS  LOCATION: [facility]  DATE: 6/25/2024    INDICATION: Lateral foot pain  COMPARISON: None.      Impression    IMPRESSION: No acute bony abnormalities in the foot. Enthesopathic changes off the calcaneus.       Medications - No data to display    Assessments & Plan (with Medical Decision Making)     I have reviewed the nursing notes.    I have reviewed the findings, diagnosis, plan and need  for follow up with the patient.    Edwin Nuno is a 43 year old male who has history of DVT on Xarelto, who presents to the emergency department for evaluation of right foot pain.  Vital signs reviewed and notable for hypertension, otherwise reassuring.  Patient has atraumatic right foot pain.  His concern is a blood clot.  He is on Xarelto for previous DVT.  DVT ultrasound obtained and reviewed.  He has no evidence of any DVT at this time.  X-rays also obtained and does not show any fracture.  Patient has some very mild tenderness over his fourth and fifth metatarsals, but has no swelling, bruising, erythema or warmth.  Low suspicion for infection.  Unclear etiology of his symptoms, but suspect likely soft tissue.  Reassured him.  Recommend close outpatient follow-up.  Return precautions discussed.        Discharge Medication List as of 6/25/2024  3:20 AM          Final diagnoses:   Right foot pain       6/25/2024   [facility] EMERGENCY DEPT       Channing York MD  06/25/24 [id number]

## 2024-06-26 ENCOUNTER — PATIENT OUTREACH (OUTPATIENT)
Dept: FAMILY MEDICINE | Facility: CLINIC | Age: 44
End: 2024-06-26
Payer: COMMERCIAL

## 2024-06-26 NOTE — TELEPHONE ENCOUNTER
What type of discharge? Emergency Department    Is a TCM episode required? No  When should the patient follow up with PCP? within 30 days of discharge.    Ching San RN  [facility]

## 2024-06-27 NOTE — TELEPHONE ENCOUNTER
Attempted call .no answer. Message left for call back  Barbara Abdalla RN on 6/27/2024 at 2:07 PM

## 2024-07-24 ENCOUNTER — MYC MEDICAL ADVICE (OUTPATIENT)
Dept: HEMATOLOGY | Facility: CLINIC | Age: 44
End: 2024-07-24
Payer: COMMERCIAL

## 2024-07-26 DIAGNOSIS — I10 BENIGN ESSENTIAL HYPERTENSION: ICD-10-CM

## 2024-07-26 NOTE — LETTER
[facility]  [address]  [facility] [address]  [phone]  July 29, 2024    Edwin Nuno  [address]  [facility] [address]    Dear Edwin,    We care about your health and have reviewed your health plan including your medical conditions, medication list, and lab results.  Based on this review, it is recommended that you follow up regarding the following health topic(s):     -Wellness (Physical) Visit & Medication Refills - APPOINTMENT NEEDED    Please call us at [phone] (or use ChangeYourFlight) to address the above recommendations.     Thank you for trusting [facility] and we appreciate the opportunity to serve you.  We look forward to supporting your healthcare needs in the future.    Healthy Regards,      Your Health Care Team  [facility]

## 2024-07-27 RX ORDER — LISINOPRIL 30 MG/1
30 TABLET ORAL DAILY
Qty: 90 TABLET | Refills: 0 | Status: SHIPPED | OUTPATIENT
Start: 2024-07-27 | End: 2024-08-26

## 2024-08-22 DIAGNOSIS — M25.561 RIGHT KNEE PAIN, UNSPECIFIED CHRONICITY: Primary | ICD-10-CM

## 2024-08-23 ENCOUNTER — ANCILLARY PROCEDURE (OUTPATIENT)
Dept: GENERAL RADIOLOGY | Facility: CLINIC | Age: 44
End: 2024-08-23
Attending: NURSE PRACTITIONER
Payer: COMMERCIAL

## 2024-08-23 ENCOUNTER — OFFICE VISIT (OUTPATIENT)
Dept: ORTHOPEDICS | Facility: CLINIC | Age: 44
End: 2024-08-23
Payer: COMMERCIAL

## 2024-08-23 VITALS
WEIGHT: 296.6 LBS | TEMPERATURE: 96.2 F | DIASTOLIC BLOOD PRESSURE: 81 MMHG | HEIGHT: 72 IN | SYSTOLIC BLOOD PRESSURE: 134 MMHG | BODY MASS INDEX: 40.17 KG/M2

## 2024-08-23 DIAGNOSIS — M25.561 RIGHT KNEE PAIN, UNSPECIFIED CHRONICITY: ICD-10-CM

## 2024-08-23 DIAGNOSIS — M17.11 PRIMARY OSTEOARTHRITIS OF RIGHT KNEE: Primary | ICD-10-CM

## 2024-08-23 PROCEDURE — 99204 OFFICE O/P NEW MOD 45 MIN: CPT | Performed by: ORTHOPAEDIC SURGERY

## 2024-08-23 PROCEDURE — 73564 X-RAY EXAM KNEE 4 OR MORE: CPT | Mod: TC | Performed by: RADIOLOGY

## 2024-08-23 NOTE — LETTER
"8/23/2024      Edwin Nuno  [address]  [facility] [address]      Dear Colleague,    Thank you for referring your patient, Edwin Nuno, to the [facility]. Please see a copy of my visit note below.    ORTHOPEDIC CONSULT      Chief Complaint: Edwin Nuno is a 43 year old male who is being seen for   Chief Complaint   Patient presents with     Right Knee - Pain       History of Present Illness:   Here for the right knee. Reports long history of right knee issues.  Reports years of pain.  Has had \"4 or 5\" surgeries prior to the right knee. Last surgery in 2022 for meniscus.  He reports has had a steady and progressive increase in symptoms to the right knee. Reports mostly medial sided pain. Worst with ambulation, activity, use better with rest.  The pain is non-radiating. Does endorse the knee giving out at times, no falls yet. Also reports crackling, popping, noises that can be painful.   Treatments tried in total: 4+ year of physician directed care including multiple rounds of physical therapy following knee surgeries, reports around 14 steroid injections with the last in 2023, previous gel injections, 4 to 5 previous knee arthroscopies, 30+ pound weight loss, rest, activity modification (including has stopped working out and weight lifting). He is unable to take NSAIDs due to xarelto use despite this reports progressive constant pain, fear of falling, inability to lift weight and other activity, pain with ambulation.     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 First following knee arthroscopy, reports has had one following COVID vaccine. Reports on xarelto chronically.       Patient's past medical, surgical, social and family histories reviewed.     Past Medical History:   Diagnosis Date     DVT     right leg dx 1/13/2018     Family history of colon cancer      GERD (gastroesophageal reflux disease)      HTN (hypertension)      Hypotestosteronemia        Past Surgical History: "   Procedure Laterality Date     APPENDECTOMY  Feb 1998     ARTHROSCOPY KNEE      right, 1/8/2018     SURGICAL HISTORY OF -  Left 1997, 2003    left knee surgery     SURGICAL HISTORY OF -  Right 1999    right shoulder       Medications:  Current Outpatient Medications   Medication Sig Dispense Refill     cetirizine (ZYRTEC) 10 MG tablet Take 10 mg by mouth daily       albuterol (PROAIR HFA/PROVENTIL HFA/VENTOLIN HFA) 108 (90 Base) MCG/ACT inhaler Inhale 2 puffs into the lungs every 6 hours as needed for shortness of breath, wheezing or cough 18 g 3     lisinopril (ZESTRIL) 30 MG tablet Take 1 tablet (30 mg) by mouth daily 90 tablet 0     omeprazole (PRILOSEC) 40 MG DR capsule Take 1 capsule (40 mg) by mouth daily (Patient not taking: Reported on 8/23/2024) 90 capsule 3     rivaroxaban ANTICOAGULANT (XARELTO ANTICOAGULANT) 20 MG TABS tablet Take 1 tablet (20 mg) by mouth daily (with dinner) 90 tablet 3     No current facility-administered medications for this visit.       No Known Allergies    Social History     Occupational History     Not on file   Tobacco Use     Smoking status: Never     Smokeless tobacco: Former     Quit date: 6/22/2002   Vaping Use     Vaping status: Never Used   Substance and Sexual Activity     Alcohol use: Yes     Comment: occas     Drug use: No     Sexual activity: Yes     Birth control/protection: I.U.D.       Family History   Problem Relation Age of Onset     Cancer - colorectal Mother      Breast Cancer Mother      Colon Cancer Mother         not malignant pollups     Cancer - colorectal Maternal Grandmother      Colon Cancer Maternal Grandmother         Large intestine and colon removal     Prostate Cancer Father         Removed prostate     Osteoporosis Paternal Grandmother      Cerebrovascular Disease Other         Great Grandmother     C.A.D. No family hx of      Diabetes No family hx of      Hypertension No family hx of      Cerebrovascular Disease No family hx of      Melanoma No  "family hx of        REVIEW OF SYSTEMS  10 point review systems performed otherwise negative as noted as per history of present illness.    Physical Exam:  Vitals: /81   Temp (!) 96.2  F (35.7  C)   Ht 1.822 m (5' 11.75\")   Wt 134.5 kg (296 lb 9.6 oz)   BMI 40.51 kg/m    BMI= Body mass index is 40.51 kg/m .  Constitutional: healthy, alert and no acute distress   Psychiatric: mentation appears normal and affect normal/bright  NEURO: no focal deficits  RESP: Normal with easy respirations and no use of accessory muscles to breathe, no audible wheezing or retractions  CV: No peripheral edema         Regular rate and rhythm by palpation  SKIN: No erythema, rashes, excoriation, or breakdown. No evidence of infection. , Previous well healed incisions/laceration: consistent with prior knee arthroscopies.   JOINT/EXTREMITIES:right knee: small effusion. AROM 0-110. Extensor intact. Pseudolaxity with varus stressing but solid endpoint. No other laxity.  Negative Lachman's. Tender along medial joint line.  No other bony or focal tenderness.  Patella tracks midline.      GAIT: not tested     Diagnostic Modalities:  Right knee x-ray: No acute fractures or dislocations.  Moderate medial compartment narrowing.  Patellofemoral shows some small osteophytes but overall preserved articular space    Right knee MRI dated 7/29/2022:  IMPRESSION:    1. Status post partial medial meniscectomy. There is recurrent apical free edge and undersurface tearing of the posterior horn measuring 2.0 cm.    2. Mild osteoarthritis of the patellofemoral greater than medial compartments. There is superimposed chondral fissuring of the medial femoral condyle, with mild subchondral edema. This has slightly progressed since the prior study dated 6/12/2020.    3. Apical free edge fraying of the lateral meniscal body. This is unchanged since the prior study.    4. Small, unruptured popliteal (Baker's) cyst. The previously visualized ganglion cyst in " Hoffa's fat pad is no longer present.    5. No cruciate or collateral ligament sprain/tear.    6. No osseous or myotendinous abnormality.    Independent visualization of the images was performed.      Impression: right knee primary osteoarthritis    Plan:  All of the above pertinent physical exam and imaging modalities findings was reviewed with Edwin.  Exam, imaging, history is consistent with arthritis. 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 Xrays today show moderate joint loss medially.  He has over the years maximized conservative therapy yet continues to have worsening pain. He mentions he is seeing his PCP regarding possible gout workup soon. Recommend he see his PCP, also recommended an MRI for full evaluation of the knee and to determine progression of chondromalacia. Also discussed to continue to work on weight loss.  Discussed partial vs total knee replacement.      MRI for [facility].     Return to clinic to discuss test results, or sooner as needed for changes.  Re-x-ray on return: No    Chandan Leach D.O.      Again, thank you for allowing me to participate in the care of your patient.        Sincerely,        Carlton Leach, DO

## 2024-08-23 NOTE — PATIENT INSTRUCTIONS
A MRI of the right knee was ordered today for [facility].     Please call [phone] to schedule your appointment with [facility] for  [facility] - [place], [place] [address] , [address] [place] [address]          After your imaging appointment is scheduled, Please call [phone] to schedule an in-person follow-up appointment with Dr. Leach to discuss your results.

## 2024-08-23 NOTE — PROGRESS NOTES
"ORTHOPEDIC CONSULT      Chief Complaint: Edwin Nuno is a 43 year old male who is being seen for   Chief Complaint   Patient presents with    Right Knee - Pain       History of Present Illness:   Here for the right knee. Reports long history of right knee issues.  Reports years of pain.  Has had \"4 or 5\" surgeries prior to the right knee. Last surgery in 2022 for meniscus.  He reports has had a steady and progressive increase in symptoms to the right knee. Reports mostly medial sided pain. Worst with ambulation, activity, use better with rest.  The pain is non-radiating. Does endorse the knee giving out at times, no falls yet. Also reports crackling, popping, noises that can be painful.   Treatments tried in total: 4+ year of physician directed care including multiple rounds of physical therapy following knee surgeries, reports around 14 steroid injections with the last in 2023, previous gel injections, 4 to 5 previous knee arthroscopies, 30+ pound weight loss, rest, activity modification (including has stopped working out and weight lifting). He is unable to take NSAIDs due to xarelto use despite this reports progressive constant pain, fear of falling, inability to lift weight and other activity, pain with ambulation.     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 First following knee arthroscopy, reports has had one following COVID vaccine. Reports on xarelto chronically.       Patient's past medical, surgical, social and family histories reviewed.     Past Medical History:   Diagnosis Date    DVT     right leg dx 1/13/2018    Family history of colon cancer     GERD (gastroesophageal reflux disease)     HTN (hypertension)     Hypotestosteronemia        Past Surgical History:   Procedure Laterality Date    APPENDECTOMY  Feb 1998    ARTHROSCOPY KNEE      right, 1/8/2018    SURGICAL HISTORY OF -  Left 1997, 2003    left knee surgery    SURGICAL HISTORY OF -  Right 1999    right shoulder       Medications:  Current " "Outpatient Medications   Medication Sig Dispense Refill    cetirizine (ZYRTEC) 10 MG tablet Take 10 mg by mouth daily      albuterol (PROAIR HFA/PROVENTIL HFA/VENTOLIN HFA) 108 (90 Base) MCG/ACT inhaler Inhale 2 puffs into the lungs every 6 hours as needed for shortness of breath, wheezing or cough 18 g 3    lisinopril (ZESTRIL) 30 MG tablet Take 1 tablet (30 mg) by mouth daily 90 tablet 0    omeprazole (PRILOSEC) 40 MG DR capsule Take 1 capsule (40 mg) by mouth daily (Patient not taking: Reported on 8/23/2024) 90 capsule 3    rivaroxaban ANTICOAGULANT (XARELTO ANTICOAGULANT) 20 MG TABS tablet Take 1 tablet (20 mg) by mouth daily (with dinner) 90 tablet 3     No current facility-administered medications for this visit.       No Known Allergies    Social History     Occupational History    Not on file   Tobacco Use    Smoking status: Never    Smokeless tobacco: Former     Quit date: 6/22/2002   Vaping Use    Vaping status: Never Used   Substance and Sexual Activity    Alcohol use: Yes     Comment: occas    Drug use: No    Sexual activity: Yes     Birth control/protection: I.U.D.       Family History   Problem Relation Age of Onset    Cancer - colorectal Mother     Breast Cancer Mother     Colon Cancer Mother         not malignant pollups    Cancer - colorectal Maternal Grandmother     Colon Cancer Maternal Grandmother         Large intestine and colon removal    Prostate Cancer Father         Removed prostate    Osteoporosis Paternal Grandmother     Cerebrovascular Disease Other         Great Grandmother    C.A.D. No family hx of     Diabetes No family hx of     Hypertension No family hx of     Cerebrovascular Disease No family hx of     Melanoma No family hx of        REVIEW OF SYSTEMS  10 point review systems performed otherwise negative as noted as per history of present illness.    Physical Exam:  Vitals: /81   Temp (!) 96.2  F (35.7  C)   Ht 1.822 m (5' 11.75\")   Wt 134.5 kg (296 lb 9.6 oz)   BMI 40.51 " kg/m    BMI= Body mass index is 40.51 kg/m .  Constitutional: healthy, alert and no acute distress   Psychiatric: mentation appears normal and affect normal/bright  NEURO: no focal deficits  RESP: Normal with easy respirations and no use of accessory muscles to breathe, no audible wheezing or retractions  CV: No peripheral edema         Regular rate and rhythm by palpation  SKIN: No erythema, rashes, excoriation, or breakdown. No evidence of infection. , Previous well healed incisions/laceration: consistent with prior knee arthroscopies.   JOINT/EXTREMITIES:right knee: small effusion. AROM 0-110. Extensor intact. Pseudolaxity with varus stressing but solid endpoint. No other laxity.  Negative Lachman's. Tender along medial joint line.  No other bony or focal tenderness.  Patella tracks midline.      GAIT: not tested     Diagnostic Modalities:  Right knee x-ray: No acute fractures or dislocations.  Moderate medial compartment narrowing.  Patellofemoral shows some small osteophytes but overall preserved articular space    Right knee MRI dated 7/29/2022:  IMPRESSION:    1. Status post partial medial meniscectomy. There is recurrent apical free edge and undersurface tearing of the posterior horn measuring 2.0 cm.    2. Mild osteoarthritis of the patellofemoral greater than medial compartments. There is superimposed chondral fissuring of the medial femoral condyle, with mild subchondral edema. This has slightly progressed since the prior study dated 6/12/2020.    3. Apical free edge fraying of the lateral meniscal body. This is unchanged since the prior study.    4. Small, unruptured popliteal (Baker's) cyst. The previously visualized ganglion cyst in Hoffa's fat pad is no longer present.    5. No cruciate or collateral ligament sprain/tear.    6. No osseous or myotendinous abnormality.    Independent visualization of the images was performed.      Impression: right knee primary osteoarthritis    Plan:  All of the above  pertinent physical exam and imaging modalities findings was reviewed with Edwin.  Exam, imaging, history is consistent with arthritis. 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 Xrays today show moderate joint loss medially.  He has over the years maximized conservative therapy yet continues to have worsening pain. He mentions he is seeing his PCP regarding possible gout workup soon. Recommend he see his PCP, also recommended an MRI for full evaluation of the knee and to determine progression of chondromalacia. Also discussed to continue to work on weight loss.  Discussed partial vs total knee replacement.      MRI for [facility].     Return to clinic to discuss test results, or sooner as needed for changes.  Re-x-ray on return: Renay Leach D.O.

## 2024-08-26 ENCOUNTER — OFFICE VISIT (OUTPATIENT)
Dept: FAMILY MEDICINE | Facility: CLINIC | Age: 44
End: 2024-08-26
Payer: COMMERCIAL

## 2024-08-26 VITALS
OXYGEN SATURATION: 98 % | SYSTOLIC BLOOD PRESSURE: 130 MMHG | RESPIRATION RATE: 16 BRPM | HEART RATE: 64 BPM | BODY MASS INDEX: 40.36 KG/M2 | HEIGHT: 72 IN | DIASTOLIC BLOOD PRESSURE: 62 MMHG | WEIGHT: 298 LBS | TEMPERATURE: 96.7 F

## 2024-08-26 DIAGNOSIS — I10 BENIGN ESSENTIAL HYPERTENSION: ICD-10-CM

## 2024-08-26 DIAGNOSIS — Z80.0 FAMILY HISTORY OF COLON CANCER: ICD-10-CM

## 2024-08-26 DIAGNOSIS — M25.561 RIGHT KNEE PAIN, UNSPECIFIED CHRONICITY: ICD-10-CM

## 2024-08-26 DIAGNOSIS — Z00.00 ROUTINE GENERAL MEDICAL EXAMINATION AT A HEALTH CARE FACILITY: Primary | ICD-10-CM

## 2024-08-26 DIAGNOSIS — Z12.11 SPECIAL SCREENING FOR MALIGNANT NEOPLASMS, COLON: ICD-10-CM

## 2024-08-26 DIAGNOSIS — E78.5 HYPERLIPIDEMIA WITH TARGET LDL LESS THAN 130: ICD-10-CM

## 2024-08-26 LAB
ANION GAP SERPL CALCULATED.3IONS-SCNC: 10 MMOL/L (ref 7–15)
BUN SERPL-MCNC: 11.1 MG/DL (ref 6–20)
CALCIUM SERPL-MCNC: 9.8 MG/DL (ref 8.8–10.4)
CHLORIDE SERPL-SCNC: 103 MMOL/L (ref 98–107)
CHOLEST SERPL-MCNC: 215 MG/DL
CREAT SERPL-MCNC: 0.99 MG/DL (ref 0.67–1.17)
EGFRCR SERPLBLD CKD-EPI 2021: >90 ML/MIN/1.73M2
FASTING STATUS PATIENT QL REPORTED: YES
FASTING STATUS PATIENT QL REPORTED: YES
GLUCOSE SERPL-MCNC: 84 MG/DL (ref 70–99)
HCO3 SERPL-SCNC: 28 MMOL/L (ref 22–29)
HDLC SERPL-MCNC: 30 MG/DL
LDLC SERPL CALC-MCNC: 136 MG/DL
NONHDLC SERPL-MCNC: 185 MG/DL
POTASSIUM SERPL-SCNC: 4.2 MMOL/L (ref 3.4–5.3)
SODIUM SERPL-SCNC: 141 MMOL/L (ref 135–145)
TRIGL SERPL-MCNC: 244 MG/DL
URATE SERPL-MCNC: 6.7 MG/DL (ref 3.4–7)

## 2024-08-26 PROCEDURE — 80061 LIPID PANEL: CPT | Performed by: NURSE PRACTITIONER

## 2024-08-26 PROCEDURE — 99214 OFFICE O/P EST MOD 30 MIN: CPT | Mod: 25 | Performed by: NURSE PRACTITIONER

## 2024-08-26 PROCEDURE — 36415 COLL VENOUS BLD VENIPUNCTURE: CPT | Performed by: NURSE PRACTITIONER

## 2024-08-26 PROCEDURE — 84550 ASSAY OF BLOOD/URIC ACID: CPT | Performed by: NURSE PRACTITIONER

## 2024-08-26 PROCEDURE — 99396 PREV VISIT EST AGE 40-64: CPT | Performed by: NURSE PRACTITIONER

## 2024-08-26 PROCEDURE — 80048 BASIC METABOLIC PNL TOTAL CA: CPT | Performed by: NURSE PRACTITIONER

## 2024-08-26 RX ORDER — LISINOPRIL 30 MG/1
30 TABLET ORAL DAILY
Qty: 90 TABLET | Refills: 3 | Status: SHIPPED | OUTPATIENT
Start: 2024-08-26

## 2024-08-26 RX ORDER — LISINOPRIL 30 MG/1
30 TABLET ORAL DAILY
Qty: 90 TABLET | Refills: 0 | Status: SHIPPED | OUTPATIENT
Start: 2024-08-26 | End: 2024-08-26

## 2024-08-26 ASSESSMENT — PAIN SCALES - GENERAL: PAINLEVEL: NO PAIN (0)

## 2024-08-26 NOTE — PROGRESS NOTES
"Preventive Care Visit  [facility]  CHAIM Chase CNP, Family Medicine  Aug 26, 2024      Assessment & Plan     Routine general medical examination at a health care facility      Benign essential hypertension    - Basic metabolic panel  (Ca, Cl, CO2, Creat, Gluc, K, Na, BUN); Future  - lisinopril (ZESTRIL) 30 MG tablet; Take 1 tablet (30 mg) by mouth daily.  - Basic metabolic panel  (Ca, Cl, CO2, Creat, Gluc, K, Na, BUN)  Controlled, continue Lisinopril and monitoring. Labs pending.    Hyperlipidemia with target LDL less than 130    - Lipid panel reflex to direct LDL Non-fasting; Future  - Lipid panel reflex to direct LDL Non-fasting    Right knee pain, unspecified chronicity    - Uric acid; Future  - Uric acid  If elevated uric acid, discussed adding allopurinol daily.    Family history of colon cancer    - Colonoscopy Screening  Referral; Future    Special screening for malignant neoplasms, colon    - Colonoscopy Screening  Referral; Future          BMI  Estimated body mass index is 40.7 kg/m  as calculated from the following:    Height as of this encounter: 1.822 m (5' 11.75\").    Weight as of this encounter: 135.2 kg (298 lb).   Weight management plan: Discussed healthy diet and exercise guidelines      See Patient Instructions  Patient Instructions   Will be notified of pending labs.  Schedule colonoscopy, will need to hold Xarelto 2 days before scope.  Check with formulary to see which weight loss medication your plan may cover.        Patient Education  Preventive Care Advice   This is general advice given by our system to help you stay healthy. However, your care team may have specific advice just for you. Please talk to your care team about your preventive care needs.  Nutrition  Eat 5 or more servings of fruits and vegetables each day.  Try wheat bread, brown rice and whole grain pasta (instead of white bread, rice, and pasta).  Get enough calcium and " vitamin D. Check the label on foods and aim for 100% of the RDA (recommended daily allowance).  Lifestyle  Exercise at least 150 minutes each week  (30 minutes a day, 5 days a week).  Do muscle strengthening activities 2 days a week. These help control your weight and prevent disease.  No smoking.  Wear sunscreen to prevent skin cancer.  Have a dental exam and cleaning every 6 months.  Yearly exams  See your health care team every year to talk about:  Any changes in your health.  Any medicines your care team has prescribed.  Preventive care, family planning, and ways to prevent chronic diseases.  Shots (vaccines)   HPV shots (up to age 26), if you've never had them before.  Hepatitis B shots (up to age 59), if you've never had them before.  COVID-19 shot: Get this shot when it's due.  Flu shot: Get a flu shot every year.  Tetanus shot: Get a tetanus shot every 10 years.  Pneumococcal, hepatitis A, and RSV shots: Ask your care team if you need these based on your risk.  Shingles shot (for age 50 and up)  General health tests  Diabetes screening:  Starting at age 35, Get screened for diabetes at least every 3 years.  If you are younger than age 35, ask your care team if you should be screened for diabetes.  Cholesterol test: At age 39, start having a cholesterol test every 5 years, or more often if advised.  Bone density scan (DEXA): At age 50, ask your care team if you should have this scan for osteoporosis (brittle bones).  Hepatitis C: Get tested at least once in your life.  STIs (sexually transmitted infections)  Before age 24: Ask your care team if you should be screened for STIs.  After age 24: Get screened for STIs if you're at risk. You are at risk for STIs (including HIV) if:  You are sexually active with more than one person.  You don't use condoms every time.  You or a partner was diagnosed with a sexually transmitted infection.  If you are at risk for HIV, ask about PrEP medicine to prevent HIV.  Get  tested for HIV at least once in your life, whether you are at risk for HIV or not.  Cancer screening tests  Cervical cancer screening: If you have a cervix, begin getting regular cervical cancer screening tests starting at age 21.  Breast cancer scan (mammogram): If you've ever had breasts, begin having regular mammograms starting at age 40. This is a scan to check for breast cancer.  Colon cancer screening: It is important to start screening for colon cancer at age 45.  Have a colonoscopy test every 10 years (or more often if you're at risk) Or, ask your provider about stool tests like a FIT test every year or Cologuard test every 3 years.  To learn more about your testing options, visit:   .  For help making a decision, visit:   https://bit.ly/ok58726.  Prostate cancer screening test: If you have a prostate, ask your care team if a prostate cancer screening test (PSA) at age 55 is right for you.  Lung cancer screening: If you are a current or former smoker ages 50 to 80, ask your care team if ongoing lung cancer screenings are right for you.  For informational purposes only. Not to replace the advice of your health care provider. Copyright   2023 [employer]. All rights reserved. Clinically reviewed by the [facility] Transitions Program. StatSheet 052570 - REV 01/24.       Nessa Stinson is a 43 year old, presenting for the following:  Recheck Medication, Physical, and Hypertension        8/26/2024     4:14 PM   Additional Questions   Roomed by         Health Care Directive  Patient does not have a Health Care Directive or Living Will:     Healthy Habits:     Getting at least 3 servings of Calcium per day:  Yes    Bi-annual eye exam:  Yes    Dental care twice a year:  Yes    Sleep apnea or symptoms of sleep apnea:  None    Diet:  Regular (no restrictions)    Frequency of exercise:  None    Duration of exercise:  N/A    Taking medications regularly:  Yes    Barriers to taking medications:   "None    Medication side effects:  None    Additional concerns today:  Yes  History of Present Illness       Reason for visit:  I need to get a refill on my BP meds and I would like to discuss the colon oscapy and my recent bouts with gout. I would also like to get my uric acid checked as i have had high levels on previous tests but nothing was done.   He is not taking prescribed medications regularly due to None.    He states he feels his \"gout\" is flaring up. He's worried about his kidneys.  Has been to another clinic which has prescribed him Zepbound but he doesn't know if he will continue with that due to cost.  Family history of colon cancer and would like to do colonoscopy.  He is anticipating a knee replacement.  He is struggling with his weight.  No other concerns.               No data to display                  10/19/2022   Nutrition   Three or more servings of calcium each day? Yes   Diet: breakfast skipped            10/19/2022   Exercise   Frequency of exercise: None            1/14/2024   Social Factors   Worry food won't last until get money to buy more No   Food not last or not have enough money for food? No   Do you have housing? (Housing is defined as stable permanent housing and does not include staying ouside in a car, in a tent, in an abandoned building, in an overnight shelter, or couch-surfing.) Yes   Are you worried about losing your housing? No   Lack of transportation? No   Unable to get utilities (heat,electricity)? No            10/19/2022   Dental   Dentist two times every year? Yes                 Today's PHQ-2 Score:       1/14/2024    10:18 PM   PHQ-2 ( 1999 [employer])   Q1: Little interest or pleasure in doing things 1   Q2: Feeling down, depressed or hopeless 0   PHQ-2 Score 1   Q1: Little interest or pleasure in doing things Several days   Q2: Feeling down, depressed or hopeless Not at all   PHQ-2 Score 1         10/19/2022   Substance Use   Alcohol more than 3/day or more than 7/wk " Yes   How often do you have a drink containing alcohol 4 or more times a week   How many alcohol drinks on typical day 3 or 4   How often do you have 5+ drinks at one occasion Less than monthly   Audit 2/3 Score 2   How often not able to stop drinking once started Never   How often failed to do what normally expected Never   How often needed first drink in am after a heavy drinking session Never   How often feeling of guilt or remorse after drinking Monthly   How often unable to remember what happened the night before Never   Have you or someone else been injured because of your drinking No   Has anyone been concerned or suggested you cut down on drinking No   TOTAL SCORE - AUDIT 8        Social History     Tobacco Use    Smoking status: Never    Smokeless tobacco: Former     Quit date: 6/22/2002   Vaping Use    Vaping status: Never Used   Substance Use Topics    Alcohol use: Yes     Comment: occas    Drug use: No       ASCVD Risk   The 10-year ASCVD risk score (Alek SEXTON, et al., 2019) is: 4.4%    Values used to calculate the score:      Age: 43 years      Sex: Male      Is Non- : No      Diabetic: No      Tobacco smoker: No      Systolic Blood Pressure: 130 mmHg      Is BP treated: Yes      HDL Cholesterol: 27 mg/dL      Total Cholesterol: 200 mg/dL         No data to display                 Reviewed and updated as needed this visit by Provider                    BP Readings from Last 3 Encounters:   08/26/24 130/62   08/23/24 134/81   06/25/24 (!) 154/91    Wt Readings from Last 3 Encounters:   08/26/24 135.2 kg (298 lb)   08/23/24 134.5 kg (296 lb 9.6 oz)   05/08/24 147.4 kg (324 lb 14.4 oz)                  Patient Active Problem List   Diagnosis    Family history of colon cancer    Hyperlipidemia with target LDL less than 130    Hypotestosteronism    Esophageal reflux    Benign essential hypertension    Pulmonary embolism on right (H)    DVT (deep venous thrombosis) (H)     Long-term (current) use of anticoagulants [Z79.01]    Morbid obesity (H)     Past Surgical History:   Procedure Laterality Date    APPENDECTOMY  Feb 1998    ARTHROSCOPY KNEE      right, 1/8/2018    SURGICAL HISTORY OF -  Left 1997, 2003    left knee surgery    SURGICAL HISTORY OF -  Right 1999    right shoulder       Social History     Tobacco Use    Smoking status: Never    Smokeless tobacco: Former     Quit date: 6/22/2002   Substance Use Topics    Alcohol use: Yes     Comment: occas     Family History   Problem Relation Age of Onset    Cancer - colorectal Mother     Breast Cancer Mother     Colon Cancer Mother         not malignant pollups    Cancer - colorectal Maternal Grandmother     Colon Cancer Maternal Grandmother         Large intestine and colon removal    Prostate Cancer Father         Removed prostate    Osteoporosis Paternal Grandmother     Cerebrovascular Disease Other         Great Grandmother    C.A.D. No family hx of     Diabetes No family hx of     Hypertension No family hx of     Cerebrovascular Disease No family hx of     Melanoma No family hx of          Current Outpatient Medications   Medication Sig Dispense Refill    albuterol (PROAIR HFA/PROVENTIL HFA/VENTOLIN HFA) 108 (90 Base) MCG/ACT inhaler Inhale 2 puffs into the lungs every 6 hours as needed for shortness of breath, wheezing or cough 18 g 3    cetirizine (ZYRTEC) 10 MG tablet Take 10 mg by mouth daily      lisinopril (ZESTRIL) 30 MG tablet Take 1 tablet (30 mg) by mouth daily. 90 tablet 3    omeprazole (PRILOSEC) 40 MG DR capsule Take 1 capsule (40 mg) by mouth daily 90 capsule 3    rivaroxaban ANTICOAGULANT (XARELTO ANTICOAGULANT) 20 MG TABS tablet Take 1 tablet (20 mg) by mouth daily (with dinner) 90 tablet 3    tirzepatide-Weight Management (ZEPBOUND) 2.5 MG/0.5ML prefilled pen Inject 0.5 mLs (2.5 mg) subcutaneously every 7 days.       No Known Allergies  Recent Labs   Lab Test 01/15/24  1606 01/02/24  2339 06/22/23  1627  "06/06/23  1631 05/16/23  1416 10/19/22  0917 02/07/22  1351 04/15/21  2323 10/22/19  1558   A1C 5.5  --   --  6.1* 6.1*  --   --   --   --    LDL  --   --  123*  --  145* 170*  --   --  131*   HDL  --   --  27*  --  31* 36*  --   --  33*   TRIG  --   --  252*  --  235* 420*  --   --  307*   ALT  --  69  --  80* 79* 75*   < > 48  --    CR  --  1.26*  --  1.10 1.01 0.94   < > 1.17 1.03   GFRESTIMATED  --  73  --  86 >90 >90   < > 77 >90   GFRESTBLACK  --   --   --   --   --   --   --  90 >90   POTASSIUM  --  4.2  --  4.5 4.4 4.9   < > 4.0 4.0   TSH 2.68  --   --   --   --  0.98  --   --   --     < > = values in this interval not displayed.          Review of Systems  Constitutional, HEENT, cardiovascular, pulmonary, gi and gu systems are negative, except as otherwise noted.     Objective    Exam  /62   Pulse 64   Temp (!) 96.7  F (35.9  C) (Tympanic)   Resp 16   Ht 1.822 m (5' 11.75\")   Wt 135.2 kg (298 lb)   SpO2 98%   BMI 40.70 kg/m     Estimated body mass index is 40.7 kg/m  as calculated from the following:    Height as of this encounter: 1.822 m (5' 11.75\").    Weight as of this encounter: 135.2 kg (298 lb).    Physical Exam  GENERAL: alert and no distress  EYES: Eyes grossly normal to inspection, PERRL and conjunctivae and sclerae normal  HENT: ear canals and TM's normal, nose and mouth without ulcers or lesions  NECK: no adenopathy, no asymmetry, masses, or scars  RESP: lungs clear to auscultation - no rales, rhonchi or wheezes  CV: regular rate and rhythm, normal S1 S2, no S3 or S4, no murmur, click or rub, no peripheral edema  ABDOMEN: soft, obese, non tender, no hepatosplenomegaly, no masses and bowel sounds normal  MS: no gross musculoskeletal defects noted, no edema  SKIN: no suspicious lesions or rashes  PSYCH: mentation appears normal, affect normal/bright        Signed Electronically by: CHAIM Chase CNP    "

## 2024-08-26 NOTE — PATIENT INSTRUCTIONS
Will be notified of pending labs.  Schedule colonoscopy, will need to hold Xarelto 2 days before scope.  Check with formulary to see which weight loss medication your plan may cover.        Patient Education   Preventive Care Advice   This is general advice given by our system to help you stay healthy. However, your care team may have specific advice just for you. Please talk to your care team about your preventive care needs.  Nutrition  Eat 5 or more servings of fruits and vegetables each day.  Try wheat bread, brown rice and whole grain pasta (instead of white bread, rice, and pasta).  Get enough calcium and vitamin D. Check the label on foods and aim for 100% of the RDA (recommended daily allowance).  Lifestyle  Exercise at least 150 minutes each week  (30 minutes a day, 5 days a week).  Do muscle strengthening activities 2 days a week. These help control your weight and prevent disease.  No smoking.  Wear sunscreen to prevent skin cancer.  Have a dental exam and cleaning every 6 months.  Yearly exams  See your health care team every year to talk about:  Any changes in your health.  Any medicines your care team has prescribed.  Preventive care, family planning, and ways to prevent chronic diseases.  Shots (vaccines)   HPV shots (up to age 26), if you've never had them before.  Hepatitis B shots (up to age 59), if you've never had them before.  COVID-19 shot: Get this shot when it's due.  Flu shot: Get a flu shot every year.  Tetanus shot: Get a tetanus shot every 10 years.  Pneumococcal, hepatitis A, and RSV shots: Ask your care team if you need these based on your risk.  Shingles shot (for age 50 and up)  General health tests  Diabetes screening:  Starting at age 35, Get screened for diabetes at least every 3 years.  If you are younger than age 35, ask your care team if you should be screened for diabetes.  Cholesterol test: At age 39, start having a cholesterol test every 5 years, or more often if  advised.  Bone density scan (DEXA): At age 50, ask your care team if you should have this scan for osteoporosis (brittle bones).  Hepatitis C: Get tested at least once in your life.  STIs (sexually transmitted infections)  Before age 24: Ask your care team if you should be screened for STIs.  After age 24: Get screened for STIs if you're at risk. You are at risk for STIs (including HIV) if:  You are sexually active with more than one person.  You don't use condoms every time.  You or a partner was diagnosed with a sexually transmitted infection.  If you are at risk for HIV, ask about PrEP medicine to prevent HIV.  Get tested for HIV at least once in your life, whether you are at risk for HIV or not.  Cancer screening tests  Cervical cancer screening: If you have a cervix, begin getting regular cervical cancer screening tests starting at age 21.  Breast cancer scan (mammogram): If you've ever had breasts, begin having regular mammograms starting at age 40. This is a scan to check for breast cancer.  Colon cancer screening: It is important to start screening for colon cancer at age 45.  Have a colonoscopy test every 10 years (or more often if you're at risk) Or, ask your provider about stool tests like a FIT test every year or Cologuard test every 3 years.  To learn more about your testing options, visit:   .  For help making a decision, visit:   https://bit.ly/kt31206.  Prostate cancer screening test: If you have a prostate, ask your care team if a prostate cancer screening test (PSA) at age 55 is right for you.  Lung cancer screening: If you are a current or former smoker ages 50 to 80, ask your care team if ongoing lung cancer screenings are right for you.  For informational purposes only. Not to replace the advice of your health care provider. Copyright   2023 [employer]. All rights reserved. Clinically reviewed by the [facility] Transitions Program. [employer] 551164 - REV 01/24.

## 2024-09-28 NOTE — MR AVS SNAPSHOT
Edwin MARCIA Nuno   2/5/2018 9:30 AM   Anticoagulation Therapy Visit    Description:  37 year old male   Provider:  [facility]   Department:  [facility]           INR as of 2/5/2018     Today's INR 3.8!      Anticoagulation Summary as of 2/5/2018     INR goal 2.0-3.0   Today's INR 3.8!   Full instructions 2/5: Hold; Otherwise 10 mg every day   Next INR check 2/8/2018    Indications   DVT (deep venous thrombosis) (H) [I82.409]  Pulmonary embolism on right (H) [I26.99]  Long-term (current) use of anticoagulants [Z79.01] [Z79.01]         Description     No warfarin Monday 2/5/18.  Take 10mg Tues & 10mg Wed.  Recheck INR Thursday 2/8/18.      Your next Anticoagulation Clinic appointment(s)     Feb 08, 2018  8:45 AM CST   [facility] with [facility]   [facility]    [address] [address] [phone]              Contact Numbers     Please call [phone] with any problems or questions regarding your therapy.    If you need to cancel and/or reschedule your appointment please call one of the following numbers:  [facility] - [phone]  [place] - [phone]  [place] - [phone]  [facility] [phone]  [place] - [phone]            February 2018 Details    Sun Mon Tue Wed Thu Fri Sat         1               2               3                 4               5      Hold   See details      6      10 mg         7      10 mg         8            9               10                 11               12               13               14               15               16               17                 18               19               20               21               22               23               24                 25               26               27               28                   Date Details   02/05 This INR check       Date of next INR:  2/8/2018         How to take your warfarin dose     To take:  10 mg Take 2 of  the 5 mg tablets.    Hold Do not take your warfarin dose. See the Details table to the right for additional instructions.                 independent

## 2025-03-19 ENCOUNTER — TELEPHONE (OUTPATIENT)
Dept: HEMATOLOGY | Facility: CLINIC | Age: 45
End: 2025-03-19
Payer: COMMERCIAL

## 2025-04-14 ENCOUNTER — TELEPHONE (OUTPATIENT)
Dept: HEMATOLOGY | Facility: CLINIC | Age: 45
End: 2025-04-14
Payer: COMMERCIAL

## 2025-04-14 NOTE — TELEPHONE ENCOUNTER
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  [facility] Provider: ANA Nguyen    Incoming fax from [facility] in [place], [place].     Pt is scheduled for an in office balloon sinuplasty on 06/05/2025 with Dr. Antoinette Eastman. They are requesting a two day Xarelto hold prior to procedure and to resume the day after. They are requesting a fax back to confirm this is OK.    Routing to care team for confirmation. A brief signed letter would likely be best. This should be faxed to [phone].    Harley HUDSON RN  [facility]  Office: [phone]  Clinic: [phone]  Fax: [phone]

## 2025-04-14 NOTE — LETTER
[facility]  [address]  [address]  [place] [address]  Phone: [phone]  Fax: [phone]     04/15/25       Edwin Curry (1980) is followed at the [facility] for his history of recurrent VTE. He is on Xarelto 20mg daily for secondary prophylaxis. He is cleared to hold the Xarelto for two days prior to his 06/05/2025 sinuplasty. He can resume the Xarelto the following day as long as he is not having any issues with bleeding.    Please feel free to reach out if you have any questions or concerns.       Rena Yuan, MPH, PA-C  [facility]

## 2025-05-22 NOTE — PROGRESS NOTES
[facility]  [address] [place] [address] [address], [place] [address]  Main: [phone], Fax: [phone]    Virtual Visit Note:    Patient: Edwin Nuno  MRN: [id number]  : 1980  TONA: 25       Reason for visit:  History of venous thromboembolism, annual follow up.      Clinical History Summary:  Edwin Nuno is a 44 year old male with past medical history significant for hypertension, low testosterone, and recurrent venous thromboembolism. He presents today for follow up.     Milad has history of surgically provoked DVT and PE after right knee surgery and while on testosterone therapy in 2018. He was treated with anticoagulation for 6 months. He had very labile INRs on warfarin. He never had lupus anticoagulant checked nor chromogenic factor X level. His testosterone was discontinued after his thrombotic event. Of note, he did not have any erythrocytosis at the time of his event. He did have bleeding on coumadin with elevated INR. No source was found on testing. He was supposed to have a colonoscopy however this was never completed.      He had repeat left knee surgery in  and was treated with Xarelto at prophylactic intensity postoperatively and tolearted the procedure well.      Milad was diagnosed with right lower extremity DVT on 4/15/2021. 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 He had no other signs/symptoms of VITT but no laboratory testing was completed.      He has had prior inheritable thrombophilia evaluation which was negative. His cardiolipin antibodies were negative.      He saw SIENA Flood in 2021 who recommended long term anticoagulation with Xarelto. He has not been seen in follow up since then as his primary care team fills his prescriptions. He did not have any follow up imaging to assess for thrombus resolution.        He re-established care with me in 1/2024. He was tolerating Xarelto well without any bleeding issues. He noted peristent anemia however without documented bleeding. His repeat labs showed normal hemoglobin, iron, ferritin, B12 and folate levels.      He also reported he has been off testosterone and had worsening symptoms and his T level is again low and considered resuming T therapy but he ultimately elected not to.      Interim History:  Today, Milad notes that he is doing fairly well.  He is tolerating Xarelto once a day without any bleeding concerns or new VTE symptoms.  He does have a history of venous incompetency and underwent prior vein procedures many years ago at [facility].  He is wearing compression socks routinely.  He has started to develop some blue discoloration of the inside of his right leg above the ankle that is 4 to 5 inches in length without any overlying prominent varicosities.  He has not had any follow-up with the vein clinic or repeat venous incompetency studies recently.  He notes that it is not causing him any aching, cramping.  His edema is well-controlled with use of daily compression.     He is currently scheduled for balloon sinuplasty end of June with Dr. Antoinette Eastman in [place]. They are requesting a two day Xarelto hold prior to procedure and to resume the day after.  We discussed management of his Xarelto perioperatively.    He notes that he will be due for a colonoscopy within the next year and reports need for a future knee surgery.  These are not yet scheduled.      ROS:  Denies any bleeding complications. Specifically, no frequent epistaxis. No issues with oral mucosal bleeding. Denies any hematuria or blood in stools. Denies any shortness of breath. No chest pain. No cough. No fever.  See HPI for positive      Medications:   Current Outpatient Medications   Medication Sig Dispense Refill    albuterol (PROAIR HFA/PROVENTIL HFA/VENTOLIN HFA) 108 (90 Base) MCG/ACT  inhaler Inhale 2 puffs into the lungs every 6 hours as needed for shortness of breath, wheezing or cough 18 g 3    cetirizine (ZYRTEC) 10 MG tablet Take 10 mg by mouth daily      lisinopril (ZESTRIL) 30 MG tablet Take 1 tablet (30 mg) by mouth daily. 90 tablet 3    omeprazole (PRILOSEC) 40 MG DR capsule Take 1 capsule (40 mg) by mouth daily 90 capsule 3    rivaroxaban ANTICOAGULANT (XARELTO ANTICOAGULANT) 20 MG TABS tablet Take 1 tablet (20 mg) by mouth daily (with dinner) 90 tablet 3    tirzepatide-Weight Management (ZEPBOUND) 2.5 MG/0.5ML prefilled pen Inject 0.5 mLs (2.5 mg) subcutaneously every 7 days.          Allergies:    No Known Allergies    PMH:  Past Medical History:   Diagnosis Date    DVT     right leg dx 1/13/2018    Family history of colon cancer     GERD (gastroesophageal reflux disease)     HTN (hypertension)     Hypotestosteronemia         Social History:   Social History     Tobacco Use    Smoking status: Never    Smokeless tobacco: Former     Quit date: 6/22/2002   Vaping Use    Vaping status: Never Used   Substance Use Topics    Alcohol use: Yes     Comment: occas    Drug use: No       Family History:  Deferred.    Objective:  Vitals: None, virtual visit    Exam:   Constitutional: Appears well, no distress      Labs:  CBC RESULTS:   Recent Labs   Lab Test 01/31/24  1354   WBC 7.7   RBC 4.70   HGB 14.2   HCT 40.6   MCV 86   MCH 30.2   MCHC 35.0   RDW 12.3        Last Comprehensive Metabolic Panel:  Sodium   Date Value Ref Range Status   08/26/2024 141 135 - 145 mmol/L Final   04/15/2021 136 133 - 144 mmol/L Final     Potassium   Date Value Ref Range Status   08/26/2024 4.2 3.4 - 5.3 mmol/L Final   02/07/2022 4.1 3.4 - 5.3 mmol/L Final   04/15/2021 4.0 3.4 - 5.3 mmol/L Final     Chloride   Date Value Ref Range Status   08/26/2024 103 98 - 107 mmol/L Final   02/07/2022 103 94 - 109 mmol/L Final   04/15/2021 103 94 - 109 mmol/L Final     Carbon Dioxide   Date Value Ref Range Status    04/15/2021 27 20 - 32 mmol/L Final     Carbon Dioxide (CO2)   Date Value Ref Range Status   08/26/2024 28 22 - 29 mmol/L Final   02/07/2022 28 20 - 32 mmol/L Final     Anion Gap   Date Value Ref Range Status   08/26/2024 10 7 - 15 mmol/L Final   02/07/2022 4 3 - 14 mmol/L Final   04/15/2021 6 3 - 14 mmol/L Final     Glucose   Date Value Ref Range Status   08/26/2024 84 70 - 99 mg/dL Final   02/07/2022 96 70 - 99 mg/dL Final   04/15/2021 104 (H) 70 - 99 mg/dL Final     Urea Nitrogen   Date Value Ref Range Status   08/26/2024 11.1 6.0 - 20.0 mg/dL Final   02/07/2022 19 7 - 30 mg/dL Final   04/15/2021 27 7 - 30 mg/dL Final     Creatinine   Date Value Ref Range Status   08/26/2024 0.99 0.67 - 1.17 mg/dL Final   04/15/2021 1.17 0.66 - 1.25 mg/dL Final     GFR Estimate   Date Value Ref Range Status   08/26/2024 >90 >60 mL/min/1.73m2 Final     Comment:     eGFR calculated using 2021 CKD-EPI equation.   04/15/2021 77 >60 mL/min/[1.73_m2] Final     Comment:     Non  GFR Calc  Starting 12/18/2018, serum creatinine based estimated GFR (eGFR) will be   calculated using the Chronic Kidney Disease Epidemiology Collaboration   (CKD-EPI) equation.       Calcium   Date Value Ref Range Status   08/26/2024 9.8 8.8 - 10.4 mg/dL Final     Comment:     Reference intervals for this test were updated on 7/16/2024 to reflect our healthy population more accurately. There may be differences in the flagging of prior results with similar values performed with this method. Those prior results can be interpreted in the context of the updated reference intervals.   04/15/2021 9.2 8.5 - 10.1 mg/dL Final     Liver Function Studies -   Recent Labs   Lab Test 01/02/24  2339   PROTTOTAL 7.4   ALBUMIN 4.4   BILITOTAL 0.2   ALKPHOS 64   AST 36   ALT 69        Imaging:    Narrative & Impression   EXAM: US LOWER EXTREMITY VENOUS DUPLEX RIGHT  LOCATION: [facility]  DATE: 6/25/2024     INDICATION: Pain. Suspected  DVT.  COMPARISON: Ultrasound venous right lower extremity Doppler 4/15/2021.  TECHNIQUE: Venous Duplex ultrasound of the right lower extremity with and without compression, augmentation and duplex. Color flow and spectral Doppler with waveform analysis performed.     FINDINGS: Exam includes the common femoral, femoral, popliteal, and contralateral common femoral veins as well as segmentally visualized deep calf veins and greater saphenous vein.      RIGHT: Interval resolution of right popliteal and peroneal vein DVT demonstrated previously. No DVT in the right lower extremity veins on current examination. No superficial thrombophlebitis. No popliteal cyst.                                                                      IMPRESSION:  1.  Resolved right popliteal and peroneal vein DVT demonstrated previously. No deep venous thrombosis in the right lower extremity on current study.     2.  No popliteal cyst.     Assessment:  History of recurrent venous thromboembolism on long term anticoagulation with Xarelto 20mg once daily for secondary prevention   History of normocytic anemia- resolved on last labs.   Folate, B12, iron all WNL.  History of low testosterone, previously on testosterone, stopped after first DVT event despite no erythrocytosis    Plan:  Continue anticoagulation with Xarelto 20mg once daily for venous thromboembolism prevention.  Discussed option to reduce dose to 10mg once daily if he desires or if he is having any bleeding concerns.  He elects to stay at the therapeutic dose for now.    He will let me know if he resumes T therapy to monitor for HGB/hematocrit elevation. Therapeutic dose while on T therapy is recommended.  He has no plans of resuming at present.    He was recommended to continue to wear daily compression socks.  He will think about whether or not he wants to go back to his prior vein clinic or to reestablish care at the [facility] for further evaluation and management of venous  incompetency.  He will send me a message or give us a phone call if he desires referral.    For upcoming surgeries and procedures, hold Xarelto x 48 hours prior and resume within 12-24 hours after.     Patient instructed to contact the clinic should they require any surgical procedures for perioperative planning.     Return to clinic in 1 year, sooner if any concerns.       Video-Visit Details:  Type of service:  Video Visit   Joined the call at 5/28/2025, 10:59:39 am.  Left the call at 5/28/2025, 11:11:28 am.  You were on the call for 11 minutes 49 seconds  Originating Location (pt. Location) [place], [place]  Distant Location (provider location):  [facility]  Mode of Communication:  Video Conference via AmericanSebastian Yuan, MPH, PA-C  [facility]    20 minutes spent by me on the date of the encounter doing chart review, review of outside records, review of test results, interpretation of tests, patient visit, and documentation

## 2025-05-28 ENCOUNTER — VIRTUAL VISIT (OUTPATIENT)
Dept: HEMATOLOGY | Facility: CLINIC | Age: 45
End: 2025-05-28
Attending: PHYSICIAN ASSISTANT
Payer: COMMERCIAL

## 2025-05-28 DIAGNOSIS — Z86.718 HISTORY OF DVT (DEEP VEIN THROMBOSIS): ICD-10-CM

## 2025-05-28 DIAGNOSIS — Z79.01 LONG TERM CURRENT USE OF ANTICOAGULANT THERAPY: ICD-10-CM

## 2025-05-28 DIAGNOSIS — Z71.89 ENCOUNTER FOR ANTICOAGULATION DISCUSSION AND COUNSELING: ICD-10-CM

## 2025-05-28 DIAGNOSIS — Z86.711 HISTORY OF PULMONARY EMBOLISM: Primary | ICD-10-CM

## 2025-05-28 NOTE — PATIENT INSTRUCTIONS
[facility]  [facility] [address], [address], [place] [address]  Main: [phone], Fax: [phone]    Edwin,   It was a pleasure seeing you today.  Thank you for allowing us to be involved in your care.  Please let us know if there is anything else we can do for you, so that we can be sure you are leaving completely satisfied with your care experience. Below is the plan that we discussed.     Continue Xarelto at 20mg once daily with food   Continue to wear compression socks at least knee-high 20 to 30 mmHg daily  Please call if you want a referral to get updated venous competency ultrasound to check the valves of the superficial and deep veins of the legs prior to seeing a vein specialist back.  For your upcoming surgeries and procedures, please hold Xarelto for 48 hours prior to the surgery and resume within 24 hours after surgery.  Call if you have any new bleeding symptoms or concerns about new clot so that we can expedite evaluation for you  Labs due this next year.  I put orders in and they can be drawn at any [place] location at your convenience.      We would like a provider on our team to see you at least annually for optimal care and to allow us to continue to prescribe for you.        Return to clinic in  in one year.    If you have questions or concerns, please don't hesitate to send a [employer] Message for non urgent matters or contact my nurse clinician, Joan. Her number is [phone]. If they are unavailable and you have immediate concerns, please call [phone] and ask for a nurse.     Rena Yuan, MPH, PA-C  [facility]

## 2025-06-11 ENCOUNTER — LAB (OUTPATIENT)
Dept: LAB | Facility: CLINIC | Age: 45
End: 2025-06-11
Payer: COMMERCIAL

## 2025-06-11 ENCOUNTER — RESULTS FOLLOW-UP (OUTPATIENT)
Dept: HEMATOLOGY | Facility: CLINIC | Age: 45
End: 2025-06-11

## 2025-06-11 DIAGNOSIS — D64.9 ANEMIA, UNSPECIFIED TYPE: Primary | ICD-10-CM

## 2025-06-11 DIAGNOSIS — Z86.711 HISTORY OF PULMONARY EMBOLISM: ICD-10-CM

## 2025-06-11 DIAGNOSIS — Z86.718 HISTORY OF DVT (DEEP VEIN THROMBOSIS): ICD-10-CM

## 2025-06-11 LAB
ALBUMIN SERPL BCG-MCNC: 4.4 G/DL (ref 3.5–5.2)
ALP SERPL-CCNC: 58 U/L (ref 40–150)
ALT SERPL W P-5'-P-CCNC: 33 U/L (ref 0–70)
ANION GAP SERPL CALCULATED.3IONS-SCNC: 10 MMOL/L (ref 7–15)
AST SERPL W P-5'-P-CCNC: 24 U/L (ref 0–45)
BILIRUB SERPL-MCNC: 0.5 MG/DL
BUN SERPL-MCNC: 14.6 MG/DL (ref 6–20)
CALCIUM SERPL-MCNC: 9.5 MG/DL (ref 8.8–10.4)
CHLORIDE SERPL-SCNC: 103 MMOL/L (ref 98–107)
CREAT SERPL-MCNC: 1.03 MG/DL (ref 0.67–1.17)
EGFRCR SERPLBLD CKD-EPI 2021: >90 ML/MIN/1.73M2
ERYTHROCYTE [DISTWIDTH] IN BLOOD BY AUTOMATED COUNT: 12.9 % (ref 10–15)
GLUCOSE SERPL-MCNC: 92 MG/DL (ref 70–99)
HCO3 SERPL-SCNC: 25 MMOL/L (ref 22–29)
HCT VFR BLD AUTO: 36.5 % (ref 40–53)
HGB BLD-MCNC: 12.6 G/DL (ref 13.3–17.7)
MCH RBC QN AUTO: 29.4 PG (ref 26.5–33)
MCHC RBC AUTO-ENTMCNC: 34.5 G/DL (ref 31.5–36.5)
MCV RBC AUTO: 85 FL (ref 78–100)
PLATELET # BLD AUTO: 245 10E3/UL (ref 150–450)
POTASSIUM SERPL-SCNC: 4.1 MMOL/L (ref 3.4–5.3)
PROT SERPL-MCNC: 7.3 G/DL (ref 6.4–8.3)
RBC # BLD AUTO: 4.28 10E6/UL (ref 4.4–5.9)
SODIUM SERPL-SCNC: 138 MMOL/L (ref 135–145)
WBC # BLD AUTO: 7.5 10E3/UL (ref 4–11)

## 2025-06-11 PROCEDURE — 85027 COMPLETE CBC AUTOMATED: CPT

## 2025-06-11 PROCEDURE — 36415 COLL VENOUS BLD VENIPUNCTURE: CPT

## 2025-06-11 PROCEDURE — 80053 COMPREHEN METABOLIC PANEL: CPT

## 2025-07-28 ENCOUNTER — PATIENT OUTREACH (OUTPATIENT)
Dept: CARE COORDINATION | Facility: CLINIC | Age: 45
End: 2025-07-28
Payer: COMMERCIAL

## 2025-08-11 ENCOUNTER — PATIENT OUTREACH (OUTPATIENT)
Dept: CARE COORDINATION | Facility: CLINIC | Age: 45
End: 2025-08-11
Payer: COMMERCIAL